# Patient Record
Sex: FEMALE | Race: WHITE | NOT HISPANIC OR LATINO | Employment: OTHER | URBAN - METROPOLITAN AREA
[De-identification: names, ages, dates, MRNs, and addresses within clinical notes are randomized per-mention and may not be internally consistent; named-entity substitution may affect disease eponyms.]

---

## 2017-02-21 ENCOUNTER — CONVERSION ENCOUNTER (OUTPATIENT)
Dept: FAMILY MEDICINE CLINIC | Facility: CLINIC | Age: 57
End: 2017-02-21

## 2017-02-21 LAB
ALBUMIN SERPL-MCNC: 4.5 G/DL (ref 3.6–5.1)
ALBUMIN/GLOB SERPL: ABNORMAL {RATIO} (ref 1–2.5)
ALP SERPL-CCNC: 73 UNITS/L (ref 33–130)
ALT SERPL-CCNC: 12 UNITS/L (ref 6–29)
ANA SER QL IA: POSITIVE
AST SERPL-CCNC: 17 UNITS/L (ref 10–35)
BILIRUB SERPL-MCNC: 0.6 MG/DL (ref 0.2–1.2)
BILIRUB UR QL STRIP: NEGATIVE
BUN SERPL-MCNC: 16 MG/DL (ref 7–25)
BUN/CREAT SERPL: ABNORMAL (ref 6–22)
C3 SERPL-MCNC: 114 MG/DL (ref 90–180)
C4 SERPL-MCNC: 19 MG/DL (ref 16–47)
CALCIUM SERPL-MCNC: 9.4 MG/DL (ref 8.6–10.4)
CHLORIDE SERPL-SCNC: 103 MMOL/L (ref 98–110)
CK SERPL-CCNC: 249 UNITS/L (ref 29–143)
CO2 CONTENT VENOUS: 31 MMOL/L (ref 20–31)
COLOR UR: YELLOW
CONV BACTERIA IN URINE MICRO: ABNORMAL /HPF
CONV HYALINE CASTS IN URINE MICRO: ABNORMAL
CONV PROTEIN IN URINE BY AUTOMATED TEST STRIP: NEGATIVE
CONV TOTAL PROTEIN: 7 G/DL (ref 6.1–8.1)
CREAT UR-MCNC: 0.78 MG/DL (ref 0.5–1.05)
CRP SERPL-MCNC: 0.38 MG/DL
CYCLIC CITRULLINATED PEPTIDE ANTIBODY: ABNORMAL
DSDNA AB SER-ACNC: <1 [IU]/ML
ERYTHROCYTE [DISTWIDTH] IN BLOOD BY AUTOMATED COUNT: 15.9 % (ref 11–15)
ERYTHROCYTE [SEDIMENTATION RATE] IN BLOOD BY WESTERGREN METHOD: 17 MM/HR
GLOBULIN UR ELPH-MCNC: ABNORMAL G/DL (ref 1.9–3.7)
GLUCOSE SERPL-MCNC: 83 MG/DL (ref 65–99)
GLUCOSE UR QL: NEGATIVE G/DL
HCT VFR BLD AUTO: 39.3 % (ref 35–45)
HGB BLD-MCNC: 13.2 G/DL (ref 11.7–15.5)
HGB UR QL STRIP: NEGATIVE
KETONES UR QL STRIP: NEGATIVE
LEUKOCYTE ESTERASE UR QL STRIP: ABNORMAL
MCH RBC QN AUTO: 33.9 PG (ref 27–33)
MCHC RBC AUTO-ENTMCNC: ABNORMAL % (ref 32–36)
MCV RBC AUTO: 101 FL (ref 80–100)
NITRITE UR QL STRIP: NEGATIVE
PH UR STRIP.AUTO: 6 [PH] (ref 5–8)
PLATELET # BLD AUTO: ABNORMAL 10*3/MM3 (ref 140–400)
PMV BLD AUTO: 11.1 FL (ref 7.5–12.5)
POTASSIUM SERPL-SCNC: 4 MMOL/L (ref 3.5–5.3)
RBC # BLD AUTO: ABNORMAL 10*6/MM3 (ref 3.8–5.1)
RBC #/AREA URNS HPF: ABNORMAL /[HPF]
SODIUM SERPL-SCNC: 141 MMOL/L (ref 135–146)
SP GR UR: 1.01 (ref 1–1.03)
SQUAMOUS #/AREA URNS HPF: ABNORMAL /HPF
WBC # BLD AUTO: ABNORMAL K/UL (ref 3.8–10.8)
WBC #/AREA URNS HPF: ABNORMAL CELLS/HPF

## 2017-06-02 LAB
ALBUMIN SERPL-MCNC: 4.2 G/DL (ref 3.6–5.1)
ALBUMIN/GLOB SERPL: ABNORMAL {RATIO} (ref 1–2.5)
ALDOLASE SERPL-CCNC: 6.2 UNITS/L
ALP SERPL-CCNC: 71 UNITS/L (ref 33–130)
ALT SERPL-CCNC: 15 UNITS/L (ref 6–29)
AST SERPL-CCNC: 18 UNITS/L (ref 10–35)
BILIRUB SERPL-MCNC: 0.4 MG/DL (ref 0.2–1.2)
BILIRUB UR QL STRIP: NEGATIVE
BUN SERPL-MCNC: 14 MG/DL (ref 7–25)
BUN/CREAT SERPL: ABNORMAL (ref 6–22)
CALCIUM SERPL-MCNC: 9.5 MG/DL (ref 8.6–10.4)
CHLORIDE SERPL-SCNC: 107 MMOL/L (ref 98–110)
CK SERPL-CCNC: 197 UNITS/L (ref 29–143)
CO2 CONTENT VENOUS: 28 MMOL/L (ref 20–31)
COLOR UR: ABNORMAL
CONV BACTERIA IN URINE MICRO: ABNORMAL /HPF
CONV HYALINE CASTS IN URINE MICRO: ABNORMAL
CONV PROTEIN IN URINE BY AUTOMATED TEST STRIP: NEGATIVE
CONV TOTAL PROTEIN: 6.6 G/DL (ref 6.1–8.1)
CREAT UR-MCNC: 0.72 MG/DL (ref 0.5–1.05)
ERYTHROCYTE [DISTWIDTH] IN BLOOD BY AUTOMATED COUNT: 13.8 % (ref 11–15)
ERYTHROCYTE [SEDIMENTATION RATE] IN BLOOD BY WESTERGREN METHOD: 19 MM/HR
GLOBULIN UR ELPH-MCNC: ABNORMAL G/DL (ref 1.9–3.7)
GLUCOSE SERPL-MCNC: 100 MG/DL (ref 65–99)
GLUCOSE UR QL: NEGATIVE G/DL
HCT VFR BLD AUTO: 38.2 % (ref 35–45)
HGB BLD-MCNC: 12.8 G/DL (ref 11.7–15.5)
HGB UR QL STRIP: NEGATIVE
KETONES UR QL STRIP: NEGATIVE
LEUKOCYTE ESTERASE UR QL STRIP: ABNORMAL
MCH RBC QN AUTO: 33.6 PG (ref 27–33)
MCHC RBC AUTO-ENTMCNC: ABNORMAL % (ref 32–36)
MCV RBC AUTO: 100.3 FL (ref 80–100)
NITRITE UR QL STRIP: NEGATIVE
PH UR STRIP.AUTO: 6.5 [PH] (ref 5–8)
PLATELET # BLD AUTO: ABNORMAL 10*3/MM3 (ref 140–400)
PMV BLD AUTO: 12.5 FL (ref 7.5–12.5)
POTASSIUM SERPL-SCNC: 4.6 MMOL/L (ref 3.5–5.3)
RBC # BLD AUTO: ABNORMAL 10*6/MM3 (ref 3.8–5.1)
RBC #/AREA URNS HPF: ABNORMAL /[HPF]
SODIUM SERPL-SCNC: 140 MMOL/L (ref 135–146)
SP GR UR: 1.02 (ref 1–1.03)
SQUAMOUS #/AREA URNS HPF: ABNORMAL /HPF
TSH SERPL-ACNC: 0.57 MICROINTL UNITS/ML
WBC # BLD AUTO: ABNORMAL K/UL (ref 3.8–10.8)
WBC #/AREA URNS HPF: ABNORMAL CELLS/HPF

## 2017-11-03 LAB
ALBUMIN SERPL-MCNC: 4.3 G/DL (ref 3.6–5.1)
ALBUMIN/GLOB SERPL: ABNORMAL {RATIO} (ref 1–2.5)
ALDOLASE SERPL-CCNC: 4.1 UNITS/L
ALP SERPL-CCNC: 77 UNITS/L (ref 33–130)
ALT SERPL-CCNC: 20 UNITS/L (ref 6–29)
AST SERPL-CCNC: 19 UNITS/L (ref 10–35)
BASOPHILS # BLD AUTO: ABNORMAL 10*3/MM3 (ref 0–200)
BASOPHILS NFR BLD AUTO: 0.9 %
BILIRUB SERPL-MCNC: 0.4 MG/DL (ref 0.2–1.2)
BILIRUB UR QL STRIP: NEGATIVE
BUN SERPL-MCNC: 10 MG/DL (ref 7–25)
BUN/CREAT SERPL: ABNORMAL (ref 6–22)
C3 SERPL-MCNC: 150 MG/DL (ref 90–180)
C4 SERPL-MCNC: 25 MG/DL (ref 16–47)
CALCIUM SERPL-MCNC: 9.6 MG/DL (ref 8.6–10.4)
CHLORIDE SERPL-SCNC: 105 MMOL/L (ref 98–110)
CK SERPL-CCNC: 203 UNITS/L (ref 29–143)
CO2 CONTENT VENOUS: 31 MMOL/L (ref 20–31)
COLOR UR: ABNORMAL
CONV BACTERIA IN URINE MICRO: ABNORMAL /HPF
CONV CALCIUM OXALATE CRYSTALS /HPF IN URINE SEDIMENT BY MICROSCOPY: ABNORMAL
CONV HYALINE CASTS IN URINE MICRO: ABNORMAL
CONV NEUTROPHILS/100 LEUKOCYTES IN BODY FLUID BY MANUAL COUNT: 60.5 %
CONV PROTEIN IN URINE BY AUTOMATED TEST STRIP: ABNORMAL
CONV TOTAL PROTEIN: 6.7 G/DL (ref 6.1–8.1)
CREAT UR-MCNC: 0.77 MG/DL (ref 0.5–1.05)
CRP SERPL-MCNC: 0.39 MG/DL
EOSINOPHIL # BLD AUTO: 2.8 %
EOSINOPHIL # BLD AUTO: ABNORMAL 10*3/MM3 (ref 15–500)
ERYTHROCYTE [DISTWIDTH] IN BLOOD BY AUTOMATED COUNT: 12.7 % (ref 11–15)
ERYTHROCYTE [SEDIMENTATION RATE] IN BLOOD BY WESTERGREN METHOD: 29 MM/HR
GLOBULIN UR ELPH-MCNC: ABNORMAL G/DL (ref 1.9–3.7)
GLUCOSE SERPL-MCNC: 94 MG/DL (ref 65–99)
GLUCOSE UR QL: NEGATIVE G/DL
HCT VFR BLD AUTO: 39.3 % (ref 35–45)
HGB BLD-MCNC: 13.4 G/DL (ref 11.7–15.5)
HGB UR QL STRIP: NEGATIVE
KETONES UR QL STRIP: ABNORMAL
LEUKOCYTE ESTERASE UR QL STRIP: ABNORMAL
LYMPHOCYTES # BLD AUTO: ABNORMAL 10*3/MM3 (ref 850–3900)
LYMPHOCYTES NFR BLD AUTO: 30.2 %
MCH RBC QN AUTO: 33.8 PG (ref 27–33)
MCHC RBC AUTO-ENTMCNC: ABNORMAL % (ref 32–36)
MCV RBC AUTO: 99.2 FL (ref 80–100)
MONOCYTES # BLD AUTO: ABNORMAL 10*3/MICROLITER (ref 200–950)
MONOCYTES NFR BLD AUTO: 5.6 %
NEUTROPHILS # BLD AUTO: ABNORMAL 10*3/MM3 (ref 1500–7800)
NITRITE UR QL STRIP: NEGATIVE
PH UR STRIP.AUTO: 6 [PH] (ref 5–8)
PLATELET # BLD AUTO: ABNORMAL 10*3/MM3 (ref 140–400)
PMV BLD AUTO: 12.1 FL (ref 7.5–12.5)
POTASSIUM SERPL-SCNC: 4.9 MMOL/L (ref 3.5–5.3)
RBC # BLD AUTO: ABNORMAL 10*6/MM3 (ref 3.8–5.1)
RBC #/AREA URNS HPF: ABNORMAL /[HPF]
SODIUM SERPL-SCNC: 141 MMOL/L (ref 135–146)
SP GR UR: 1.03 (ref 1–1.03)
SQUAMOUS #/AREA URNS HPF: ABNORMAL /HPF
SQUAMOUS SPT QL MICRO: ABNORMAL
WBC # BLD AUTO: ABNORMAL K/UL (ref 3.8–10.8)
WBC #/AREA URNS HPF: ABNORMAL CELLS/HPF

## 2018-03-16 ENCOUNTER — HOSPITAL ENCOUNTER (OUTPATIENT)
Dept: RHEUMATOLOGY | Facility: CLINIC | Age: 58
Discharge: HOME | End: 2018-03-16
Attending: INTERNAL MEDICINE | Admitting: INTERNAL MEDICINE

## 2018-05-07 LAB
ALBUMIN SERPL-MCNC: 4.3 G/DL (ref 3.6–5.1)
ALBUMIN/GLOB SERPL: ABNORMAL {RATIO} (ref 1–2.5)
ALP SERPL-CCNC: 84 UNITS/L (ref 33–130)
ALT SERPL-CCNC: 27 UNITS/L (ref 6–29)
AST SERPL-CCNC: 21 UNITS/L (ref 10–35)
BASOPHILS # BLD AUTO: ABNORMAL 10*3/MM3 (ref 0–200)
BASOPHILS NFR BLD AUTO: 0.9 %
BILIRUB SERPL-MCNC: 0.7 MG/DL (ref 0.2–1.2)
BUN SERPL-MCNC: 11 MG/DL (ref 7–25)
BUN/CREAT SERPL: ABNORMAL (ref 6–22)
CALCIUM SERPL-MCNC: 9.4 MG/DL (ref 8.6–10.4)
CHLORIDE SERPL-SCNC: 100 MMOL/L (ref 98–110)
CO2 CONTENT VENOUS: 32 MMOL/L (ref 20–31)
COLOR UR: ABNORMAL
CONV NEUTROPHILS/100 LEUKOCYTES IN BODY FLUID BY MANUAL COUNT: 56.6 %
CONV TOTAL PROTEIN: 6.8 G/DL (ref 6.1–8.1)
CREAT UR-MCNC: 0.74 MG/DL (ref 0.5–1.05)
CRP SERPL-MCNC: 0.47 MG/DL
EOSINOPHIL # BLD AUTO: 3.7 %
EOSINOPHIL # BLD AUTO: ABNORMAL 10*3/MM3 (ref 15–500)
ERYTHROCYTE [DISTWIDTH] IN BLOOD BY AUTOMATED COUNT: 13.6 % (ref 11–15)
ERYTHROCYTE [SEDIMENTATION RATE] IN BLOOD BY WESTERGREN METHOD: 14 MM/HR
GLOBULIN UR ELPH-MCNC: ABNORMAL G/DL (ref 1.9–3.7)
GLUCOSE SERPL-MCNC: 93 MG/DL (ref 65–99)
HCT VFR BLD AUTO: 40.2 % (ref 35–45)
HGB BLD-MCNC: 14.2 G/DL (ref 11.7–15.5)
LYMPHOCYTES # BLD AUTO: ABNORMAL 10*3/MM3 (ref 850–3900)
LYMPHOCYTES NFR BLD AUTO: 31.5 %
MCH RBC QN AUTO: 34.3 PG (ref 27–33)
MCHC RBC AUTO-ENTMCNC: ABNORMAL % (ref 32–36)
MCV RBC AUTO: 97.1 FL (ref 80–100)
MONOCYTES # BLD AUTO: ABNORMAL 10*3/MICROLITER (ref 200–950)
MONOCYTES NFR BLD AUTO: 7.3 %
NEUTROPHILS # BLD AUTO: ABNORMAL 10*3/MM3 (ref 1500–7800)
PLATELET # BLD AUTO: ABNORMAL 10*3/MM3 (ref 140–400)
PMV BLD AUTO: 12.2 FL (ref 7.5–12.5)
POTASSIUM SERPL-SCNC: 3.5 MMOL/L (ref 3.5–5.3)
RBC # BLD AUTO: ABNORMAL 10*6/MM3 (ref 3.8–5.1)
SODIUM SERPL-SCNC: 140 MMOL/L (ref 135–146)
WBC # BLD AUTO: ABNORMAL K/UL (ref 3.8–10.8)

## 2018-05-17 LAB — ALDOLASE SERPL-CCNC: 6.7 UNITS/L

## 2018-07-17 ENCOUNTER — HOSPITAL ENCOUNTER (OUTPATIENT)
Dept: LAB | Facility: HOSPITAL | Age: 58
Discharge: HOME | End: 2018-07-17
Attending: NURSE PRACTITIONER | Admitting: NURSE PRACTITIONER

## 2018-07-17 LAB
ALBUMIN SERPL-MCNC: 3.7 G/DL (ref 3.5–4.8)
ALBUMIN/GLOB SERPL: 1.4 {RATIO} (ref 1–1.7)
ALP SERPL-CCNC: 62 IU/L (ref 32–91)
ALT SERPL-CCNC: 24 IU/L (ref 14–54)
ANION GAP SERPL CALC-SCNC: 12.5 MMOL/L (ref 10–20)
AST SERPL-CCNC: 26 IU/L (ref 15–41)
BASOPHILS # BLD AUTO: 0.1 10*3/UL (ref 0–0.2)
BASOPHILS NFR BLD AUTO: 1 % (ref 0–2)
BILIRUB SERPL-MCNC: 0.2 MG/DL (ref 0.3–1.2)
BUN SERPL-MCNC: 9 MG/DL (ref 8–20)
BUN/CREAT SERPL: 12.9 (ref 5.4–26.2)
CALCIUM SERPL-MCNC: 9.3 MG/DL (ref 8.9–10.3)
CHLORIDE SERPL-SCNC: 102 MMOL/L (ref 101–111)
CONV CO2: 28 MMOL/L (ref 22–32)
CONV TOTAL PROTEIN: 6.4 G/DL (ref 6.1–7.9)
CREAT UR-MCNC: 0.7 MG/DL (ref 0.4–1)
CRP SERPL-MCNC: 0.38 MG/DL (ref 0–0.7)
DIFFERENTIAL METHOD BLD: (no result)
EOSINOPHIL # BLD AUTO: 0.3 10*3/UL (ref 0–0.3)
EOSINOPHIL # BLD AUTO: 4 % (ref 0–3)
ERYTHROCYTE [DISTWIDTH] IN BLOOD BY AUTOMATED COUNT: 14.2 % (ref 11.5–14.5)
ERYTHROCYTE [SEDIMENTATION RATE] IN BLOOD BY WESTERGREN METHOD: 31 MM/HR (ref 0–30)
GLOBULIN UR ELPH-MCNC: 2.7 G/DL (ref 2.5–3.8)
GLUCOSE SERPL-MCNC: 90 MG/DL (ref 65–99)
HCT VFR BLD AUTO: 39.2 % (ref 35–49)
HGB BLD-MCNC: 13.1 G/DL (ref 12–15)
LYMPHOCYTES # BLD AUTO: 2 10*3/UL (ref 0.8–4.8)
LYMPHOCYTES NFR BLD AUTO: 27 % (ref 18–42)
MCH RBC QN AUTO: 33.7 PG (ref 26–32)
MCHC RBC AUTO-ENTMCNC: 33.5 G/DL (ref 32–36)
MCV RBC AUTO: 100.6 FL (ref 80–94)
MONOCYTES # BLD AUTO: 0.5 10*3/UL (ref 0.1–1.3)
MONOCYTES NFR BLD AUTO: 7 % (ref 2–11)
NEUTROPHILS # BLD AUTO: 4.6 10*3/UL (ref 2.3–8.6)
NEUTROPHILS NFR BLD AUTO: 61 % (ref 50–75)
NRBC BLD AUTO-RTO: 0 /100{WBCS}
NRBC/RBC NFR BLD MANUAL: 0 10*3/UL
PLATELET # BLD AUTO: 166 10*3/UL (ref 150–450)
PMV BLD AUTO: 11.4 FL (ref 7.4–10.4)
POTASSIUM SERPL-SCNC: 3.5 MMOL/L (ref 3.6–5.1)
RBC # BLD AUTO: 3.89 10*6/UL (ref 4–5.4)
SODIUM SERPL-SCNC: 139 MMOL/L (ref 136–144)
WBC # BLD AUTO: 7.5 10*3/UL (ref 4.5–11.5)

## 2018-11-05 LAB
CALCIUM SERPL-MCNC: 9.9 MG/DL (ref 8.6–10.4)
INTACT PTH: 25 PG/ML (ref 14–64)

## 2019-02-12 LAB
ALBUMIN SERPL-MCNC: 4.1 G/DL (ref 3.6–5.1)
ALBUMIN/GLOB SERPL: ABNORMAL {RATIO} (ref 1–2.5)
ALP SERPL-CCNC: 57 UNITS/L (ref 33–130)
ALT SERPL-CCNC: 16 UNITS/L (ref 6–29)
AST SERPL-CCNC: 18 UNITS/L (ref 10–35)
BASOPHILS # BLD AUTO: ABNORMAL 10*3/MM3 (ref 0–200)
BASOPHILS NFR BLD AUTO: 1.1 %
BILIRUB SERPL-MCNC: 0.5 MG/DL (ref 0.2–1.2)
BILIRUB UR QL STRIP: NEGATIVE
BUN SERPL-MCNC: 9 MG/DL (ref 7–25)
BUN/CREAT SERPL: ABNORMAL (ref 6–22)
CALCIUM SERPL-MCNC: 9.3 MG/DL (ref 8.6–10.4)
CHLORIDE SERPL-SCNC: 110 MMOL/L (ref 98–110)
CO2 CONTENT VENOUS: 28 MMOL/L (ref 20–32)
COLOR UR: YELLOW
CONV BACTERIA IN URINE MICRO: ABNORMAL /HPF
CONV HYALINE CASTS IN URINE MICRO: ABNORMAL
CONV NEUTROPHILS/100 LEUKOCYTES IN BODY FLUID BY MANUAL COUNT: 65.6 %
CONV PROTEIN IN URINE BY AUTOMATED TEST STRIP: NEGATIVE
CONV TOTAL PROTEIN: 6.3 G/DL (ref 6.1–8.1)
CREAT UR-MCNC: 0.67 MG/DL (ref 0.5–1.05)
CRP SERPL-MCNC: 0.15 MG/DL
EOSINOPHIL # BLD AUTO: 1.8 %
EOSINOPHIL # BLD AUTO: ABNORMAL 10*3/MM3 (ref 15–500)
ERYTHROCYTE [DISTWIDTH] IN BLOOD BY AUTOMATED COUNT: 13 % (ref 11–15)
ERYTHROCYTE [SEDIMENTATION RATE] IN BLOOD BY WESTERGREN METHOD: 9 MM/HR
GLOBULIN UR ELPH-MCNC: ABNORMAL G/DL (ref 1.9–3.7)
GLUCOSE SERPL-MCNC: 89 MG/DL (ref 65–139)
GLUCOSE UR QL: NEGATIVE G/DL
HCT VFR BLD AUTO: 40 % (ref 35–45)
HGB BLD-MCNC: 13.5 G/DL (ref 11.7–15.5)
HGB UR QL STRIP: NEGATIVE
KETONES UR QL STRIP: NEGATIVE
LEUKOCYTE ESTERASE UR QL STRIP: ABNORMAL
LYMPHOCYTES # BLD AUTO: ABNORMAL 10*3/MM3 (ref 850–3900)
LYMPHOCYTES NFR BLD AUTO: 26.7 %
MCH RBC QN AUTO: 33.6 PG (ref 27–33)
MCHC RBC AUTO-ENTMCNC: ABNORMAL % (ref 32–36)
MCV RBC AUTO: 99.5 FL (ref 80–100)
MONOCYTES # BLD AUTO: ABNORMAL 10*3/MICROLITER (ref 200–950)
MONOCYTES NFR BLD AUTO: 4.8 %
NEUTROPHILS # BLD AUTO: ABNORMAL 10*3/MM3 (ref 1500–7800)
NITRITE UR QL STRIP: POSITIVE
PH UR STRIP.AUTO: 7 [PH] (ref 5–8)
PLATELET # BLD AUTO: ABNORMAL 10*3/MM3 (ref 140–400)
PMV BLD AUTO: 13.3 FL (ref 7.5–12.5)
POTASSIUM SERPL-SCNC: 4 MMOL/L (ref 3.5–5.3)
RBC # BLD AUTO: ABNORMAL 10*6/MM3 (ref 3.8–5.1)
RBC #/AREA URNS HPF: ABNORMAL /[HPF]
SODIUM SERPL-SCNC: 143 MMOL/L (ref 135–146)
SP GR UR: 1.02 (ref 1–1.03)
SQUAMOUS #/AREA URNS HPF: ABNORMAL /HPF
WBC # BLD AUTO: ABNORMAL K/UL (ref 3.8–10.8)
WBC #/AREA URNS HPF: ABNORMAL CELLS/HPF

## 2019-02-21 ENCOUNTER — HOSPITAL ENCOUNTER (OUTPATIENT)
Dept: LAB | Facility: HOSPITAL | Age: 59
Discharge: HOME | End: 2019-02-21
Attending: INTERNAL MEDICINE | Admitting: INTERNAL MEDICINE

## 2019-02-21 LAB
AMPICILLIN SUSC ISLT: ABNORMAL
AZTREONAM SUSC ISLT: ABNORMAL
BACTERIA ISLT: ABNORMAL
BACTERIA SPEC AEROBE CULT: ABNORMAL
BILIRUB UR QL STRIP: NEGATIVE MG/DL
CASTS URNS QL MICRO: ABNORMAL /[LPF]
CEFAZOLIN SUSC ISLT: ABNORMAL
CEFEPIME SUSC ISLT: ABNORMAL
CEFTRIAXONE SUSC ISLT: ABNORMAL
CIPROFLOXACIN SUSC ISLT: ABNORMAL
COLONY COUNT: ABNORMAL
COLOR UR: YELLOW
CONV BACTERIA IN URINE MICRO: ABNORMAL
CONV CLARITY OF URINE: ABNORMAL
CONV HYALINE CASTS IN URINE MICRO: 1 /[LPF] (ref 0–5)
CONV PROTEIN IN URINE BY AUTOMATED TEST STRIP: NEGATIVE MG/DL
CONV SMALL ROUND CELLS: ABNORMAL /[HPF]
CONV UROBILINOGEN IN URINE BY AUTOMATED TEST STRIP: 0.2 MG/DL
CULTURE INDICATED?: ABNORMAL
GLUCOSE UR QL: NEGATIVE MG/DL
HGB UR QL STRIP: NEGATIVE
KETONES UR QL STRIP: NEGATIVE MG/DL
LEUKOCYTE ESTERASE UR QL STRIP: NEGATIVE
LEVOFLOXACIN SUSC ISLT: ABNORMAL
Lab: ABNORMAL
MEROPENEM SUSC ISLT: ABNORMAL
MICRO REPORT STATUS: ABNORMAL
NITRITE UR QL STRIP: POSITIVE
NITROFURANTOIN SUSC ISLT: ABNORMAL
PH UR STRIP.AUTO: 6 [PH] (ref 4.5–8)
PIP+TAZO SUSC ISLT: ABNORMAL
RBC #/AREA URNS HPF: 1 /[HPF] (ref 0–3)
SP GR UR: 1.02 (ref 1–1.03)
SPECIMEN SOURCE: ABNORMAL
SPERM URNS QL MICRO: ABNORMAL /[HPF]
SQUAMOUS SPT QL MICRO: 2 /[HPF] (ref 0–5)
SUSC METH SPEC: ABNORMAL
TETRACYCLINE SUSC ISLT: ABNORMAL
TOBRAMYCIN SUSC ISLT: ABNORMAL
TRIMETHOPRIM/SULFA: ABNORMAL
UNIDENT CRYS URNS QL MICRO: ABNORMAL /[HPF]
WBC #/AREA URNS HPF: 1 /[HPF] (ref 0–5)
YEAST SPEC QL WET PREP: ABNORMAL /[HPF]

## 2019-03-12 ENCOUNTER — CONVERSION ENCOUNTER (OUTPATIENT)
Dept: FAMILY MEDICINE CLINIC | Facility: CLINIC | Age: 59
End: 2019-03-12

## 2019-04-26 ENCOUNTER — OFFICE (OUTPATIENT)
Dept: URBAN - METROPOLITAN AREA CLINIC 64 | Facility: CLINIC | Age: 59
End: 2019-04-26

## 2019-04-26 VITALS
HEIGHT: 61 IN | SYSTOLIC BLOOD PRESSURE: 111 MMHG | DIASTOLIC BLOOD PRESSURE: 70 MMHG | WEIGHT: 133 LBS | HEART RATE: 81 BPM

## 2019-04-26 DIAGNOSIS — R10.84 GENERALIZED ABDOMINAL PAIN: ICD-10-CM

## 2019-04-26 DIAGNOSIS — K59.00 CONSTIPATION, UNSPECIFIED: ICD-10-CM

## 2019-04-26 DIAGNOSIS — K30 FUNCTIONAL DYSPEPSIA: ICD-10-CM

## 2019-04-26 DIAGNOSIS — R63.4 ABNORMAL WEIGHT LOSS: ICD-10-CM

## 2019-04-26 PROCEDURE — 99204 OFFICE O/P NEW MOD 45 MIN: CPT | Performed by: INTERNAL MEDICINE

## 2019-04-26 RX ORDER — LINACLOTIDE 290 UG/1
290 CAPSULE, GELATIN COATED ORAL
Qty: 90 | Refills: 3 | Status: COMPLETED
Start: 2019-04-26 | End: 2019-05-23

## 2019-04-26 RX ORDER — OMEPRAZOLE 20 MG/1
20 CAPSULE, DELAYED RELEASE ORAL
Qty: 90 | Refills: 3 | Status: ACTIVE
Start: 2019-04-26

## 2019-05-10 ENCOUNTER — CONVERSION ENCOUNTER (OUTPATIENT)
Dept: FAMILY MEDICINE CLINIC | Facility: CLINIC | Age: 59
End: 2019-05-10

## 2019-05-10 LAB
ALBUMIN SERPL-MCNC: 4.7 G/DL (ref 3.6–5.1)
ALBUMIN/GLOB SERPL: ABNORMAL {RATIO} (ref 1–2.5)
ALP SERPL-CCNC: 75 UNITS/L (ref 33–130)
ALT SERPL-CCNC: 16 UNITS/L (ref 6–29)
AST SERPL-CCNC: 19 UNITS/L (ref 10–35)
BACTERIA UR CULT: ABNORMAL
BASOPHILS # BLD AUTO: ABNORMAL 10*3/MM3 (ref 0–200)
BASOPHILS NFR BLD AUTO: 1.1 %
BILIRUB SERPL-MCNC: 0.7 MG/DL (ref 0.2–1.2)
BILIRUB UR QL STRIP: NEGATIVE
BUN SERPL-MCNC: 13 MG/DL (ref 7–25)
BUN/CREAT SERPL: ABNORMAL (ref 6–22)
CALCIUM SERPL-MCNC: 10 MG/DL (ref 8.6–10.4)
CHLORIDE SERPL-SCNC: 98 MMOL/L (ref 98–110)
CO2 CONTENT VENOUS: 30 MMOL/L (ref 20–32)
COLOR UR: YELLOW
CONV BACTERIA IN URINE MICRO: ABNORMAL /HPF
CONV HYALINE CASTS IN URINE MICRO: ABNORMAL
CONV NEUTROPHILS/100 LEUKOCYTES IN BODY FLUID BY MANUAL COUNT: 59.7 %
CONV PROTEIN IN URINE BY AUTOMATED TEST STRIP: NEGATIVE
CONV TOTAL PROTEIN: 7.4 G/DL (ref 6.1–8.1)
CREAT UR-MCNC: 0.84 MG/DL (ref 0.5–1.05)
CRP SERPL-MCNC: 0.31 MG/DL
EOSINOPHIL # BLD AUTO: 2.1 %
EOSINOPHIL # BLD AUTO: ABNORMAL 10*3/MM3 (ref 15–500)
ERYTHROCYTE [DISTWIDTH] IN BLOOD BY AUTOMATED COUNT: 12.8 % (ref 11–15)
ERYTHROCYTE [SEDIMENTATION RATE] IN BLOOD BY WESTERGREN METHOD: 9 MM/HR
GLOBULIN UR ELPH-MCNC: ABNORMAL G/DL (ref 1.9–3.7)
GLUCOSE SERPL-MCNC: 92 MG/DL (ref 65–139)
GLUCOSE UR QL: NEGATIVE G/DL
HCT VFR BLD AUTO: 44 % (ref 35–45)
HGB BLD-MCNC: 15.4 G/DL (ref 11.7–15.5)
HGB UR QL STRIP: NEGATIVE
KETONES UR QL STRIP: NEGATIVE
LYMPHOCYTES # BLD AUTO: ABNORMAL 10*3/MM3 (ref 850–3900)
LYMPHOCYTES NFR BLD AUTO: 28.3 %
MCH RBC QN AUTO: 34.1 PG (ref 27–33)
MCHC RBC AUTO-ENTMCNC: ABNORMAL % (ref 32–36)
MCV RBC AUTO: 97.6 FL (ref 80–100)
MONOCYTES # BLD AUTO: ABNORMAL 10*3/MICROLITER (ref 200–950)
MONOCYTES NFR BLD AUTO: 8.8 %
NEUTROPHILS # BLD AUTO: ABNORMAL 10*3/MM3 (ref 1500–7800)
PH UR STRIP.AUTO: 6.5 [PH] (ref 5–8)
PLATELET # BLD AUTO: ABNORMAL 10*3/MM3 (ref 140–400)
PMV BLD AUTO: 12.4 FL (ref 7.5–12.5)
POTASSIUM SERPL-SCNC: 3.4 MMOL/L (ref 3.5–5.3)
RBC # BLD AUTO: ABNORMAL 10*6/MM3 (ref 3.8–5.1)
RBC #/AREA URNS HPF: ABNORMAL /[HPF]
SODIUM SERPL-SCNC: 139 MMOL/L (ref 135–146)
SP GR UR: 1.01 (ref 1–1.03)
SQUAMOUS #/AREA URNS HPF: ABNORMAL /HPF
WBC # BLD AUTO: ABNORMAL K/UL (ref 3.8–10.8)
WBC #/AREA URNS HPF: ABNORMAL CELLS/HPF

## 2019-05-23 ENCOUNTER — OFFICE (OUTPATIENT)
Dept: URBAN - METROPOLITAN AREA CLINIC 64 | Facility: CLINIC | Age: 59
End: 2019-05-23
Payer: COMMERCIAL

## 2019-05-23 VITALS — WEIGHT: 129 LBS | HEIGHT: 61 IN

## 2019-05-23 DIAGNOSIS — R63.4 ABNORMAL WEIGHT LOSS: ICD-10-CM

## 2019-05-23 DIAGNOSIS — K30 FUNCTIONAL DYSPEPSIA: ICD-10-CM

## 2019-05-23 DIAGNOSIS — R10.84 GENERALIZED ABDOMINAL PAIN: ICD-10-CM

## 2019-05-23 DIAGNOSIS — K59.00 CONSTIPATION, UNSPECIFIED: ICD-10-CM

## 2019-05-23 DIAGNOSIS — R13.10 DYSPHAGIA, UNSPECIFIED: ICD-10-CM

## 2019-05-23 PROCEDURE — 99213 OFFICE O/P EST LOW 20 MIN: CPT | Performed by: NURSE PRACTITIONER

## 2019-06-03 VITALS
SYSTOLIC BLOOD PRESSURE: 141 MMHG | HEART RATE: 96 BPM | BODY MASS INDEX: 24.51 KG/M2 | OXYGEN SATURATION: 96 % | DIASTOLIC BLOOD PRESSURE: 86 MMHG | WEIGHT: 134 LBS

## 2019-06-12 ENCOUNTER — HOSPITAL ENCOUNTER (OUTPATIENT)
Dept: CT IMAGING | Facility: HOSPITAL | Age: 59
Discharge: HOME | End: 2019-06-12
Attending: UROLOGY | Admitting: UROLOGY

## 2019-06-12 LAB — CREAT BLDA-MCNC: 1.1 MG/DL (ref 0.6–1.3)

## 2019-07-01 RX ORDER — ALPRAZOLAM 1 MG/1
TABLET ORAL
Qty: 100 TABLET | Refills: 1 | Status: SHIPPED | OUTPATIENT
Start: 2019-07-01 | End: 2019-09-03 | Stop reason: SINTOL

## 2019-07-01 RX ORDER — TRAMADOL HYDROCHLORIDE 50 MG/1
TABLET ORAL
Qty: 135 TABLET | Refills: 1 | Status: SHIPPED | OUTPATIENT
Start: 2019-07-01 | End: 2019-08-27 | Stop reason: SDUPTHER

## 2019-07-02 ENCOUNTER — TELEPHONE (OUTPATIENT)
Dept: FAMILY MEDICINE CLINIC | Facility: CLINIC | Age: 59
End: 2019-07-02

## 2019-07-02 RX ORDER — DOXYCYCLINE 100 MG/1
100 TABLET ORAL 2 TIMES DAILY
Qty: 20 TABLET | Refills: 0 | Status: SHIPPED | OUTPATIENT
Start: 2019-07-02 | End: 2019-07-12

## 2019-07-02 NOTE — TELEPHONE ENCOUNTER
Pt. Called said she thinks she has what Shortie had, chest congestion, COPD, temp. 101, coughing up green stuff.

## 2019-07-09 ENCOUNTER — ON CAMPUS - OUTPATIENT (OUTPATIENT)
Dept: URBAN - NONMETROPOLITAN AREA HOSPITAL 6 | Facility: HOSPITAL | Age: 59
End: 2019-07-09
Payer: COMMERCIAL

## 2019-07-09 DIAGNOSIS — T18.2XXA FOREIGN BODY IN STOMACH, INITIAL ENCOUNTER: ICD-10-CM

## 2019-07-09 DIAGNOSIS — R13.10 DYSPHAGIA, UNSPECIFIED: ICD-10-CM

## 2019-07-09 DIAGNOSIS — R11.0 NAUSEA: ICD-10-CM

## 2019-07-09 DIAGNOSIS — K51.40 INFLAMMATORY POLYPS OF COLON WITHOUT COMPLICATIONS: ICD-10-CM

## 2019-07-09 DIAGNOSIS — R63.4 ABNORMAL WEIGHT LOSS: ICD-10-CM

## 2019-07-09 DIAGNOSIS — K29.50 UNSPECIFIED CHRONIC GASTRITIS WITHOUT BLEEDING: ICD-10-CM

## 2019-07-09 DIAGNOSIS — Z12.11 ENCOUNTER FOR SCREENING FOR MALIGNANT NEOPLASM OF COLON: ICD-10-CM

## 2019-07-09 PROCEDURE — 43239 EGD BIOPSY SINGLE/MULTIPLE: CPT | Mod: 59 | Performed by: INTERNAL MEDICINE

## 2019-07-09 PROCEDURE — 45385 COLONOSCOPY W/LESION REMOVAL: CPT | Mod: PT | Performed by: INTERNAL MEDICINE

## 2019-07-16 ENCOUNTER — TELEPHONE (OUTPATIENT)
Dept: FAMILY MEDICINE CLINIC | Facility: CLINIC | Age: 59
End: 2019-07-16

## 2019-07-29 ENCOUNTER — TELEPHONE (OUTPATIENT)
Dept: RHEUMATOLOGY | Facility: CLINIC | Age: 59
End: 2019-07-29

## 2019-07-29 RX ORDER — CLONAZEPAM 1 MG/1
TABLET ORAL
Qty: 90 TABLET | Refills: 0 | Status: SHIPPED | OUTPATIENT
Start: 2019-07-29 | End: 2019-08-27 | Stop reason: SDUPTHER

## 2019-07-29 NOTE — TELEPHONE ENCOUNTER
Patient states that Dr Reyes told her that the Leflunomide is causing her UTI- She has discontinued this medication on 06/18/19. She is still having UTI and intestinal problems. Is there a drug to counteract this medication?

## 2019-07-30 NOTE — TELEPHONE ENCOUNTER
Please let the patient know that there is some medication that takes the medication removes the medication from her system but I do not think that this is indicated for her at this time unless she is having severe infections.  Discontinuing the medication should be enough for now.  Let me know if she has any further questions or if Dr. Reyes believes that medication is necessary to remove leflunomide from her system.  I spoke with Faby Wan about this issue and gave her the name of the medication, but this should be given only if there is a strong indication.

## 2019-08-01 ENCOUNTER — OFFICE (OUTPATIENT)
Dept: URBAN - METROPOLITAN AREA CLINIC 64 | Facility: CLINIC | Age: 59
End: 2019-08-01

## 2019-08-01 VITALS
HEIGHT: 61 IN | WEIGHT: 135 LBS | SYSTOLIC BLOOD PRESSURE: 108 MMHG | HEART RATE: 72 BPM | DIASTOLIC BLOOD PRESSURE: 59 MMHG

## 2019-08-01 DIAGNOSIS — R13.10 DYSPHAGIA, UNSPECIFIED: ICD-10-CM

## 2019-08-01 DIAGNOSIS — R11.0 NAUSEA: ICD-10-CM

## 2019-08-01 DIAGNOSIS — R10.13 EPIGASTRIC PAIN: ICD-10-CM

## 2019-08-01 DIAGNOSIS — R63.4 ABNORMAL WEIGHT LOSS: ICD-10-CM

## 2019-08-01 PROCEDURE — 99213 OFFICE O/P EST LOW 20 MIN: CPT | Performed by: INTERNAL MEDICINE

## 2019-08-20 DIAGNOSIS — N64.4 BREAST PAIN: Primary | ICD-10-CM

## 2019-08-27 RX ORDER — TRAMADOL HYDROCHLORIDE 50 MG/1
TABLET ORAL
Qty: 135 TABLET | Refills: 0 | Status: SHIPPED | OUTPATIENT
Start: 2019-08-27 | End: 2019-09-24 | Stop reason: SDUPTHER

## 2019-08-27 RX ORDER — CLONAZEPAM 1 MG/1
TABLET ORAL
Qty: 90 TABLET | Refills: 0 | Status: SHIPPED | OUTPATIENT
Start: 2019-08-27 | End: 2019-09-03

## 2019-09-03 ENCOUNTER — OFFICE VISIT (OUTPATIENT)
Dept: FAMILY MEDICINE CLINIC | Facility: CLINIC | Age: 59
End: 2019-09-03

## 2019-09-03 VITALS
HEIGHT: 62 IN | BODY MASS INDEX: 23.19 KG/M2 | OXYGEN SATURATION: 99 % | TEMPERATURE: 98.9 F | WEIGHT: 126 LBS | HEART RATE: 99 BPM | DIASTOLIC BLOOD PRESSURE: 79 MMHG | SYSTOLIC BLOOD PRESSURE: 107 MMHG

## 2019-09-03 DIAGNOSIS — F41.9 ANXIETY DISORDER, UNSPECIFIED TYPE: Primary | ICD-10-CM

## 2019-09-03 DIAGNOSIS — I10 ESSENTIAL (PRIMARY) HYPERTENSION: ICD-10-CM

## 2019-09-03 PROBLEM — R51.9 HEADACHE: Status: ACTIVE | Noted: 2019-02-21

## 2019-09-03 PROBLEM — M79.7 FIBROMYALGIA: Status: ACTIVE | Noted: 2017-10-27

## 2019-09-03 PROBLEM — Z79.899 OTHER LONG TERM (CURRENT) DRUG THERAPY: Status: ACTIVE | Noted: 2018-11-14

## 2019-09-03 PROBLEM — J45.909 ASTHMA: Status: ACTIVE | Noted: 2019-09-03

## 2019-09-03 PROBLEM — R53.83 FATIGUE: Status: ACTIVE | Noted: 2018-08-13

## 2019-09-03 PROBLEM — M35.00 SJOGREN'S SYNDROME: Status: ACTIVE | Noted: 2017-02-17

## 2019-09-03 PROBLEM — J44.9 CHRONIC OBSTRUCTIVE PULMONARY DISEASE: Status: ACTIVE | Noted: 2019-09-03

## 2019-09-03 PROBLEM — R00.2 PALPITATIONS: Status: ACTIVE | Noted: 2017-02-23

## 2019-09-03 PROBLEM — R76.8 ELEVATED ANTINUCLEAR ANTIBODY (ANA) LEVEL: Status: ACTIVE | Noted: 2017-02-17

## 2019-09-03 PROBLEM — I34.0 NONRHEUMATIC MITRAL VALVE INSUFFICIENCY: Status: ACTIVE | Noted: 2017-02-13

## 2019-09-03 PROBLEM — E78.5 HYPERLIPIDEMIA: Status: ACTIVE | Noted: 2019-09-03

## 2019-09-03 PROBLEM — G47.00 INSOMNIA: Status: ACTIVE | Noted: 2018-09-19

## 2019-09-03 PROBLEM — G56.03 CARPAL TUNNEL SYNDROME, BILATERAL: Status: ACTIVE | Noted: 2017-02-17

## 2019-09-03 PROBLEM — E55.9 VITAMIN D DEFICIENCY: Status: ACTIVE | Noted: 2017-03-22

## 2019-09-03 PROBLEM — G47.9 SLEEP DISORDER: Status: ACTIVE | Noted: 2017-03-22

## 2019-09-03 LAB
ALBUMIN SERPL-MCNC: 4.2 G/DL (ref 3.5–4.8)
ALBUMIN/GLOB SERPL: 1.5 G/DL (ref 1–1.7)
ALP SERPL-CCNC: 75 U/L (ref 32–91)
ALT SERPL W P-5'-P-CCNC: 21 U/L (ref 14–54)
ANION GAP SERPL CALCULATED.3IONS-SCNC: 21.5 MMOL/L (ref 5–15)
AST SERPL-CCNC: 26 U/L (ref 15–41)
BASOPHILS # BLD AUTO: 0.1 10*3/MM3 (ref 0–0.2)
BASOPHILS NFR BLD AUTO: 0.8 % (ref 0–1.5)
BILIRUB SERPL-MCNC: 0.8 MG/DL (ref 0.3–1.2)
BUN BLD-MCNC: 7 MG/DL (ref 8–20)
BUN/CREAT SERPL: 7 (ref 5.4–26.2)
CALCIUM SPEC-SCNC: 9.6 MG/DL (ref 8.9–10.3)
CHLORIDE SERPL-SCNC: 86 MMOL/L (ref 101–111)
CO2 SERPL-SCNC: 32 MMOL/L (ref 22–32)
CREAT BLD-MCNC: 1 MG/DL (ref 0.4–1)
DEPRECATED RDW RBC AUTO: 48.6 FL (ref 37–54)
EOSINOPHIL # BLD AUTO: 0.1 10*3/MM3 (ref 0–0.4)
EOSINOPHIL NFR BLD AUTO: 0.6 % (ref 0.3–6.2)
ERYTHROCYTE [DISTWIDTH] IN BLOOD BY AUTOMATED COUNT: 13.9 % (ref 12.3–15.4)
GFR SERPL CREATININE-BSD FRML MDRD: 57 ML/MIN/1.73
GLOBULIN UR ELPH-MCNC: 2.8 GM/DL (ref 2.5–3.8)
GLUCOSE BLD-MCNC: 102 MG/DL (ref 65–99)
HCT VFR BLD AUTO: 42.7 % (ref 34–46.6)
HGB BLD-MCNC: 14.7 G/DL (ref 12–15.9)
LYMPHOCYTES # BLD AUTO: 2.3 10*3/MM3 (ref 0.7–3.1)
LYMPHOCYTES NFR BLD AUTO: 25 % (ref 19.6–45.3)
MCH RBC QN AUTO: 34.4 PG (ref 26.6–33)
MCHC RBC AUTO-ENTMCNC: 34.3 G/DL (ref 31.5–35.7)
MCV RBC AUTO: 100.2 FL (ref 79–97)
MONOCYTES # BLD AUTO: 0.5 10*3/MM3 (ref 0.1–0.9)
MONOCYTES NFR BLD AUTO: 5.7 % (ref 5–12)
NEUTROPHILS # BLD AUTO: 6.2 10*3/MM3 (ref 1.7–7)
NEUTROPHILS NFR BLD AUTO: 67.9 % (ref 42.7–76)
NRBC BLD AUTO-RTO: 0.1 /100 WBC (ref 0–0.2)
PLATELET # BLD AUTO: 209 10*3/MM3 (ref 140–450)
PMV BLD AUTO: 10.6 FL (ref 6–12)
POTASSIUM BLD-SCNC: 2.5 MMOL/L (ref 3.6–5.1)
PROT SERPL-MCNC: 7 G/DL (ref 6.1–7.9)
RBC # BLD AUTO: 4.26 10*6/MM3 (ref 3.77–5.28)
SODIUM BLD-SCNC: 137 MMOL/L (ref 136–144)
WBC NRBC COR # BLD: 9.2 10*3/MM3 (ref 3.4–10.8)

## 2019-09-03 PROCEDURE — 99213 OFFICE O/P EST LOW 20 MIN: CPT | Performed by: FAMILY MEDICINE

## 2019-09-03 PROCEDURE — 85025 COMPLETE CBC W/AUTO DIFF WBC: CPT | Performed by: FAMILY MEDICINE

## 2019-09-03 PROCEDURE — 80053 COMPREHEN METABOLIC PANEL: CPT | Performed by: FAMILY MEDICINE

## 2019-09-03 PROCEDURE — 36415 COLL VENOUS BLD VENIPUNCTURE: CPT | Performed by: FAMILY MEDICINE

## 2019-09-03 RX ORDER — ALBUTEROL SULFATE 2.5 MG/3ML
SOLUTION RESPIRATORY (INHALATION)
COMMUNITY
Start: 2018-11-20 | End: 2019-09-03

## 2019-09-03 RX ORDER — LEFLUNOMIDE 20 MG/1
TABLET ORAL EVERY 24 HOURS
COMMUNITY
Start: 2018-01-30 | End: 2019-06-18

## 2019-09-03 RX ORDER — BUMETANIDE 2 MG/1
2 TABLET ORAL 2 TIMES DAILY
Qty: 180 TABLET | Refills: 3 | Status: SHIPPED | OUTPATIENT
Start: 2019-09-03 | End: 2019-10-10 | Stop reason: SDUPTHER

## 2019-09-03 RX ORDER — MULTIVIT-MIN/IRON/FOLIC ACID/K 18-600-40
2000 CAPSULE ORAL DAILY
COMMUNITY
Start: 2018-05-18

## 2019-09-03 RX ORDER — ROFLUMILAST 500 UG/1
TABLET ORAL
COMMUNITY
Start: 2019-06-04 | End: 2019-09-03 | Stop reason: SDUPTHER

## 2019-09-03 RX ORDER — DULOXETIN HYDROCHLORIDE 60 MG/1
CAPSULE, DELAYED RELEASE ORAL
COMMUNITY
Start: 2019-08-24 | End: 2019-09-03 | Stop reason: SDUPTHER

## 2019-09-03 RX ORDER — DICLOFENAC SODIUM 75 MG/1
75 TABLET, DELAYED RELEASE ORAL DAILY
Qty: 90 TABLET | Refills: 3 | Status: SHIPPED | OUTPATIENT
Start: 2019-09-03 | End: 2020-03-03 | Stop reason: SDUPTHER

## 2019-09-03 RX ORDER — BUMETANIDE 2 MG/1
TABLET ORAL
COMMUNITY
Start: 2019-08-14 | End: 2019-09-03 | Stop reason: SDUPTHER

## 2019-09-03 RX ORDER — GABAPENTIN 300 MG/1
CAPSULE ORAL
COMMUNITY
Start: 2019-08-24 | End: 2019-09-03 | Stop reason: SDUPTHER

## 2019-09-03 RX ORDER — BACLOFEN 20 MG/1
TABLET ORAL
COMMUNITY
Start: 2019-06-24 | End: 2019-09-12

## 2019-09-03 RX ORDER — GABAPENTIN 300 MG/1
300 CAPSULE ORAL 4 TIMES DAILY
Qty: 360 CAPSULE | Refills: 3 | Status: SHIPPED | OUTPATIENT
Start: 2019-09-03 | End: 2020-09-08 | Stop reason: SDUPTHER

## 2019-09-03 RX ORDER — MULTIVIT WITH MINERALS/LUTEIN
TABLET ORAL
COMMUNITY
Start: 2018-03-16 | End: 2021-06-16

## 2019-09-03 RX ORDER — HYDROXYCHLOROQUINE SULFATE 200 MG/1
TABLET, FILM COATED ORAL
COMMUNITY
Start: 2019-08-06 | End: 2019-09-24 | Stop reason: SDUPTHER

## 2019-09-03 RX ORDER — EPINEPHRINE 0.3 MG/.3ML
INJECTION SUBCUTANEOUS
COMMUNITY
Start: 2019-06-26 | End: 2019-12-03 | Stop reason: SDUPTHER

## 2019-09-03 RX ORDER — ALPRAZOLAM 0.5 MG/1
0.5 TABLET ORAL 3 TIMES DAILY PRN
Qty: 100 TABLET | Refills: 0 | Status: SHIPPED | OUTPATIENT
Start: 2019-09-03 | End: 2019-09-30 | Stop reason: SDUPTHER

## 2019-09-03 RX ORDER — SUCRALFATE 1 G/1
TABLET ORAL
COMMUNITY
Start: 2019-08-20 | End: 2020-07-06 | Stop reason: ALTCHOICE

## 2019-09-03 RX ORDER — NITROGLYCERIN 0.4 MG/1
TABLET SUBLINGUAL
COMMUNITY
Start: 2016-09-09 | End: 2019-09-06 | Stop reason: SDUPTHER

## 2019-09-03 RX ORDER — ALBUTEROL SULFATE 90 UG/1
2 AEROSOL, METERED RESPIRATORY (INHALATION) EVERY 4 HOURS PRN
Qty: 3 INHALER | Refills: 3 | Status: SHIPPED | OUTPATIENT
Start: 2019-09-03 | End: 2020-09-08 | Stop reason: SDUPTHER

## 2019-09-03 RX ORDER — DICLOFENAC SODIUM 75 MG/1
TABLET, DELAYED RELEASE ORAL
COMMUNITY
Start: 2019-06-07 | End: 2019-09-03 | Stop reason: SDUPTHER

## 2019-09-03 RX ORDER — ALBUTEROL SULFATE 2.5 MG/3ML
2.5 SOLUTION RESPIRATORY (INHALATION) EVERY 4 HOURS PRN
Qty: 360 VIAL | Refills: 3 | Status: SHIPPED | OUTPATIENT
Start: 2019-09-03 | End: 2020-09-08 | Stop reason: SDUPTHER

## 2019-09-03 RX ORDER — FOLIC ACID 1 MG/1
1 TABLET ORAL EVERY EVENING
COMMUNITY
Start: 2018-08-13

## 2019-09-03 RX ORDER — POLYETHYLENE GLYCOL 3350 17 G/17G
17 POWDER, FOR SOLUTION ORAL AS NEEDED
COMMUNITY
Start: 2019-05-21

## 2019-09-03 RX ORDER — DULOXETIN HYDROCHLORIDE 60 MG/1
60 CAPSULE, DELAYED RELEASE ORAL DAILY
Qty: 90 CAPSULE | Refills: 3 | Status: SHIPPED | OUTPATIENT
Start: 2019-09-03 | End: 2019-09-23

## 2019-09-03 RX ORDER — VERAPAMIL HYDROCHLORIDE 240 MG/1
TABLET, FILM COATED, EXTENDED RELEASE ORAL
COMMUNITY
Start: 2019-07-05 | End: 2019-09-21 | Stop reason: SDUPTHER

## 2019-09-03 RX ORDER — ROFLUMILAST 500 UG/1
500 TABLET ORAL DAILY
Qty: 90 TABLET | Refills: 3 | Status: SHIPPED | OUTPATIENT
Start: 2019-09-03 | End: 2020-03-03 | Stop reason: SDUPTHER

## 2019-09-03 RX ORDER — ALBUTEROL SULFATE 90 UG/1
AEROSOL, METERED RESPIRATORY (INHALATION)
COMMUNITY
Start: 2017-06-24 | End: 2019-09-03

## 2019-09-03 NOTE — PROGRESS NOTES
Subjective   Chief Complaint   Patient presents with   • Follow-up     3 mos f/u     Diya Arreola is a 59 y.o. female.     Increased stress recently with her bipolar unmedicated daughter moving back home, her  retired, etc.      Anxiety   Presents for follow-up visit. Symptoms include depressed mood, irritability, nervous/anxious behavior and restlessness. Patient reports no chest pain, compulsions, confusion, dizziness, feeling of choking, palpitations or shortness of breath. Primary symptoms comment: decreased appetite. Symptoms occur most days. The severity of symptoms is causing significant distress. The quality of sleep is fair.     Compliance with medications is %.   Hypertension   This is a chronic problem. The current episode started more than 1 year ago. The problem has been gradually improving since onset. The problem is controlled. Associated symptoms include anxiety. Pertinent negatives include no blurred vision, chest pain, palpitations, peripheral edema or shortness of breath. The current treatment provides moderate improvement. There are no compliance problems.       Past Medical History:   Diagnosis Date   • Allergic    • Anxiety    • Arthritis    • Asthma    • COPD (chronic obstructive pulmonary disease) (CMS/formerly [facility])    • Fibromyalgia, primary    • Gastritis    • Low back pain    • Lupus    • Osteopenia    • Pancreatitis    • Sjogren's syndrome (CMS/formerly [facility])    • Tachycardia    • TIA (transient ischemic attack) 2014     Past Surgical History:   Procedure Laterality Date   • CARDIAC CATHETERIZATION     • CARPAL TUNNEL RELEASE     •  SECTION     • CHOLECYSTECTOMY     • COLONOSCOPY  10/08/2013   • CYSTOCELE REPAIR     • HYSTERECTOMY     • SPINE SURGERY      neck , , ; lumbar 2012      Allergies   Allergen Reactions   • Adhesive Tape Rash   • Aspirin Other (See Comments)   • Cephalexin Rash   • Ciprofloxacin Rash   • Corticosteroids Unknown (See Comments)   • Latex  "Unknown (See Comments)   • Nitrofurantoin Unknown (See Comments)   • Other Unknown (See Comments)     Oline Abx and Setswana [place] soap   • Penicillin G Unknown (See Comments)   • Prednisone Unknown (See Comments)   • Rosuvastatin Calcium Unknown (See Comments)   • Sulfa Antibiotics Unknown (See Comments)   • Azithromycin Rash   • Gabapentin Rash     Social History     Socioeconomic History   • Marital status:      Spouse name: Jose \"Shorty\"   • Number of children: Not on file   • Years of education: Not on file   • Highest education level: Not on file   Occupational History   • Occupation: homemaker   Tobacco Use   • Smoking status: Current Every Day Smoker     Years: 39.00     Types: Cigarettes   • Smokeless tobacco: Never Used   Substance and Sexual Activity   • Alcohol use: No     Frequency: Never     Comment: caffeine 2c qd     Social History     Tobacco Use   Smoking Status Current Every Day Smoker   • Years: 39.00   • Types: Cigarettes   Smokeless Tobacco Never Used       family history includes Diabetes in her father; Heart disease in her brother; Hypertension in her brother and sister; Osteoporosis in her maternal grandmother; Stroke in her sister.  Current Outpatient Medications on File Prior to Visit   Medication Sig Dispense Refill   • CALCIUM PO CALCIUM CAPS     • Cholecalciferol (VITAMIN D) 2000 units capsule VITAMIN D 2000 UNIT CAPS     • folic acid (FOLVITE) 1 MG tablet Daily.     • hydroxychloroquine (PLAQUENIL) 200 MG tablet      • Multiple Vitamins-Minerals (CENTRUM SILVER) tablet CENTRUM SILVER TABS     • Polyethylene Glycol 3350 (MIRALAX PO) 2 (Two) Times a Day.     • sucralfate (CARAFATE) 1 g tablet      • verapamil SR (CALAN-SR) 240 MG CR tablet      • [DISCONTINUED] albuterol (PROVENTIL) (2.5 MG/3ML) 0.083% nebulizer solution ALBUTEROL SULFATE (2.5 MG/3ML) 0.083% NEBU     • [DISCONTINUED] albuterol sulfate HFA (PROAIR HFA) 108 (90 Base) MCG/ACT inhaler PROAIR  (90 Base) MCG/ACT " AERS     • [DISCONTINUED] bumetanide (BUMEX) 2 MG tablet      • [DISCONTINUED] DALIRESP 500 MCG tablet tablet      • [DISCONTINUED] diclofenac (VOLTAREN) 75 MG EC tablet      • [DISCONTINUED] DULoxetine (CYMBALTA) 60 MG capsule      • [DISCONTINUED] gabapentin (NEURONTIN) 300 MG capsule      • baclofen (LIORESAL) 20 MG tablet      • EPINEPHrine (EPIPEN) 0.3 MG/0.3ML solution auto-injector injection      • nitroglycerin (NITROSTAT) 0.4 MG SL tablet NITROSTAT 0.4 MG SUBL     • traMADol (ULTRAM) 50 MG tablet TAKE 1 AND 1/2 TABLET THREE TIMES DAILY 135 tablet 0   • [DISCONTINUED] ALPRAZolam (XANAX) 1 MG tablet TAKE ONE TABLET BY MOUTH THREE TO FOUR TIMES DAILY AS NEEDED. 100 tablet 1   • [DISCONTINUED] clonazePAM (KlonoPIN) 1 MG tablet TAKE ONE TABLET BY MOUTH 3 TIMES DAILY 90 tablet 0     No current facility-administered medications on file prior to visit.      Patient Active Problem List   Diagnosis   • Allergic rhinitis   • Anaclitic depression   • Anxiety disorder   • Asthma   • Carpal tunnel syndrome, bilateral   • Chronic obstructive pulmonary disease (CMS/HCC)   • Connective tissue disease, undifferentiated (CMS/HCC)   • Constipation   • Cystic fibrosis carrier   • DDD (degenerative disc disease), cervical   • Dyspepsia   • Edema   • Elevated antinuclear antibody (LILI) level   • Essential (primary) hypertension   • Fatigue   • Hyperlipidemia   • Insomnia   • Irritable bowel syndrome   • Fibromyalgia   • Headache   • Lupus   • Sjogren's syndrome (CMS/HCC)   • Nonrheumatic mitral valve insufficiency   • Other long term (current) drug therapy   • Other specified dorsopathies, site unspecified   • Palpitations   • Paroxysmal tachycardia (CMS/HCC)   • Sciatica   • Sleep disorder   • Spinal stenosis of lumbar region   • Temporary cerebral vascular dysfunction   • Vitamin D deficiency       The following portions of the patient's history were reviewed and updated as appropriate: allergies, current medications, past  "family history, past medical history, past social history, past surgical history and problem list.    Review of Systems   Constitutional: Positive for irritability. Negative for chills and fever.   HENT: Negative for sinus pressure and sore throat.    Eyes: Negative for blurred vision.   Respiratory: Negative for cough and shortness of breath.    Cardiovascular: Negative for chest pain and palpitations.   Gastrointestinal: Negative for abdominal pain.   Endocrine: Negative for polyuria.   Skin: Negative for rash.   Neurological: Negative for dizziness, headache and confusion.   Hematological: Negative for adenopathy.   Psychiatric/Behavioral: Positive for depressed mood. The patient is nervous/anxious.        Objective   /79 (BP Location: Left arm, Patient Position: Sitting, Cuff Size: Adult)   Pulse 99   Temp 98.9 °F (37.2 °C) (Oral)   Ht 156.2 cm (61.5\")   Wt 57.2 kg (126 lb)   SpO2 99%   BMI 23.42 kg/m²   Physical Exam   Constitutional: She is oriented to person, place, and time. She appears well-developed. No distress.   HENT:   Head: Normocephalic.   Eyes: Conjunctivae and lids are normal.   Neck: Trachea normal. No thyroid mass and no thyromegaly present.   Cardiovascular: Normal rate, regular rhythm and normal heart sounds.   Pulmonary/Chest: Effort normal and breath sounds normal.   Lymphadenopathy:     She has no cervical adenopathy.   Neurological: She is alert and oriented to person, place, and time.   Skin: Skin is warm and dry.   Psychiatric: She has a normal mood and affect. Her speech is normal and behavior is normal. She is attentive.       No visits with results within 1 Week(s) from this visit.   Latest known visit with results is:   Hospital Outpatient Visit on 06/12/2019   Component Date Value Ref Range Status   • Creatinine, Arterial 06/12/2019 1.1  0.6 - 1.3 mg/dL Final   • GFR MDRD Non  06/12/2019 51* >60 mL/min/1.73m2 Final   • eGFR African Am 06/12/2019 >60  >60 " mL/min/1.73m2 Final           Assessment/Plan   Problems Addressed this Visit        Cardiovascular and Mediastinum    Essential (primary) hypertension    Relevant Medications    EPINEPHrine (EPIPEN) 0.3 MG/0.3ML solution auto-injector injection    bumetanide (BUMEX) 2 MG tablet    Other Relevant Orders    Comprehensive Metabolic Panel (Completed)    CBC & Differential (Completed)    CBC Auto Differential (Completed)       Other    Anxiety disorder - Primary    Relevant Medications    ALPRAZolam (XANAX) 0.5 MG tablet          Diya was seen today for follow-up.    Diagnoses and all orders for this visit:    Anxiety disorder, unspecified type    Essential (primary) hypertension  -     Comprehensive Metabolic Panel  -     CBC & Differential  -     CBC Auto Differential    Other orders  -     ALPRAZolam (XANAX) 0.5 MG tablet; Take 1 tablet by mouth 3 (Three) Times a Day As Needed for Anxiety.  -     bumetanide (BUMEX) 2 MG tablet; Take 1 tablet by mouth 2 (Two) Times a Day.  -     diclofenac (VOLTAREN) 75 MG EC tablet; Take 1 tablet by mouth Daily.  -     gabapentin (NEURONTIN) 300 MG capsule; Take 1 capsule by mouth 4 (Four) Times a Day.  -     DALIRESP 500 MCG tablet tablet; Take 1 tablet by mouth Daily.  -     albuterol sulfate HFA (PROAIR HFA) 108 (90 Base) MCG/ACT inhaler; Inhale 2 puffs Every 4 (Four) Hours As Needed for Wheezing.  -     albuterol (PROVENTIL) (2.5 MG/3ML) 0.083% nebulizer solution; Take 2.5 mg by nebulization Every 4 (Four) Hours As Needed for Wheezing.  -     DULoxetine (CYMBALTA) 60 MG capsule; Take 1 capsule by mouth Daily.

## 2019-09-04 RX ORDER — POTASSIUM CHLORIDE 750 MG/1
10 TABLET, EXTENDED RELEASE ORAL 2 TIMES DAILY
Qty: 60 TABLET | Refills: 2 | Status: SHIPPED | OUTPATIENT
Start: 2019-09-04 | End: 2019-12-17 | Stop reason: SDUPTHER

## 2019-09-06 RX ORDER — NITROGLYCERIN 0.4 MG/1
TABLET SUBLINGUAL
Qty: 25 TABLET | Refills: 1 | Status: SHIPPED | OUTPATIENT
Start: 2019-09-06 | End: 2020-10-14 | Stop reason: SDUPTHER

## 2019-09-12 RX ORDER — METHOCARBAMOL 500 MG/1
TABLET, FILM COATED ORAL
Qty: 60 TABLET | Refills: 5 | Status: SHIPPED | OUTPATIENT
Start: 2019-09-12 | End: 2020-03-03 | Stop reason: SDUPTHER

## 2019-09-23 RX ORDER — DULOXETIN HYDROCHLORIDE 60 MG/1
CAPSULE, DELAYED RELEASE ORAL
Qty: 60 CAPSULE | Refills: 3 | Status: SHIPPED | OUTPATIENT
Start: 2019-09-23 | End: 2019-12-03 | Stop reason: SDUPTHER

## 2019-09-23 RX ORDER — VERAPAMIL HYDROCHLORIDE 240 MG/1
TABLET, FILM COATED, EXTENDED RELEASE ORAL
Qty: 90 TABLET | Refills: 3 | Status: SHIPPED | OUTPATIENT
Start: 2019-09-23 | End: 2020-03-03 | Stop reason: SDUPTHER

## 2019-09-24 ENCOUNTER — OFFICE VISIT (OUTPATIENT)
Dept: RHEUMATOLOGY | Facility: CLINIC | Age: 59
End: 2019-09-24

## 2019-09-24 VITALS
DIASTOLIC BLOOD PRESSURE: 69 MMHG | HEIGHT: 61 IN | HEART RATE: 101 BPM | SYSTOLIC BLOOD PRESSURE: 102 MMHG | BODY MASS INDEX: 23.81 KG/M2

## 2019-09-24 DIAGNOSIS — Z23 NEED FOR VACCINATION: ICD-10-CM

## 2019-09-24 DIAGNOSIS — Z79.899 LONG-TERM USE OF HIGH-RISK MEDICATION: ICD-10-CM

## 2019-09-24 DIAGNOSIS — M35.00 SECONDARY SJOGREN'S SYNDROME (HCC): ICD-10-CM

## 2019-09-24 DIAGNOSIS — R76.8 ANA POSITIVE: Primary | ICD-10-CM

## 2019-09-24 DIAGNOSIS — R63.4 WEIGHT LOSS: ICD-10-CM

## 2019-09-24 DIAGNOSIS — M35.9 CONNECTIVE TISSUE DISEASE (HCC): ICD-10-CM

## 2019-09-24 DIAGNOSIS — M85.80 OSTEOPENIA, UNSPECIFIED LOCATION: ICD-10-CM

## 2019-09-24 PROCEDURE — 90674 CCIIV4 VAC NO PRSV 0.5 ML IM: CPT | Performed by: INTERNAL MEDICINE

## 2019-09-24 PROCEDURE — G0008 ADMIN INFLUENZA VIRUS VAC: HCPCS | Performed by: INTERNAL MEDICINE

## 2019-09-24 PROCEDURE — 99214 OFFICE O/P EST MOD 30 MIN: CPT | Performed by: INTERNAL MEDICINE

## 2019-09-24 RX ORDER — HYDROXYCHLOROQUINE SULFATE 200 MG/1
TABLET, FILM COATED ORAL
Qty: 135 TABLET | Refills: 0 | Status: SHIPPED | OUTPATIENT
Start: 2019-09-24 | End: 2020-03-27 | Stop reason: SDUPTHER

## 2019-09-24 RX ORDER — TRAMADOL HYDROCHLORIDE 50 MG/1
TABLET ORAL
Qty: 135 TABLET | Refills: 0 | Status: SHIPPED | OUTPATIENT
Start: 2019-09-24 | End: 2019-10-21 | Stop reason: SDUPTHER

## 2019-09-24 NOTE — PROGRESS NOTES
She is a pleasant 59-year-old female with history of connective tissue disease (SLE/Sjogren's syndrome).  The patient was last seen in the office May 21, 2019.  The patient was taking leflunomide 20 mg p.o. daily and Plaquenil 300 mg p.o. daily on a regular basis.  The patient had recurrent infections and was complaining of weight loss for several months.  She completed her age-appropriate cancer screening and all of the studies were negative for malignancies.  In July, she stopped leflunomide after being seen by the urology service due to the recurrent urinary tract infections, which were thought to be a side effect of leflunomide.  After she stopped the medication, she did not have any more problems with UTIs.  As far as her weight loss, this has remained stable.  She did not experience weight loss when she started on leflunomide approximately a year and a half ago.    She reports pain that is generalized, rated 6 out of 10, she has 1-1/2 hours of morning stiffness, she has pain in her shoulders, her knees her ankles.  He has not noticed recent joint swelling. She has pain in her breast.    Review of systems is negative for fever or chills, she has dry eyes and dry mouth, she drinks water constantly, she uses Biotene with good results.  Denies oral nasal ulcers, no excessive hair loss, denies nausea vomiting, no diarrhea, no chest pain, she has on and off shortness of breath.      Other systems reviewed and they were negative.    Laboratories done for review today show creatinine of 0.91, liver function test normal, potassium 3.9.  UA negative WBC count 7.8, hemoglobin 13.4, platelet count normal.  ESR 14, CRP 2.5.    Social family history reviewed and unchanged.    Active Ambulatory Problems     Diagnosis Date Noted   • Allergic rhinitis 10/31/2014   • Anaclitic depression 01/03/2012   • Anxiety disorder 09/03/2019   • Asthma 09/03/2019   • Carpal tunnel syndrome, bilateral 02/17/2017   • Chronic obstructive  pulmonary disease (CMS/[facility]) 09/03/2019   • Connective tissue disease, undifferentiated (CMS/[facility]) 10/03/2014   • Constipation 07/10/2015   • Cystic fibrosis carrier 10/03/2011   • DDD (degenerative disc disease), cervical 10/03/2011   • Dyspepsia 04/29/2013   • Edema 06/20/2016   • Elevated antinuclear antibody (LILI) level 02/17/2017   • Essential (primary) hypertension 12/02/2011   • Fatigue 08/13/2018   • Hyperlipidemia 09/03/2019   • Insomnia 09/19/2018   • Irritable bowel syndrome 10/03/2011   • Fibromyalgia 10/27/2017   • Headache 02/21/2019   • Lupus 10/03/2011   • Sjogren's syndrome (CMS/[facility]) 02/17/2017   • Nonrheumatic mitral valve insufficiency 02/13/2017   • Other long term (current) drug therapy 11/14/2018   • Other specified dorsopathies, site unspecified 12/28/2011   • Palpitations 02/23/2017   • Paroxysmal tachycardia (CMS/[facility]) 10/03/2011   • Sciatica 12/28/2011   • Sleep disorder 03/22/2017   • Spinal stenosis of lumbar region 03/07/2012   • Temporary cerebral vascular dysfunction 09/16/2014   • Vitamin D deficiency 03/22/2017     Resolved Ambulatory Problems     Diagnosis Date Noted   • No Resolved Ambulatory Problems     Past Medical History:   Diagnosis Date   • Allergic    • Anxiety    • Arthritis    • Asthma    • COPD (chronic obstructive pulmonary disease) (CMS/[facility])    • Fibromyalgia, primary    • Gastritis    • Low back pain    • Lupus    • Osteopenia    • Pancreatitis    • Sjogren's syndrome (CMS/[facility])    • Tachycardia    • TIA (transient ischemic attack) 09/2014     Physical examination:  Vitals:    09/24/19 1102   BP: 102/69   Pulse: 101     GENERAL: Well-developed, well-nourished in no acute distress. Alert and oriented x3.  HEENT: Normocephalic, atraumatic. Pupils are equal, round, and reactive to light. Extraocular muscles are intact. Mucous membranes are pink and moist. Nostrils are clear.   NECK: Supple without lymphadenopathy.  LUNGS: Clear to auscultation bilaterally.  HEART: Regular  rate and rhythm without murmur, rub or gallop.  CHEST: Respirations easy and unlabored.  EXTREMITIES: No cyanosis, edema or clubbing.  SKIN: Warm, dry and intact.  MSK: Generalized tenderness.  Multiple myofascial tender points.  No signs of synovitis.    Assessment:  1.  Connective tissue disease.  The patient complains of generalized pain.  Physical exam is positive for multiple myofascial tender points.  I do not see signs of synovitis.  No skin rashes, no oral nasal ulcers.  No major signs for active disease clinically.  Her laboratories are for the most part unremarkable.  Plan to continue with Plaquenil as is.    2.  Long-term high-risk medications.  The patient was on leflunomide and that the recurrent urinary tract infections.  This medication has been stopped.    She was recommended to keep up-to-date with her regular eye exams due to Plaquenil therapy.  Cancer screening:  Colonoscopy  2019 Dr Merida      Mammogram: 8/2019  Bone health: calcium and vitamin D: YES Vit D  DXA Scan YES 6/10/14  Vaccines: Flu: 2017, needs to be updated  PNA13: 12/18/18    Zostervac: YES 2014  X-rays of the chest,3/16/2018 Hands, 3/16/18 Feet:  3/16/2018  Hepatitis panel, HIV, QTB/PPD:  NO  EYE Exam for Plaquenil; 10/2018     #3 weight loss.  Improved.  She no longer has nausea after stopping leflunomide.  Although leflunomide causes weight loss in some patients, she has been taking leflunomide for several months before she Had weight loss.    4.  Secondary Sjogren's syndrome.  Recommended to continue with Biotene to be used when needed.  Keep herself hydrated.    5.  Osteopenia.  She did not do the bone densitometry as recommended.  Recommend to follow-up with PCP which she is going to be seen in December.    Plan:  Continue with Plaquenil 300 mg p.o. daily  Keep up-to-date with eye exams  Conservative measures for management of Sjogren's syndrome  Recommend to proceed with DEXA and discuss with PCP.  Take calcium and vitamin  D.  RTC in 6 months or sooner if needed.

## 2019-09-25 ENCOUNTER — TELEPHONE (OUTPATIENT)
Dept: FAMILY MEDICINE CLINIC | Facility: CLINIC | Age: 59
End: 2019-09-25

## 2019-09-25 DIAGNOSIS — E87.6 HYPOKALEMIA: Primary | ICD-10-CM

## 2019-09-30 RX ORDER — ALPRAZOLAM 0.5 MG/1
0.5 TABLET ORAL 3 TIMES DAILY PRN
Qty: 100 TABLET | Refills: 0 | Status: SHIPPED | OUTPATIENT
Start: 2019-09-30 | End: 2019-10-28 | Stop reason: SDUPTHER

## 2019-10-11 RX ORDER — BUMETANIDE 2 MG/1
TABLET ORAL
Qty: 60 TABLET | Refills: 5 | Status: SHIPPED | OUTPATIENT
Start: 2019-10-11 | End: 2020-03-03 | Stop reason: SDUPTHER

## 2019-10-14 NOTE — TELEPHONE ENCOUNTER
Pt called back stating that she does not see Dr. Torres until March since labs were good other that K+. If you want her K+ checked sent order to [facility] and let her know. She will go get it done.

## 2019-10-16 NOTE — TELEPHONE ENCOUNTER
I put in an order for labs.  Please fax to [facility] jaja let her know to go get them done.

## 2019-10-21 RX ORDER — TRAMADOL HYDROCHLORIDE 50 MG/1
TABLET ORAL
Qty: 135 TABLET | Refills: 0 | Status: SHIPPED | OUTPATIENT
Start: 2019-10-21 | End: 2019-11-18 | Stop reason: SDUPTHER

## 2019-10-22 ENCOUNTER — OFFICE VISIT (OUTPATIENT)
Dept: FAMILY MEDICINE CLINIC | Facility: CLINIC | Age: 59
End: 2019-10-22

## 2019-10-22 ENCOUNTER — TELEPHONE (OUTPATIENT)
Dept: FAMILY MEDICINE CLINIC | Facility: CLINIC | Age: 59
End: 2019-10-22

## 2019-10-22 VITALS
TEMPERATURE: 98.5 F | HEIGHT: 61 IN | SYSTOLIC BLOOD PRESSURE: 150 MMHG | RESPIRATION RATE: 16 BRPM | HEART RATE: 101 BPM | BODY MASS INDEX: 26.06 KG/M2 | WEIGHT: 138 LBS | DIASTOLIC BLOOD PRESSURE: 85 MMHG | OXYGEN SATURATION: 95 %

## 2019-10-22 DIAGNOSIS — E87.6 HYPOKALEMIA: ICD-10-CM

## 2019-10-22 DIAGNOSIS — H66.90 ACUTE OTITIS MEDIA, UNSPECIFIED OTITIS MEDIA TYPE: Primary | ICD-10-CM

## 2019-10-22 DIAGNOSIS — J02.9 SORE THROAT: ICD-10-CM

## 2019-10-22 LAB
EXPIRATION DATE: NORMAL
INTERNAL CONTROL: NORMAL
Lab: NORMAL
S PYO AG THROAT QL: NEGATIVE

## 2019-10-22 PROCEDURE — 36415 COLL VENOUS BLD VENIPUNCTURE: CPT | Performed by: FAMILY MEDICINE

## 2019-10-22 PROCEDURE — 80048 BASIC METABOLIC PNL TOTAL CA: CPT | Performed by: FAMILY MEDICINE

## 2019-10-22 PROCEDURE — 99213 OFFICE O/P EST LOW 20 MIN: CPT | Performed by: FAMILY MEDICINE

## 2019-10-22 PROCEDURE — 87880 STREP A ASSAY W/OPTIC: CPT | Performed by: FAMILY MEDICINE

## 2019-10-22 RX ORDER — CEPHALEXIN 500 MG/1
500 CAPSULE ORAL 3 TIMES DAILY
Qty: 30 CAPSULE | Refills: 0 | Status: SHIPPED | OUTPATIENT
Start: 2019-10-22 | End: 2020-03-03

## 2019-10-22 NOTE — PROGRESS NOTES
Subjective   Chief Complaint   Patient presents with   • Otitis Media     bilateral   • Sore Throat   • Fever     Diya Arreola is a 59 y.o. female.     Otitis Media   This is a new problem. The current episode started in the past 7 days. The problem occurs constantly. The problem has been gradually worsening. Associated symptoms include coughing, fatigue, a fever, myalgias, a sore throat and swollen glands. Pertinent negatives include no abdominal pain, chest pain, chills or rash. Treatments tried: sweet oil, salt water, tea with lemon and ezekiel. The treatment provided no relief.   Sore Throat    Associated symptoms include coughing and swollen glands. Pertinent negatives include no abdominal pain or shortness of breath.   Fever    Associated symptoms include coughing and a sore throat. Pertinent negatives include no abdominal pain, chest pain or rash.      Past Medical History:   Diagnosis Date   • Allergic    • Anxiety    • Arthritis    • Asthma    • COPD (chronic obstructive pulmonary disease) (CMS/[facility])    • Fibromyalgia, primary    • Gastritis    • Low back pain    • Lupus (CMS/[facility])    • Osteopenia    • Pancreatitis    • Sjogren's syndrome (CMS/[facility]ly [facility])    • Tachycardia    • TIA (transient ischemic attack) 2014     Past Surgical History:   Procedure Laterality Date   • CARDIAC CATHETERIZATION     • CARPAL TUNNEL RELEASE     •  SECTION     • CHOLECYSTECTOMY     • COLONOSCOPY  10/08/2013   • CYSTOCELE REPAIR     • HYSTERECTOMY     • SPINE SURGERY      neck , , ; lumbar 2012      Allergies   Allergen Reactions   • Adhesive Tape Rash   • Aspirin Other (See Comments)   • Cephalexin Rash   • Ciprofloxacin Rash   • Corticosteroids Unknown (See Comments)   • Latex Unknown (See Comments)   • Nitrofurantoin Unknown (See Comments)   • Other Unknown (See Comments)     Oline Abx and [place] soap   • Penicillin G Unknown (See Comments)   • Prednisone Unknown (See Comments)   • Rosuvastatin  "Calcium Unknown (See Comments)   • Sulfa Antibiotics Unknown (See Comments)   • Azithromycin Rash   • Gabapentin Rash     Social History     Socioeconomic History   • Marital status:      Spouse name: Jose \"Shorty\"   • Number of children: 4   • Years of education: Not on file   • Highest education level: Not on file   Occupational History   • Occupation: homemaker   Tobacco Use   • Smoking status: Current Every Day Smoker     Years: 39.00     Types: Cigarettes   • Smokeless tobacco: Never Used   • Tobacco comment: 10 cigareetes a day   Substance and Sexual Activity   • Alcohol use: No     Frequency: Never     Comment: caffeine 2c qd     Social History     Tobacco Use   Smoking Status Current Every Day Smoker   • Years: 39.00   • Types: Cigarettes   Smokeless Tobacco Never Used   Tobacco Comment    10 cigareetes a day       family history includes Diabetes in her father; Heart disease in her brother; Hypertension in her brother and sister; Osteoporosis in her maternal grandmother; Stroke in her sister.  Current Outpatient Medications on File Prior to Visit   Medication Sig Dispense Refill   • albuterol (PROVENTIL) (2.5 MG/3ML) 0.083% nebulizer solution Take 2.5 mg by nebulization Every 4 (Four) Hours As Needed for Wheezing. 360 vial 3   • albuterol sulfate HFA (PROAIR HFA) 108 (90 Base) MCG/ACT inhaler Inhale 2 puffs Every 4 (Four) Hours As Needed for Wheezing. 3 inhaler 3   • ALPRAZolam (XANAX) 0.5 MG tablet TAKE 1 TABLET BY MOUTH 3 (THREE) TIMES A DAY AS NEEDED FOR ANXIETY. 100 tablet 0   • bumetanide (BUMEX) 2 MG tablet TAKE ONE (1) TABLET BY MOUTH TWICE A DAY 60 tablet 5   • CALCIUM PO CALCIUM CAPS     • Cholecalciferol (VITAMIN D) 2000 units capsule VITAMIN D 2000 UNIT CAPS     • DALIRESP 500 MCG tablet tablet Take 1 tablet by mouth Daily. 90 tablet 3   • diclofenac (VOLTAREN) 75 MG EC tablet Take 1 tablet by mouth Daily. 90 tablet 3   • DULoxetine (CYMBALTA) 60 MG capsule TAKE 1 TABLET BY MOUTH TWICE " DAILY 60 capsule 3   • EPINEPHrine (EPIPEN) 0.3 MG/0.3ML solution auto-injector injection      • folic acid (FOLVITE) 1 MG tablet Daily.     • gabapentin (NEURONTIN) 300 MG capsule Take 1 capsule by mouth 4 (Four) Times a Day. 360 capsule 3   • hydroxychloroquine (PLAQUENIL) 200 MG tablet Take 1 and a 1/2 tablet by mouth daily 135 tablet 0   • methocarbamol (ROBAXIN) 500 MG tablet TAKE ONE (1) TABLET BY MOUTH TWICE A DAY 60 tablet 5   • Multiple Vitamins-Minerals (CENTRUM SILVER) tablet CENTRUM SILVER TABS     • nitroglycerin (NITROSTAT) 0.4 MG SL tablet DISSOLVE ONE (1) TABLET UNDER THE TONGUE EVERY FIVE MINUTES AS NEEDED FOR CHEST PAIN. DO NOT EXCEED 3 TABLETS. 25 tablet 1   • Polyethylene Glycol 3350 (MIRALAX PO) 2 (Two) Times a Day.     • potassium chloride (K-DUR,KLOR-CON) 10 MEQ CR tablet Take 1 tablet by mouth 2 (Two) Times a Day. 60 tablet 2   • sucralfate (CARAFATE) 1 g tablet      • traMADol (ULTRAM) 50 MG tablet TAKE 1 AND 1/2 TABLET THREE TIMES DAILY 135 tablet 0   • verapamil SR (CALAN-SR) 240 MG CR tablet TAKE 1 TABLET BY MOUTH DAILY 90 tablet 3     No current facility-administered medications on file prior to visit.      Patient Active Problem List   Diagnosis   • Allergic rhinitis   • Anaclitic depression   • Anxiety disorder   • Asthma   • Carpal tunnel syndrome, bilateral   • Chronic obstructive pulmonary disease (CMS/HCC)   • Connective tissue disease, undifferentiated (CMS/HCC)   • Constipation   • Cystic fibrosis carrier   • DDD (degenerative disc disease), cervical   • Dyspepsia   • Edema   • Elevated antinuclear antibody (LIIL) level   • Essential (primary) hypertension   • Fatigue   • Hyperlipidemia   • Insomnia   • Irritable bowel syndrome   • Fibromyalgia   • Headache   • Lupus (CMS/HCC)   • Sjogren's syndrome (CMS/HCC)   • Nonrheumatic mitral valve insufficiency   • Other long term (current) drug therapy   • Other specified dorsopathies, site unspecified   • Palpitations   • Paroxysmal  "tachycardia (CMS/HCC)   • Sciatica   • Sleep disorder   • Spinal stenosis of lumbar region   • Temporary cerebral vascular dysfunction   • Vitamin D deficiency   • Hypokalemia   • Sore throat   • Acute otitis media       The following portions of the patient's history were reviewed and updated as appropriate: allergies, current medications, past family history, past medical history, past social history, past surgical history and problem list.    Review of Systems   Constitutional: Positive for fatigue and fever. Negative for chills.   HENT: Positive for sore throat and swollen glands. Negative for sinus pressure.    Eyes: Negative for blurred vision.   Respiratory: Positive for cough. Negative for shortness of breath.    Cardiovascular: Negative for chest pain and palpitations.   Gastrointestinal: Negative for abdominal pain.   Endocrine: Negative for polyuria.   Musculoskeletal: Positive for myalgias.   Skin: Negative for rash.   Neurological: Negative for dizziness and headache.   Hematological: Negative for adenopathy.   Psychiatric/Behavioral: Negative for depressed mood.       Objective   /85 (BP Location: Right arm, Patient Position: Sitting, Cuff Size: Adult)   Pulse 101   Temp 98.5 °F (36.9 °C) (Oral)   Resp 16   Ht 154.9 cm (61\")   Wt 62.6 kg (138 lb)   SpO2 95%   BMI 26.07 kg/m²   Physical Exam   Constitutional: She is oriented to person, place, and time. She appears well-developed. No distress.   HENT:   Head: Normocephalic.   Right Ear: Tympanic membrane is erythematous.   Left Ear: Tympanic membrane is erythematous.   Mouth/Throat: Mucous membranes are normal. Posterior oropharyngeal erythema present.   Eyes: Conjunctivae and lids are normal.   Neck: Trachea normal. No thyroid mass and no thyromegaly present.   Cardiovascular: Normal rate, regular rhythm and normal heart sounds.   Pulmonary/Chest: Effort normal and breath sounds normal.   Lymphadenopathy:     She has no cervical adenopathy. "   Neurological: She is alert and oriented to person, place, and time.   Skin: Skin is warm and dry.   Psychiatric: She has a normal mood and affect. Her speech is normal and behavior is normal. She is attentive.       Office Visit on 10/22/2019   Component Date Value Ref Range Status   • Glucose 10/22/2019 90  65 - 99 mg/dL Final   • BUN 10/22/2019 16  6 - 20 mg/dL Final   • Creatinine 10/22/2019 0.96  0.57 - 1.00 mg/dL Final   • Sodium 10/22/2019 145  136 - 145 mmol/L Final   • Potassium 10/22/2019 3.6  3.5 - 5.2 mmol/L Final   • Chloride 10/22/2019 98  98 - 107 mmol/L Final   • CO2 10/22/2019 34.7* 22.0 - 29.0 mmol/L Final   • Calcium 10/22/2019 9.5  8.6 - 10.5 mg/dL Final   • eGFR Non African Amer 10/22/2019 60* >60 mL/min/1.73 Final   • BUN/Creatinine Ratio 10/22/2019 16.7  7.0 - 25.0 Final   • Anion Gap 10/22/2019 12.3  5.0 - 15.0 mmol/L Final   • Rapid Strep A Screen 10/22/2019 Negative  Negative, VALID, INVALID, Not Performed Final   • Internal Control 10/22/2019 Passed  Passed Final   • Lot Number 10/22/2019 [id number]   Final   • Expiration Date 10/22/2019 10,[id number]   Final           Assessment/Plan   Problems Addressed this Visit        Respiratory    Sore throat    Relevant Orders    POCT rapid strep A (Completed)       Nervous and Auditory    Acute otitis media - Primary       Other    Hypokalemia    Relevant Orders    Basic Metabolic Panel (Completed)          Diya was seen today for otitis media, sore throat and fever.    Diagnoses and all orders for this visit:    Acute otitis media, unspecified otitis media type    Hypokalemia  -     Basic Metabolic Panel    Sore throat  -     POCT rapid strep A    Other orders  -     cephalexin (KEFLEX) 500 MG capsule; Take 1 capsule by mouth 3 (Three) Times a Day.  -     Discontinue: HYDROcodone-homatropine (HYCODAN) 5-1.5 MG/5ML syrup; Take 5 mL by mouth Every 6 (Six) Hours As Needed for Cough.

## 2019-10-23 LAB
ANION GAP SERPL CALCULATED.3IONS-SCNC: 12.3 MMOL/L (ref 5–15)
BUN BLD-MCNC: 16 MG/DL (ref 6–20)
BUN/CREAT SERPL: 16.7 (ref 7–25)
CALCIUM SPEC-SCNC: 9.5 MG/DL (ref 8.6–10.5)
CHLORIDE SERPL-SCNC: 98 MMOL/L (ref 98–107)
CO2 SERPL-SCNC: 34.7 MMOL/L (ref 22–29)
CREAT BLD-MCNC: 0.96 MG/DL (ref 0.57–1)
GFR SERPL CREATININE-BSD FRML MDRD: 60 ML/MIN/1.73
GLUCOSE BLD-MCNC: 90 MG/DL (ref 65–99)
POTASSIUM BLD-SCNC: 3.6 MMOL/L (ref 3.5–5.2)
SODIUM BLD-SCNC: 145 MMOL/L (ref 136–145)

## 2019-10-23 RX ORDER — GUAIFENESIN AND CODEINE PHOSPHATE 100; 10 MG/5ML; MG/5ML
5 SOLUTION ORAL 3 TIMES DAILY PRN
Qty: 120 ML | Refills: 0 | Status: SHIPPED | OUTPATIENT
Start: 2019-10-23 | End: 2020-03-03

## 2019-10-26 PROBLEM — E87.6 HYPOKALEMIA: Status: ACTIVE | Noted: 2019-10-26

## 2019-10-26 PROBLEM — J02.9 SORE THROAT: Status: ACTIVE | Noted: 2019-10-26

## 2019-10-26 PROBLEM — H66.90 ACUTE OTITIS MEDIA: Status: ACTIVE | Noted: 2019-10-26

## 2019-10-28 RX ORDER — ALPRAZOLAM 0.5 MG/1
0.5 TABLET ORAL 3 TIMES DAILY PRN
Qty: 100 TABLET | Refills: 0 | Status: SHIPPED | OUTPATIENT
Start: 2019-10-28 | End: 2019-11-25 | Stop reason: SDUPTHER

## 2019-11-18 RX ORDER — TRAMADOL HYDROCHLORIDE 50 MG/1
TABLET ORAL
Qty: 135 TABLET | Refills: 1 | Status: SHIPPED | OUTPATIENT
Start: 2019-11-18 | End: 2020-01-13

## 2019-11-25 RX ORDER — ALPRAZOLAM 0.5 MG/1
0.5 TABLET ORAL 3 TIMES DAILY PRN
Qty: 100 TABLET | Refills: 1 | Status: SHIPPED | OUTPATIENT
Start: 2019-11-25 | End: 2020-01-20

## 2019-12-03 ENCOUNTER — OFFICE VISIT (OUTPATIENT)
Dept: FAMILY MEDICINE CLINIC | Facility: CLINIC | Age: 59
End: 2019-12-03

## 2019-12-03 VITALS
BODY MASS INDEX: 26.26 KG/M2 | DIASTOLIC BLOOD PRESSURE: 73 MMHG | HEART RATE: 83 BPM | WEIGHT: 139 LBS | SYSTOLIC BLOOD PRESSURE: 118 MMHG | TEMPERATURE: 98.1 F

## 2019-12-03 DIAGNOSIS — I47.9 PAROXYSMAL TACHYCARDIA (HCC): ICD-10-CM

## 2019-12-03 DIAGNOSIS — M48.061 SPINAL STENOSIS OF LUMBAR REGION WITHOUT NEUROGENIC CLAUDICATION: Primary | ICD-10-CM

## 2019-12-03 DIAGNOSIS — J42 CHRONIC BRONCHITIS, UNSPECIFIED CHRONIC BRONCHITIS TYPE (HCC): ICD-10-CM

## 2019-12-03 PROCEDURE — 99214 OFFICE O/P EST MOD 30 MIN: CPT | Performed by: FAMILY MEDICINE

## 2019-12-03 RX ORDER — DULOXETIN HYDROCHLORIDE 60 MG/1
60 CAPSULE, DELAYED RELEASE ORAL 2 TIMES DAILY
Qty: 180 CAPSULE | Refills: 3 | Status: SHIPPED | OUTPATIENT
Start: 2019-12-03 | End: 2020-01-20

## 2019-12-03 RX ORDER — EPINEPHRINE 0.3 MG/.3ML
0.3 INJECTION SUBCUTANEOUS ONCE
Qty: 1 EACH | Refills: 1 | Status: SHIPPED | OUTPATIENT
Start: 2019-12-03 | End: 2019-12-03

## 2019-12-03 NOTE — ASSESSMENT & PLAN NOTE
COPD is unchanged.  Discussed monitoring symptoms and use of quick-relief medications and contacting us early in the course of exacerbations.  Counseled to avoid exposure to cigarette smoke.

## 2019-12-03 NOTE — PROGRESS NOTES
Subjective   Chief Complaint   Patient presents with   • Follow-up     on lupus and get labs     Diya Arreola is a 59 y.o. female.     Back Pain   This is a chronic problem. The current episode started more than 1 year ago. The problem occurs constantly. The problem has been gradually worsening since onset. The pain is present in the lumbar spine and thoracic spine. The quality of the pain is described as burning, shooting and aching. The pain radiates to the right foot and left foot. The pain is moderate. The symptoms are aggravated by bending and standing. Associated symptoms include numbness, paresthesias and tingling. Pertinent negatives include no abdominal pain, bladder incontinence, chest pain or fever. She has tried bed rest, analgesics and heat for the symptoms. The treatment provided mild relief.   COPD   There is no chest tightness, cough, difficulty breathing, shortness of breath, sputum production or wheezing. This is a chronic problem. The current episode started more than 1 year ago. The problem occurs intermittently. The problem has been unchanged. Pertinent negatives include no appetite change, chest pain, dyspnea on exertion, ear pain, fever, sore throat or trouble swallowing. Her symptoms are aggravated by change in weather. Her symptoms are not alleviated by beta-agonist.      Past Medical History:   Diagnosis Date   • Allergic    • Anxiety    • Arthritis    • Asthma    • COPD (chronic obstructive pulmonary disease) (CMS/HCC)    • Fibromyalgia, primary    • Gastritis    • Low back pain    • Lupus (CMS/HCC)    • Osteopenia    • Pancreatitis    • Sjogren's syndrome (CMS/HCC)    • Tachycardia    • TIA (transient ischemic attack) 2014     Past Surgical History:   Procedure Laterality Date   • CARDIAC CATHETERIZATION     • CARPAL TUNNEL RELEASE     •  SECTION     • CHOLECYSTECTOMY     • COLONOSCOPY  10/08/2013   • CYSTOCELE REPAIR     • HYSTERECTOMY     • SPINE SURGERY      neck ,  "2001, 2006; lumbar 4/2012      Allergies   Allergen Reactions   • Adhesive Tape Rash   • Aspirin Other (See Comments)   • Cephalexin Rash   • Ciprofloxacin Rash   • Corticosteroids Unknown (See Comments)   • Latex Unknown (See Comments)   • Nitrofurantoin Unknown (See Comments)   • Other Unknown (See Comments)     Oline Abx and [place]haric [place] soap   • Penicillin G Unknown (See Comments)   • Prednisone Unknown (See Comments)   • Rosuvastatin Calcium Unknown (See Comments)   • Sulfa Antibiotics Unknown (See Comments)   • Azithromycin Rash   • Gabapentin Rash     Social History     Socioeconomic History   • Marital status:      Spouse name: Jose \"Shorty\"   • Number of children: 4   • Years of education: Not on file   • Highest education level: Not on file   Occupational History   • Occupation: homemaker   Tobacco Use   • Smoking status: Current Every Day Smoker     Years: 39.00     Types: Cigarettes   • Smokeless tobacco: Never Used   • Tobacco comment: 10 cigareetes a day   Substance and Sexual Activity   • Alcohol use: No     Frequency: Never     Comment: caffeine 2c qd     Social History     Tobacco Use   Smoking Status Current Every Day Smoker   • Years: 39.00   • Types: Cigarettes   Smokeless Tobacco Never Used   Tobacco Comment    10 cigareetes a day       family history includes Diabetes in her father; Heart disease in her brother; Hypertension in her brother and sister; Osteoporosis in her maternal grandmother; Stroke in her sister.  Current Outpatient Medications on File Prior to Visit   Medication Sig Dispense Refill   • albuterol (PROVENTIL) (2.5 MG/3ML) 0.083% nebulizer solution Take 2.5 mg by nebulization Every 4 (Four) Hours As Needed for Wheezing. 360 vial 3   • albuterol sulfate HFA (PROAIR HFA) 108 (90 Base) MCG/ACT inhaler Inhale 2 puffs Every 4 (Four) Hours As Needed for Wheezing. 3 inhaler 3   • ALPRAZolam (XANAX) 0.5 MG tablet TAKE 1 TABLET BY MOUTH 3 (THREE) TIMES A DAY AS NEEDED FOR " ANXIETY. 100 tablet 1   • bumetanide (BUMEX) 2 MG tablet TAKE ONE (1) TABLET BY MOUTH TWICE A DAY 60 tablet 5   • CALCIUM PO CALCIUM CAPS     • cephalexin (KEFLEX) 500 MG capsule Take 1 capsule by mouth 3 (Three) Times a Day. 30 capsule 0   • Cholecalciferol (VITAMIN D) 2000 units capsule VITAMIN D 2000 UNIT CAPS     • DALIRESP 500 MCG tablet tablet Take 1 tablet by mouth Daily. 90 tablet 3   • diclofenac (VOLTAREN) 75 MG EC tablet Take 1 tablet by mouth Daily. 90 tablet 3   • folic acid (FOLVITE) 1 MG tablet Daily.     • gabapentin (NEURONTIN) 300 MG capsule Take 1 capsule by mouth 4 (Four) Times a Day. 360 capsule 3   • guaifenesin-codeine (GUAIFENESIN AC) 100-10 MG/5ML liquid Take 5 mL by mouth 3 (Three) Times a Day As Needed for Cough. 120 mL 0   • hydroxychloroquine (PLAQUENIL) 200 MG tablet Take 1 and a 1/2 tablet by mouth daily 135 tablet 0   • methocarbamol (ROBAXIN) 500 MG tablet TAKE ONE (1) TABLET BY MOUTH TWICE A DAY 60 tablet 5   • Multiple Vitamins-Minerals (CENTRUM SILVER) tablet CENTRUM SILVER TABS     • nitroglycerin (NITROSTAT) 0.4 MG SL tablet DISSOLVE ONE (1) TABLET UNDER THE TONGUE EVERY FIVE MINUTES AS NEEDED FOR CHEST PAIN. DO NOT EXCEED 3 TABLETS. 25 tablet 1   • Polyethylene Glycol 3350 (MIRALAX PO) 2 (Two) Times a Day.     • potassium chloride (K-DUR,KLOR-CON) 10 MEQ CR tablet Take 1 tablet by mouth 2 (Two) Times a Day. 60 tablet 2   • sucralfate (CARAFATE) 1 g tablet      • traMADol (ULTRAM) 50 MG tablet TAKE 1 AND 1/2 TABLET THREE TIMES DAILY 135 tablet 1   • verapamil SR (CALAN-SR) 240 MG CR tablet TAKE 1 TABLET BY MOUTH DAILY 90 tablet 3   • [DISCONTINUED] DULoxetine (CYMBALTA) 60 MG capsule TAKE 1 TABLET BY MOUTH TWICE DAILY 60 capsule 3   • [DISCONTINUED] EPINEPHrine (EPIPEN) 0.3 MG/0.3ML solution auto-injector injection        No current facility-administered medications on file prior to visit.      Patient Active Problem List   Diagnosis   • Allergic rhinitis   • Anaclitic  depression   • Anxiety disorder   • Asthma   • Carpal tunnel syndrome, bilateral   • Chronic obstructive pulmonary disease (CMS/HCC)   • Connective tissue disease, undifferentiated (CMS/HCC)   • Constipation   • Cystic fibrosis carrier   • DDD (degenerative disc disease), cervical   • Dyspepsia   • Edema   • Elevated antinuclear antibody (LILI) level   • Essential (primary) hypertension   • Fatigue   • Hyperlipidemia   • Insomnia   • Irritable bowel syndrome   • Fibromyalgia   • Headache   • Lupus (CMS/HCC)   • Sjogren's syndrome (CMS/HCC)   • Nonrheumatic mitral valve insufficiency   • Other long term (current) drug therapy   • Other specified dorsopathies, site unspecified   • Palpitations   • Paroxysmal tachycardia (CMS/HCC)   • Sciatica   • Sleep disorder   • Spinal stenosis of lumbar region   • Temporary cerebral vascular dysfunction   • Vitamin D deficiency   • Hypokalemia       The following portions of the patient's history were reviewed and updated as appropriate: allergies, current medications, past family history, past medical history, past social history, past surgical history and problem list.    Review of Systems   Constitutional: Negative for appetite change and fever.   HENT: Negative for ear pain, sore throat and trouble swallowing.    Respiratory: Negative for cough, sputum production, shortness of breath and wheezing.    Cardiovascular: Negative for chest pain and dyspnea on exertion.   Gastrointestinal: Negative for abdominal pain.   Genitourinary: Negative for urinary incontinence.   Musculoskeletal: Positive for back pain.   Neurological: Positive for tingling, numbness and paresthesias.       Objective   /73 (BP Location: Right arm, Patient Position: Sitting, Cuff Size: Adult)   Pulse 83   Temp 98.1 °F (36.7 °C) (Oral)   Wt 63 kg (139 lb)   BMI 26.26 kg/m²   Physical Exam   Constitutional: She is oriented to person, place, and time. She appears well-developed. No distress.   HENT:    Head: Normocephalic.   Eyes: Conjunctivae and lids are normal.   Neck: Trachea normal. No thyroid mass and no thyromegaly present.   Cardiovascular: Normal rate, regular rhythm and normal heart sounds.   Pulmonary/Chest: Effort normal and breath sounds normal.   Lymphadenopathy:     She has no cervical adenopathy.   Neurological: She is alert and oriented to person, place, and time.   Skin: Skin is warm and dry.   Psychiatric: She has a normal mood and affect. Her speech is normal and behavior is normal. She is attentive.       No visits with results within 1 Week(s) from this visit.   Latest known visit with results is:   Office Visit on 10/22/2019   Component Date Value Ref Range Status   • Glucose 10/22/2019 90  65 - 99 mg/dL Final   • BUN 10/22/2019 16  6 - 20 mg/dL Final   • Creatinine 10/22/2019 0.96  0.57 - 1.00 mg/dL Final   • Sodium 10/22/2019 145  136 - 145 mmol/L Final   • Potassium 10/22/2019 3.6  3.5 - 5.2 mmol/L Final   • Chloride 10/22/2019 98  98 - 107 mmol/L Final   • CO2 10/22/2019 34.7* 22.0 - 29.0 mmol/L Final   • Calcium 10/22/2019 9.5  8.6 - 10.5 mg/dL Final   • eGFR Non African Amer 10/22/2019 60* >60 mL/min/1.73 Final   • BUN/Creatinine Ratio 10/22/2019 16.7  7.0 - 25.0 Final   • Anion Gap 10/22/2019 12.3  5.0 - 15.0 mmol/L Final   • Rapid Strep A Screen 10/22/2019 Negative  Negative, VALID, INVALID, Not Performed Final   • Internal Control 10/22/2019 Passed  Passed Final   • Lot Number 10/22/2019 [id number]   Final   • Expiration Date 10/22/2019 [id number]   Final           Assessment/Plan   Problems Addressed this Visit        Cardiovascular and Mediastinum    Paroxysmal tachycardia (CMS/HCC)     No symptoms recently         Relevant Medications    EPINEPHrine (EPIPEN) 0.3 MG/0.3ML solution auto-injector injection       Respiratory    Chronic obstructive pulmonary disease (CMS/HCC)     COPD is unchanged.  Discussed monitoring symptoms and use of quick-relief medications and contacting us  early in the course of exacerbations.  Counseled to avoid exposure to cigarette smoke.                Other    Spinal stenosis of lumbar region - Primary    Relevant Orders    MRI Lumbar Spine With & Without Contrast          Diya was seen today for follow-up.    Diagnoses and all orders for this visit:    Spinal stenosis of lumbar region without neurogenic claudication  -     MRI Lumbar Spine With & Without Contrast; Future    Paroxysmal tachycardia (CMS/HCC)    Chronic bronchitis, unspecified chronic bronchitis type (CMS/HCC)    Other orders  -     DULoxetine (CYMBALTA) 60 MG capsule; Take 1 capsule by mouth 2 (Two) Times a Day.  -     EPINEPHrine (EPIPEN) 0.3 MG/0.3ML solution auto-injector injection; Inject 0.3 mL under the skin into the appropriate area as directed 1 (One) Time for 1 dose.

## 2019-12-06 ENCOUNTER — TELEPHONE (OUTPATIENT)
Dept: FAMILY MEDICINE CLINIC | Facility: CLINIC | Age: 59
End: 2019-12-06

## 2019-12-06 DIAGNOSIS — I10 ESSENTIAL (PRIMARY) HYPERTENSION: Primary | ICD-10-CM

## 2019-12-06 NOTE — TELEPHONE ENCOUNTER
Elda is having her MRI at [facility] and they need a current Creatinine and BUN  level They will not take the 10/22/2019

## 2019-12-17 RX ORDER — POTASSIUM CHLORIDE 750 MG/1
10 TABLET, EXTENDED RELEASE ORAL 3 TIMES DAILY
Qty: 90 TABLET | Refills: 2 | Status: SHIPPED | OUTPATIENT
Start: 2019-12-17 | End: 2020-10-14 | Stop reason: SDUPTHER

## 2019-12-17 NOTE — PROGRESS NOTES
Please let her know that her MRI shows moderate spinal stenosis.  I recommend that she follow up with a spine surgeon.  Does she have someone in mind or does she want me to refer her to someone?

## 2020-01-10 DIAGNOSIS — M50.30 DDD (DEGENERATIVE DISC DISEASE), CERVICAL: Primary | ICD-10-CM

## 2020-01-13 RX ORDER — TRAMADOL HYDROCHLORIDE 50 MG/1
TABLET ORAL
Qty: 135 TABLET | Refills: 0 | Status: SHIPPED | OUTPATIENT
Start: 2020-01-13 | End: 2020-02-10

## 2020-01-17 DIAGNOSIS — F41.1 GENERALIZED ANXIETY DISORDER: Primary | ICD-10-CM

## 2020-01-20 RX ORDER — ALPRAZOLAM 0.5 MG/1
0.5 TABLET ORAL 3 TIMES DAILY PRN
Qty: 100 TABLET | Refills: 1 | Status: SHIPPED | OUTPATIENT
Start: 2020-01-20 | End: 2020-03-03 | Stop reason: SDUPTHER

## 2020-01-20 RX ORDER — DULOXETIN HYDROCHLORIDE 60 MG/1
CAPSULE, DELAYED RELEASE ORAL
Qty: 60 CAPSULE | Refills: 11 | Status: SHIPPED | OUTPATIENT
Start: 2020-01-20 | End: 2020-09-08 | Stop reason: SDUPTHER

## 2020-02-07 DIAGNOSIS — M50.30 DDD (DEGENERATIVE DISC DISEASE), CERVICAL: ICD-10-CM

## 2020-02-10 RX ORDER — TRAMADOL HYDROCHLORIDE 50 MG/1
TABLET ORAL
Qty: 135 TABLET | Refills: 0 | Status: SHIPPED | OUTPATIENT
Start: 2020-02-10 | End: 2020-03-03 | Stop reason: SDUPTHER

## 2020-03-03 ENCOUNTER — OFFICE VISIT (OUTPATIENT)
Dept: FAMILY MEDICINE CLINIC | Facility: CLINIC | Age: 60
End: 2020-03-03

## 2020-03-03 VITALS
OXYGEN SATURATION: 95 % | TEMPERATURE: 97.2 F | BODY MASS INDEX: 27.96 KG/M2 | WEIGHT: 148 LBS | HEART RATE: 89 BPM | SYSTOLIC BLOOD PRESSURE: 131 MMHG | DIASTOLIC BLOOD PRESSURE: 77 MMHG

## 2020-03-03 DIAGNOSIS — M50.30 DDD (DEGENERATIVE DISC DISEASE), CERVICAL: ICD-10-CM

## 2020-03-03 DIAGNOSIS — F41.1 GENERALIZED ANXIETY DISORDER: ICD-10-CM

## 2020-03-03 DIAGNOSIS — M48.061 SPINAL STENOSIS OF LUMBAR REGION WITHOUT NEUROGENIC CLAUDICATION: Primary | ICD-10-CM

## 2020-03-03 DIAGNOSIS — E87.6 HYPOKALEMIA: ICD-10-CM

## 2020-03-03 LAB
ALBUMIN SERPL-MCNC: 4.7 G/DL (ref 3.5–5.2)
ALBUMIN/GLOB SERPL: 1.8 G/DL
ALP SERPL-CCNC: 98 U/L (ref 39–117)
ALT SERPL W P-5'-P-CCNC: 29 U/L (ref 1–33)
ANION GAP SERPL CALCULATED.3IONS-SCNC: 15.2 MMOL/L (ref 5–15)
AST SERPL-CCNC: 21 U/L (ref 1–32)
BILIRUB SERPL-MCNC: 0.3 MG/DL (ref 0.2–1.2)
BUN BLD-MCNC: 26 MG/DL (ref 6–20)
BUN/CREAT SERPL: 27.4 (ref 7–25)
CALCIUM SPEC-SCNC: 10.1 MG/DL (ref 8.6–10.5)
CHLORIDE SERPL-SCNC: 93 MMOL/L (ref 98–107)
CO2 SERPL-SCNC: 31.8 MMOL/L (ref 22–29)
CREAT BLD-MCNC: 0.95 MG/DL (ref 0.57–1)
GFR SERPL CREATININE-BSD FRML MDRD: 61 ML/MIN/1.73
GLOBULIN UR ELPH-MCNC: 2.6 GM/DL
GLUCOSE BLD-MCNC: 105 MG/DL (ref 65–99)
POTASSIUM BLD-SCNC: 3.5 MMOL/L (ref 3.5–5.2)
PROT SERPL-MCNC: 7.3 G/DL (ref 6–8.5)
SODIUM BLD-SCNC: 140 MMOL/L (ref 136–145)

## 2020-03-03 PROCEDURE — 80053 COMPREHEN METABOLIC PANEL: CPT | Performed by: FAMILY MEDICINE

## 2020-03-03 PROCEDURE — 99214 OFFICE O/P EST MOD 30 MIN: CPT | Performed by: FAMILY MEDICINE

## 2020-03-03 PROCEDURE — 36415 COLL VENOUS BLD VENIPUNCTURE: CPT | Performed by: FAMILY MEDICINE

## 2020-03-03 RX ORDER — LIDOCAINE 50 MG/G
1 PATCH TOPICAL EVERY 24 HOURS
Qty: 30 PATCH | Refills: 1 | Status: SHIPPED | OUTPATIENT
Start: 2020-03-03 | End: 2021-06-16

## 2020-03-03 RX ORDER — TRAMADOL HYDROCHLORIDE 50 MG/1
50 TABLET ORAL EVERY 6 HOURS PRN
Qty: 135 TABLET | Refills: 0 | Status: SHIPPED | OUTPATIENT
Start: 2020-03-03 | End: 2020-04-10 | Stop reason: SDUPTHER

## 2020-03-03 RX ORDER — METHOCARBAMOL 500 MG/1
TABLET, FILM COATED ORAL
Qty: 60 TABLET | Refills: 5 | OUTPATIENT
Start: 2020-03-03

## 2020-03-03 RX ORDER — VERAPAMIL HYDROCHLORIDE 240 MG/1
240 TABLET, FILM COATED, EXTENDED RELEASE ORAL DAILY
Qty: 90 TABLET | Refills: 3 | Status: SHIPPED | OUTPATIENT
Start: 2020-03-03 | End: 2020-09-08 | Stop reason: SDUPTHER

## 2020-03-03 RX ORDER — METHOCARBAMOL 750 MG/1
750 TABLET, FILM COATED ORAL 2 TIMES DAILY
Qty: 60 TABLET | Refills: 0 | Status: SHIPPED | OUTPATIENT
Start: 2020-03-03 | End: 2020-04-03

## 2020-03-03 RX ORDER — ALPRAZOLAM 1 MG/1
1 TABLET ORAL 3 TIMES DAILY PRN
Qty: 90 TABLET | Refills: 0 | Status: SHIPPED | OUTPATIENT
Start: 2020-03-03 | End: 2020-04-10

## 2020-03-03 RX ORDER — ROFLUMILAST 500 UG/1
500 TABLET ORAL DAILY
Qty: 90 TABLET | Refills: 3 | Status: SHIPPED | OUTPATIENT
Start: 2020-03-03 | End: 2021-03-10 | Stop reason: SDUPTHER

## 2020-03-03 RX ORDER — DICLOFENAC SODIUM 75 MG/1
75 TABLET, DELAYED RELEASE ORAL DAILY
Qty: 90 TABLET | Refills: 3 | Status: SHIPPED | OUTPATIENT
Start: 2020-03-03 | End: 2020-08-25 | Stop reason: SDUPTHER

## 2020-03-03 RX ORDER — BUMETANIDE 2 MG/1
2 TABLET ORAL 2 TIMES DAILY
Qty: 60 TABLET | Refills: 5 | Status: SHIPPED | OUTPATIENT
Start: 2020-03-03 | End: 2020-09-08 | Stop reason: SDUPTHER

## 2020-03-03 RX ORDER — EPINEPHRINE 0.3 MG/.3ML
INJECTION SUBCUTANEOUS
COMMUNITY
Start: 2019-12-03 | End: 2021-02-17 | Stop reason: SDUPTHER

## 2020-03-20 NOTE — PROGRESS NOTES
Subjective   Chief Complaint   Patient presents with   • Follow-up     3 mos   • Labs Only     Diya Arreola is a 59 y.o. female.     H/O hypokalemia. She is due to have her potassium level re-tested.    Back Pain   This is a chronic problem. The current episode started more than 1 year ago. The problem occurs constantly. The problem is unchanged. The pain is present in the lumbar spine. The quality of the pain is described as aching. The pain does not radiate. The pain is severe. The symptoms are aggravated by bending. Pertinent negatives include no abdominal pain, bladder incontinence, bowel incontinence, chest pain, dysuria, fever, headaches, numbness, pelvic pain, tingling, weakness or weight loss. Risk factors include sedentary lifestyle. She has tried analgesics for the symptoms. The treatment provided moderate relief.   Anxiety   Presents for follow-up visit. Symptoms include nervous/anxious behavior. Patient reports no chest pain, compulsions, confusion, decreased concentration, depressed mood, dizziness, excessive worry, insomnia, palpitations, restlessness or shortness of breath. Symptoms occur occasionally. The severity of symptoms is moderate. The quality of sleep is fair.     Compliance with medications is %.      Past Medical History:   Diagnosis Date   • Allergic    • Anxiety    • Arthritis    • Asthma    • COPD (chronic obstructive pulmonary disease) (CMS/HCC)    • Fibromyalgia, primary    • Gastritis    • Low back pain    • Lupus (CMS/HCC)    • Osteopenia    • Pancreatitis    • Sjogren's syndrome (CMS/HCC)    • Tachycardia    • TIA (transient ischemic attack) 2014     Past Surgical History:   Procedure Laterality Date   • CARDIAC CATHETERIZATION     • CARPAL TUNNEL RELEASE     •  SECTION     • CHOLECYSTECTOMY     • COLONOSCOPY  10/08/2013   • CYSTOCELE REPAIR     • HYSTERECTOMY     • SPINE SURGERY      neck , , ; lumbar 2012      Allergies   Allergen Reactions   •  "Adhesive Tape Rash   • Aspirin Other (See Comments)   • Cephalexin Rash   • Ciprofloxacin Rash   • Corticosteroids Unknown (See Comments)   • Latex Unknown (See Comments)   • Nitrofurantoin Unknown (See Comments)   • Other Unknown (See Comments)     Oline Abx and [place] soap   • Penicillin G Unknown (See Comments)   • Prednisone Unknown (See Comments)   • Rosuvastatin Calcium Unknown (See Comments)   • Sulfa Antibiotics Unknown (See Comments)   • Azithromycin Rash   • Gabapentin Rash     Social History     Socioeconomic History   • Marital status:      Spouse name: Jose \"Shorty\"   • Number of children: 4   • Years of education: Not on file   • Highest education level: Not on file   Occupational History   • Occupation: homemaker   Tobacco Use   • Smoking status: Current Every Day Smoker     Years: 39.00     Types: Cigarettes   • Smokeless tobacco: Never Used   • Tobacco comment: 10 cigareetes a day   Substance and Sexual Activity   • Alcohol use: No     Frequency: Never     Comment: caffeine 2c qd     Social History     Tobacco Use   Smoking Status Current Every Day Smoker   • Years: 39.00   • Types: Cigarettes   Smokeless Tobacco Never Used   Tobacco Comment    10 cigareetes a day       family history includes Diabetes in her father; Heart disease in her brother; Hypertension in her brother and sister; Osteoporosis in her maternal grandmother; Stroke in her sister.  Current Outpatient Medications on File Prior to Visit   Medication Sig Dispense Refill   • albuterol (PROVENTIL) (2.5 MG/3ML) 0.083% nebulizer solution Take 2.5 mg by nebulization Every 4 (Four) Hours As Needed for Wheezing. 360 vial 3   • albuterol sulfate HFA (PROAIR HFA) 108 (90 Base) MCG/ACT inhaler Inhale 2 puffs Every 4 (Four) Hours As Needed for Wheezing. 3 inhaler 3   • CALCIUM PO CALCIUM CAPS     • Cholecalciferol (VITAMIN D) 2000 units capsule VITAMIN D 2000 UNIT CAPS     • DULoxetine (CYMBALTA) 60 MG capsule TAKE ONE CAPSULE BY " MOUTH TWICE A DAY 60 capsule 11   • EPINEPHrine (EPIPEN) 0.3 MG/0.3ML solution auto-injector injection      • folic acid (FOLVITE) 1 MG tablet Daily.     • gabapentin (NEURONTIN) 300 MG capsule Take 1 capsule by mouth 4 (Four) Times a Day. 360 capsule 3   • hydroxychloroquine (PLAQUENIL) 200 MG tablet Take 1 and a 1/2 tablet by mouth daily 135 tablet 0   • Multiple Vitamins-Minerals (CENTRUM SILVER) tablet CENTRUM SILVER TABS     • nitroglycerin (NITROSTAT) 0.4 MG SL tablet DISSOLVE ONE (1) TABLET UNDER THE TONGUE EVERY FIVE MINUTES AS NEEDED FOR CHEST PAIN. DO NOT EXCEED 3 TABLETS. 25 tablet 1   • Polyethylene Glycol 3350 (MIRALAX PO) 2 (Two) Times a Day.     • potassium chloride (K-DUR,KLOR-CON) 10 MEQ CR tablet Take 1 tablet by mouth 3 (Three) Times a Day. 90 tablet 2   • sucralfate (CARAFATE) 1 g tablet        No current facility-administered medications on file prior to visit.      Patient Active Problem List   Diagnosis   • Allergic rhinitis   • Anaclitic depression   • Anxiety disorder   • Asthma   • Carpal tunnel syndrome, bilateral   • Chronic obstructive pulmonary disease (CMS/HCC)   • Connective tissue disease, undifferentiated (CMS/HCC)   • Constipation   • Cystic fibrosis carrier   • DDD (degenerative disc disease), cervical   • Dyspepsia   • Edema   • Elevated antinuclear antibody (LILI) level   • Essential (primary) hypertension   • Fatigue   • Hyperlipidemia   • Insomnia   • Irritable bowel syndrome   • Fibromyalgia   • Headache   • Lupus (CMS/HCC)   • Sjogren's syndrome (CMS/HCC)   • Nonrheumatic mitral valve insufficiency   • Other long term (current) drug therapy   • Other specified dorsopathies, site unspecified   • Palpitations   • Paroxysmal tachycardia (CMS/HCC)   • Sciatica   • Sleep disorder   • Spinal stenosis of lumbar region   • Temporary cerebral vascular dysfunction   • Vitamin D deficiency   • Hypokalemia       The following portions of the patient's history were reviewed and updated as  appropriate: allergies, current medications, past family history, past medical history, past social history, past surgical history and problem list.    Review of Systems   Constitutional: Negative for chills, fever and unexpected weight loss.   HENT: Negative for sinus pressure and sore throat.    Eyes: Negative for blurred vision.   Respiratory: Negative for cough and shortness of breath.    Cardiovascular: Negative for chest pain and palpitations.   Gastrointestinal: Negative for abdominal pain and bowel incontinence.   Endocrine: Negative for polyuria.   Genitourinary: Negative for dysuria, pelvic pain and urinary incontinence.   Musculoskeletal: Positive for arthralgias, back pain and myalgias.   Skin: Positive for rash.   Neurological: Negative for dizziness, tingling, weakness, numbness, headache and confusion.   Hematological: Negative for adenopathy.   Psychiatric/Behavioral: Negative for decreased concentration and depressed mood. The patient is nervous/anxious. The patient does not have insomnia.        Objective   /77 (BP Location: Left arm, Patient Position: Sitting, Cuff Size: Adult)   Pulse 89   Temp 97.2 °F (36.2 °C) (Tympanic)   Wt 67.1 kg (148 lb)   SpO2 95%   BMI 27.96 kg/m²   Physical Exam   Constitutional: She is oriented to person, place, and time. She appears well-developed. No distress.   HENT:   Head: Normocephalic.   Eyes: Conjunctivae and lids are normal.   Neck: Trachea normal. No thyroid mass and no thyromegaly present.   Cardiovascular: Normal rate, regular rhythm and normal heart sounds.   Pulmonary/Chest: Effort normal and breath sounds normal.   Musculoskeletal: She exhibits tenderness.        Lumbar back: She exhibits tenderness.   Lymphadenopathy:     She has no cervical adenopathy.   Neurological: She is alert and oriented to person, place, and time.   Skin: Skin is warm and dry.   Psychiatric: She has a normal mood and affect. Her speech is normal and behavior is  normal. She is attentive.       Office Visit on 03/03/2020   Component Date Value Ref Range Status   • Glucose 03/03/2020 105* 65 - 99 mg/dL Final   • BUN 03/03/2020 26* 6 - 20 mg/dL Final   • Creatinine 03/03/2020 0.95  0.57 - 1.00 mg/dL Final   • Sodium 03/03/2020 140  136 - 145 mmol/L Final   • Potassium 03/03/2020 3.5  3.5 - 5.2 mmol/L Final   • Chloride 03/03/2020 93* 98 - 107 mmol/L Final   • CO2 03/03/2020 31.8* 22.0 - 29.0 mmol/L Final   • Calcium 03/03/2020 10.1  8.6 - 10.5 mg/dL Final   • Total Protein 03/03/2020 7.3  6.0 - 8.5 g/dL Final   • Albumin 03/03/2020 4.70  3.50 - 5.20 g/dL Final   • ALT (SGPT) 03/03/2020 29  1 - 33 U/L Final   • AST (SGOT) 03/03/2020 21  1 - 32 U/L Final   • Alkaline Phosphatase 03/03/2020 98  39 - 117 U/L Final   • Total Bilirubin 03/03/2020 0.3  0.2 - 1.2 mg/dL Final   • eGFR Non African Amer 03/03/2020 61  >60 mL/min/1.73 Final   • Globulin 03/03/2020 2.6  gm/dL Final   • A/G Ratio 03/03/2020 1.8  g/dL Final   • BUN/Creatinine Ratio 03/03/2020 27.4* 7.0 - 25.0 Final   • Anion Gap 03/03/2020 15.2* 5.0 - 15.0 mmol/L Final           Assessment/Plan   Problems Addressed this Visit        Other    Anxiety disorder     Psychological condition is improving with treatment.  Continue current treatment regimen.  Psychological condition  will be reassessed at the next regular appointment.         Relevant Medications    ALPRAZolam (XANAX) 1 MG tablet    Spinal stenosis of lumbar region - Primary     She has an appt on March 11 with Dr. Strong, that has been rescheduled twice.           Hypokalemia    Relevant Orders    Comprehensive Metabolic Panel (Completed)

## 2020-03-27 DIAGNOSIS — R76.8 ANA POSITIVE: ICD-10-CM

## 2020-03-27 RX ORDER — HYDROXYCHLOROQUINE SULFATE 200 MG/1
TABLET, FILM COATED ORAL
Qty: 135 TABLET | Refills: 0 | Status: SHIPPED | OUTPATIENT
Start: 2020-03-27 | End: 2020-09-08 | Stop reason: SDUPTHER

## 2020-03-31 ENCOUNTER — TELEPHONE (OUTPATIENT)
Dept: RHEUMATOLOGY | Facility: CLINIC | Age: 60
End: 2020-03-31

## 2020-04-01 DIAGNOSIS — M50.30 DDD (DEGENERATIVE DISC DISEASE), CERVICAL: ICD-10-CM

## 2020-04-02 RX ORDER — AZITHROMYCIN 250 MG/1
TABLET, FILM COATED ORAL
Qty: 6 TABLET | OUTPATIENT
Start: 2020-04-02

## 2020-04-03 DIAGNOSIS — M50.30 DDD (DEGENERATIVE DISC DISEASE), CERVICAL: ICD-10-CM

## 2020-04-03 RX ORDER — TRAMADOL HYDROCHLORIDE 50 MG/1
50 TABLET ORAL EVERY 6 HOURS PRN
Qty: 135 TABLET | OUTPATIENT
Start: 2020-04-03

## 2020-04-03 RX ORDER — METHOCARBAMOL 750 MG/1
750 TABLET, FILM COATED ORAL 2 TIMES DAILY
Qty: 60 TABLET | Refills: 0 | Status: SHIPPED | OUTPATIENT
Start: 2020-04-03 | End: 2020-05-01 | Stop reason: SDUPTHER

## 2020-04-03 RX ORDER — CEPHALEXIN 500 MG/1
500 CAPSULE ORAL 3 TIMES DAILY
Qty: 30 CAPSULE | Refills: 0 | OUTPATIENT
Start: 2020-04-03

## 2020-04-03 RX ORDER — METHOCARBAMOL 500 MG/1
TABLET, FILM COATED ORAL
Qty: 60 TABLET | Refills: 0 | OUTPATIENT
Start: 2020-04-03

## 2020-04-08 DIAGNOSIS — F41.1 GENERALIZED ANXIETY DISORDER: ICD-10-CM

## 2020-04-10 DIAGNOSIS — M50.30 DDD (DEGENERATIVE DISC DISEASE), CERVICAL: ICD-10-CM

## 2020-04-10 RX ORDER — ALPRAZOLAM 1 MG/1
1 TABLET ORAL 3 TIMES DAILY PRN
Qty: 10 TABLET | Refills: 0 | Status: SHIPPED | OUTPATIENT
Start: 2020-04-10 | End: 2020-04-17

## 2020-04-10 RX ORDER — TRAMADOL HYDROCHLORIDE 50 MG/1
50 TABLET ORAL 2 TIMES DAILY PRN
Qty: 14 TABLET | Refills: 0 | Status: SHIPPED | OUTPATIENT
Start: 2020-04-10 | End: 2020-04-21

## 2020-04-17 DIAGNOSIS — M50.30 DDD (DEGENERATIVE DISC DISEASE), CERVICAL: ICD-10-CM

## 2020-04-17 DIAGNOSIS — F41.1 GENERALIZED ANXIETY DISORDER: ICD-10-CM

## 2020-04-17 RX ORDER — TRAMADOL HYDROCHLORIDE 50 MG/1
50 TABLET ORAL 2 TIMES DAILY PRN
Qty: 14 TABLET | OUTPATIENT
Start: 2020-04-17

## 2020-04-17 RX ORDER — ALPRAZOLAM 1 MG/1
1 TABLET ORAL 3 TIMES DAILY PRN
Qty: 10 TABLET | Refills: 0 | Status: SHIPPED | OUTPATIENT
Start: 2020-04-17 | End: 2020-04-21 | Stop reason: SDUPTHER

## 2020-04-21 DIAGNOSIS — F41.1 GENERALIZED ANXIETY DISORDER: ICD-10-CM

## 2020-04-21 DIAGNOSIS — M50.30 DDD (DEGENERATIVE DISC DISEASE), CERVICAL: ICD-10-CM

## 2020-04-21 RX ORDER — ALPRAZOLAM 1 MG/1
1 TABLET ORAL 3 TIMES DAILY PRN
Qty: 90 TABLET | Refills: 0 | Status: SHIPPED | OUTPATIENT
Start: 2020-04-21 | End: 2020-05-18

## 2020-04-21 RX ORDER — TRAMADOL HYDROCHLORIDE 50 MG/1
50 TABLET ORAL 2 TIMES DAILY PRN
Qty: 14 TABLET | Refills: 0 | OUTPATIENT
Start: 2020-04-21

## 2020-05-01 RX ORDER — METHOCARBAMOL 750 MG/1
750 TABLET, FILM COATED ORAL 2 TIMES DAILY
Qty: 60 TABLET | Refills: 0 | Status: SHIPPED | OUTPATIENT
Start: 2020-05-01 | End: 2020-05-29 | Stop reason: SDUPTHER

## 2020-05-01 RX ORDER — METHOCARBAMOL 750 MG/1
750 TABLET, FILM COATED ORAL 2 TIMES DAILY
Qty: 60 TABLET | Refills: 0 | OUTPATIENT
Start: 2020-05-01

## 2020-05-18 DIAGNOSIS — F41.1 GENERALIZED ANXIETY DISORDER: ICD-10-CM

## 2020-05-18 DIAGNOSIS — M50.30 DDD (DEGENERATIVE DISC DISEASE), CERVICAL: ICD-10-CM

## 2020-05-18 RX ORDER — TRAMADOL HYDROCHLORIDE 50 MG/1
50 TABLET ORAL EVERY 6 HOURS PRN
Qty: 120 TABLET | Refills: 0 | Status: SHIPPED | OUTPATIENT
Start: 2020-05-18 | End: 2020-06-03 | Stop reason: SDUPTHER

## 2020-05-18 RX ORDER — ALPRAZOLAM 1 MG/1
1 TABLET ORAL 3 TIMES DAILY PRN
Qty: 90 TABLET | Refills: 0 | Status: SHIPPED | OUTPATIENT
Start: 2020-05-18 | End: 2020-06-15

## 2020-05-29 RX ORDER — METHOCARBAMOL 750 MG/1
750 TABLET, FILM COATED ORAL 2 TIMES DAILY
Qty: 60 TABLET | Refills: 0 | OUTPATIENT
Start: 2020-05-29

## 2020-05-29 RX ORDER — METHOCARBAMOL 750 MG/1
750 TABLET, FILM COATED ORAL 2 TIMES DAILY
Qty: 60 TABLET | Refills: 0 | Status: SHIPPED | OUTPATIENT
Start: 2020-05-29 | End: 2020-06-03

## 2020-06-03 ENCOUNTER — OFFICE VISIT (OUTPATIENT)
Dept: FAMILY MEDICINE CLINIC | Facility: CLINIC | Age: 60
End: 2020-06-03

## 2020-06-03 ENCOUNTER — OFFICE VISIT (OUTPATIENT)
Dept: NEUROSURGERY | Facility: CLINIC | Age: 60
End: 2020-06-03

## 2020-06-03 VITALS
TEMPERATURE: 97.7 F | HEART RATE: 86 BPM | SYSTOLIC BLOOD PRESSURE: 112 MMHG | BODY MASS INDEX: 26.45 KG/M2 | DIASTOLIC BLOOD PRESSURE: 72 MMHG | OXYGEN SATURATION: 96 % | WEIGHT: 140 LBS

## 2020-06-03 VITALS
SYSTOLIC BLOOD PRESSURE: 105 MMHG | WEIGHT: 140 LBS | BODY MASS INDEX: 26.43 KG/M2 | DIASTOLIC BLOOD PRESSURE: 76 MMHG | HEART RATE: 96 BPM | HEIGHT: 61 IN | RESPIRATION RATE: 18 BRPM

## 2020-06-03 DIAGNOSIS — G89.29 CHRONIC LOW BACK PAIN, UNSPECIFIED BACK PAIN LATERALITY, UNSPECIFIED WHETHER SCIATICA PRESENT: Primary | ICD-10-CM

## 2020-06-03 DIAGNOSIS — F41.1 GENERALIZED ANXIETY DISORDER: ICD-10-CM

## 2020-06-03 DIAGNOSIS — F32.A DEPRESSION, UNSPECIFIED DEPRESSION TYPE: Primary | ICD-10-CM

## 2020-06-03 DIAGNOSIS — M51.36 LUMBAR ADJACENT SEGMENT DISEASE WITH SPONDYLOLISTHESIS: ICD-10-CM

## 2020-06-03 DIAGNOSIS — M43.16 LUMBAR ADJACENT SEGMENT DISEASE WITH SPONDYLOLISTHESIS: ICD-10-CM

## 2020-06-03 DIAGNOSIS — M47.892 OTHER SPONDYLOSIS, CERVICAL REGION: ICD-10-CM

## 2020-06-03 DIAGNOSIS — M54.50 CHRONIC LOW BACK PAIN, UNSPECIFIED BACK PAIN LATERALITY, UNSPECIFIED WHETHER SCIATICA PRESENT: Primary | ICD-10-CM

## 2020-06-03 DIAGNOSIS — M50.30 DDD (DEGENERATIVE DISC DISEASE), CERVICAL: ICD-10-CM

## 2020-06-03 PROCEDURE — 99203 OFFICE O/P NEW LOW 30 MIN: CPT | Performed by: NEUROLOGICAL SURGERY

## 2020-06-03 PROCEDURE — 99214 OFFICE O/P EST MOD 30 MIN: CPT | Performed by: FAMILY MEDICINE

## 2020-06-03 RX ORDER — MELOXICAM 15 MG/1
15 TABLET ORAL DAILY
Qty: 60 TABLET | Refills: 2 | Status: SHIPPED | OUTPATIENT
Start: 2020-06-03 | End: 2020-07-06 | Stop reason: ALTCHOICE

## 2020-06-03 RX ORDER — TRAMADOL HYDROCHLORIDE 50 MG/1
100 TABLET ORAL EVERY 8 HOURS PRN
Qty: 180 TABLET | Refills: 0 | Status: SHIPPED | OUTPATIENT
Start: 2020-06-03 | End: 2020-07-01

## 2020-06-03 RX ORDER — BUPROPION HYDROCHLORIDE 150 MG/1
150 TABLET ORAL DAILY
Qty: 90 TABLET | Refills: 1 | Status: SHIPPED | OUTPATIENT
Start: 2020-06-03 | End: 2020-09-08 | Stop reason: SDUPTHER

## 2020-06-03 RX ORDER — TIZANIDINE 2 MG/1
2 TABLET ORAL NIGHTLY PRN
Qty: 90 TABLET | Refills: 2 | Status: SHIPPED | OUTPATIENT
Start: 2020-06-03 | End: 2020-10-23 | Stop reason: SDUPTHER

## 2020-06-03 NOTE — PROGRESS NOTES
Subjective   No chief complaint on file.    Diya Arreola is a 60 y.o. female.     She has had increased stress recently with concerns about the pandemic, rioting in near cities, family issues, etc.     She reports that she recently saw a neurosurgeon.  He is scheduling an MRI of her cervical spine and has referred her for epidurals.    Anxiety   Presents for follow-up visit. Symptoms include depressed mood, excessive worry, malaise and nervous/anxious behavior. Patient reports no chest pain, confusion, dizziness, feeling of choking, palpitations, shortness of breath or suicidal ideas. Symptoms occur constantly. The severity of symptoms is moderate. The quality of sleep is fair.     Her past medical history is significant for depression. Compliance with medications is %.   Depression   Visit Type: follow-up  Patient presents with the following symptoms: anhedonia, depressed mood, excessive worry, malaise, nervousness/anxiety, psychomotor agitation and psychomotor retardation.  Patient is not experiencing: confusion, palpitations, shortness of breath, suicidal ideas and suicidal planning.  Frequency of symptoms: most days   Severity: moderate   Sleep quality: fair      Neck Pain    This is a chronic problem. The current episode started more than 1 year ago. The problem occurs constantly. The problem has been gradually worsening. The pain is present in the midline. The quality of the pain is described as burning and shooting. The pain is moderate. The symptoms are aggravated by position. Associated symptoms include headaches, numbness and tingling. Pertinent negatives include no chest pain or fever. She has tried oral narcotics, muscle relaxants and home exercises for the symptoms. The treatment provided moderate relief.      Past Medical History:   Diagnosis Date   • Allergic    • Anxiety    • Arthritis    • Asthma    • COPD (chronic obstructive pulmonary disease) (CMS/[facility])    • Fibromyalgia, primary    •  "Gastritis    • Low back pain    • Lupus (CMS/HCC)    • Osteopenia    • Pancreatitis    • Sjogren's syndrome (CMS/HCC)    • Tachycardia    • TIA (transient ischemic attack) 2014     Past Surgical History:   Procedure Laterality Date   • CARDIAC CATHETERIZATION     • CARPAL TUNNEL RELEASE     •  SECTION     • CHOLECYSTECTOMY     • COLONOSCOPY  10/08/2013   • CYSTOCELE REPAIR     • HYSTERECTOMY     • SPINE SURGERY      neck , , ; lumbar 2012      Allergies   Allergen Reactions   • Adhesive Tape Rash   • Cephalexin Rash   • Ciprofloxacin Rash   • Corticosteroids Unknown (See Comments)     Nerve burning    • Latex Unknown (See Comments)     Reddness, rash    • Other Unknown (See Comments)     Oline Abx and [place] soap   • Penicillin G Anaphylaxis   • Prednisone Unknown (See Comments)     Nerve burning    • Sulfa Antibiotics Unknown (See Comments)   • Azithromycin Rash     Social History     Socioeconomic History   • Marital status:      Spouse name: Jose \"Shorty\"   • Number of children: 4   • Years of education: Not on file   • Highest education level: Not on file   Occupational History   • Occupation: homemaker   Tobacco Use   • Smoking status: Current Every Day Smoker     Packs/day: 0.50     Years: 39.00     Pack years: 19.50     Types: Cigarettes   • Smokeless tobacco: Never Used   • Tobacco comment: 10 cigareetes a day   Substance and Sexual Activity   • Alcohol use: No     Frequency: Never     Comment: caffeine 2c qd   • Drug use: Never   • Sexual activity: Never     Social History     Tobacco Use   Smoking Status Current Every Day Smoker   • Packs/day: 0.50   • Years: 39.00   • Pack years: 19.50   • Types: Cigarettes   Smokeless Tobacco Never Used   Tobacco Comment    10 cigareetes a day       family history includes Diabetes in her father; Heart disease in her brother; Hypertension in her brother and sister; Osteoporosis in her maternal grandmother; Stroke in her " sister.  Current Outpatient Medications on File Prior to Visit   Medication Sig Dispense Refill   • albuterol (PROVENTIL) (2.5 MG/3ML) 0.083% nebulizer solution Take 2.5 mg by nebulization Every 4 (Four) Hours As Needed for Wheezing. 360 vial 3   • albuterol sulfate HFA (PROAIR HFA) 108 (90 Base) MCG/ACT inhaler Inhale 2 puffs Every 4 (Four) Hours As Needed for Wheezing. 3 inhaler 3   • ALPRAZolam (XANAX) 1 MG tablet TAKE 1 TABLET BY MOUTH 3 (THREE) TIMES A DAY AS NEEDED FOR ANXIETY. 90 tablet 0   • bumetanide (BUMEX) 2 MG tablet Take 1 tablet by mouth 2 (Two) Times a Day. 60 tablet 5   • CALCIUM PO CALCIUM CAPS     • Cholecalciferol (VITAMIN D) 2000 units capsule VITAMIN D 2000 UNIT CAPS     • diclofenac (VOLTAREN) 75 MG EC tablet Take 1 tablet by mouth Daily. 90 tablet 3   • diphenhydrAMINE HCl, Sleep, (SLEEP-AID PO) Take  by mouth Every Night.     • DULoxetine (CYMBALTA) 60 MG capsule TAKE ONE CAPSULE BY MOUTH TWICE A DAY 60 capsule 11   • EPINEPHrine (EPIPEN) 0.3 MG/0.3ML solution auto-injector injection      • folic acid (FOLVITE) 1 MG tablet Daily.     • gabapentin (NEURONTIN) 300 MG capsule Take 1 capsule by mouth 4 (Four) Times a Day. 360 capsule 3   • hydroxychloroquine (PLAQUENIL) 200 MG tablet Take 1 and a 1/2 tablet by mouth daily 135 tablet 0   • lidocaine (LIDODERM) 5 % Place 1 patch on the skin as directed by provider Daily. Remove & Discard patch within 12 hours or as directed by MD 30 patch 1   • meloxicam (MOBIC) 15 MG tablet Take 1 tablet by mouth Daily. 60 tablet 2   • Multiple Vitamins-Minerals (CENTRUM SILVER) tablet CENTRUM SILVER TABS     • nitroglycerin (NITROSTAT) 0.4 MG SL tablet DISSOLVE ONE (1) TABLET UNDER THE TONGUE EVERY FIVE MINUTES AS NEEDED FOR CHEST PAIN. DO NOT EXCEED 3 TABLETS. 25 tablet 1   • Polyethylene Glycol 3350 (MIRALAX PO) 2 (Two) Times a Day.     • potassium chloride (K-DUR,KLOR-CON) 10 MEQ CR tablet Take 1 tablet by mouth 3 (Three) Times a Day. 90 tablet 2   •  roflumilast (DALIRESP) 500 MCG tablet tablet Take 1 tablet by mouth Daily. 90 tablet 3   • sucralfate (CARAFATE) 1 g tablet      • tiZANidine (ZANAFLEX) 2 MG tablet Take 1 tablet by mouth At Night As Needed for Muscle Spasms. 90 tablet 2   • verapamil SR (CALAN-SR) 240 MG CR tablet Take 1 tablet by mouth Daily. 90 tablet 3   • [DISCONTINUED] methocarbamol (ROBAXIN) 750 MG tablet Take 1 tablet by mouth 2 (Two) Times a Day. 60 tablet 0   • [DISCONTINUED] traMADol (ULTRAM) 50 MG tablet TAKE 1 TABLET BY MOUTH EVERY 6 (SIX) HOURS AS NEEDED FOR MODERATE PAIN . 120 tablet 0     No current facility-administered medications on file prior to visit.      Patient Active Problem List   Diagnosis   • Allergic rhinitis   • Anxiety disorder   • Asthma   • Carpal tunnel syndrome, bilateral   • Chronic obstructive pulmonary disease (CMS/HCC)   • Connective tissue disease, undifferentiated (CMS/HCC)   • Constipation   • Cystic fibrosis carrier   • DDD (degenerative disc disease), cervical   • Dyspepsia   • Edema   • Elevated antinuclear antibody (LILI) level   • Essential (primary) hypertension   • Fatigue   • Hyperlipidemia   • Insomnia   • Irritable bowel syndrome   • Fibromyalgia   • Headache   • Lupus (CMS/HCC)   • Sjogren's syndrome (CMS/HCC)   • Nonrheumatic mitral valve insufficiency   • Other long term (current) drug therapy   • Other specified dorsopathies, site unspecified   • Palpitations   • Paroxysmal tachycardia (CMS/HCC)   • Sciatica   • Sleep disorder   • Spinal stenosis of lumbar region   • Temporary cerebral vascular dysfunction   • Vitamin D deficiency   • Hypokalemia   • Depression       The following portions of the patient's history were reviewed and updated as appropriate: allergies, current medications, past family history, past medical history, past social history, past surgical history and problem list.    Review of Systems   Constitutional: Negative for chills and fever.   HENT: Negative for sinus pressure  and sore throat.    Eyes: Negative for blurred vision.   Respiratory: Negative for cough and shortness of breath.    Cardiovascular: Negative for chest pain and palpitations.   Gastrointestinal: Negative for abdominal pain.   Endocrine: Negative for polyuria.   Musculoskeletal: Positive for neck pain.   Skin: Negative for rash.   Neurological: Positive for tingling and numbness. Negative for dizziness, headache and confusion.   Hematological: Negative for adenopathy.   Psychiatric/Behavioral: Positive for depressed mood. Negative for suicidal ideas. The patient is nervous/anxious.        Objective   /72 (BP Location: Left arm, Patient Position: Sitting, Cuff Size: Adult)   Pulse 86   Temp 97.7 °F (36.5 °C)   Wt 63.5 kg (140 lb)   SpO2 96%   BMI 26.45 kg/m²   Physical Exam   Constitutional: She is oriented to person, place, and time. She appears well-developed. No distress.   HENT:   Head: Normocephalic.   Eyes: Conjunctivae and lids are normal.   Neck: Trachea normal. Muscular tenderness present. No thyroid mass and no thyromegaly present.   Cardiovascular: Normal rate, regular rhythm and normal heart sounds.   Pulmonary/Chest: Effort normal and breath sounds normal.   Lymphadenopathy:     She has no cervical adenopathy.   Neurological: She is alert and oriented to person, place, and time. She displays tremor.   Skin: Skin is warm and dry.   Psychiatric: Her speech is normal and behavior is normal. She exhibits a depressed mood. She is attentive.       No visits with results within 1 Week(s) from this visit.   Latest known visit with results is:   Office Visit on 03/03/2020   Component Date Value Ref Range Status   • Glucose 03/03/2020 105* 65 - 99 mg/dL Final   • BUN 03/03/2020 26* 6 - 20 mg/dL Final   • Creatinine 03/03/2020 0.95  0.57 - 1.00 mg/dL Final   • Sodium 03/03/2020 140  136 - 145 mmol/L Final   • Potassium 03/03/2020 3.5  3.5 - 5.2 mmol/L Final   • Chloride 03/03/2020 93* 98 - 107 mmol/L  Final   • CO2 03/03/2020 31.8* 22.0 - 29.0 mmol/L Final   • Calcium 03/03/2020 10.1  8.6 - 10.5 mg/dL Final   • Total Protein 03/03/2020 7.3  6.0 - 8.5 g/dL Final   • Albumin 03/03/2020 4.70  3.50 - 5.20 g/dL Final   • ALT (SGPT) 03/03/2020 29  1 - 33 U/L Final   • AST (SGOT) 03/03/2020 21  1 - 32 U/L Final   • Alkaline Phosphatase 03/03/2020 98  39 - 117 U/L Final   • Total Bilirubin 03/03/2020 0.3  0.2 - 1.2 mg/dL Final   • eGFR Non African Amer 03/03/2020 61  >60 mL/min/1.73 Final   • Globulin 03/03/2020 2.6  gm/dL Final   • A/G Ratio 03/03/2020 1.8  g/dL Final   • BUN/Creatinine Ratio 03/03/2020 27.4* 7.0 - 25.0 Final   • Anion Gap 03/03/2020 15.2* 5.0 - 15.0 mmol/L Final           Assessment/Plan   Problems Addressed this Visit        Musculoskeletal and Integument    DDD (degenerative disc disease), cervical    Relevant Medications    traMADol (ULTRAM) 50 MG tablet       Other    Anxiety disorder    Relevant Medications    buPROPion XL (Wellbutrin XL) 150 MG 24 hr tablet    Depression - Primary    Relevant Medications    buPROPion XL (Wellbutrin XL) 150 MG 24 hr tablet

## 2020-06-03 NOTE — PROGRESS NOTES
"Subjective   Diya Arreola is a 60 y.o. female.     Chief Complaint   Patient presents with   • Back Pain     new pt. fell off ladder 3 years ago      Visit Vitals  /76 (BP Location: Left arm, Patient Position: Sitting, Cuff Size: Large Adult)   Pulse 96   Resp 18   Ht 154.9 cm (61\")   Wt 63.5 kg (140 lb)   BMI 26.45 kg/m²       History of Present Illness: Mrs. Arreola is a 60-year-old lady who presents today with a 2-year history of progressive back and lower extremity pain.  This started after a fall from a ladder.  Is been progressively worsening over the past 2 years which cannot stand for more than a few minutes without excruciating back pain pain down the legs which will also depend on how she carries.  If she uses a shopping cart she can handle most of her time.  She used to be an avid jogger.  In 2000 and well she did undergo an L3-4 posterior lumbar decompression arthrodesis which she states significantly helped all of her back and leg pain.  Before this she had suffered an injury at work and underwent an anterior cervical procedure which she states did not heal well and they had to revise the surgery and after that she states is been doing very well.  After the fall over the past year she has been noticing more numbness and tingling in her hands and feet.  It is more problematic at night.  She did have a history of carpal tunnel syndrome and bilateral carpal tunnel release.  She was diagnosed several years ago with lupus is currently on Curtice for this.  She denies any urinary urgency or incontinence or gait dysfunction.  He denies any fever, chills, history of any neoplastic process.  She has not undergone any interventional pain management or physical therapy.  She currently takes Neurontin 300 mg 4 times a day which she is not sure helps the numbness and tingling that much definite does not help the leg and back pain.  The back pain is approximately 60% to the leg pain.  Pain radiates down " "the legs.  It is alleviated when she sits down but she states the back pain will come very problematic if she sits for too long.  She takes Ultram and states it only has minimal effect.    The following portions of the patient's history were reviewed and updated as appropriate: allergies, current medications, past family history, past medical history, past social history, past surgical history and problem list.    Review of Systems   All other systems reviewed and are negative.           Past Surgical History:   Procedure Laterality Date   • CARDIAC CATHETERIZATION     • CARPAL TUNNEL RELEASE     •  SECTION     • CHOLECYSTECTOMY     • COLONOSCOPY  10/08/2013   • CYSTOCELE REPAIR     • HYSTERECTOMY     • SPINE SURGERY      neck , , ; lumbar 2012        Past Medical History:   Diagnosis Date   • Allergic    • Anxiety    • Arthritis    • Asthma    • COPD (chronic obstructive pulmonary disease) (CMS/[facility])    • Fibromyalgia, primary    • Gastritis    • Low back pain    • Lupus (CMS/HCC)    • Osteopenia    • Pancreatitis    • Sjogren's syndrome (CMS/HCC)    • Tachycardia    • TIA (transient ischemic attack) 2014     Social History     Socioeconomic History   • Marital status:      Spouse name: Jose \"Carlos\"   • Number of children: 4   • Years of education: Not on file   • Highest education level: Not on file   Occupational History   • Occupation: homemaker   Tobacco Use   • Smoking status: Current Every Day Smoker     Packs/day: 0.50     Years: 39.00     Pack years: 19.50     Types: Cigarettes   • Smokeless tobacco: Never Used   • Tobacco comment: 10 cigareetes a day   Substance and Sexual Activity   • Alcohol use: No     Frequency: Never     Comment: caffeine 2c qd   • Drug use: Never   • Sexual activity: Never      Family History   Problem Relation Age of Onset   • Diabetes Father    • Stroke Sister    • Hypertension Sister    • Hypertension Brother    • Heart disease Brother    • " Osteoporosis Maternal Grandmother           Objective   Physical Exam  Neurologic Exam  Ortho Exam  Well fed, well-developed, no apparent distress   Head tremor approximately 9 to 12 Hz   alert and oriented by 3  Speech is intact and coherent articulate with good content and production  Cranial nerves III through XII are grossly intact with pupils symmetric and reactive and no gaze paresis or nystagmus  Sensation is intact to soft touch and pinprick  in both upper and lower extremities  Motor strength is 5/5 in both upper and lower extremities with no focal motor deficits  Reflexes are 2+ in both upper and lower extremities with no upper motor neuron signs  Gait is nonantalgic  Heel toe walking is intact  No dysmetria or dysdiadochokinesia  Slight resting tremor bilateral extremities  Normal extremities with 2+ distal pulse and less than 2-second cap refill  Positive Tinel bilateral cubital tunnel and transverse carpal ligament with positive ulnar stretch test  Mild tender to palpation lumbar spine  Full range of the lumbar spine  Full range of cervical spine negative Spurling sign negative Lhermitte's sign    MRI of the lumbar spine demonstrates advanced spondylitic disease with bilateral neuroforaminal and lateral recess stenosis with associated arthropathy generating additional stenosis at the L2-3 level as well as advanced facet arthropathy stenosis seen at the L4-5 level.  There is loss of lordotic curvature.  The remaining disc appears relatively normal in height hydration.  There appears to be solid arthrodesis at the L3-4 level.    Lumbar flexion-extension films demonstrate a mobile listhesis at L4-5 approximate 6 mm and fishmouthing at the L2-3 level and what appears to be a solid arthrodesis L3-4.  There is loss lordosis of the pelvic veins approximately 70 degrees of lumbar curvature approximate 55 degrees.      Assessment and Plan: Mrs. Arreola is a 60-year-old lady with destruction adjacent segment  spondylolisthesis at the L4-5 level approximately 6 mm which reduces to 0 in extension.  She has advanced degenerative changes with Modic changes at the L2-3 level with fishmouthing on flexion-extension.  MRI does demonstrate central neuroforaminal stenosis and advanced facet arthropathy at the L2-3 and the L4-5 level there appears to be a solid arthrodesis at L3-4.  At this time I feel that most of her issues with her hand and feet are related to her neuropathy.  She does have lupus and she is been on Plaquenil for several years.  She has had multiple spine surgeries to the cervical spine and I am also concerned that after the fall she might be developing symptoms of progressive spinal cord compression.  At this time we will plan to refer her to physical therapy for deep tissue manipulation cervical spine and dry needling as well as lumbar spine.  I will start her on a course of tizanidine and meloxicam.  She currently takes Neurontin 400 mg 3 times a day and I do not want to adjust this dose at this time.  I will also refer her to interventional pain management for dedicated lumbar epidurals.  I did discuss with her that we will try conservative route I feel that most likely we will need to be discussing proceeding with surgical intervention of the lumbar spine in time.      Problems Addressed this Visit     None

## 2020-06-12 ENCOUNTER — TELEPHONE (OUTPATIENT)
Dept: NEUROSURGERY | Facility: CLINIC | Age: 60
End: 2020-06-12

## 2020-06-15 DIAGNOSIS — F41.1 GENERALIZED ANXIETY DISORDER: ICD-10-CM

## 2020-06-15 RX ORDER — ALPRAZOLAM 1 MG/1
1 TABLET ORAL 3 TIMES DAILY PRN
Qty: 90 TABLET | Refills: 1 | Status: SHIPPED | OUTPATIENT
Start: 2020-06-15 | End: 2020-08-07 | Stop reason: SDUPTHER

## 2020-06-18 ENCOUNTER — TELEPHONE (OUTPATIENT)
Dept: NEUROSURGERY | Facility: CLINIC | Age: 60
End: 2020-06-18

## 2020-06-18 NOTE — TELEPHONE ENCOUNTER
IVORY FROM PATIENT'S PCP, DR. BERRY'S OFFICE CALLED ASKING FOR MA. PATIENT HAS BEEN CONSTANTLY REQUESTING ALLERGY LIST FROM PCP DUE TO SUPPOSED INSTRUCTION FROM OFFICE BUT NO REC OF THIS INSTRUCTION IN CHART AT TIME OF CALL SO UNABLE TO ASSIST. OFFICE CONFUSED ON REQUEST SINCE THEY ARE WITHIN [facility].    PLEASE CONTACT GIRMA -[phone] FOR FURTHER ASSISTANCE

## 2020-06-22 ENCOUNTER — HOSPITAL ENCOUNTER (OUTPATIENT)
Dept: MRI IMAGING | Facility: HOSPITAL | Age: 60
Discharge: HOME | End: 2020-06-22
Admitting: NEUROLOGICAL SURGERY

## 2020-06-22 ENCOUNTER — TELEPHONE (OUTPATIENT)
Dept: NEUROSURGERY | Facility: CLINIC | Age: 60
End: 2020-06-22

## 2020-06-22 ENCOUNTER — OFFICE VISIT (OUTPATIENT)
Dept: PAIN MEDICINE | Facility: CLINIC | Age: 60
End: 2020-06-22

## 2020-06-22 VITALS
RESPIRATION RATE: 16 BRPM | OXYGEN SATURATION: 93 % | BODY MASS INDEX: 26.43 KG/M2 | DIASTOLIC BLOOD PRESSURE: 86 MMHG | HEART RATE: 98 BPM | SYSTOLIC BLOOD PRESSURE: 135 MMHG | WEIGHT: 140 LBS | HEIGHT: 61 IN

## 2020-06-22 DIAGNOSIS — M54.16 LUMBAR RADICULITIS: ICD-10-CM

## 2020-06-22 DIAGNOSIS — M96.1 POSTLAMINECTOMY SYNDROME OF CERVICAL REGION: ICD-10-CM

## 2020-06-22 DIAGNOSIS — M96.1 POSTLAMINECTOMY SYNDROME OF LUMBAR REGION: ICD-10-CM

## 2020-06-22 DIAGNOSIS — G89.4 CHRONIC PAIN SYNDROME: Primary | ICD-10-CM

## 2020-06-22 DIAGNOSIS — M47.892 OTHER SPONDYLOSIS, CERVICAL REGION: ICD-10-CM

## 2020-06-22 PROCEDURE — 72141 MRI NECK SPINE W/O DYE: CPT

## 2020-06-22 PROCEDURE — G0463 HOSPITAL OUTPT CLINIC VISIT: HCPCS | Performed by: ANESTHESIOLOGY

## 2020-06-22 PROCEDURE — 99204 OFFICE O/P NEW MOD 45 MIN: CPT | Performed by: ANESTHESIOLOGY

## 2020-06-22 NOTE — PROGRESS NOTES
Subjective    CC back, leg pain, neck pain  Diya Arreola is a 60 y.o. female with chronic neck pain status post ACDF, chronic back pain status post Fusion L3-5, here for initial evaluation.  Referred by neurosurgery/Dr. Strong.  Complains of continued and worsening back pain upper lumbar and lower lumbar radiating to bilateral hips bilateral lower extremity with burning tingling numbness and feeling of weakness in the legs worse with standing walking twisting or any activity.  Denies new injury, saddle anesthesia bladder bowel continence.  Continued neck pain, radiating to bilateral shoulder limited range of motion in the neck and significant paraspinal muscle spasm bilateral UE radicular pain.  Denies balance issues.    Chronic tremors, history of polyarthralgia/lupus sees rheumatology.  Pain interfering with ADL.  Tried physical therapy without relief.  Seen neurosurgery, referred to try injection.  C-spine MRI pending.  Utilizes tramadol with mild relief prescriber PCP.  Also on benzos.     L-spine MRI 2019 postsurgical changes L3-L4 with pedicle screw L3.  Retrolisthesis L2 on L3 moderate central lateral canal stenosis.  Grade 1 anterolisthesis L4-L5.  Degenerative changes throughout.  L-spine x-ray 2020: 4 mm anterolisthesis on extension, 6 mm anterolisthesis on flexion, at the L4-5 level. Stable spinal fusion changes at L3-4.. Severe degenerative disc and endplate changes at L2-3, similar to  2018 examination.    Pain Assessment   Location of Pain: Lower Back, R Hip, L Hip, L Leg, neck pain, joint  Description of Pain: Dull/Aching, Throbbing, Stabbing  Previous Pain Rating :9  Current Pain Ratin  Aggravating Factors: Activity  Alleviating Factors: Rest, Medication    PEG Assessment   What number best describes your pain on average in the past week?9  What number best describes how, during the past week, pain has interfered with your enjoyment of life?10  What number best describes how, during  the past week, pain has interfered with your general activity?10      The following portions of the patient's history were reviewed and updated as appropriate: allergies, current medications, past family history, past medical history, past social history, past surgical history and problem list.     has a past medical history of Allergic, Anxiety, Arthritis, Asthma, COPD (chronic obstructive pulmonary disease) (CMS/HCC), Fibromyalgia, primary, Gastritis, Low back pain, Lupus (CMS/HCC), Osteopenia, Pancreatitis, Sjogren's syndrome (CMS/[facility]), Tachycardia, and TIA (transient ischemic attack) (2014).   has a past surgical history that includes Cholecystectomy; Hysterectomy;  section; Cystocele repair; Carpal tunnel release; Cardiac catheterization; Spine surgery; and Colonoscopy (10/08/2013).  family history includes Diabetes in her father; Heart disease in her brother; Hypertension in her brother and sister; Osteoporosis in her maternal grandmother; Stroke in her sister.  Social History     Tobacco Use   • Smoking status: Current Every Day Smoker     Packs/day: 0.50     Years: 39.00     Pack years: 19.50     Types: Cigarettes   • Smokeless tobacco: Never Used   • Tobacco comment: 10 cigareetes a day   Substance Use Topics   • Alcohol use: No     Frequency: Never     Comment: caffeine 2c qd     Review of Systems   Constitutional: Negative for chills, fatigue and fever.   HENT: Negative for swollen glands and trouble swallowing.    Respiratory: Negative.  Negative for shortness of breath.    Cardiovascular: Negative for chest pain, palpitations and leg swelling.   Gastrointestinal: Negative for nausea and vomiting.   Musculoskeletal: Positive for arthralgias, back pain and neck pain. Negative for gait problem, joint swelling, myalgias and neck stiffness.   Skin: Negative for color change, dry skin, rash and skin lesions.   Neurological: Positive for tremors, numbness and headache. Negative for dizziness,  "weakness and light-headedness.   Hematological: Negative for adenopathy. Does not bruise/bleed easily.   Psychiatric/Behavioral: Negative for behavioral problems, suicidal ideas and depressed mood.   All other systems reviewed and are negative.    Objective   Physical Exam   Constitutional: She is oriented to person, place, and time. She appears well-developed and well-nourished. No distress.   Eyes: Pupils are equal, round, and reactive to light. Conjunctivae are normal.   Neck: Muscular tenderness present. Decreased range of motion present. Kernig's sign noted.   Cardiovascular: Normal heart sounds and intact distal pulses.   Pulmonary/Chest: Effort normal and breath sounds normal.   Musculoskeletal:        Cervical back: She exhibits decreased range of motion, tenderness and spasm.        Lumbar back: She exhibits decreased range of motion and tenderness.   Back: Paraspinal tenderness, positive leg raise on the left/right.  Pain with extension/side rotation. Lumbar loading positive, pain on extension of low back past 5 degrees.  TTP on the lumbar facets noted.     Vascular Status -  Her right foot exhibits no edema. Her left foot exhibits no edema.  Lymphadenopathy:     She has no cervical adenopathy.   Neurological: She is alert and oriented to person, place, and time. She has normal strength and normal reflexes. No sensory deficit. Gait abnormal. Coordination normal.   Skin: Skin is warm and dry. Capillary refill takes less than 2 seconds.   Psychiatric: She has a normal mood and affect. Her behavior is normal.   Vitals reviewed.    /86   Pulse 98   Resp 16   Ht 154.9 cm (61\")   Wt 63.5 kg (140 lb)   SpO2 93%   BMI 26.45 kg/m²      PHQ 9 on chart  Opioid risk tool low risk    Assessment/Plan   Diya was seen today for back pain, neck pain and initial evaluation.    Diagnoses and all orders for this visit:    Chronic pain syndrome    Postlaminectomy syndrome of lumbar region    Postlaminectomy " syndrome of cervical region    Lumbar radiculitis  -     Ambulatory Referral to Pain Management    Summary  Diya Arreola is a 60 y.o. female with chronic neck pain status post ACDF, chronic back pain status post Fusion L3-5, here for initial evaluation.  Referred by neurosurgery/Dr. Strong.  Worsening back pain from DDD spondylosis postlaminectomy syndrome with radicular pain.  L-spine x-ray flexion-extension with anterolisthesis mild instability L4-5.    Continued neck pain from postlaminectomy syndrome.  Worsening chronic tremors.  Impairing ADL.  Marginal relief with medication and PT.  Seen neurosurgery.  C-spine MRI pending.  Further try injection for lumbar pain.    Schedule for caudal ALEXIS.  We will also plan bilateral transforaminal procedure injection above the fusion at L2/3.    RTC for procedure

## 2020-07-01 DIAGNOSIS — M50.30 DDD (DEGENERATIVE DISC DISEASE), CERVICAL: ICD-10-CM

## 2020-07-01 RX ORDER — TRAMADOL HYDROCHLORIDE 50 MG/1
100 TABLET ORAL EVERY 8 HOURS PRN
Qty: 180 TABLET | Refills: 1 | Status: SHIPPED | OUTPATIENT
Start: 2020-07-01 | End: 2020-08-25

## 2020-07-06 ENCOUNTER — HOSPITAL ENCOUNTER (OUTPATIENT)
Dept: PAIN MEDICINE | Facility: HOSPITAL | Age: 60
Discharge: HOME | End: 2020-07-06

## 2020-07-06 ENCOUNTER — HOSPITAL ENCOUNTER (OUTPATIENT)
Dept: PAIN MEDICINE | Facility: HOSPITAL | Age: 60
Discharge: HOME | End: 2020-07-06
Admitting: ANESTHESIOLOGY

## 2020-07-06 VITALS
HEART RATE: 104 BPM | RESPIRATION RATE: 16 BRPM | DIASTOLIC BLOOD PRESSURE: 87 MMHG | OXYGEN SATURATION: 98 % | HEIGHT: 61 IN | TEMPERATURE: 97.5 F | WEIGHT: 134 LBS | SYSTOLIC BLOOD PRESSURE: 117 MMHG | BODY MASS INDEX: 25.3 KG/M2

## 2020-07-06 DIAGNOSIS — M54.16 LUMBAR RADICULITIS: Primary | ICD-10-CM

## 2020-07-06 DIAGNOSIS — R52 PAIN: ICD-10-CM

## 2020-07-06 PROCEDURE — 0 IOPAMIDOL 41 % SOLUTION: Performed by: ANESTHESIOLOGY

## 2020-07-06 PROCEDURE — 62323 NJX INTERLAMINAR LMBR/SAC: CPT | Performed by: ANESTHESIOLOGY

## 2020-07-06 PROCEDURE — 25010000002 METHYLPREDNISOLONE PER 40 MG

## 2020-07-06 PROCEDURE — 77003 FLUOROGUIDE FOR SPINE INJECT: CPT

## 2020-07-06 RX ORDER — BUPIVACAINE HYDROCHLORIDE 2.5 MG/ML
INJECTION, SOLUTION EPIDURAL; INFILTRATION; INTRACAUDAL
Status: DISCONTINUED
Start: 2020-07-06 | End: 2020-07-07 | Stop reason: HOSPADM

## 2020-07-06 RX ORDER — BUPIVACAINE HYDROCHLORIDE 2.5 MG/ML
10 INJECTION, SOLUTION INFILTRATION; PERINEURAL ONCE
Status: COMPLETED | OUTPATIENT
Start: 2020-07-06 | End: 2020-07-06

## 2020-07-06 RX ORDER — DICLOFENAC POTASSIUM 25 MG/1
CAPSULE, LIQUID FILLED ORAL
COMMUNITY
End: 2020-07-06 | Stop reason: ALTCHOICE

## 2020-07-06 RX ORDER — METHYLPREDNISOLONE ACETATE 40 MG/ML
40 INJECTION, SUSPENSION INTRA-ARTICULAR; INTRALESIONAL; INTRAMUSCULAR; SOFT TISSUE ONCE
Status: COMPLETED | OUTPATIENT
Start: 2020-07-06 | End: 2020-07-06

## 2020-07-06 RX ORDER — METHYLPREDNISOLONE ACETATE 40 MG/ML
INJECTION, SUSPENSION INTRA-ARTICULAR; INTRALESIONAL; INTRAMUSCULAR; SOFT TISSUE
Status: DISCONTINUED
Start: 2020-07-06 | End: 2020-07-07 | Stop reason: HOSPADM

## 2020-07-06 RX ADMIN — IOPAMIDOL 3 ML: 408 INJECTION, SOLUTION INTRATHECAL at 14:04

## 2020-07-06 RX ADMIN — BUPIVACAINE HYDROCHLORIDE 10 ML: 2.5 INJECTION, SOLUTION INFILTRATION; PERINEURAL at 14:05

## 2020-07-06 RX ADMIN — METHYLPREDNISOLONE ACETATE 40 MG: 40 INJECTION, SUSPENSION INTRA-ARTICULAR; INTRALESIONAL; INTRAMUSCULAR; SOFT TISSUE at 14:05

## 2020-07-06 NOTE — ADDENDUM NOTE
Encounter addended by: Lizeth Mcclellan MD on: 7/6/2020 2:23 PM   Actions taken: Problem List reviewed, Allergies reviewed, Medication List reviewed, Charge Capture section accepted

## 2020-07-06 NOTE — PROCEDURES
"Subjective    CC back pain, bilateral leg pain  Diya Arreola is a 60 y.o. female lumbar radiculitis/postlaminectomy syndrome here for caudal ALEXIS.     No anticoagulation    Pain Assessment   Location of Pain: Lower Back,legs  Description of Pain: Dull/Aching, Throbbing, Stabbing  Previous Pain Rating :6  Current Pain Ratin  Aggravating Factors: Activity  Alleviating Factors: Rest, Medication    The following portions of the patient's history were reviewed and updated as appropriate: allergies, current medications, past family history, past medical history, past social history, past surgical history and problem list.    Review of Systems  As in HPI  Objective   Physical Exam   Constitutional: She is oriented to person, place, and time. She appears well-developed. No distress.   Cardiovascular: Normal rate.   Pulmonary/Chest: Effort normal.   Musculoskeletal:        Lumbar back: She exhibits decreased range of motion and tenderness.   Neurological: She is alert and oriented to person, place, and time.   Psychiatric: She has a normal mood and affect.     /87 (BP Location: Left arm, Patient Position: Sitting)   Pulse 120   Temp 97.5 °F (36.4 °C) (Skin)   Resp 16   Ht 154.9 cm (61\")   Wt 60.8 kg (134 lb)   SpO2 95%   BMI 25.32 kg/m²     Assessment/Plan    underwent caudal ALEXIS.  Letter for bilateral L3 transforaminal epidural steroid injection /the above the fusion     RTC 4-6 weeks or as needed for repeat    DATE OF PROCEDURE: 2020    PREOPERATIVE DIAGNOSIS:  lumbosacral DDD and radiculitis  POSTOPERATIVE DIAGNOSIS: Same    PROCEDURE PERFORMED: Caudal Epidural Steroid Injection    The patient presents with a history of  lumbosacral degenerative disc disease with lumbosacral neuritis. The patient presents today for a caudal epidural steroid injection. The patient understands the risks and benefits of the procedure and wishes to proceed. The patient was seen in the preoperative area.  Patient's " consent was obtained and updated.  Vitals were taken.  Patient was then brought to the procedure suite and placed in a prone position. The appropriate anatomic area was widely prepped with  Chloraprep and draped in a sterile fashion. Noninvasive monitoring per routine anesthesia protocol was placed.  Under fluoroscopic guidance using a lateral view a 22 guage curved spinal needle was passed through skin anesthesized with 1% Lidocaine without epinephrine.  The needle was advanced through the sacral hiatus and into the sacral epidural space using fluoroscopic guidance. Needle tip placement in the epidural space was confirmed by loss of resistance and injection of  1.5  mL of  preservative free contrast. Following this 10 mL of a solution containing  1 mL of 40 mg Depo-Medrol,  1 mL of  0.25% bupivacaine and 8 mL of preservative-free saline was carefully administered in the epidural space.   A sterile dressing was placed over the puncture site.    The patient tolerated the procedure with  no complications. They were then brought to the post procedure area where they recovered nicely.     Discharge:  The patient will be discharged home in stable condition.   Patient understands to contact the Center with any post procedure questions or concerns.  Discharge instructions given by nursing staff.

## 2020-07-06 NOTE — ADDENDUM NOTE
Encounter addended by: Johanna Lim RN on: 7/6/2020 2:32 PM   Actions taken: Visit Navigator Flowsheet section accepted

## 2020-07-24 ENCOUNTER — TELEPHONE (OUTPATIENT)
Dept: NEUROSURGERY | Facility: CLINIC | Age: 60
End: 2020-07-24

## 2020-07-24 NOTE — TELEPHONE ENCOUNTER
Pt called and would like Mian to give her a call about her epidural injections. She said she had the 1st one completed 7/6/20 and she said they called her yesterday and stated ins will not cover the 2nd injection for lumbar 2-3  until 8/17/20 d/t injections being to close together.    Pt contact # [phone]   Best time to call: anytime

## 2020-07-29 NOTE — TELEPHONE ENCOUNTER
PATIENT CALLED REQUESTING FOR MRI REPORT & LAST OFFICE NOTE TO BE FAXED TO DR. BERRY [phone] ATTN GIRMA  PH:[phone]    PATIENT CAN BE CONTACTED [phone] WITH AN UPDATE

## 2020-07-31 ENCOUNTER — TELEPHONE (OUTPATIENT)
Dept: NEUROSURGERY | Facility: CLINIC | Age: 60
End: 2020-07-31

## 2020-07-31 NOTE — TELEPHONE ENCOUNTER
PATIENT/CAREGIVER RETURNED CALL FOR ALLY BUT DUE TO HUB PROTOCOL, UNABLE TO ASSIST AT TIME OF CALL SINCE NO REC OF A PREV CALL MADE IN CHART WITH INSTRUCTIONS AS TO WHAT & WHY PATIENT WAS CONTACTED.     [phone]

## 2020-08-03 ENCOUNTER — APPOINTMENT (OUTPATIENT)
Dept: PAIN MEDICINE | Facility: HOSPITAL | Age: 60
End: 2020-08-03

## 2020-08-04 ENCOUNTER — PRIOR AUTHORIZATION (OUTPATIENT)
Dept: PAIN MEDICINE | Facility: HOSPITAL | Age: 60
End: 2020-08-04

## 2020-08-04 NOTE — TELEPHONE ENCOUNTER
CALL FROM  [facility] ABOUT [facility] REQUESTED IT  EXCEEDS LIMITATION THEY WILL ONLY  APPROVE 2 LEVEL PER VISIT, BILATERAL IS CONSIDER 4 LEVELS PLEASE ADVISE.

## 2020-08-05 NOTE — TELEPHONE ENCOUNTER
One level (L3) bilateral is considered 4 levels?  when just doing left L3 and Right L3 ( 2 sites) ?   If it's the case then schedule one side at a time.

## 2020-08-07 DIAGNOSIS — F41.1 GENERALIZED ANXIETY DISORDER: ICD-10-CM

## 2020-08-10 RX ORDER — ALPRAZOLAM 1 MG/1
1 TABLET ORAL 3 TIMES DAILY PRN
Qty: 90 TABLET | Refills: 1 | Status: SHIPPED | OUTPATIENT
Start: 2020-08-10 | End: 2020-09-22

## 2020-08-17 ENCOUNTER — HOSPITAL ENCOUNTER (OUTPATIENT)
Dept: PAIN MEDICINE | Facility: HOSPITAL | Age: 60
Discharge: HOME | End: 2020-08-17
Admitting: ANESTHESIOLOGY

## 2020-08-17 ENCOUNTER — HOSPITAL ENCOUNTER (OUTPATIENT)
Dept: PAIN MEDICINE | Facility: HOSPITAL | Age: 60
Discharge: HOME | End: 2020-08-17

## 2020-08-17 VITALS
HEIGHT: 61 IN | BODY MASS INDEX: 26.43 KG/M2 | SYSTOLIC BLOOD PRESSURE: 106 MMHG | TEMPERATURE: 98.1 F | HEART RATE: 72 BPM | RESPIRATION RATE: 16 BRPM | WEIGHT: 140 LBS | DIASTOLIC BLOOD PRESSURE: 70 MMHG | OXYGEN SATURATION: 98 %

## 2020-08-17 DIAGNOSIS — M54.16 LUMBAR RADICULITIS: Primary | ICD-10-CM

## 2020-08-17 DIAGNOSIS — M54.50 LOWER BACK PAIN: ICD-10-CM

## 2020-08-17 PROCEDURE — 77003 FLUOROGUIDE FOR SPINE INJECT: CPT

## 2020-08-17 PROCEDURE — 64483 NJX AA&/STRD TFRM EPI L/S 1: CPT | Performed by: ANESTHESIOLOGY

## 2020-08-17 PROCEDURE — 0 IOPAMIDOL 41 % SOLUTION: Performed by: ANESTHESIOLOGY

## 2020-08-17 PROCEDURE — 25010000002 DEXAMETHASONE SODIUM PHOSPHATE 10 MG/ML SOLUTION

## 2020-08-17 RX ORDER — DEXAMETHASONE SODIUM PHOSPHATE 10 MG/ML
INJECTION, SOLUTION INTRAMUSCULAR; INTRAVENOUS
Status: DISCONTINUED
Start: 2020-08-17 | End: 2020-08-18 | Stop reason: HOSPADM

## 2020-08-17 RX ORDER — BUPIVACAINE HYDROCHLORIDE 2.5 MG/ML
10 INJECTION, SOLUTION INFILTRATION; PERINEURAL ONCE
Status: COMPLETED | OUTPATIENT
Start: 2020-08-17 | End: 2020-08-17

## 2020-08-17 RX ORDER — BUPIVACAINE HYDROCHLORIDE 2.5 MG/ML
INJECTION, SOLUTION EPIDURAL; INFILTRATION; INTRACAUDAL
Status: DISCONTINUED
Start: 2020-08-17 | End: 2020-08-18 | Stop reason: HOSPADM

## 2020-08-17 RX ORDER — DEXAMETHASONE SODIUM PHOSPHATE 10 MG/ML
10 INJECTION, SOLUTION INTRAMUSCULAR; INTRAVENOUS ONCE
Status: COMPLETED | OUTPATIENT
Start: 2020-08-17 | End: 2020-08-17

## 2020-08-17 RX ADMIN — BUPIVACAINE HYDROCHLORIDE 10 ML: 2.5 INJECTION, SOLUTION INFILTRATION; PERINEURAL at 13:27

## 2020-08-17 RX ADMIN — DEXAMETHASONE SODIUM PHOSPHATE 10 MG: 10 INJECTION, SOLUTION INTRAMUSCULAR; INTRAVENOUS at 13:27

## 2020-08-17 RX ADMIN — IOPAMIDOL 3 ML: 408 INJECTION, SOLUTION INTRATHECAL at 13:27

## 2020-08-19 NOTE — PROCEDURES
"Subjective    CC back pain, bilateral leg pain  Diya Arreola is a 60 y.o. female lumbar radiculitis/postlaminectomy syndrome here for bilateral L3 transforaminal ALEXIS    No anticoagulation    Pain Assessment   Location of Pain: Lower Back,legs  Description of Pain: Dull/Aching, Throbbing, Stabbing  Previous Pain Rating :6  Current Pain Ratin  Aggravating Factors: Activity  Alleviating Factors: Rest, Medication    The following portions of the patient's history were reviewed and updated as appropriate: allergies, current medications, past family history, past medical history, past social history, past surgical history and problem list.    Review of Systems  As in HPI  Objective   Physical Exam   Constitutional: She is oriented to person, place, and time. She appears well-developed. No distress.   Cardiovascular: Normal rate.   Pulmonary/Chest: Effort normal.   Musculoskeletal:        Lumbar back: She exhibits decreased range of motion and tenderness.   Neurological: She is alert and oriented to person, place, and time.   Psychiatric: She has a normal mood and affect.     /70 (BP Location: Left arm, Patient Position: Sitting)   Pulse 72   Temp 98.1 °F (36.7 °C) (Skin)   Resp 16   Ht 154.9 cm (61\")   Wt 63.5 kg (140 lb)   SpO2 98%   BMI 26.45 kg/m²     Assessment/Plan    underwent bilateral L3 transforaminal epidural steroid injection /the above the fusion     RTC 4-6 weeks or as needed for repeat    DATE OF PROCEDURE:  2020    PREOPERATIVE DIAGNOSIS:   Lumbar radiculitis    POSTOPERATIVE DIAGNOSIS: Same    PROCEDURE PERFORMED: Bilateral L3 Transforaminal Epidural     The patient presents with a history of  lumbar degenerative disc disease with lumbosacral neuritis at level L3 bilaterally.  The patient presents today for a transforaminal epidural. The patient understands the risks and benefits of the procedure and wishes to proceed. The patient was seen in the preoperative area.  Patient's " consent was obtained and updated.  Vitals were taken.  Patient was then brought to the procedure suite and placed in a prone position. Noninvasive monitoring per routine anesthesia protocol was placed.  The appropriate anatomic area was widely prepped with Chloroprep and draped in a sterile fashion.  Under fluoroscopic guidance using an AP and lateral view, a 22 guage curved tip spinal needle  was passed through skin anesthetized with 1% Lidocaine without epinephrine. The needle tip was advanced to the inferior medial aspect of the transverse process and carefully walked into the neuroforamin using an AP lateral view.  At no time were parathesias elicited.  At this point 2 mL of a solution containing  1 mL of 0.25% bupivacaine and 1 mL of 10 mg Decadron were injected on each side.  Clear epidural spread using 0.25 mL of  preservative free contrast was obtained.  A sterile dressing was placed over the puncture site.    The patient tolerated the procedure with no complications . They were then brought to the post procedure area where they recovered nicely.    Discharge:  The patient will be discharged home in stable condition.   Patient understands to contact the Center with any post procedure questions or concerns.  Discharge instructions given by nursing staff.

## 2020-08-24 DIAGNOSIS — M50.30 DDD (DEGENERATIVE DISC DISEASE), CERVICAL: ICD-10-CM

## 2020-08-25 RX ORDER — TRAMADOL HYDROCHLORIDE 50 MG/1
100 TABLET ORAL EVERY 8 HOURS PRN
Qty: 180 TABLET | Refills: 1 | Status: SHIPPED | OUTPATIENT
Start: 2020-08-25 | End: 2020-10-22 | Stop reason: SDUPTHER

## 2020-08-26 RX ORDER — DICLOFENAC SODIUM 75 MG/1
75 TABLET, DELAYED RELEASE ORAL DAILY
Qty: 90 TABLET | Refills: 1 | Status: SHIPPED | OUTPATIENT
Start: 2020-08-26 | End: 2021-02-17

## 2020-08-31 ENCOUNTER — TELEPHONE (OUTPATIENT)
Dept: FAMILY MEDICINE CLINIC | Facility: CLINIC | Age: 60
End: 2020-08-31

## 2020-08-31 RX ORDER — DOXYCYCLINE 100 MG/1
100 CAPSULE ORAL 2 TIMES DAILY
Qty: 20 CAPSULE | Refills: 0 | Status: SHIPPED | OUTPATIENT
Start: 2020-08-31 | End: 2021-03-10

## 2020-09-02 ENCOUNTER — TELEPHONE (OUTPATIENT)
Dept: NEUROSURGERY | Facility: CLINIC | Age: 60
End: 2020-09-02

## 2020-09-02 NOTE — TELEPHONE ENCOUNTER
PT IS SCHEDULED TO SEE DR PETERS ON 10/21. (THAT IS HIS FIRST AVAILABLE)       PT HAS FINISHED HER EPIDURAL SHOTS. SHE WOULD LIKE TO KNOW IF SHE NEEDS TO GET ANYMORE SHOTS BEFORE HER APPT.   THE SHOTS ARE NOT LASTING.  PT SAYS SHE COULD NOT FINISH PT BECAUSE OF THE PAIN.    LASHAUN [phone]   ASHLEY [phone]

## 2020-09-08 ENCOUNTER — OFFICE VISIT (OUTPATIENT)
Dept: FAMILY MEDICINE CLINIC | Facility: CLINIC | Age: 60
End: 2020-09-08

## 2020-09-08 VITALS
DIASTOLIC BLOOD PRESSURE: 71 MMHG | OXYGEN SATURATION: 92 % | TEMPERATURE: 97.1 F | WEIGHT: 147 LBS | BODY MASS INDEX: 27.78 KG/M2 | HEART RATE: 88 BPM | SYSTOLIC BLOOD PRESSURE: 132 MMHG

## 2020-09-08 DIAGNOSIS — Z23 NEED FOR IMMUNIZATION AGAINST INFLUENZA: ICD-10-CM

## 2020-09-08 DIAGNOSIS — F32.A DEPRESSION, UNSPECIFIED DEPRESSION TYPE: ICD-10-CM

## 2020-09-08 DIAGNOSIS — M50.30 DDD (DEGENERATIVE DISC DISEASE), CERVICAL: ICD-10-CM

## 2020-09-08 DIAGNOSIS — M48.061 SPINAL STENOSIS OF LUMBAR REGION WITHOUT NEUROGENIC CLAUDICATION: Primary | ICD-10-CM

## 2020-09-08 DIAGNOSIS — R76.8 ANA POSITIVE: ICD-10-CM

## 2020-09-08 PROCEDURE — 90686 IIV4 VACC NO PRSV 0.5 ML IM: CPT | Performed by: FAMILY MEDICINE

## 2020-09-08 PROCEDURE — 99214 OFFICE O/P EST MOD 30 MIN: CPT | Performed by: FAMILY MEDICINE

## 2020-09-08 PROCEDURE — G0008 ADMIN INFLUENZA VIRUS VAC: HCPCS | Performed by: FAMILY MEDICINE

## 2020-09-08 RX ORDER — GABAPENTIN 300 MG/1
300 CAPSULE ORAL 4 TIMES DAILY
Qty: 360 CAPSULE | Refills: 3 | Status: SHIPPED | OUTPATIENT
Start: 2020-09-08 | End: 2021-03-10

## 2020-09-08 RX ORDER — BUMETANIDE 2 MG/1
2 TABLET ORAL 2 TIMES DAILY
Qty: 180 TABLET | Refills: 3 | Status: SHIPPED | OUTPATIENT
Start: 2020-09-08 | End: 2020-09-18

## 2020-09-08 RX ORDER — ALBUTEROL SULFATE 90 UG/1
2 AEROSOL, METERED RESPIRATORY (INHALATION) EVERY 4 HOURS PRN
Qty: 18 G | Refills: 3 | Status: SHIPPED | OUTPATIENT
Start: 2020-09-08 | End: 2021-03-10 | Stop reason: SDUPTHER

## 2020-09-08 RX ORDER — VERAPAMIL HYDROCHLORIDE 240 MG/1
240 TABLET, FILM COATED, EXTENDED RELEASE ORAL DAILY
Qty: 90 TABLET | Refills: 3 | Status: SHIPPED | OUTPATIENT
Start: 2020-09-08 | End: 2021-09-07

## 2020-09-08 RX ORDER — BUPROPION HYDROCHLORIDE 150 MG/1
150 TABLET ORAL DAILY
Qty: 90 TABLET | Refills: 3 | Status: SHIPPED | OUTPATIENT
Start: 2020-09-08 | End: 2020-09-18

## 2020-09-08 RX ORDER — HYDROXYCHLOROQUINE SULFATE 200 MG/1
400 TABLET, FILM COATED ORAL DAILY
Qty: 180 TABLET | Refills: 1 | Status: SHIPPED | OUTPATIENT
Start: 2020-09-08 | End: 2021-03-07

## 2020-09-08 RX ORDER — DULOXETIN HYDROCHLORIDE 60 MG/1
60 CAPSULE, DELAYED RELEASE ORAL 2 TIMES DAILY
Qty: 180 CAPSULE | Refills: 3 | Status: SHIPPED | OUTPATIENT
Start: 2020-09-08 | End: 2021-05-25

## 2020-09-08 RX ORDER — ALBUTEROL SULFATE 2.5 MG/3ML
2.5 SOLUTION RESPIRATORY (INHALATION) EVERY 4 HOURS PRN
Qty: 360 VIAL | Refills: 3 | Status: SHIPPED | OUTPATIENT
Start: 2020-09-08 | End: 2022-07-20 | Stop reason: SDUPTHER

## 2020-09-08 NOTE — PROGRESS NOTES
Subjective   Chief Complaint   Patient presents with   • Back Pain     f/u   • Depression     Diya Arreola is a 60 y.o. female.     Her lower back is more painful than her neck at this time.  She has fallen a few times due to her legs giving out.  She has had both area evaluated by a neurosurgeon but says that he seems to be more concerned about her cervical spine.    Back Pain   This is a chronic problem. The current episode started more than 1 year ago. The problem occurs constantly. The problem is unchanged. The pain is present in the lumbar spine. The quality of the pain is described as burning and aching. The pain is moderate. The symptoms are aggravated by twisting and position. Associated symptoms include leg pain, numbness, tingling and weakness. Pertinent negatives include no abdominal pain, chest pain, dysuria or fever. Risk factors include lack of exercise and sedentary lifestyle.   Depression   Visit Type: follow-up  Patient presents with the following symptoms: anhedonia and fatigue.  Patient is not experiencing: depressed mood, feelings of hopelessness, feelings of worthlessness, irritability, palpitations, panic, psychomotor agitation, restlessness, shortness of breath, suicidal ideas, suicidal planning and thoughts of death.  Frequency of symptoms: most days   Severity: moderate   Sleep quality: fair  Compliance with medications:  %           Past Medical History:   Diagnosis Date   • Allergic    • Anxiety    • Arthritis    • Asthma    • COPD (chronic obstructive pulmonary disease) (CMS/[facility])    • Fibromyalgia, primary    • Gastritis    • Low back pain    • Lupus (CMS/[facility])    • Osteopenia    • Pancreatitis    • Sjogren's syndrome (CMS/[facility])    • Tachycardia    • TIA (transient ischemic attack) 2014     Past Surgical History:   Procedure Laterality Date   • CARDIAC CATHETERIZATION     • CARPAL TUNNEL RELEASE     •  SECTION     • CHOLECYSTECTOMY     • COLONOSCOPY  10/08/2013   •  "CYSTOCELE REPAIR     • HYSTERECTOMY     • SPINE SURGERY      neck 2000, 2001, 2006; lumbar 4/2012      Allergies   Allergen Reactions   • Adhesive Tape Rash   • Cephalexin Rash   • Ciprofloxacin Rash   • Corticosteroids Unknown (See Comments)     Nerve burning    • Latex Unknown (See Comments)     Reddness, rash    • Other Unknown (See Comments)     Oline Abx and [place] soap   • Penicillin G Anaphylaxis   • Prednisone Unknown (See Comments)     Nerve burning    • Sulfa Antibiotics Unknown (See Comments)   • Azithromycin Rash     Social History     Socioeconomic History   • Marital status:      Spouse name: Jose \"Shorty\"   • Number of children: 4   • Years of education: Not on file   • Highest education level: Not on file   Occupational History   • Occupation: homemaker   Tobacco Use   • Smoking status: Current Every Day Smoker     Packs/day: 0.25     Years: 39.00     Pack years: 9.75     Types: Cigarettes   • Smokeless tobacco: Never Used   • Tobacco comment: 10 cigareetes a day   Substance and Sexual Activity   • Alcohol use: No     Frequency: Never     Comment: caffeine 2c qd   • Drug use: Never   • Sexual activity: Never     Social History     Tobacco Use   Smoking Status Current Every Day Smoker   • Packs/day: 0.25   • Years: 39.00   • Pack years: 9.75   • Types: Cigarettes   Smokeless Tobacco Never Used   Tobacco Comment    10 cigareetes a day       family history includes Diabetes in her father; Heart disease in her brother; Hypertension in her brother and sister; Osteoporosis in her maternal grandmother; Stroke in her sister.  Current Outpatient Medications on File Prior to Visit   Medication Sig Dispense Refill   • ALPRAZolam (XANAX) 1 MG tablet Take 1 tablet by mouth 3 (Three) Times a Day As Needed for Anxiety. 90 tablet 1   • CALCIUM PO CALCIUM CAPS     • Cholecalciferol (VITAMIN D) 2000 units capsule VITAMIN D 2000 UNIT CAPS     • diclofenac (VOLTAREN) 75 MG EC tablet Take 1 tablet by mouth " Daily. 90 tablet 1   • diphenhydrAMINE HCl, Sleep, (SLEEP-AID PO) Take  by mouth Every Night.     • doxycycline (MONODOX) 100 MG capsule Take 1 capsule by mouth 2 (Two) Times a Day. 20 capsule 0   • EPINEPHrine (EPIPEN) 0.3 MG/0.3ML solution auto-injector injection      • folic acid (FOLVITE) 1 MG tablet Daily.     • lidocaine (LIDODERM) 5 % Place 1 patch on the skin as directed by provider Daily. Remove & Discard patch within 12 hours or as directed by MD 30 patch 1   • Multiple Vitamins-Minerals (CENTRUM SILVER) tablet CENTRUM SILVER TABS     • nitroglycerin (NITROSTAT) 0.4 MG SL tablet DISSOLVE ONE (1) TABLET UNDER THE TONGUE EVERY FIVE MINUTES AS NEEDED FOR CHEST PAIN. DO NOT EXCEED 3 TABLETS. 25 tablet 1   • Polyethylene Glycol 3350 (MIRALAX PO) 2 (Two) Times a Day.     • potassium chloride (K-DUR,KLOR-CON) 10 MEQ CR tablet Take 1 tablet by mouth 3 (Three) Times a Day. 90 tablet 2   • roflumilast (DALIRESP) 500 MCG tablet tablet Take 1 tablet by mouth Daily. 90 tablet 3   • tiZANidine (ZANAFLEX) 2 MG tablet Take 1 tablet by mouth At Night As Needed for Muscle Spasms. 90 tablet 2   • traMADol (ULTRAM) 50 MG tablet TAKE 2 TABLETS BY MOUTH EVERY 8 (EIGHT) HOURS AS NEEDED FOR MODERATE PAIN . 180 tablet 1     No current facility-administered medications on file prior to visit.      Patient Active Problem List   Diagnosis   • Allergic rhinitis   • Anxiety disorder   • Asthma   • Carpal tunnel syndrome, bilateral   • Chronic obstructive pulmonary disease (CMS/HCC)   • Connective tissue disease, undifferentiated (CMS/HCC)   • Constipation   • Cystic fibrosis carrier   • DDD (degenerative disc disease), cervical   • Dyspepsia   • Edema   • Elevated antinuclear antibody (LILI) level   • Essential (primary) hypertension   • Fatigue   • Hyperlipidemia   • Insomnia   • Irritable bowel syndrome   • Fibromyalgia   • Headache   • Lupus (CMS/HCC)   • Sjogren's syndrome (CMS/HCC)   • Nonrheumatic mitral valve insufficiency   •  Other long term (current) drug therapy   • Other specified dorsopathies, site unspecified   • Palpitations   • Paroxysmal tachycardia (CMS/HCC)   • Sciatica   • Sleep disorder   • Spinal stenosis of lumbar region   • Temporary cerebral vascular dysfunction   • Vitamin D deficiency   • Hypokalemia   • Depression   • LILI positive   • Need for immunization against influenza       The following portions of the patient's history were reviewed and updated as appropriate: allergies, current medications, past family history, past medical history, past social history, past surgical history and problem list.    Review of Systems   Constitutional: Negative for chills, fever and irritability.   HENT: Negative for sinus pressure and sore throat.    Eyes: Negative for blurred vision.   Respiratory: Negative for cough and shortness of breath.    Cardiovascular: Negative for chest pain and palpitations.   Gastrointestinal: Negative for abdominal pain.   Endocrine: Negative for polyuria.   Genitourinary: Negative for dysuria.   Musculoskeletal: Positive for back pain.   Skin: Negative for rash.   Neurological: Positive for tingling, weakness and numbness. Negative for dizziness and headache.   Hematological: Negative for adenopathy.   Psychiatric/Behavioral: Negative for suicidal ideas and depressed mood.       Objective   /71 (BP Location: Left arm, Patient Position: Sitting, Cuff Size: Adult)   Pulse 88   Temp 97.1 °F (36.2 °C)   Wt 66.7 kg (147 lb)   SpO2 92%   BMI 27.78 kg/m²   Physical Exam   Constitutional: She is oriented to person, place, and time. She appears well-developed. No distress.   HENT:   Head: Normocephalic.   Eyes: Conjunctivae and lids are normal.   Neck: Trachea normal. No thyroid mass and no thyromegaly present.   Cardiovascular: Normal rate, regular rhythm and normal heart sounds.   Pulmonary/Chest: Effort normal and breath sounds normal.   Musculoskeletal:        Lumbar back: She exhibits  decreased range of motion, tenderness and pain.   Lymphadenopathy:     She has no cervical adenopathy.   Neurological: She is alert and oriented to person, place, and time. She displays tremor.   Skin: Skin is warm and dry.   Psychiatric: She has a normal mood and affect. Her speech is normal and behavior is normal. She is attentive.       No visits with results within 1 Week(s) from this visit.   Latest known visit with results is:   Office Visit on 03/03/2020   Component Date Value Ref Range Status   • Glucose 03/03/2020 105* 65 - 99 mg/dL Final   • BUN 03/03/2020 26* 6 - 20 mg/dL Final   • Creatinine 03/03/2020 0.95  0.57 - 1.00 mg/dL Final   • Sodium 03/03/2020 140  136 - 145 mmol/L Final   • Potassium 03/03/2020 3.5  3.5 - 5.2 mmol/L Final   • Chloride 03/03/2020 93* 98 - 107 mmol/L Final   • CO2 03/03/2020 31.8* 22.0 - 29.0 mmol/L Final   • Calcium 03/03/2020 10.1  8.6 - 10.5 mg/dL Final   • Total Protein 03/03/2020 7.3  6.0 - 8.5 g/dL Final   • Albumin 03/03/2020 4.70  3.50 - 5.20 g/dL Final   • ALT (SGPT) 03/03/2020 29  1 - 33 U/L Final   • AST (SGOT) 03/03/2020 21  1 - 32 U/L Final   • Alkaline Phosphatase 03/03/2020 98  39 - 117 U/L Final   • Total Bilirubin 03/03/2020 0.3  0.2 - 1.2 mg/dL Final   • eGFR Non African Amer 03/03/2020 61  >60 mL/min/1.73 Final   • Globulin 03/03/2020 2.6  gm/dL Final   • A/G Ratio 03/03/2020 1.8  g/dL Final   • BUN/Creatinine Ratio 03/03/2020 27.4* 7.0 - 25.0 Final   • Anion Gap 03/03/2020 15.2* 5.0 - 15.0 mmol/L Final           Assessment/Plan   Diya was seen today for back pain and depression.    Diagnoses and all orders for this visit:    Spinal stenosis of lumbar region without neurogenic claudication  Comments:  Follow up with Dr. Strong and Dr. Mcclellan as scheduled    Depression, unspecified depression type  Comments:  Feeling some better on current medicines.  Continue same.   Orders:  -     buPROPion XL (Wellbutrin XL) 150 MG 24 hr tablet; Take 1 tablet by  mouth Daily.    LILI positive  -     hydroxychloroquine (PLAQUENIL) 200 MG tablet; Take 2 tablets by mouth Daily for 180 doses. Indications: Rheumatoid Arthritis    DDD (degenerative disc disease), cervical    Need for immunization against influenza  -     FluLaval Quad >6 Months (3300-8280)    Other orders  -     albuterol (PROVENTIL) (2.5 MG/3ML) 0.083% nebulizer solution; Take 2.5 mg by nebulization Every 4 (Four) Hours As Needed for Wheezing.  -     albuterol sulfate HFA (ProAir HFA) 108 (90 Base) MCG/ACT inhaler; Inhale 2 puffs Every 4 (Four) Hours As Needed for Wheezing.  -     bumetanide (BUMEX) 2 MG tablet; Take 1 tablet by mouth 2 (Two) Times a Day.  -     DULoxetine (CYMBALTA) 60 MG capsule; Take 1 capsule by mouth 2 (Two) Times a Day.  -     gabapentin (NEURONTIN) 300 MG capsule; Take 1 capsule by mouth 4 (Four) Times a Day.  -     verapamil SR (CALAN-SR) 240 MG CR tablet; Take 1 tablet by mouth Daily.

## 2020-09-09 PROBLEM — Z23 NEED FOR IMMUNIZATION AGAINST INFLUENZA: Status: ACTIVE | Noted: 2020-09-09

## 2020-09-09 PROBLEM — R76.8 ANA POSITIVE: Status: ACTIVE | Noted: 2020-09-09

## 2020-09-14 ENCOUNTER — OFFICE VISIT (OUTPATIENT)
Dept: PAIN MEDICINE | Facility: CLINIC | Age: 60
End: 2020-09-14

## 2020-09-14 VITALS — RESPIRATION RATE: 16 BRPM | WEIGHT: 137 LBS | HEIGHT: 61 IN | BODY MASS INDEX: 25.86 KG/M2

## 2020-09-14 DIAGNOSIS — G89.4 CHRONIC PAIN SYNDROME: Primary | ICD-10-CM

## 2020-09-14 DIAGNOSIS — M96.1 POSTLAMINECTOMY SYNDROME OF LUMBAR REGION: ICD-10-CM

## 2020-09-14 DIAGNOSIS — M54.16 LUMBAR RADICULITIS: ICD-10-CM

## 2020-09-14 DIAGNOSIS — M96.1 POSTLAMINECTOMY SYNDROME OF CERVICAL REGION: ICD-10-CM

## 2020-09-14 PROCEDURE — 99443 PR PHYS/QHP TELEPHONE EVALUATION 21-30 MIN: CPT | Performed by: ANESTHESIOLOGY

## 2020-09-14 RX ORDER — GABAPENTIN 300 MG/1
300 CAPSULE ORAL
COMMUNITY
End: 2021-09-16

## 2020-09-15 NOTE — PROGRESS NOTES
Subjective    CC back, leg pain, neck pain  Diya Arreola is a 60 y.o. female with chronic neck pain status post ACDF, chronic back pain status post Fusion L3-5, here for f/u by tel.  Good relief with caudal and hazel L3 TFESI/above fusion. However did not last long.   Chronic back pain upper lumbar and lower lumbar radiating to bilateral hips bilateral lower extremity with burning tingling numbness and feeling of weakness in the legs worse with standing walking twisting or any activity.  Denies new injury, saddle anesthesia bladder bowel continence.  Continued neck pain, radiating to bilateral shoulder limited range of motion in the neck and significant paraspinal muscle spasm bilateral UE radicular pain.  Denies balance issues.    Chronic tremors, history of polyarthralgia/lupus sees rheumatology.  Pain interfering with ADL.  Tried physical therapy without relief.  Seen neurosurgery, referred to try injection.   Utilizes tramadol with mild relief prescriber PCP.  Also on benzos.     C-spine MRI  probably a plate on the anterior aspect of the spine at the C4-5 level. mild subluxation with degenerative disc disease at C3-4, causing a mild central canal stenosis. Left-sided disc extrusion at C6-7 with left foraminal impingement.   L-spine MRI 2019 postsurgical changes L3-L4 with pedicle screw L3.  Retrolisthesis L2 on L3 moderate central lateral canal stenosis.  Grade 1 anterolisthesis L4-L5.  Degenerative changes throughout.  L-spine x-ray : 4 mm anterolisthesis on extension, 6 mm anterolisthesis on flexion, at the L4-5 level. Stable spinal fusion changes at L3-4.. Severe degenerative disc and endplate changes at L2-3, similar to  2018 examination.    Pain Assessment   Location of Pain: Lower Back, R Hip, L Hip, L Leg, neck pain, joint  Description of Pain: Dull/Aching, Throbbing, Stabbing  Previous Pain Rating :9  Current Pain Ratin  Aggravating Factors: Activity  Alleviating Factors: Rest,  Medication    PEG Assessment   What number best describes your pain on average in the past week?7  What number best describes how, during the past week, pain has interfered with your enjoyment of life?10  What number best describes how, during the past week, pain has interfered with your general activity?7      The following portions of the patient's history were reviewed and updated as appropriate: allergies, current medications, past family history, past medical history, past social history, past surgical history and problem list.     has a past medical history of Allergic, Anxiety, Arthritis, Asthma, COPD (chronic obstructive pulmonary disease) (CMS/[facility]), Fibromyalgia, primary, Gastritis, Low back pain, Lupus (CMS/[facility]), Osteopenia, Pancreatitis, Sjogren's syndrome (CMS/[facility]), Tachycardia, and TIA (transient ischemic attack) (2014).   has a past surgical history that includes Cholecystectomy; Hysterectomy;  section; Cystocele repair; Carpal tunnel release; Cardiac catheterization; Spine surgery; and Colonoscopy (10/08/2013).  family history includes Diabetes in her father; Heart disease in her brother; Hypertension in her brother and sister; Osteoporosis in her maternal grandmother; Stroke in her sister.  Social History     Tobacco Use   • Smoking status: Current Every Day Smoker     Packs/day: 0.25     Years: 39.00     Pack years: 9.75     Types: Cigarettes   • Smokeless tobacco: Never Used   Substance Use Topics   • Alcohol use: No     Frequency: Never     Comment: caffeine 2c qd     Review of Systems   Constitutional: Negative for chills, fatigue and fever.   HENT: Negative for swollen glands and trouble swallowing.    Respiratory: Negative.  Negative for shortness of breath.    Cardiovascular: Negative for chest pain, palpitations and leg swelling.   Gastrointestinal: Negative for nausea and vomiting.   Musculoskeletal: Positive for arthralgias, back pain and neck pain. Negative for gait problem,  "joint swelling, myalgias and neck stiffness.   Skin: Negative for color change, dry skin, rash and skin lesions.   Neurological: Positive for tremors, numbness and headache. Negative for dizziness, weakness and light-headedness.   Hematological: Negative for adenopathy. Does not bruise/bleed easily.   Psychiatric/Behavioral: Negative for behavioral problems, suicidal ideas and depressed mood.   All other systems reviewed and are negative.    Objective   Physical Exam   Constitutional: No distress.     Resp 16   Ht 154.9 cm (61\")   Wt 62.1 kg (137 lb)   BMI 25.89 kg/m²      PHQ 9 on chart  Opioid risk tool low risk    Assessment/Plan   Diya was seen today for back pain and follow-up.    Diagnoses and all orders for this visit:    Chronic pain syndrome    Postlaminectomy syndrome of lumbar region    Postlaminectomy syndrome of cervical region    Lumbar radiculitis    Summary  Diya Arreola is a 60 y.o. female with chronic neck pain status post ACDF, chronic back pain status post Fusion L3-5, here for f/u.  Chronic back pain from DDD spondylosis postlaminectomy syndrome with radicular pain.  L-spine x-ray flexion-extension with anterolisthesis mild instability L4-5.    Continued neck pain from postlaminectomy syndrome.  Worsening chronic tremors.  Impairing ADL.  Marginal relief with medication and PT.    Good relief with caudal ALEXIS and  bilateral transforaminal procedure injection above the fusion at L2/3.  However did not last long.   She would like to follow up with neurosurgery before considering repeat injection.     RTC  As needed    "

## 2020-09-18 DIAGNOSIS — F32.A DEPRESSION, UNSPECIFIED DEPRESSION TYPE: ICD-10-CM

## 2020-09-18 RX ORDER — BUPROPION HYDROCHLORIDE 150 MG/1
150 TABLET ORAL DAILY
Qty: 90 TABLET | Refills: 1 | Status: SHIPPED | OUTPATIENT
Start: 2020-09-18 | End: 2020-10-22 | Stop reason: SDUPTHER

## 2020-09-18 RX ORDER — BUMETANIDE 2 MG/1
2 TABLET ORAL 2 TIMES DAILY
Qty: 180 TABLET | Refills: 1 | Status: SHIPPED | OUTPATIENT
Start: 2020-09-18 | End: 2021-09-17 | Stop reason: SDUPTHER

## 2020-09-22 DIAGNOSIS — F41.1 GENERALIZED ANXIETY DISORDER: ICD-10-CM

## 2020-09-22 RX ORDER — ALPRAZOLAM 1 MG/1
1 TABLET ORAL 3 TIMES DAILY PRN
Qty: 90 TABLET | Refills: 1 | Status: SHIPPED | OUTPATIENT
Start: 2020-09-22 | End: 2020-11-27

## 2020-10-14 RX ORDER — NITROGLYCERIN 0.4 MG/1
0.4 TABLET SUBLINGUAL
Qty: 25 TABLET | Refills: 1 | Status: SHIPPED | OUTPATIENT
Start: 2020-10-14 | End: 2022-01-12

## 2020-10-14 RX ORDER — POTASSIUM CHLORIDE 750 MG/1
10 TABLET, EXTENDED RELEASE ORAL 3 TIMES DAILY
Qty: 90 TABLET | Refills: 2 | Status: SHIPPED | OUTPATIENT
Start: 2020-10-14 | End: 2020-11-06

## 2020-10-21 ENCOUNTER — OFFICE VISIT (OUTPATIENT)
Dept: NEUROSURGERY | Facility: CLINIC | Age: 60
End: 2020-10-21

## 2020-10-21 VITALS
WEIGHT: 148 LBS | RESPIRATION RATE: 18 BRPM | HEIGHT: 61 IN | DIASTOLIC BLOOD PRESSURE: 85 MMHG | SYSTOLIC BLOOD PRESSURE: 122 MMHG | BODY MASS INDEX: 27.94 KG/M2 | HEART RATE: 116 BPM

## 2020-10-21 DIAGNOSIS — M48.061 SPINAL STENOSIS OF LUMBAR REGION WITHOUT NEUROGENIC CLAUDICATION: Primary | ICD-10-CM

## 2020-10-21 PROCEDURE — 99211 OFF/OP EST MAY X REQ PHY/QHP: CPT | Performed by: NEUROLOGICAL SURGERY

## 2020-10-21 NOTE — PROGRESS NOTES
"Rubi Arreola is a 60 y.o. female.     Chief Complaint   Patient presents with   • Follow-up     low back pain     Visit Vitals  /85 (BP Location: Left arm, Patient Position: Sitting, Cuff Size: Large Adult)   Pulse 116   Resp 18   Ht 154.9 cm (61\")   Wt 67.1 kg (148 lb)   BMI 27.96 kg/m²       History of Present Illness: Patient states her back and leg pain is actually getting worse.  She does have a mild listhesis below the fusion at L3-4.  I did inform the patient that I will be leaving the [facility] system and she wished to be transferred back over to Dr. Cao.  She states she was not aware that his practice had restarted.  I told her I would oblige her and we will place a referral today.    The following portions of the patient's history were reviewed and updated as appropriate: allergies, current medications, past family history, past medical history, past social history, past surgical history and problem list.    Review of Systems         Past Surgical History:   Procedure Laterality Date   • CARDIAC CATHETERIZATION     • CARPAL TUNNEL RELEASE     •  SECTION     • CHOLECYSTECTOMY     • COLONOSCOPY  10/08/2013   • CYSTOCELE REPAIR     • EPIDURAL BLOCK     • HYSTERECTOMY     • SPINE SURGERY      neck , , ; lumbar 2012        Past Medical History:   Diagnosis Date   • Allergic    • Anxiety    • Arthritis    • Asthma    • COPD (chronic obstructive pulmonary disease) (CMS/HCC)    • Fibromyalgia, primary    • Gastritis    • Low back pain    • Lupus (CMS/HCC)    • Osteopenia    • Pancreatitis    • Sjogren's syndrome (CMS/HCC)    • Tachycardia    • TIA (transient ischemic attack) 2014     Social History     Socioeconomic History   • Marital status:      Spouse name: Jose \"Shorty\"   • Number of children: 4   • Years of education: Not on file   • Highest education level: Not on file   Occupational History   • Occupation: homemaker   Tobacco Use   • Smoking status: " Current Every Day Smoker     Packs/day: 0.25     Years: 39.00     Pack years: 9.75     Types: Cigarettes   • Smokeless tobacco: Never Used   Substance and Sexual Activity   • Alcohol use: No     Frequency: Never     Comment: caffeine 2c qd   • Drug use: Never   • Sexual activity: Never      Family History   Problem Relation Age of Onset   • Diabetes Father    • Stroke Sister    • Hypertension Sister    • Hypertension Brother    • Heart disease Brother    • Osteoporosis Maternal Grandmother           Objective   Physical Exam  Neurologic Exam  Ortho Exam        Assessment and Plan: See above    I spent 5 minutes with the patient in direct face-to-face time with greater than 50% time counseling educated patient.      Problems Addressed this Visit     None      Diagnoses    None.

## 2020-10-22 ENCOUNTER — TELEPHONE (OUTPATIENT)
Dept: NEUROSURGERY | Facility: CLINIC | Age: 60
End: 2020-10-22

## 2020-10-22 DIAGNOSIS — F32.A DEPRESSION, UNSPECIFIED DEPRESSION TYPE: ICD-10-CM

## 2020-10-22 DIAGNOSIS — M50.30 DDD (DEGENERATIVE DISC DISEASE), CERVICAL: ICD-10-CM

## 2020-10-22 NOTE — TELEPHONE ENCOUNTER
THE PATIENT CALLED BECAUSE SHE IS HAVING TROUBLE REFILLING HER TIZANIDINE PRESCRIPTION. SHE SAID HER PHARMACY ([facility], [phone]) TOLD HER THEY DIDN'T HAVE THE ORDER FOR HER TIZANIDINE. SHE WANTED TO KNOW IF ANOTHER ORDER COULD BE SENT.     THE PATIENT ALSO WANTED TO BE CONTACTED ABOUT WHAT NEUROSURGEON SHE CAN SEE AFTER DR. PETERS LEAVES. SHE SAID SHE WAS TOLD TO SEE DR. BARILLAS BUT HE IS OUT OF THE NETWORK AND SHE CAN'T AFFORD TO SEE HIM.     PLEASE ADVISE. THE PATIENT CAN BE REACHED AT: [phone]    THANK YOU!

## 2020-10-23 ENCOUNTER — TELEPHONE (OUTPATIENT)
Dept: PAIN MEDICINE | Facility: HOSPITAL | Age: 60
End: 2020-10-23

## 2020-10-23 DIAGNOSIS — M50.30 DDD (DEGENERATIVE DISC DISEASE), CERVICAL: ICD-10-CM

## 2020-10-23 RX ORDER — TRAMADOL HYDROCHLORIDE 50 MG/1
100 TABLET ORAL EVERY 8 HOURS PRN
Qty: 180 TABLET | Refills: 1 | Status: SHIPPED | OUTPATIENT
Start: 2020-10-23 | End: 2020-12-04 | Stop reason: SDUPTHER

## 2020-10-23 RX ORDER — TIZANIDINE 2 MG/1
2 TABLET ORAL NIGHTLY PRN
Qty: 90 TABLET | Refills: 2 | Status: SHIPPED | OUTPATIENT
Start: 2020-10-23 | End: 2020-10-23 | Stop reason: SDUPTHER

## 2020-10-23 RX ORDER — BUPROPION HYDROCHLORIDE 150 MG/1
150 TABLET ORAL DAILY
Qty: 90 TABLET | Refills: 1 | Status: SHIPPED | OUTPATIENT
Start: 2020-10-23 | End: 2021-09-17 | Stop reason: SDUPTHER

## 2020-10-23 RX ORDER — TIZANIDINE 2 MG/1
2 TABLET ORAL NIGHTLY PRN
Qty: 90 TABLET | Refills: 2 | Status: SHIPPED | OUTPATIENT
Start: 2020-10-23 | End: 2021-06-16 | Stop reason: SDUPTHER

## 2020-10-23 RX ORDER — TRAMADOL HYDROCHLORIDE 50 MG/1
100 TABLET ORAL EVERY 8 HOURS PRN
Qty: 180 TABLET | Refills: 1 | OUTPATIENT
Start: 2020-10-23

## 2020-10-23 NOTE — TELEPHONE ENCOUNTER
Patient called and wanted it charted that her feet went out from under her she had a major fall hit her head and  believes that she fractured her tailbone and poss. Hip. Walking with a cane now. Spent three days in [facility].  Getting an MRI from Dr. Rea but would like to come in to discuss pain medication. Scheduled a follow up visit.

## 2020-10-26 ENCOUNTER — OFFICE VISIT (OUTPATIENT)
Dept: FAMILY MEDICINE CLINIC | Facility: CLINIC | Age: 60
End: 2020-10-26

## 2020-10-26 VITALS
BODY MASS INDEX: 27.59 KG/M2 | SYSTOLIC BLOOD PRESSURE: 139 MMHG | DIASTOLIC BLOOD PRESSURE: 82 MMHG | WEIGHT: 146 LBS | HEART RATE: 116 BPM | OXYGEN SATURATION: 97 % | TEMPERATURE: 97.8 F

## 2020-10-26 DIAGNOSIS — M48.061 SPINAL STENOSIS OF LUMBAR REGION WITHOUT NEUROGENIC CLAUDICATION: Primary | ICD-10-CM

## 2020-10-26 PROCEDURE — 99213 OFFICE O/P EST LOW 20 MIN: CPT | Performed by: FAMILY MEDICINE

## 2020-10-26 RX ORDER — CALCIUM CARBONATE 160(400)MG
1 TABLET,CHEWABLE ORAL DAILY
Qty: 1 EACH | Refills: 0 | Status: ON HOLD | OUTPATIENT
Start: 2020-10-26 | End: 2021-11-17

## 2020-10-28 ENCOUNTER — OFFICE VISIT (OUTPATIENT)
Dept: PAIN MEDICINE | Facility: CLINIC | Age: 60
End: 2020-10-28

## 2020-10-28 VITALS
HEART RATE: 108 BPM | WEIGHT: 146 LBS | DIASTOLIC BLOOD PRESSURE: 84 MMHG | HEIGHT: 61 IN | SYSTOLIC BLOOD PRESSURE: 118 MMHG | OXYGEN SATURATION: 98 % | RESPIRATION RATE: 16 BRPM | TEMPERATURE: 97.3 F | BODY MASS INDEX: 27.56 KG/M2

## 2020-10-28 DIAGNOSIS — M96.1 POSTLAMINECTOMY SYNDROME OF CERVICAL REGION: ICD-10-CM

## 2020-10-28 DIAGNOSIS — M96.1 POSTLAMINECTOMY SYNDROME OF LUMBAR REGION: ICD-10-CM

## 2020-10-28 DIAGNOSIS — G89.4 CHRONIC PAIN SYNDROME: Primary | ICD-10-CM

## 2020-10-28 DIAGNOSIS — M54.16 LUMBAR RADICULITIS: ICD-10-CM

## 2020-10-28 PROCEDURE — 99214 OFFICE O/P EST MOD 30 MIN: CPT | Performed by: ANESTHESIOLOGY

## 2020-10-28 PROCEDURE — G0463 HOSPITAL OUTPT CLINIC VISIT: HCPCS | Performed by: ANESTHESIOLOGY

## 2020-10-28 NOTE — PROGRESS NOTES
Subjective    CC back, leg pain, neck pain  Diya Arreola is a 60 y.o. female with chronic neck pain status post ACDF, chronic back pain status post Fusion L3-5, here for follow-up.    Had caudal ALEXIS, transforaminal ALEXIS above the fusion at L3 Mera mild relief.  Had a fall 2 weeks ago complains of significantly worsening back pain, coccyx pain, bilateral lower extremity pain.  Was seen at [facility] had pelvic x-ray showing no acute abnormalities and no hardware abnormality.   His last visit with me had follow-up with neurosurgery, surgery was discussed however Dr. Strong is leaving [place] she was refer back to Dr. Cao per patient preference.  She is concerned he is not in her insurance network.   Chronic back pain upper lumbar and lower lumbar radiating to bilateral hips bilateral lower extremity with burning tingling numbness and feeling of weakness in the legs worse with standing walking twisting or any activity.  Denies new injury, saddle anesthesia bladder bowel continence.  Chronic neck pain, radiating to bilateral shoulder limited range of motion in the neck and significant paraspinal muscle spasm bilateral UE radicular pain.  Denies balance issues.    Chronic tremors, history of polyarthralgia/lupus sees rheumatology.  Pain interfering with ADL.  Tried physical therapy without relief.  Seen neurosurgery, referred to try injection.    Utilizes tramadol with mild relief prescriber PCP.  Also on benzos.     C-spine MRI 2020 probably a plate on the anterior aspect of the spine at the C4-5 level. mild subluxation with degenerative disc disease at C3-4, causing a mild central canal stenosis. Left-sided disc extrusion at C6-7 with left foraminal impingement.   L-spine MRI 2019 postsurgical changes L3-L4 with pedicle screw L3.  Retrolisthesis L2 on L3 moderate central lateral canal stenosis.  Grade 1 anterolisthesis L4-L5.  Degenerative changes throughout.  L-spine x-ray 2020: 4 mm anterolisthesis on extension,  6 mm anterolisthesis on flexion, at the L4-5 level. Stable spinal fusion changes at L3-4.. Severe degenerative disc and endplate changes at L2-3, similar to  2018 examination.    Pain Assessment   Location of Pain: Lower Back, R Hip, L Hip, L Leg, neck pain, joint  Description of Pain: Dull/Aching, Throbbing, Stabbing  Previous Pain Rating :9  Current Pain Ratin  Aggravating Factors: Activity  Alleviating Factors: Rest, Medication    PEG Assessment   What number best describes your pain on average in the past week?9  What number best describes how, during the past week, pain has interfered with your enjoyment of life?10  What number best describes how, during the past week, pain has interfered with your general activity?10      The following portions of the patient's history were reviewed and updated as appropriate: allergies, current medications, past family history, past medical history, past social history, past surgical history and problem list.     has a past medical history of Allergic, Anxiety, Arthritis, Asthma, COPD (chronic obstructive pulmonary disease) (CMS/HCC), Fall, Fibromyalgia, primary, Gastritis, Low back pain, Lupus (CMS/HCC), Neuropathy, Osteopenia, Pancreatitis, Sjogren's syndrome (CMS/HCC), Tachycardia, and TIA (transient ischemic attack) (2014).   has a past surgical history that includes Cholecystectomy; Hysterectomy;  section; Cystocele repair; Carpal tunnel release; Cardiac catheterization; Spine surgery; Colonoscopy (10/08/2013); and Epidural block injection.  family history includes Diabetes in her father; Heart disease in her brother; Hypertension in her brother and sister; Osteoporosis in her maternal grandmother; Stroke in her sister.  Social History     Tobacco Use   • Smoking status: Current Every Day Smoker     Packs/day: 0.25     Years: 39.00     Pack years: 9.75     Types: Cigarettes   • Smokeless tobacco: Never Used   Substance Use Topics   • Alcohol use: No      Frequency: Never     Comment: caffeine 2c qd     Review of Systems   Constitutional: Negative for chills, fatigue and fever.   HENT: Negative for swollen glands and trouble swallowing.    Respiratory: Negative.  Negative for shortness of breath.    Cardiovascular: Negative for chest pain, palpitations and leg swelling.   Gastrointestinal: Negative for nausea and vomiting.   Musculoskeletal: Positive for arthralgias, back pain and neck pain. Negative for gait problem, joint swelling, myalgias and neck stiffness.   Skin: Negative for color change, dry skin, rash and skin lesions.   Neurological: Positive for tremors, numbness and headache. Negative for dizziness, weakness and light-headedness.   Hematological: Negative for adenopathy. Does not bruise/bleed easily.   Psychiatric/Behavioral: Negative for behavioral problems, suicidal ideas and depressed mood.   All other systems reviewed and are negative.    Objective   Physical Exam   Constitutional: She is oriented to person, place, and time. She appears well-developed and well-nourished. No distress.   Eyes: Pupils are equal, round, and reactive to light. Conjunctivae are normal.   Neck: Muscular tenderness present. Decreased range of motion present. Kernig's sign noted.   Cardiovascular: Normal heart sounds.   Pulmonary/Chest: Effort normal and breath sounds normal.   Musculoskeletal:      Cervical back: She exhibits decreased range of motion, tenderness and spasm.      Lumbar back: She exhibits decreased range of motion and tenderness.      Comments: Back: Paraspinal tenderness, positive leg raise on the left/right.  Pain with extension/side rotation. Lumbar loading positive, pain on extension of low back past 5 degrees.  TTP on the lumbar facets noted.     Vascular Status -  Her right foot exhibits no edema. Her left foot exhibits no edema.  Lymphadenopathy:     She has no cervical adenopathy.   Neurological: She is alert and oriented to person, place, and  "time. She has normal reflexes. No sensory deficit. Gait abnormal. Coordination normal.   Skin: Skin is warm and dry. Capillary refill takes less than 2 seconds.   Psychiatric: Her behavior is normal.   Vitals reviewed.    /84   Pulse 108   Temp 97.3 °F (36.3 °C)   Resp 16   Ht 154.9 cm (61\")   Wt 66.2 kg (146 lb)   SpO2 98%   BMI 27.59 kg/m²      PHQ 9 on chart  Opioid risk tool low risk    Assessment/Plan   Diagnoses and all orders for this visit:    1. Chronic pain syndrome (Primary)    2. Postlaminectomy syndrome of lumbar region  -     Ambulatory Referral to Neurosurgery    3. Postlaminectomy syndrome of cervical region  -     Ambulatory Referral to Neurosurgery    4. Lumbar radiculitis  -     Ambulatory Referral to Pain Management  -     Ambulatory Referral to Neurosurgery    Summary  Diya Arreola is a 60 y.o. female with chronic neck pain status post ACDF, chronic back pain status post Fusion L3-5, here for follow-up.  Worsening back pain from DDD spondylosis postlaminectomy syndrome with radicular pain.  L-spine x-ray flexion-extension with anterolisthesis mild instability L4-5.    Continued neck pain from postlaminectomy syndrome.  Worsening chronic tremors.  Impairing ADL.  Marginal relief with medication and PT.    Had caudal ALEXIS, transforaminal ALEXIS above the fusion at L3 Mera mild relief.  Had a fall 2 weeks ago complains of significantly worsening back pain, coccyx pain, bilateral lower extremity pain.  Was seen at [facility] had pelvic x-ray showing no acute abnormalities and no hardware abnormality.   Scheduled for caudal ALEXIS.    Since her last visit with me had follow-up with neurosurgery, surgery was discussed however Dr. Strong is leaving [facility] she was refer back to Dr. Cao per patient preference.  She is concerned he is not in her insurance network.   Will provide new referral to [facility] Prince at [facility]    He inquired about opiate therapy.  Currently on tramadol from PCP.  "   She is currently on benzo/alprazolam 3 times daily.  Discussed increased risk of combining opioid and benzo use and will not prescribe stronger opioids while she is on benzos.  Will discuss weaning with PCP.    RTC for procedure

## 2020-11-06 RX ORDER — POTASSIUM CHLORIDE 750 MG/1
TABLET, EXTENDED RELEASE ORAL
Qty: 90 TABLET | Refills: 2 | Status: SHIPPED | OUTPATIENT
Start: 2020-11-06 | End: 2021-03-29

## 2020-11-25 DIAGNOSIS — F41.1 GENERALIZED ANXIETY DISORDER: ICD-10-CM

## 2020-11-27 RX ORDER — ALPRAZOLAM 1 MG/1
1 TABLET ORAL 3 TIMES DAILY PRN
Qty: 90 TABLET | Refills: 1 | Status: SHIPPED | OUTPATIENT
Start: 2020-11-27 | End: 2021-01-26 | Stop reason: SDUPTHER

## 2020-12-04 ENCOUNTER — TELEMEDICINE (OUTPATIENT)
Dept: FAMILY MEDICINE CLINIC | Facility: CLINIC | Age: 60
End: 2020-12-04

## 2020-12-04 ENCOUNTER — TELEPHONE (OUTPATIENT)
Dept: NEUROSURGERY | Facility: CLINIC | Age: 60
End: 2020-12-04

## 2020-12-04 DIAGNOSIS — E87.6 HYPOKALEMIA: ICD-10-CM

## 2020-12-04 DIAGNOSIS — J06.9 VIRAL UPPER RESPIRATORY TRACT INFECTION: Primary | ICD-10-CM

## 2020-12-04 DIAGNOSIS — M50.30 DDD (DEGENERATIVE DISC DISEASE), CERVICAL: ICD-10-CM

## 2020-12-04 DIAGNOSIS — E78.5 HYPERLIPIDEMIA, UNSPECIFIED HYPERLIPIDEMIA TYPE: ICD-10-CM

## 2020-12-04 PROCEDURE — 99214 OFFICE O/P EST MOD 30 MIN: CPT | Performed by: FAMILY MEDICINE

## 2020-12-04 RX ORDER — TRAMADOL HYDROCHLORIDE 50 MG/1
100 TABLET ORAL EVERY 8 HOURS PRN
Qty: 180 TABLET | Refills: 0 | Status: SHIPPED | OUTPATIENT
Start: 2020-12-04 | End: 2021-01-15

## 2020-12-04 NOTE — TELEPHONE ENCOUNTER
THE PATIENT HAS PREVIOUSLY BEEN SEEN BY DR. PETERS AND WANTED TO CONFIRM IF SHE WILL BE ABLE TO BE SEEN BY DR. DOW. SHE HAS NUMBNESS AND SHAKING IN HER HANDS FROM DISC DEGENERATION IN HER CERVICAL SPINE. SHE IS ALSO HAVING TREMORS IN THE LEFT SIDE OF HER NECK.    PLEASE ADVISE. THE PATIENT CAN BE REACHED [phone]     THANK YOU!

## 2020-12-04 NOTE — PROGRESS NOTES
Subjective   Chief Complaint   Patient presents with   • URI   • Back Pain     Diya Arreola is a 60 y.o. female.   You have chosen to receive care through a telehealth visit.  Do you consent to use a video/audio connection for your medical care today? Yes    She and her  are doing video visits today because of possible exposure to COVID19 last week.  They had tests today and will let me know the results next week.     URI   This is a new problem. The current episode started in the past 7 days. The problem has been gradually improving. There has been no fever. Associated symptoms include congestion, ear pain, joint pain, rhinorrhea and a sore throat. Pertinent negatives include no abdominal pain, chest pain, coughing, nausea, rash, sinus pain, sneezing, swollen glands or wheezing. She has tried steam, increased fluids and decongestant for the symptoms. The treatment provided mild relief.   Back Pain  This is a chronic problem. The current episode started more than 1 year ago. The problem occurs constantly. The problem has been gradually worsening since onset. The pain is present in the lumbar spine. The quality of the pain is described as aching. The pain is severe. The symptoms are aggravated by twisting and bending. Pertinent negatives include no abdominal pain, chest pain or fever. Risk factors include lack of exercise, sedentary lifestyle and menopause. She has tried ice, heat and analgesics for the symptoms. The treatment provided mild relief.   Hyperlipidemia  This is a chronic problem. The current episode started more than 1 year ago. There are no known factors aggravating her hyperlipidemia. Pertinent negatives include no chest pain or shortness of breath. Current antihyperlipidemic treatment includes diet change. The current treatment provides mild improvement of lipids. There are no compliance problems.       Past Medical History:   Diagnosis Date   • Allergic    • Anxiety    • Arthritis    •  "Asthma    • COPD (chronic obstructive pulmonary disease) (CMS/[facility])    • Fall     10-8-20-- was in [facility]   • Fibromyalgia, primary    • Gastritis    • Low back pain    • Lupus (CMS/[facility])    • Neuropathy     feet   • Osteopenia    • Pancreatitis    • Sjogren's syndrome (CMS/[facility])    • Tachycardia    • TIA (transient ischemic attack) 2014     Past Surgical History:   Procedure Laterality Date   • CARDIAC CATHETERIZATION     • CARPAL TUNNEL RELEASE     •  SECTION     • CHOLECYSTECTOMY     • COLONOSCOPY  10/08/2013   • CYSTOCELE REPAIR     • EPIDURAL BLOCK     • HYSTERECTOMY     • SPINE SURGERY      neck , , ; lumbar 2012      Allergies   Allergen Reactions   • Adhesive Tape Rash   • Cephalexin Rash   • Ciprofloxacin Rash   • Corticosteroids Unknown (See Comments)     Nerve burning    • Latex Unknown (See Comments)     Reddness, rash    • Other Unknown (See Comments)     Oline Abx and Vietnamese [place] soap   • Penicillin G Anaphylaxis   • Prednisone Unknown (See Comments)     Nerve burning    • Sulfa Antibiotics Unknown (See Comments)   • Azithromycin Rash     Social History     Socioeconomic History   • Marital status:      Spouse name: Jose \"Shorty\"   • Number of children: 4   • Years of education: Not on file   • Highest education level: Not on file   Occupational History   • Occupation: homemaker   Tobacco Use   • Smoking status: Current Every Day Smoker     Packs/day: 0.25     Years: 39.00     Pack years: 9.75     Types: Cigarettes   • Smokeless tobacco: Never Used   Substance and Sexual Activity   • Alcohol use: No     Frequency: Never     Comment: caffeine 2c qd   • Drug use: Never   • Sexual activity: Never     Social History     Tobacco Use   Smoking Status Current Every Day Smoker   • Packs/day: 0.25   • Years: 39.00   • Pack years: 9.75   • Types: Cigarettes   Smokeless Tobacco Never Used       family history includes Diabetes in her father; Heart disease in her " brother; Hypertension in her brother and sister; Osteoporosis in her maternal grandmother; Stroke in her sister.  Current Outpatient Medications on File Prior to Visit   Medication Sig Dispense Refill   • albuterol (PROVENTIL) (2.5 MG/3ML) 0.083% nebulizer solution Take 2.5 mg by nebulization Every 4 (Four) Hours As Needed for Wheezing. 360 vial 3   • albuterol sulfate HFA (ProAir HFA) 108 (90 Base) MCG/ACT inhaler Inhale 2 puffs Every 4 (Four) Hours As Needed for Wheezing. 18 g 3   • ALPRAZolam (XANAX) 1 MG tablet TAKE 1 TABLET BY MOUTH 3 (THREE) TIMES A DAY AS NEEDED FOR ANXIETY. 90 tablet 1   • bumetanide (BUMEX) 2 MG tablet TAKE 1 TABLET BY MOUTH 2 (TWO) TIMES A DAY. 180 tablet 1   • buPROPion XL (WELLBUTRIN XL) 150 MG 24 hr tablet Take 1 tablet by mouth Daily. 90 tablet 1   • CALCIUM PO CALCIUM CAPS     • Cholecalciferol (VITAMIN D) 2000 units capsule VITAMIN D 2000 UNIT CAPS     • diclofenac (VOLTAREN) 75 MG EC tablet Take 1 tablet by mouth Daily. 90 tablet 1   • diphenhydrAMINE HCl, Sleep, (SLEEP-AID PO) Take  by mouth Every Night.     • doxycycline (MONODOX) 100 MG capsule Take 1 capsule by mouth 2 (Two) Times a Day. 20 capsule 0   • DULoxetine (CYMBALTA) 60 MG capsule Take 1 capsule by mouth 2 (Two) Times a Day. 180 capsule 3   • EPINEPHrine (EPIPEN) 0.3 MG/0.3ML solution auto-injector injection      • folic acid (FOLVITE) 1 MG tablet Daily.     • gabapentin (NEURONTIN) 300 MG capsule Take 1 capsule by mouth 4 (Four) Times a Day. 360 capsule 3   • gabapentin (NEURONTIN) 300 MG capsule qid     • hydroxychloroquine (PLAQUENIL) 200 MG tablet Take 2 tablets by mouth Daily for 180 doses. Indications: Rheumatoid Arthritis 180 tablet 1   • KLOR-CON 10 MEQ CR tablet TAKE 1 TABLET BY MOUTH THREE TIMES A DAY 90 tablet 2   • lidocaine (LIDODERM) 5 % Place 1 patch on the skin as directed by provider Daily. Remove & Discard patch within 12 hours or as directed by MD 30 patch 1   • Misc. Devices (Rollator Ultra-Light)  misc 1 Device Daily. 1 each 0   • Multiple Vitamins-Minerals (CENTRUM SILVER) tablet CENTRUM SILVER TABS     • nitroglycerin (NITROSTAT) 0.4 MG SL tablet Place 1 tablet under the tongue Every 5 (Five) Minutes As Needed for Chest Pain. Take no more than 3 doses in 15 minutes. 25 tablet 1   • Polyethylene Glycol 3350 (MIRALAX PO) 2 (Two) Times a Day.     • roflumilast (DALIRESP) 500 MCG tablet tablet Take 1 tablet by mouth Daily. 90 tablet 3   • tiZANidine (ZANAFLEX) 2 MG tablet Take 1 tablet by mouth At Night As Needed for Muscle Spasms. 90 tablet 2   • verapamil SR (CALAN-SR) 240 MG CR tablet Take 1 tablet by mouth Daily. 90 tablet 3   • [DISCONTINUED] traMADol (ULTRAM) 50 MG tablet Take 2 tablets by mouth Every 8 (Eight) Hours As Needed for Moderate Pain . 180 tablet 1     No current facility-administered medications on file prior to visit.      Patient Active Problem List   Diagnosis   • Allergic rhinitis   • Anxiety disorder   • Asthma   • Carpal tunnel syndrome, bilateral   • Chronic obstructive pulmonary disease (CMS/HCC)   • Connective tissue disease, undifferentiated (CMS/HCC)   • Constipation   • Cystic fibrosis carrier   • DDD (degenerative disc disease), cervical   • Dyspepsia   • Edema   • Elevated antinuclear antibody (LILI) level   • Essential (primary) hypertension   • Fatigue   • Hyperlipidemia   • Insomnia   • Irritable bowel syndrome   • Fibromyalgia   • Headache   • Lupus (CMS/HCC)   • Sjogren's syndrome (CMS/HCC)   • Nonrheumatic mitral valve insufficiency   • Other long term (current) drug therapy   • Other specified dorsopathies, site unspecified   • Palpitations   • Paroxysmal tachycardia (CMS/HCC)   • Sciatica   • Sleep disorder   • Spinal stenosis of lumbar region   • Temporary cerebral vascular dysfunction   • Vitamin D deficiency   • Hypokalemia   • Depression   • LILI positive   • Need for immunization against influenza       The following portions of the patient's history were reviewed and  updated as appropriate: allergies, current medications, past family history, past medical history, past social history, past surgical history and problem list.    Review of Systems   Constitutional: Negative for chills and fever.   HENT: Positive for congestion, ear pain, rhinorrhea and sore throat. Negative for sinus pressure, sneezing and swollen glands.    Eyes: Negative for blurred vision.   Respiratory: Negative for cough, shortness of breath and wheezing.    Cardiovascular: Negative for chest pain and palpitations.   Gastrointestinal: Negative for abdominal pain and nausea.   Endocrine: Negative for polyuria.   Genitourinary: Negative for difficulty urinating.   Musculoskeletal: Positive for back pain and joint pain.   Skin: Negative for rash.   Neurological: Negative for dizziness, seizures and headache.   Hematological: Negative for adenopathy.   Psychiatric/Behavioral: Negative for depressed mood.       Objective   There were no vitals taken for this visit.  Physical Exam  Constitutional:       Appearance: Normal appearance.   HENT:      Head: Normocephalic and atraumatic.   Pulmonary:      Effort: Pulmonary effort is normal.   Neurological:      Mental Status: She is alert. Mental status is at baseline.   Psychiatric:         Mood and Affect: Mood normal.         No visits with results within 1 Week(s) from this visit.   Latest known visit with results is:   Office Visit on 03/03/2020   Component Date Value Ref Range Status   • Glucose 03/03/2020 105* 65 - 99 mg/dL Final   • BUN 03/03/2020 26* 6 - 20 mg/dL Final   • Creatinine 03/03/2020 0.95  0.57 - 1.00 mg/dL Final   • Sodium 03/03/2020 140  136 - 145 mmol/L Final   • Potassium 03/03/2020 3.5  3.5 - 5.2 mmol/L Final   • Chloride 03/03/2020 93* 98 - 107 mmol/L Final   • CO2 03/03/2020 31.8* 22.0 - 29.0 mmol/L Final   • Calcium 03/03/2020 10.1  8.6 - 10.5 mg/dL Final   • Total Protein 03/03/2020 7.3  6.0 - 8.5 g/dL Final   • Albumin 03/03/2020 4.70  3.50 -  5.20 g/dL Final   • ALT (SGPT) 03/03/2020 29  1 - 33 U/L Final   • AST (SGOT) 03/03/2020 21  1 - 32 U/L Final   • Alkaline Phosphatase 03/03/2020 98  39 - 117 U/L Final   • Total Bilirubin 03/03/2020 0.3  0.2 - 1.2 mg/dL Final   • eGFR Non African Amer 03/03/2020 61  >60 mL/min/1.73 Final   • Globulin 03/03/2020 2.6  gm/dL Final   • A/G Ratio 03/03/2020 1.8  g/dL Final   • BUN/Creatinine Ratio 03/03/2020 27.4* 7.0 - 25.0 Final   • Anion Gap 03/03/2020 15.2* 5.0 - 15.0 mmol/L Final           Assessment/Plan   Diagnoses and all orders for this visit:    1. Viral upper respiratory tract infection (Primary)  Comments:  Waiting on COVID19 test results.     2. DDD (degenerative disc disease), cervical  Comments:  Refer to a new neurosurgeon since her prior one has moved away.  Orders:  -     traMADol (ULTRAM) 50 MG tablet; Take 2 tablets by mouth Every 8 (Eight) Hours As Needed for Moderate Pain .  Dispense: 180 tablet; Refill: 0  -     Ambulatory Referral to Neurosurgery    3. Hypokalemia  Comments:  Labs ordered.  Orders:  -     Comprehensive Metabolic Panel    4. Hyperlipidemia, unspecified hyperlipidemia type  -     Lipid Panel      Total time spent on evaluation of patient:  25 min

## 2020-12-07 ENCOUNTER — TELEPHONE (OUTPATIENT)
Dept: NEUROSURGERY | Facility: CLINIC | Age: 60
End: 2020-12-07

## 2020-12-07 NOTE — TELEPHONE ENCOUNTER
Pt is calling to schedule her appointment on 12/14 with Dr Jenkins.  Pt says she can make that appointment      Please call pt at [phone]

## 2020-12-14 ENCOUNTER — OFFICE VISIT (OUTPATIENT)
Dept: NEUROSURGERY | Facility: CLINIC | Age: 60
End: 2020-12-14

## 2020-12-14 VITALS
HEART RATE: 125 BPM | HEIGHT: 61 IN | SYSTOLIC BLOOD PRESSURE: 168 MMHG | DIASTOLIC BLOOD PRESSURE: 98 MMHG | BODY MASS INDEX: 27.94 KG/M2 | WEIGHT: 148 LBS

## 2020-12-14 DIAGNOSIS — M54.50 CHRONIC LOW BACK PAIN WITHOUT SCIATICA, UNSPECIFIED BACK PAIN LATERALITY: Primary | ICD-10-CM

## 2020-12-14 DIAGNOSIS — G89.29 CHRONIC LOW BACK PAIN WITHOUT SCIATICA, UNSPECIFIED BACK PAIN LATERALITY: Primary | ICD-10-CM

## 2020-12-14 PROCEDURE — 99214 OFFICE O/P EST MOD 30 MIN: CPT | Performed by: NEUROLOGICAL SURGERY

## 2020-12-14 NOTE — PROGRESS NOTES
"Subjective   History of Present Illness: Diya Arreola is a 60 y.o. female  Back pain.    History of Present Illness   Summary  Diya Arreola is a 60 y.o. female with chronic neck pain status post ACDF, chronic back pain status post Fusion L3-5 by Dr Cao.  Worsening back pain from DDD spondylosis postlaminectomy syndrome with radicular pain.  L-spine x-ray flexion-extension with anterolisthesis mild instability L4-5.    Marginal relief with medication and PT.  Patient is sought referral follow-up with Dr. Cao, Dr. Strong, and Dr. Prince at [facility].  She inquired about opiate therapy.  Currently on tramadol from PCP.    She is currently on benzo/alprazolam 3 times daily. Will discuss weaning with PCP.    The following portions of the patient's history were reviewed and updated as appropriate: allergies, current medications, past family history, past medical history, past social history, past surgical history and problem list.    Review of Systems   Constitutional: Negative.    HENT: Negative.    Eyes: Negative.    Cardiovascular: Negative.    Gastrointestinal: Negative.        Objective     ./98 (BP Location: Left arm, Patient Position: Sitting, Cuff Size: Adult)   Pulse (!) 125   Ht 154.9 cm (61\")   Wt 67.1 kg (148 lb)   BMI 27.96 kg/m²    Body mass index is 27.96 kg/m².      Physical Exam  HENT:      Head: Normocephalic.   Eyes:      Pupils: Pupils are equal, round, and reactive to light.   Neck:      Musculoskeletal: Neck supple.   Pulmonary:      Effort: Pulmonary effort is normal.      Breath sounds: Normal breath sounds.   Abdominal:      Palpations: Abdomen is soft.   Neurological:      General: No focal deficit present.      Mental Status: She is alert.       Neurologic Exam     Cranial Nerves     CN III, IV, VI   Pupils are equal, round, and reactive to light.    Motor Exam 5/5 strength in legs     Sensory Exam   Pinprick intact in lower extremities         Assessment/Plan "   Independent Review of Radiographic Studies:      There are outside films not available for review     Medical Decision Making:      Probable postlaminectomy syndrome, with persistence of pain.  Will obtain several imaging studies to complete her evaluation including x-rays lumbar spine with flexion and extension and CT of the lumbar spine as well as MRI of lumbosacral spine.  Following completion of the diagnostic diagnostic imaging additional recommendations will follow.  Probable  Diagnoses and all orders for this visit:    1. Chronic low back pain without sciatica, unspecified back pain laterality (Primary)  -     CT Lumbar Spine Without Contrast; Future  -     MRI Lumbar Spine Without Contrast; Future  -     XR Spine Lumbar Complete With Flex & Ext; Future      Return in about 3 weeks (around 1/4/2021).    Ottoniel Jenkins Jr., MD FAANS, FACS, FICS         Ottoniel Jenkins MD  12/14/20  13:55 EST

## 2020-12-21 ENCOUNTER — TELEPHONE (OUTPATIENT)
Dept: FAMILY MEDICINE CLINIC | Facility: CLINIC | Age: 60
End: 2020-12-21

## 2021-01-06 ENCOUNTER — HOSPITAL ENCOUNTER (OUTPATIENT)
Dept: GENERAL RADIOLOGY | Facility: HOSPITAL | Age: 61
Discharge: HOME | End: 2021-01-06

## 2021-01-06 ENCOUNTER — HOSPITAL ENCOUNTER (OUTPATIENT)
Dept: CT IMAGING | Facility: HOSPITAL | Age: 61
Discharge: HOME | End: 2021-01-06

## 2021-01-06 ENCOUNTER — HOSPITAL ENCOUNTER (OUTPATIENT)
Dept: MRI IMAGING | Facility: HOSPITAL | Age: 61
Discharge: HOME | End: 2021-01-06

## 2021-01-06 DIAGNOSIS — M54.50 CHRONIC LOW BACK PAIN WITHOUT SCIATICA, UNSPECIFIED BACK PAIN LATERALITY: ICD-10-CM

## 2021-01-06 DIAGNOSIS — G89.29 CHRONIC LOW BACK PAIN WITHOUT SCIATICA, UNSPECIFIED BACK PAIN LATERALITY: ICD-10-CM

## 2021-01-06 PROCEDURE — 72131 CT LUMBAR SPINE W/O DYE: CPT

## 2021-01-06 PROCEDURE — 72148 MRI LUMBAR SPINE W/O DYE: CPT

## 2021-01-06 PROCEDURE — 72114 X-RAY EXAM L-S SPINE BENDING: CPT

## 2021-01-15 ENCOUNTER — TELEPHONE (OUTPATIENT)
Dept: FAMILY MEDICINE CLINIC | Facility: CLINIC | Age: 61
End: 2021-01-15

## 2021-01-15 DIAGNOSIS — M50.30 DDD (DEGENERATIVE DISC DISEASE), CERVICAL: ICD-10-CM

## 2021-01-15 RX ORDER — TRAZODONE HYDROCHLORIDE 100 MG/1
100 TABLET ORAL NIGHTLY
Qty: 30 TABLET | Refills: 1 | Status: SHIPPED | OUTPATIENT
Start: 2021-01-15 | End: 2021-02-08

## 2021-01-15 RX ORDER — TRAMADOL HYDROCHLORIDE 50 MG/1
100 TABLET ORAL EVERY 8 HOURS PRN
Qty: 180 TABLET | Refills: 0 | Status: SHIPPED | OUTPATIENT
Start: 2021-01-15 | End: 2021-02-17

## 2021-01-15 NOTE — TELEPHONE ENCOUNTER
Patient called to request a refill of Trazodone 100mg for insomnia. Was prescribed before like 2 years ago.    Please advise    [phone]  Or   [phone]

## 2021-01-21 ENCOUNTER — TELEPHONE (OUTPATIENT)
Dept: FAMILY MEDICINE CLINIC | Facility: CLINIC | Age: 61
End: 2021-01-21

## 2021-01-21 NOTE — TELEPHONE ENCOUNTER
Caller: ASHLEY BUTLER    Relationship: Emergency Contact    Best call back number: [phone]    What test was performed: LABS    When was the test performed: ABOUT 2 WEEKS AGO     Where was the test performed: [facility]     Additional notes: PATIENT WOULD LIKE A CALL BACK WITH TEST RESULTS.

## 2021-01-22 NOTE — TELEPHONE ENCOUNTER
I did not order any labs on her recently.  It looks like they were ordered by CLARY Steele.  She needs to contact Lindsey for test results.

## 2021-01-22 NOTE — TELEPHONE ENCOUNTER
HUD OK TO SHARE...    TEST WERE ORDERED BY ESTRELLITA LUX. PT NEEDS TO CALL HER OFFICE FOR RESULTS.    I CALLED PT BUT NO ANSWER.

## 2021-01-25 DIAGNOSIS — F41.1 GENERALIZED ANXIETY DISORDER: ICD-10-CM

## 2021-01-26 ENCOUNTER — TELEPHONE (OUTPATIENT)
Dept: FAMILY MEDICINE CLINIC | Facility: CLINIC | Age: 61
End: 2021-01-26

## 2021-01-26 DIAGNOSIS — F41.1 GENERALIZED ANXIETY DISORDER: ICD-10-CM

## 2021-01-26 RX ORDER — ALPRAZOLAM 1 MG/1
1 TABLET ORAL 3 TIMES DAILY PRN
Qty: 90 TABLET | Refills: 1 | Status: SHIPPED | OUTPATIENT
Start: 2021-01-26 | End: 2021-03-10 | Stop reason: SDUPTHER

## 2021-01-26 NOTE — TELEPHONE ENCOUNTER
Caller: ASHLEY BUTLER    Relationship: Emergency Contact    Best call back number: [phone]    Medication needed:   Requested Prescriptions     Pending Prescriptions Disp Refills   • ALPRAZolam (XANAX) 1 MG tablet 90 tablet 1     Sig: Take 1 tablet by mouth 3 (Three) Times a Day As Needed for Anxiety.       When do you need the refill by: ASAP    What details did the patient provide when requesting the medication: OUT OF MEDICATION    Does the patient have less than a 3 day supply:  [x] Yes  [] No    What is the patient's preferred pharmacy: [facility]/PHARMACY #[id number] - [facility] [place] [address] AT [place] [address] - [phone] [facility] [phone]

## 2021-01-27 RX ORDER — ALPRAZOLAM 1 MG/1
1 TABLET ORAL 3 TIMES DAILY PRN
Qty: 90 TABLET | Refills: 1 | OUTPATIENT
Start: 2021-01-27

## 2021-02-01 ENCOUNTER — OFFICE VISIT (OUTPATIENT)
Dept: NEUROSURGERY | Facility: CLINIC | Age: 61
End: 2021-02-01

## 2021-02-01 DIAGNOSIS — M96.1 POSTLAMINECTOMY SYNDROME, LUMBAR REGION: Primary | ICD-10-CM

## 2021-02-01 PROCEDURE — 99214 OFFICE O/P EST MOD 30 MIN: CPT | Performed by: NEUROLOGICAL SURGERY

## 2021-02-01 NOTE — PROGRESS NOTES
Subjective   History of Present Illness: Diya Arreola is a 60 y.o. female seen in follow-up in our neurosurgery clinic on 2/1/2021.  We evaluated her before for chronic back pain, status post L3-L4 pedicle screw fusion by Dr. Cao.  She presented to our clinic in December with probable postlaminectomy syndrome, with some relief with medication and physical therapy.  She has been seen in follow-up with Dr. Vigil, Dr. Strong and also Dr. Prince.  Currently she remains on tramadol.  Since we last evaluated her her symptomatic complaints are little improved.  She is more active, still complains of back pain after walking several feet.  She has been treated with physical therapy before and she states when she performs exercises her back feels better.  Denies any numbness or weakness of her legs.  MRI of the lumbosacral spine, CT scan of the lumbosacral spine and lumbar spine x-rays with flexion and extension films have been obtained.    History of Present Illness    The following portions of the patient's history were reviewed and updated as appropriate: allergies, current medications, past family history, past medical history, past social history, past surgical history and problem list.    Review of Systems   Constitutional: Negative.    HENT: Negative.    Eyes: Negative.    Respiratory: Negative.    Cardiovascular: Negative.    Musculoskeletal: Positive for back pain.   Neurological: Negative.        Objective     .There were no vitals taken for this visit.   There is no height or weight on file to calculate BMI.      Physical Exam  Constitutional:       Appearance: Normal appearance.   HENT:      Head: Normocephalic and atraumatic.   Eyes:      Extraocular Movements: Extraocular movements intact.      Pupils: Pupils are equal, round, and reactive to light.   Neck:      Musculoskeletal: Normal range of motion.   Abdominal:      Palpations: Abdomen is soft.   Musculoskeletal: Normal range of motion.   Neurological:       General: No focal deficit present.      Mental Status: She is alert and oriented to person, place, and time.   Psychiatric:         Speech: Speech normal.       Neurologic Exam     Mental Status   Oriented to person, place, and time.   Attention: normal. Concentration: normal.   Speech: speech is normal   Level of consciousness: alert  Knowledge: good.     Cranial Nerves     CN III, IV, VI   Pupils are equal, round, and reactive to light.    Motor Exam   Muscle bulk: normal5/5 strength throughout with negative pronator drift     Sensory Exam   Primary and cortical sensory modalities intact             Assessment/Plan   Independent Review of Radiographic Studies:      I personally reviewed the images from the following studies.    DATE OF EXAM:  1/6/2021 10:02 AM     PROCEDURE:  XR SPINE LUMBAR COMPLETE W FLEX EXT-     INDICATIONS:  Chronic low back pain, fall one month ago, previous spinal fusion.     COMPARISON:  3/16/2018.     TECHNIQUE:   A minimum of six radiologic views including bending views of the  lumbosacral spine were obtained.     FINDINGS:  There are bilateral transpedicular screws and posterior fixation rods at  L3-L4 with an interbody fusion procedure. There is no hardware loosening  or failure. There is disc space narrowing with the endplate sclerosis  and cortical irregularity at L3-L4 which is likely due to degenerative  disc disease and spondylosis. There is grade 1 anterior  spondylolisthesis of L4 on L5 on the flexion view. There does not appear  to be subluxation on the extension or neutral lateral view. There are  facet arthritic changes at L4-L5 and L5-S1. There is no acute fracture.  There are atherosclerotic vascular calcifications at the aortoiliac  bifurcation.      IMPRESSION:     1. Postsurgical changes at the L3-L4 level with no hardware loosening or  failure.  2. Degenerative changes as described above.  3. There is grade 1 anterior spondylolisthesis of L4 on L5 only seen on  the  flexion view.  Study Result    DATE OF EXAM:  1/6/2021 9:10 AM     PROCEDURE:  MRI LUMBAR SPINE WO CONTRAST-, CT LUMBAR SPINE WO CONTRAST-     INDICATIONS:  back pain; M54.5-Low back pain; G89.29-Other chronic pain     COMPARISON:  CT abdomen pelvis from 06/12/2019     TECHNIQUE:   Routine magnetic resonance imaging of the lumbar spine was performed  without the administration of contrast. CT scan of the lumbar spine  without IV contrast was also performed.     FINDINGS:  No acute fracture is identified. There is grade 1 anterolisthesis of L4  on L5. The vertebral body heights appear normal. There are degenerative  endplate changes at L2-3 and L3-4. There are marked discogenic changes  at L2-3 with adjacent sclerosis of the vertebral bodies. There are  changes from decompressive laminectomy with posterior fusion involving  vertical rods with paired pedicle screws placed at L3 and L4, with an  associated intervertebral spacer. The hardware appears intact. There is  at most minimal osseous fusion across L3-4. The conus terminates at the  T12-L1 level. Within the partially visualized abdomen is a 3 cm left  adrenal mass most compatible with an adenoma.     L1-2: Mild disc extrusion traversing 3 mm superiorly. Mild bilateral  facet arthropathy. Mild spinal canal stenosis. Mild bilateral neural  foraminal stenosis.     L2-3: Moderate disc bulge. Moderate bilateral facet arthropathy with  ligamentum flavum infolding. Moderate spinal canal stenosis. Moderate  bilateral neural foraminal stenosis.     L3-4: Laminectomy. Tiny left paracentral disc protrusion. Mild right  facet arthropathy. No spinal canal stenosis. Mild right neural foraminal  stenosis.     L4-5: Moderate disc bulge. Moderate bilateral facet arthropathy with  ligamentum flavum infolding. Mild to moderate spinal canal stenosis.  Mild to moderate right and mild left neural foraminal stenosis.     L5-S1: Moderate right and mild left facet arthropathy. No spinal  canal  stenosis. No neural foraminal stenosis.     IMPRESSION:  1.Changes from decompressive laminectomy with posterior fusion at L3-4.  Hardware appears intact.  2.Multilevel degenerative changes of lumbar spine as described above,  most prominent at L2-3 where there is moderate spinal canal and moderate  bilateral neural foraminal stenosis.     Review of the flexion and extension films show a small degree of anterior subluxation 4 5 about 4 mm in flexion only.  MRI of the lumbosacral spine is noteworthy for mild canal and foraminal stenosis.    Medical Decision Making:      Agree with diagnosis of postlaminectomy syndrome, yet overall the patient is continuing to slowly improve.  Review of the flexion and extension films show a small degree of anterior subluxation 4 5 about 4 mm in flexion only.  MRI of the lumbosacral spine is noteworthy for mild canal and foraminal stenosis.  Recommend continued conservative therapy and encourage continue PT with electrical stimulation ultrasound massage exercising especially core strengthening.  No heavy lifting bending or stooping is recommended.  Patient is agreement we will continue to follow-up with us as needed.    Procedures      There are no diagnoses linked to this encounter.  No follow-ups on file.    This patient was examined wearing appropriate personal protective equipment.     Patient is a current tobacco user. Discussed cessation/avoidance of tobacco products.    Smoking increases the risk of heart disease, lung disease, vascular disease, stroke, and cancer. If you smoke, STOP!    For more information:  Quit Now [place]  1-800-QUIT-NOW  https://[email or web address]      I did discuss a low-salt diet and exercise to improve BP in addition to taking presribed medications.    Patient's BMI is above goal.  I discussed healthy, low-fat diet and exercise to improve overall health and wellbeing.             Ottoniel Jenkins MD  02/01/21  14:35 EST

## 2021-02-08 RX ORDER — TRAZODONE HYDROCHLORIDE 100 MG/1
TABLET ORAL
Qty: 30 TABLET | Refills: 1 | Status: SHIPPED | OUTPATIENT
Start: 2021-02-08 | End: 2021-03-09

## 2021-02-17 DIAGNOSIS — M50.30 DDD (DEGENERATIVE DISC DISEASE), CERVICAL: ICD-10-CM

## 2021-02-17 RX ORDER — EPINEPHRINE 0.3 MG/.3ML
0.3 INJECTION SUBCUTANEOUS ONCE
Qty: 1 EACH | Refills: 1 | Status: SHIPPED | OUTPATIENT
Start: 2021-02-17 | End: 2021-02-17

## 2021-02-17 RX ORDER — TRAMADOL HYDROCHLORIDE 50 MG/1
100 TABLET ORAL EVERY 8 HOURS PRN
Qty: 180 TABLET | Refills: 0 | Status: SHIPPED | OUTPATIENT
Start: 2021-02-17 | End: 2021-03-10 | Stop reason: SDUPTHER

## 2021-02-17 RX ORDER — DICLOFENAC SODIUM 75 MG/1
TABLET, DELAYED RELEASE ORAL
Qty: 90 TABLET | Refills: 1 | Status: SHIPPED | OUTPATIENT
Start: 2021-02-17 | End: 2021-08-06

## 2021-03-09 RX ORDER — TRAZODONE HYDROCHLORIDE 100 MG/1
TABLET ORAL
Qty: 30 TABLET | Refills: 1 | Status: SHIPPED | OUTPATIENT
Start: 2021-03-09 | End: 2021-03-10 | Stop reason: SDUPTHER

## 2021-03-10 ENCOUNTER — OFFICE VISIT (OUTPATIENT)
Dept: FAMILY MEDICINE CLINIC | Facility: CLINIC | Age: 61
End: 2021-03-10

## 2021-03-10 VITALS
HEART RATE: 101 BPM | TEMPERATURE: 98.6 F | OXYGEN SATURATION: 90 % | SYSTOLIC BLOOD PRESSURE: 119 MMHG | BODY MASS INDEX: 30.42 KG/M2 | DIASTOLIC BLOOD PRESSURE: 81 MMHG | WEIGHT: 161 LBS

## 2021-03-10 DIAGNOSIS — M50.30 DDD (DEGENERATIVE DISC DISEASE), CERVICAL: ICD-10-CM

## 2021-03-10 DIAGNOSIS — M48.061 SPINAL STENOSIS OF LUMBAR REGION WITHOUT NEUROGENIC CLAUDICATION: ICD-10-CM

## 2021-03-10 DIAGNOSIS — F32.A DEPRESSION, UNSPECIFIED DEPRESSION TYPE: ICD-10-CM

## 2021-03-10 DIAGNOSIS — F41.1 GENERALIZED ANXIETY DISORDER: Primary | ICD-10-CM

## 2021-03-10 PROCEDURE — 99214 OFFICE O/P EST MOD 30 MIN: CPT | Performed by: FAMILY MEDICINE

## 2021-03-10 RX ORDER — ALPRAZOLAM 1 MG/1
1 TABLET ORAL 3 TIMES DAILY PRN
Qty: 90 TABLET | Refills: 1 | Status: SHIPPED | OUTPATIENT
Start: 2021-03-10 | End: 2021-05-17

## 2021-03-10 RX ORDER — ROFLUMILAST 500 UG/1
500 TABLET ORAL DAILY
Qty: 90 TABLET | Refills: 3 | Status: SHIPPED | OUTPATIENT
Start: 2021-03-10 | End: 2021-11-21

## 2021-03-10 RX ORDER — EPINEPHRINE 0.3 MG/.3ML
INJECTION SUBCUTANEOUS
COMMUNITY
Start: 2021-02-17 | End: 2021-12-17 | Stop reason: SDUPTHER

## 2021-03-10 RX ORDER — CYCLOBENZAPRINE HCL 10 MG
10 TABLET ORAL 3 TIMES DAILY PRN
Qty: 30 TABLET | Refills: 1 | Status: SHIPPED | OUTPATIENT
Start: 2021-03-10 | End: 2021-03-23 | Stop reason: SDUPTHER

## 2021-03-10 RX ORDER — DOXYCYCLINE 100 MG/1
100 CAPSULE ORAL 2 TIMES DAILY
Qty: 20 CAPSULE | Refills: 0 | Status: SHIPPED | OUTPATIENT
Start: 2021-03-10 | End: 2021-05-14 | Stop reason: SDUPTHER

## 2021-03-10 RX ORDER — ALBUTEROL SULFATE 90 UG/1
2 AEROSOL, METERED RESPIRATORY (INHALATION) EVERY 4 HOURS PRN
Qty: 18 G | Refills: 3 | Status: SHIPPED | OUTPATIENT
Start: 2021-03-10 | End: 2021-06-25

## 2021-03-10 RX ORDER — TRAMADOL HYDROCHLORIDE 50 MG/1
100 TABLET ORAL EVERY 8 HOURS PRN
Qty: 180 TABLET | Refills: 0 | Status: SHIPPED | OUTPATIENT
Start: 2021-03-10 | End: 2021-04-07

## 2021-03-10 RX ORDER — BREXPIPRAZOLE 0.5 MG/1
1 TABLET ORAL DAILY
Qty: 90 TABLET | Refills: 1 | Status: SHIPPED | OUTPATIENT
Start: 2021-03-10 | End: 2021-09-11

## 2021-03-10 RX ORDER — TRAZODONE HYDROCHLORIDE 100 MG/1
100 TABLET ORAL
Qty: 90 TABLET | Refills: 3 | Status: SHIPPED | OUTPATIENT
Start: 2021-03-10 | End: 2021-04-02

## 2021-03-26 RX ORDER — CYCLOBENZAPRINE HCL 10 MG
10 TABLET ORAL 3 TIMES DAILY PRN
Qty: 30 TABLET | Refills: 1 | Status: SHIPPED | OUTPATIENT
Start: 2021-03-26 | End: 2021-05-25

## 2021-03-29 RX ORDER — POTASSIUM CHLORIDE 750 MG/1
TABLET, EXTENDED RELEASE ORAL
Qty: 270 TABLET | Refills: 1 | Status: SHIPPED | OUTPATIENT
Start: 2021-03-29 | End: 2021-11-01

## 2021-03-29 NOTE — PROGRESS NOTES
Chief Complaint  Depression (discuss med)    Subjective          Diya Arreola presents to [facility]  Depression  Visit Type: follow-up  Patient presents with the following symptoms: anhedonia, depressed mood, excessive worry, fatigue, nervousness/anxiety and weight gain.  Patient is not experiencing: chest pain, confusion, feelings of worthlessness, restlessness, shortness of breath, suicidal planning and thoughts of death.  Frequency of symptoms: most days   Severity: moderate   Sleep quality: fair      Back Pain  This is a chronic problem. The current episode started more than 1 year ago. The problem occurs constantly. The problem is unchanged. The pain is present in the lumbar spine and thoracic spine. The quality of the pain is described as aching and burning. The pain is severe. The symptoms are aggravated by stress, twisting and position. Associated symptoms include headaches. Pertinent negatives include no chest pain, fever, numbness or tingling. Risk factors include menopause, sedentary lifestyle and lack of exercise.       Objective   Vital Signs:   /81 (BP Location: Left arm, Patient Position: Sitting, Cuff Size: Adult)   Pulse 101   Temp 98.6 °F (37 °C)   Wt 73 kg (161 lb)   SpO2 90%   BMI 30.42 kg/m²     Physical Exam  Constitutional:       General: She is not in acute distress.     Appearance: She is well-developed.   HENT:      Head: Normocephalic.   Eyes:      General: Lids are normal.      Conjunctiva/sclera: Conjunctivae normal.   Neck:      Thyroid: No thyroid mass or thyromegaly.      Trachea: Trachea normal.   Cardiovascular:      Rate and Rhythm: Normal rate and regular rhythm.      Heart sounds: Normal heart sounds.   Pulmonary:      Effort: Pulmonary effort is normal.      Breath sounds: Normal breath sounds.   Abdominal:      Palpations: Abdomen is soft.   Musculoskeletal:      Cervical back: Tenderness present. Decreased range of motion.       Lumbar back: Tenderness present. Decreased range of motion.   Lymphadenopathy:      Cervical: No cervical adenopathy.   Skin:     General: Skin is warm and dry.   Neurological:      Mental Status: She is alert and oriented to person, place, and time.   Psychiatric:         Attention and Perception: She is attentive.         Mood and Affect: Mood is depressed.         Speech: Speech normal.         Behavior: Behavior normal.        Result Review :   The following data was reviewed by: Mitzy Rea MD on 03/10/2021:                            Assessment and Plan    Diagnoses and all orders for this visit:    1. Generalized anxiety disorder (Primary)  -     ALPRAZolam (XANAX) 1 MG tablet; Take 1 tablet by mouth 3 (Three) Times a Day As Needed for Anxiety.  Dispense: 90 tablet; Refill: 1    2. DDD (degenerative disc disease), cervical  Comments:  Refer to a new neurosurgeon since her prior one has moved away.  Orders:  -     traMADol (ULTRAM) 50 MG tablet; Take 2 tablets by mouth Every 8 (Eight) Hours As Needed for Moderate Pain .  Dispense: 180 tablet; Refill: 0  -     Ambulatory Referral to Physical Therapy    3. Depression, unspecified depression type    4. Spinal stenosis of lumbar region without neurogenic claudication  -     Ambulatory Referral to Physical Therapy    Other orders  -     traZODone (DESYREL) 100 MG tablet; Take 1 tablet by mouth every night at bedtime.  Dispense: 90 tablet; Refill: 3  -     roflumilast (Daliresp) 500 MCG tablet tablet; Take 1 tablet by mouth Daily.  Dispense: 90 tablet; Refill: 3  -     doxycycline (MONODOX) 100 MG capsule; Take 1 capsule by mouth 2 (Two) Times a Day.  Dispense: 20 capsule; Refill: 0  -     Discontinue: cyclobenzaprine (FLEXERIL) 10 MG tablet; Take 1 tablet by mouth 3 (Three) Times a Day As Needed for Muscle Spasms.  Dispense: 30 tablet; Refill: 1  -     albuterol sulfate HFA (ProAir HFA) 108 (90 Base) MCG/ACT inhaler; Inhale 2 puffs Every 4 (Four) Hours As  Needed for Wheezing.  Dispense: 18 g; Refill: 3  -     Brexpiprazole (Rexulti) 0.5 MG tablet; Take 0.5 mg by mouth Daily.  Dispense: 90 tablet; Refill: 1        Follow Up   No follow-ups on file.  Patient was given instructions and counseling regarding her condition or for health maintenance advice. Please see specific information pulled into the AVS if appropriate.

## 2021-04-02 RX ORDER — TRAZODONE HYDROCHLORIDE 100 MG/1
TABLET ORAL
Qty: 30 TABLET | Refills: 1 | Status: SHIPPED | OUTPATIENT
Start: 2021-04-02 | End: 2021-08-30

## 2021-04-06 DIAGNOSIS — M50.30 DDD (DEGENERATIVE DISC DISEASE), CERVICAL: ICD-10-CM

## 2021-04-07 RX ORDER — TRAMADOL HYDROCHLORIDE 50 MG/1
100 TABLET ORAL EVERY 8 HOURS PRN
Qty: 180 TABLET | Refills: 0 | Status: SHIPPED | OUTPATIENT
Start: 2021-04-07 | End: 2021-05-07

## 2021-05-05 DIAGNOSIS — M50.30 DDD (DEGENERATIVE DISC DISEASE), CERVICAL: ICD-10-CM

## 2021-05-07 RX ORDER — TRAMADOL HYDROCHLORIDE 50 MG/1
100 TABLET ORAL EVERY 8 HOURS PRN
Qty: 180 TABLET | Refills: 0 | Status: SHIPPED | OUTPATIENT
Start: 2021-05-07 | End: 2021-06-08

## 2021-05-13 ENCOUNTER — TELEPHONE (OUTPATIENT)
Dept: FAMILY MEDICINE CLINIC | Facility: CLINIC | Age: 61
End: 2021-05-13

## 2021-05-13 NOTE — TELEPHONE ENCOUNTER
Caller: Diya Arreola    Relationship: Self    Best call back number: [phone]    What medication are you requesting: ANTIBIOTICS  What are your current symptoms: COUGH AND GREEN PHLEGM. MUCINEX NOT HELPING  How long have you been experiencing symptoms: 2 WEEKS   Have you had these symptoms before:    [x] Yes  [] No    Have you been treated for these symptoms before:   [x] Yes  [] No    If a prescription is needed, what is your preferred pharmacy and phone number: [facility]/PHARMACY #[id number] - [address] AT [place] [address] - [phone]  - [phone]

## 2021-05-14 RX ORDER — DOXYCYCLINE 100 MG/1
100 CAPSULE ORAL 2 TIMES DAILY
Qty: 20 CAPSULE | Refills: 0 | Status: SHIPPED | OUTPATIENT
Start: 2021-05-14 | End: 2021-05-25

## 2021-05-17 DIAGNOSIS — F41.1 GENERALIZED ANXIETY DISORDER: ICD-10-CM

## 2021-05-17 RX ORDER — ALPRAZOLAM 1 MG/1
1 TABLET ORAL 3 TIMES DAILY PRN
Qty: 90 TABLET | Refills: 1 | Status: SHIPPED | OUTPATIENT
Start: 2021-05-17 | End: 2021-06-16

## 2021-05-24 ENCOUNTER — TELEPHONE (OUTPATIENT)
Dept: PAIN MEDICINE | Facility: CLINIC | Age: 61
End: 2021-05-24

## 2021-05-24 NOTE — TELEPHONE ENCOUNTER
Caller: ABDOULAYE BUTLER     Relationship: SELF     Best call back number: [phone] ASHLEY BUTLER /CAREGIVER     What is the best time to reach you: ANYTIME     Who are you requesting to speak with (clinical staff, provider,  specific staff member):      What was the call regarding: NEW PROBLEM- WITH THE PATIENT HIP AND GLUTE AREA/ PAIN NOT BEING ABLE TO BEAR WEIGHT FOR LONG PERIODS OF TIME CANNOT  MORE THAN 10 POUNDS, PAIN IN VETEBRA AND LOWER BACK WHERE INFUSION WAS DONE WITH  A WHILE AGO , PT STATES THAT SHE HAS HAD TWO NEW FALLS SINCE THE LAST TIME SHE SAW  IN 2020  AND IS CURRENTLY TRYING COME OFF OF XANAX- OFFICE PLEASE ADVISE     Do you require a callback: YES

## 2021-05-24 NOTE — TELEPHONE ENCOUNTER
5/24/21-- Chastity--please schedule office  visit---per Dr GUADALUPE note-- if that is what pt is requesting

## 2021-05-24 NOTE — TELEPHONE ENCOUNTER
I am not sure of what she is asking. Does she want to be seen? If so, Please schedule for IN OFFICE evaluation

## 2021-05-25 ENCOUNTER — OFFICE VISIT (OUTPATIENT)
Dept: FAMILY MEDICINE CLINIC | Facility: CLINIC | Age: 61
End: 2021-05-25

## 2021-05-25 VITALS
TEMPERATURE: 97.5 F | OXYGEN SATURATION: 96 % | BODY MASS INDEX: 27.78 KG/M2 | SYSTOLIC BLOOD PRESSURE: 120 MMHG | HEART RATE: 100 BPM | WEIGHT: 147 LBS | DIASTOLIC BLOOD PRESSURE: 76 MMHG

## 2021-05-25 DIAGNOSIS — F41.1 GENERALIZED ANXIETY DISORDER: ICD-10-CM

## 2021-05-25 DIAGNOSIS — M48.061 SPINAL STENOSIS OF LUMBAR REGION WITHOUT NEUROGENIC CLAUDICATION: ICD-10-CM

## 2021-05-25 DIAGNOSIS — N30.00 ACUTE CYSTITIS WITHOUT HEMATURIA: Primary | ICD-10-CM

## 2021-05-25 LAB
BILIRUB BLD-MCNC: NEGATIVE MG/DL
CLARITY, POC: CLEAR
COLOR UR: YELLOW
GLUCOSE UR STRIP-MCNC: NEGATIVE MG/DL
KETONES UR QL: NEGATIVE
LEUKOCYTE EST, POC: NEGATIVE
NITRITE UR-MCNC: NEGATIVE MG/ML
PH UR: 7 [PH] (ref 5–8)
PROT UR STRIP-MCNC: NEGATIVE MG/DL
RBC # UR STRIP: NEGATIVE /UL
SP GR UR: 1.02 (ref 1–1.03)
UROBILINOGEN UR QL: NORMAL

## 2021-05-25 PROCEDURE — 99214 OFFICE O/P EST MOD 30 MIN: CPT | Performed by: FAMILY MEDICINE

## 2021-05-25 PROCEDURE — 81003 URINALYSIS AUTO W/O SCOPE: CPT | Performed by: FAMILY MEDICINE

## 2021-05-25 RX ORDER — HYDROXYCHLOROQUINE SULFATE 200 MG/1
200 TABLET, FILM COATED ORAL 2 TIMES DAILY
COMMUNITY
Start: 2021-04-26 | End: 2022-01-26 | Stop reason: SDUPTHER

## 2021-05-25 RX ORDER — NITROFURANTOIN 25; 75 MG/1; MG/1
100 CAPSULE ORAL 2 TIMES DAILY
Qty: 14 CAPSULE | Refills: 0 | Status: SHIPPED | OUTPATIENT
Start: 2021-05-25 | End: 2021-06-16

## 2021-05-25 NOTE — PROGRESS NOTES
Chief Complaint  Difficulty Urinating (burning sensation)    Subjective          Diya Arreola presents to [facility]  Difficulty Urinating  This is a new problem. The current episode started in the past 7 days. The problem occurs constantly. The problem has been unchanged. Associated symptoms include urinary symptoms. Pertinent negatives include no chills, congestion, coughing or fever. Nothing aggravates the symptoms. She has tried drinking for the symptoms. The treatment provided mild relief.       Objective   Vital Signs:   /76 (BP Location: Left arm, Patient Position: Sitting, Cuff Size: Adult)   Pulse 100   Temp 97.5 °F (36.4 °C) (Infrared)   Wt 66.7 kg (147 lb)   SpO2 96%   BMI 27.78 kg/m²     Physical Exam  Vitals reviewed.   Constitutional:       General: She is not in acute distress.     Appearance: She is well-developed.   HENT:      Head: Normocephalic.   Eyes:      General: Lids are normal.      Conjunctiva/sclera: Conjunctivae normal.   Neck:      Thyroid: No thyroid mass or thyromegaly.      Trachea: Trachea normal.   Cardiovascular:      Rate and Rhythm: Normal rate and regular rhythm.      Heart sounds: Normal heart sounds.   Pulmonary:      Effort: Pulmonary effort is normal.      Breath sounds: Normal breath sounds.   Abdominal:      Palpations: Abdomen is soft.      Tenderness: There is no right CVA tenderness or left CVA tenderness.   Musculoskeletal:      Cervical back: Normal range of motion.   Lymphadenopathy:      Cervical: No cervical adenopathy.   Skin:     General: Skin is warm and dry.   Neurological:      Mental Status: She is alert and oriented to person, place, and time.   Psychiatric:         Attention and Perception: She is attentive.         Mood and Affect: Mood normal.         Speech: Speech normal.         Behavior: Behavior normal.        Result Review :                 Assessment and Plan    Diagnoses and all orders for this  visit:    1. Acute cystitis without hematuria (Primary)  -     POCT urinalysis dipstick, automated  -     nitrofurantoin, macrocrystal-monohydrate, (Macrobid) 100 MG capsule; Take 1 capsule by mouth 2 (Two) Times a Day.  Dispense: 14 capsule; Refill: 0    2. Generalized anxiety disorder  Assessment & Plan:  She is planning to taper from 1mg Xanax to 0.5 mg Xanax with the next refill      3. Spinal stenosis of lumbar region without neurogenic claudication  Assessment & Plan:  She is scheduled to go to Pain Management but they want her to wean off of the Xanax.  Tramadol is no longer helping.         Follow Up   No follow-ups on file.  Patient was given instructions and counseling regarding her condition or for health maintenance advice. Please see specific information pulled into the AVS if appropriate.

## 2021-05-25 NOTE — ASSESSMENT & PLAN NOTE
She is scheduled to go to Pain Management but they want her to wean off of the Xanax.  Tramadol is no longer helping.

## 2021-06-04 ENCOUNTER — TELEPHONE (OUTPATIENT)
Dept: FAMILY MEDICINE CLINIC | Facility: CLINIC | Age: 61
End: 2021-06-04

## 2021-06-08 DIAGNOSIS — M50.30 DDD (DEGENERATIVE DISC DISEASE), CERVICAL: ICD-10-CM

## 2021-06-08 RX ORDER — TRAMADOL HYDROCHLORIDE 50 MG/1
100 TABLET ORAL EVERY 8 HOURS PRN
Qty: 180 TABLET | Refills: 0 | Status: SHIPPED | OUTPATIENT
Start: 2021-06-08 | End: 2021-08-03

## 2021-06-16 ENCOUNTER — TELEPHONE (OUTPATIENT)
Dept: PAIN MEDICINE | Facility: CLINIC | Age: 61
End: 2021-06-16

## 2021-06-16 ENCOUNTER — OFFICE VISIT (OUTPATIENT)
Dept: FAMILY MEDICINE CLINIC | Facility: CLINIC | Age: 61
End: 2021-06-16

## 2021-06-16 VITALS
HEART RATE: 91 BPM | WEIGHT: 157 LBS | SYSTOLIC BLOOD PRESSURE: 143 MMHG | OXYGEN SATURATION: 95 % | TEMPERATURE: 97.3 F | DIASTOLIC BLOOD PRESSURE: 83 MMHG | BODY MASS INDEX: 29.66 KG/M2

## 2021-06-16 DIAGNOSIS — F41.1 GENERALIZED ANXIETY DISORDER: Primary | ICD-10-CM

## 2021-06-16 DIAGNOSIS — M79.7 FIBROMYALGIA: ICD-10-CM

## 2021-06-16 PROCEDURE — 99213 OFFICE O/P EST LOW 20 MIN: CPT | Performed by: FAMILY MEDICINE

## 2021-06-16 NOTE — PROGRESS NOTES
Chief Complaint  Follow-up    Subjective          Diya Arreola presents to [facility]  She is trying to wean off of Xanax.  She plans to see pain management and has been told that they would not prescribe her any narcotics unless she weans off of benzos.  She has been cutting her pills in half and trying to take less and is still feeling ok except when her daughter irritates her.     Anxiety  Presents for follow-up visit. Symptoms include nervous/anxious behavior. Patient reports no chest pain, compulsions, decreased concentration, dizziness, palpitations, panic or suicidal ideas. Symptoms occur most days. The severity of symptoms is moderate. The quality of sleep is fair.       Fibromyalgia  This is a chronic problem. The current episode started more than 1 year ago. The problem occurs constantly. The problem has been unchanged. Associated symptoms include fatigue, headaches, myalgias and neck pain. Pertinent negatives include no chest pain, chills, congestion, fever, numbness, rash, swollen glands, vertigo, visual change, vomiting or weakness. The symptoms are aggravated by exertion.       Objective   Vital Signs:   /83 (BP Location: Left arm, Patient Position: Sitting, Cuff Size: Adult)   Pulse 91   Temp 97.3 °F (36.3 °C) (Infrared)   Wt 71.2 kg (157 lb)   SpO2 95%   BMI 29.66 kg/m²     Physical Exam  Vitals and nursing note reviewed.   Constitutional:       General: She is not in acute distress.     Appearance: She is well-developed.   HENT:      Head: Normocephalic.   Eyes:      General: Lids are normal.      Conjunctiva/sclera: Conjunctivae normal.   Neck:      Thyroid: No thyroid mass or thyromegaly.      Trachea: Trachea normal.   Cardiovascular:      Rate and Rhythm: Normal rate and regular rhythm.      Heart sounds: Normal heart sounds.   Pulmonary:      Effort: Pulmonary effort is normal.      Breath sounds: Normal breath sounds.   Musculoskeletal:       Cervical back: Normal range of motion. Tenderness present.   Lymphadenopathy:      Cervical: No cervical adenopathy.   Skin:     General: Skin is warm and dry.   Neurological:      Mental Status: She is alert and oriented to person, place, and time. Mental status is at baseline.      Motor: Tremor present.   Psychiatric:         Attention and Perception: She is attentive.         Mood and Affect: Mood normal.         Speech: Speech normal.         Behavior: Behavior normal.        Result Review :   The following data was reviewed by: Mitzy Rea MD on 06/16/2021:                            Assessment and Plan    Diagnoses and all orders for this visit:    1. Generalized anxiety disorder (Primary)  Assessment & Plan:  Weaning down on the dose of Xanax gradually.    Orders:  -     ALPRAZolam (Xanax) 0.5 MG tablet; Take 1 tablet by mouth 3 (Three) Times a Day As Needed for Anxiety.  Dispense: 90 tablet; Refill: 0    2. Fibromyalgia  -     tiZANidine (ZANAFLEX) 2 MG tablet; Take 1 tablet by mouth 2 (Two) Times a Day As Needed for Muscle Spasms.  Dispense: 180 tablet; Refill: 0        Follow Up   No follow-ups on file.  Patient was given instructions and counseling regarding her condition or for health maintenance advice. Please see specific information pulled into the AVS if appropriate.

## 2021-06-16 NOTE — TELEPHONE ENCOUNTER
Do you know anything about this? Im not sure who called her or what to tell her when we return her call.

## 2021-06-16 NOTE — TELEPHONE ENCOUNTER
Provider: DR MURRAY   Caller: MRS CARRIE   Relationship to Patient: PATIENT   Phone Number: [phone]  Reason for Call: PATIENT CALLED IN THIS MORNING STATING THAT SHE HAD RECEIVED A CALL YESTERDAY FROM OUR OFFICE DIDN'T SEE ANY NOTE STATING WHO CALLED OR WHAT IT WAS REGARDING BUT PATIENT SAID THAT DR MURRAY IS WANTING TO DO A TELEPHONE CONSULTATION WITH HER BEFORE HER VISIT ON 6/21/21 AND PATIENT ALSO STATES THAT DR BERRY(PCP)  IS ALSO WANTING TO SPEAK WITH DR MURRAY AS WELL. DR MURRAY CAN REACH HER @  [phone] ADVISED PATIENT THAT OFFICE IS NOT OPEN YET BUT WOULD SEND A MESSAGE OVER TO HAVE SOMEONE GIVE HER A CALL BACK TO DISCUSS WHAT THEY ARE NEEDING.   When was the patient last seen: 11/20/20

## 2021-06-17 ENCOUNTER — TELEPHONE (OUTPATIENT)
Dept: FAMILY MEDICINE CLINIC | Facility: CLINIC | Age: 61
End: 2021-06-17

## 2021-06-17 RX ORDER — TIZANIDINE 2 MG/1
2 TABLET ORAL 2 TIMES DAILY PRN
Qty: 180 TABLET | Refills: 0 | Status: SHIPPED | OUTPATIENT
Start: 2021-06-17 | End: 2021-09-08

## 2021-06-17 RX ORDER — ALPRAZOLAM 0.5 MG/1
0.5 TABLET ORAL 3 TIMES DAILY PRN
Qty: 90 TABLET | Refills: 0 | Status: SHIPPED | OUTPATIENT
Start: 2021-06-17 | End: 2021-08-03

## 2021-06-17 NOTE — TELEPHONE ENCOUNTER
UNABLE TO WARM TRANSFER    Caller: Diya Arreola    Relationship to patient: Self    Best call back number: [phone]    Patient is needing:     PATIENT IS OUT OF XANAX AND WAS SUPPOSE TO GET A LOWER DOSE SENT TO PHARMACY YESTERDAY AFTER HER APPOINTMENT WITH DR. BERRY. SHE WAS SUPPOSE TO GET MORE OF HER TIZANIDINE ALSO. PLEASE CALL.    CONFIRMED PHARMACY:  [facility]/pharmacy #[id number] - [address] AT [address] [phone] [place] [phone] [phone]

## 2021-06-21 ENCOUNTER — OFFICE VISIT (OUTPATIENT)
Dept: PAIN MEDICINE | Facility: CLINIC | Age: 61
End: 2021-06-21

## 2021-06-21 ENCOUNTER — PRIOR AUTHORIZATION (OUTPATIENT)
Dept: PAIN MEDICINE | Facility: CLINIC | Age: 61
End: 2021-06-21

## 2021-06-21 VITALS
HEIGHT: 61 IN | BODY MASS INDEX: 29.45 KG/M2 | RESPIRATION RATE: 16 BRPM | HEART RATE: 104 BPM | OXYGEN SATURATION: 95 % | WEIGHT: 156 LBS | DIASTOLIC BLOOD PRESSURE: 83 MMHG | SYSTOLIC BLOOD PRESSURE: 118 MMHG

## 2021-06-21 DIAGNOSIS — G89.4 CHRONIC PAIN SYNDROME: Primary | ICD-10-CM

## 2021-06-21 DIAGNOSIS — M54.16 LUMBAR RADICULITIS: ICD-10-CM

## 2021-06-21 DIAGNOSIS — M96.1 POSTLAMINECTOMY SYNDROME OF CERVICAL REGION: ICD-10-CM

## 2021-06-21 DIAGNOSIS — M96.1 POSTLAMINECTOMY SYNDROME OF LUMBAR REGION: ICD-10-CM

## 2021-06-21 PROCEDURE — 99214 OFFICE O/P EST MOD 30 MIN: CPT | Performed by: ANESTHESIOLOGY

## 2021-06-21 NOTE — PROGRESS NOTES
Subjective    CC back, leg pain, neck pain  Diya Arreola is a 61 y.o. female with chronic neck pain status post ACDF, chronic back pain status post Fusion L3-5, here for follow-up.    Last seen several months ago.  Since then has been evaluated by neurosurgery/Dr. Jenkins.  No intervention recommended at this time she was referred to PT and to continue conservative management.  Present today complaining of worsening pain, no relief with tramadol she could not see take prescriber PCP.  She is weaning off benzos to start chronic opioid therapy.    Chronic back pain upper lumbar and lower lumbar radiating to bilateral hips bilateral lower extremity with burning tingling numbness and feeling of weakness in the legs worse with standing walking twisting or any activity.  Denies new injury, saddle anesthesia bladder bowel continence.  Chronic neck pain, radiating to bilateral shoulder limited range of motion in the neck and significant paraspinal muscle spasm bilateral UE radicular pain.  Denies balance issues.    Chronic tremors, history of polyarthralgia/lupus sees rheumatology.  Pain interfering with ADL.  Tried physical therapy without relief.  Seen neurosurgery, referred to try injection.    Utilizes tramadol with mild relief prescriber PCP.  Also on benzos.     C-spine MRI 2020 probably a plate on the anterior aspect of the spine at the C4-5 level. mild subluxation with degenerative disc disease at C3-4, causing a mild central canal stenosis. Left-sided disc extrusion at C6-7 with left foraminal impingement.   L-spine MRI 2019 postsurgical changes L3-L4 with pedicle screw L3.  Retrolisthesis L2 on L3 moderate central lateral canal stenosis.  Grade 1 anterolisthesis L4-L5.  Degenerative changes throughout.  L-spine x-ray 2020: 4 mm anterolisthesis on extension, 6 mm anterolisthesis on flexion, at the L4-5 level. Stable spinal fusion changes at L3-4.. Severe degenerative disc and endplate changes at L2-3, similar  to  2018 examination.    Pain Assessment   Location of Pain: Lower Back, R Hip, L Hip, L Leg, neck pain, joint  Description of Pain: Dull/Aching, Throbbing, Stabbing  Previous Pain Rating :9  Current Pain Ratin  Aggravating Factors: Activity  Alleviating Factors: Rest, Medication    PEG Assessment   What number best describes your pain on average in the past week?9  What number best describes how, during the past week, pain has interfered with your enjoyment of life?9  What number best describes how, during the past week, pain has interfered with your general activity?10      The following portions of the patient's history were reviewed and updated as appropriate: allergies, current medications, past family history, past medical history, past social history, past surgical history and problem list.     has a past medical history of Allergic, Anxiety, Arthritis, Asthma, COPD (chronic obstructive pulmonary disease) (CMS/HCC), Fall, Fibromyalgia, primary, Gastritis, Low back pain, Lupus (CMS/[facility]), Neuropathy, Osteopenia, Pancreatitis, Sjogren's syndrome (CMS/[facility]), Tachycardia, and TIA (transient ischemic attack) (2014).   has a past surgical history that includes Cholecystectomy; Hysterectomy;  section; Cystocele repair; Carpal tunnel release; Cardiac catheterization; Spine surgery; Colonoscopy (10/08/2013); and Epidural block injection.  family history includes Diabetes in her father; Heart disease in her brother; Hypertension in her brother and sister; Osteoporosis in her maternal grandmother; Stroke in her sister.  Social History     Tobacco Use   • Smoking status: Current Every Day Smoker     Packs/day: 0.50     Years: 39.00     Pack years: 19.50     Types: Cigarettes   • Smokeless tobacco: Never Used   • Tobacco comment: 3/4 ppd   Substance Use Topics   • Alcohol use: No     Comment: caffeine 2c qd     Review of Systems   Musculoskeletal: Positive for arthralgias, back pain and neck pain.  "  Neurological: Positive for tremors, numbness and headache.   All other systems reviewed and are negative.    Objective   Physical Exam   Constitutional: No distress.   Neck: Kernig's sign noted.   Pulmonary/Chest: Effort normal.   Musculoskeletal:      Cervical back: She exhibits decreased range of motion and tenderness.      Lumbar back: She exhibits decreased range of motion and tenderness.   Psychiatric: Her behavior is normal.     /83 (BP Location: Left arm, Patient Position: Sitting)   Pulse 104   Resp 16   Ht 154.9 cm (61\")   Wt 70.8 kg (156 lb)   SpO2 95%   BMI 29.48 kg/m²      PHQ 9 on chart  Opioid risk tool low risk    Assessment/Plan   Diagnoses and all orders for this visit:    1. Chronic pain syndrome (Primary)    2. Postlaminectomy syndrome of lumbar region    3. Postlaminectomy syndrome of cervical region    4. Lumbar radiculitis  -     Caudal or Epidural, Single Injection    Summary  Diya Arreola is a 61 y.o. female with chronic neck pain status post ACDF, chronic back pain status post Fusion L3-5, here for follow-up.    Chronic back pain from DDD spondylosis postlaminectomy syndrome with radicular pain.   Chronic neck pain from postlaminectomy syndrome.  Worsening chronic tremors.  Impairing ADL.  Marginal relief with medication and PT.  Had caudal ALEXIS, transforaminal ALEXIS above the fusion at L3 L4 mild relief.      Last seen several months ago.  Since then has been evaluated by neurosurgery/Dr. Jenkins.  No intervention recommended at this time she was referred to PT and to continue conservative management.  Present today complaining of worsening pain, no relief with tramadol she could not see take prescriber PCP.  She is weaning off benzos to start chronic opioid therapy.    Schedule for repeat caudal ALEXIS.  Risk and benefit discussed.    Follow-up in 4 to 5 weeks once she is done with benzos for UDS.  If cleared we will start hydrocodone.  Discussed risk of tolerance, dependence, " respiratory depression, coma and death associated with use of oral opioids for treatment of chronic nonmalignant pain.     RTC for procedure

## 2021-06-25 RX ORDER — ALBUTEROL SULFATE 90 UG/1
AEROSOL, METERED RESPIRATORY (INHALATION)
Qty: 18 G | Refills: 3 | Status: SHIPPED | OUTPATIENT
Start: 2021-06-25 | End: 2022-07-20 | Stop reason: SDUPTHER

## 2021-06-28 ENCOUNTER — TELEPHONE (OUTPATIENT)
Dept: FAMILY MEDICINE CLINIC | Facility: CLINIC | Age: 61
End: 2021-06-28

## 2021-06-28 ENCOUNTER — TELEPHONE (OUTPATIENT)
Dept: PAIN MEDICINE | Facility: CLINIC | Age: 61
End: 2021-06-28

## 2021-06-28 ENCOUNTER — APPOINTMENT (OUTPATIENT)
Dept: PAIN MEDICINE | Facility: HOSPITAL | Age: 61
End: 2021-06-28

## 2021-06-28 RX ORDER — DOXYCYCLINE 100 MG/1
100 CAPSULE ORAL 2 TIMES DAILY
Qty: 20 CAPSULE | Refills: 0 | Status: SHIPPED | OUTPATIENT
Start: 2021-06-28 | End: 2021-09-17

## 2021-06-28 NOTE — TELEPHONE ENCOUNTER
Caller: ABDOULAYE BUTLER    Relationship to patient: SELF     Best call back number: [phone]    Chief complaint: PATIENT NEEDS TO CANCEL EPIDURAL SCHEDULED FOR 9:30 AM ON 06/28/2021    Type of visit: CAUDAL EPIDURAL    Requested date: ???     If rescheduling, when is the original appointment:     Additional notes: PATIENT NOT FEELING WELL- HAS HAD A FEVER FOR 2 DAYS

## 2021-06-28 NOTE — TELEPHONE ENCOUNTER
Caller: Diya Arreola    Relationship: Self    Best call back number: [phone]    What medication are you requesting: DR. CRUM     What are your current symptoms: FEVER, SORE THROAT, EAR PAIN     How long have you been experiencing symptoms: 3-4 DAYS     Have you had these symptoms before:    [x] Yes  [] No    Have you been treated for these symptoms before:   [x] Yes  [] No    If a prescription is needed, what is your preferred pharmacy and phone number: [id number] - [place] [address] - [phone]  - [phone]

## 2021-08-03 ENCOUNTER — OFFICE VISIT (OUTPATIENT)
Dept: PAIN MEDICINE | Facility: CLINIC | Age: 61
End: 2021-08-03

## 2021-08-03 VITALS
SYSTOLIC BLOOD PRESSURE: 155 MMHG | RESPIRATION RATE: 16 BRPM | HEIGHT: 61 IN | HEART RATE: 109 BPM | OXYGEN SATURATION: 97 % | DIASTOLIC BLOOD PRESSURE: 84 MMHG | WEIGHT: 156 LBS | BODY MASS INDEX: 29.45 KG/M2

## 2021-08-03 DIAGNOSIS — M96.1 POSTLAMINECTOMY SYNDROME OF LUMBAR REGION: ICD-10-CM

## 2021-08-03 DIAGNOSIS — Z79.899 HIGH RISK MEDICATION USE: Primary | ICD-10-CM

## 2021-08-03 DIAGNOSIS — G89.4 CHRONIC PAIN SYNDROME: Primary | ICD-10-CM

## 2021-08-03 DIAGNOSIS — M54.16 LUMBAR RADICULITIS: ICD-10-CM

## 2021-08-03 DIAGNOSIS — Z79.899 HIGH RISK MEDICATION USE: ICD-10-CM

## 2021-08-03 DIAGNOSIS — M96.1 POSTLAMINECTOMY SYNDROME OF CERVICAL REGION: ICD-10-CM

## 2021-08-03 PROCEDURE — 99214 OFFICE O/P EST MOD 30 MIN: CPT | Performed by: ANESTHESIOLOGY

## 2021-08-03 RX ORDER — HYDROCODONE BITARTRATE AND ACETAMINOPHEN 7.5; 325 MG/1; MG/1
1 TABLET ORAL 3 TIMES DAILY PRN
Qty: 21 TABLET | Refills: 0 | Status: SHIPPED | OUTPATIENT
Start: 2021-08-03 | End: 2021-09-02 | Stop reason: SDUPTHER

## 2021-08-03 RX ORDER — HYDROCODONE BITARTRATE AND ACETAMINOPHEN 7.5; 325 MG/1; MG/1
1 TABLET ORAL 3 TIMES DAILY PRN
Qty: 90 TABLET | Refills: 0 | Status: SHIPPED | OUTPATIENT
Start: 2021-08-10 | End: 2021-09-02 | Stop reason: SDUPTHER

## 2021-08-03 NOTE — PROGRESS NOTES
Subjective    CC back, leg pain, neck pain  Diya Arreola is a 61 y.o. female with chronic neck pain status post ACDF, chronic back pain status post Fusion L3-5, here for follow-up.    Has weaned off alprazolam.  Continue worsening back pain from postlaminectomy syndrome.    Chronic back pain upper lumbar and lower lumbar radiating to bilateral hips bilateral lower extremity with burning tingling numbness and feeling of weakness in the legs worse with standing walking twisting or any activity.  Denies new injury, saddle anesthesia bladder bowel continence.  Chronic neck pain, radiating to bilateral shoulder limited range of motion in the neck and significant paraspinal muscle spasm bilateral UE radicular pain.  Denies balance issues.    Chronic tremors, history of polyarthralgia/lupus sees rheumatology.  Pain interfering with ADL.  Tried physical therapy without relief.  Seen neurosurgery, referred to try injection.        C-spine MRI  probably a plate on the anterior aspect of the spine at the C4-5 level. mild subluxation with degenerative disc disease at C3-4, causing a mild central canal stenosis. Left-sided disc extrusion at C6-7 with left foraminal impingement.   L-spine MRI 2019 postsurgical changes L3-L4 with pedicle screw L3.  Retrolisthesis L2 on L3 moderate central lateral canal stenosis.  Grade 1 anterolisthesis L4-L5.  Degenerative changes throughout.  L-spine x-ray : 4 mm anterolisthesis on extension, 6 mm anterolisthesis on flexion, at the L4-5 level. Stable spinal fusion changes at L3-4.. Severe degenerative disc and endplate changes at L2-3, similar to  2018 examination.    Pain Assessment   Location of Pain: Lower Back, R Hip, L Hip, L Leg, neck pain, joint  Description of Pain: Dull/Aching, Throbbing, Stabbing  Previous Pain Rating :9  Current Pain Ratin  Aggravating Factors: Activity  Alleviating Factors: Rest, Medication    PEG Assessment   What number best describes your  pain on average in the past week?9  What number best describes how, during the past week, pain has interfered with your enjoyment of life?4  What number best describes how, during the past week, pain has interfered with your general activity?10      The following portions of the patient's history were reviewed and updated as appropriate: allergies, current medications, past family history, past medical history, past social history, past surgical history and problem list.     has a past medical history of Allergic, Anxiety, Arthritis, Asthma, COPD (chronic obstructive pulmonary disease) (CMS/[facility]), Fall, Fibromyalgia, primary, Gastritis, Low back pain, Lupus (CMS/[facility]), Neuropathy, Osteopenia, Pancreatitis, Rheumatoid arthritis (CMS/[facility]), Sjogren's syndrome (CMS/[facility]), Tachycardia, and TIA (transient ischemic attack) (2014).   has a past surgical history that includes Cholecystectomy; Hysterectomy;  section; Cystocele repair; Carpal tunnel release; Cardiac catheterization; Spine surgery; Colonoscopy (10/08/2013); Epidural block injection; and Back surgery.  family history includes Diabetes in her father; Heart disease in her brother; Hypertension in her brother and sister; Osteoporosis in her maternal grandmother; Stroke in her sister.  Social History     Tobacco Use   • Smoking status: Current Every Day Smoker     Packs/day: 0.50     Years: 39.00     Pack years: 19.50     Types: Cigarettes   • Smokeless tobacco: Never Used   • Tobacco comment: 3/4 ppd   Substance Use Topics   • Alcohol use: No     Comment: caffeine 2c qd     Review of Systems   Musculoskeletal: Positive for arthralgias, back pain and neck pain.   Neurological: Positive for tremors, numbness and headache.   All other systems reviewed and are negative.    Objective   Physical Exam   Constitutional: No distress.   Neck: Kernig's sign noted.   Pulmonary/Chest: Effort normal.   Musculoskeletal:      Cervical back: She exhibits decreased range of  "motion and tenderness.      Lumbar back: She exhibits decreased range of motion and tenderness.   Psychiatric: Her behavior is normal.     /84   Pulse 109   Resp 16   Ht 154.9 cm (61\")   Wt 70.8 kg (156 lb)   SpO2 97%   BMI 29.48 kg/m²      PHQ 9 on chart  Opioid risk tool low risk    Assessment/Plan   Diagnoses and all orders for this visit:    1. Chronic pain syndrome (Primary)  -     HYDROcodone-acetaminophen (NORCO) 7.5-325 MG per tablet; Take 1 tablet by mouth 3 (Three) Times a Day As Needed for Severe Pain .  Dispense: 21 tablet; Refill: 0  -     HYDROcodone-acetaminophen (NORCO) 7.5-325 MG per tablet; Take 1 tablet by mouth 3 (Three) Times a Day As Needed for Severe Pain . DNF before 8/10/2021  Dispense: 90 tablet; Refill: 0    2. Postlaminectomy syndrome of lumbar region    3. Postlaminectomy syndrome of cervical region    4. Lumbar radiculitis    5. High risk medication use    Summary  Diya Arreola is a 61 y.o. female with chronic neck pain status post ACDF, chronic back pain status post Fusion L3-5, here for follow-up.    Chronic back pain from DDD spondylosis postlaminectomy syndrome with radicular pain.   Chronic neck pain from postlaminectomy syndrome.  Worsening chronic tremors.  Impairing ADL.  Marginal relief with medication and PT.  Had caudal ALEXIS, transforaminal ALEXIS above the fusion at L3 L4 mild relief.      Has weaned off alprazolam  in order to continue chronic opioid therapy..  Continue worsening back pain from postlaminectomy syndrome.  Cannot afford injection at this time.    Start hydrocodone 7.5/325 3 times daily as needed for severe pain.  UDS sent and inspect reviewed  Discussed risk of tolerance, dependence, respiratory depression, coma and death associated with use of oral opioids for treatment of chronic nonmalignant pain.     RTC 1 month  "

## 2021-08-06 RX ORDER — DICLOFENAC SODIUM 75 MG/1
TABLET, DELAYED RELEASE ORAL
Qty: 90 TABLET | Refills: 1 | Status: SHIPPED | OUTPATIENT
Start: 2021-08-06 | End: 2021-09-17

## 2021-08-17 ENCOUNTER — TELEPHONE (OUTPATIENT)
Dept: NEUROSURGERY | Facility: CLINIC | Age: 61
End: 2021-08-17

## 2021-08-17 NOTE — TELEPHONE ENCOUNTER
Caller: Diya Arreola    Relationship to patient: Self    Best call back number: [phone]  Chief complaint: PREVIOUS DR DOW  Type of visit: FOLLOW UP EXT    Requested date: ASAP    If rescheduling, when is the original appointment: NA    Additional notes:PATIENT WAS LAST SEEN ON 02/01/21 WITH DR. DOW.  PATIENT IS CALLING TODAY TO SEE ABOUT SCHEDULING AN APPOINMENT DUE TO INCREASED PAIN AND TINGLING IN THE BOTTOM OF HER FEET AND LOWER RIGHT LEG.  PATIENT STATES THAT SHE CANNOT STAND LONGER THAT 15 MINUTES AT A TIME., LAST IMAGING XR LUMBAR - 01/06/21, MRI LUMBAR 01/06/21 IN CHART.  DOES PATIENT NEED NEW IMAGING BEFORE SCHEDULING?>  PLEASE ADVISE ON SCHEDULING.      THANK YOU

## 2021-08-30 RX ORDER — TRAZODONE HYDROCHLORIDE 100 MG/1
TABLET ORAL
Qty: 90 TABLET | Refills: 3 | Status: SHIPPED | OUTPATIENT
Start: 2021-08-30 | End: 2021-09-17 | Stop reason: SDUPTHER

## 2021-09-02 ENCOUNTER — OFFICE VISIT (OUTPATIENT)
Dept: PAIN MEDICINE | Facility: CLINIC | Age: 61
End: 2021-09-02

## 2021-09-02 VITALS
HEART RATE: 105 BPM | HEIGHT: 61 IN | WEIGHT: 156 LBS | OXYGEN SATURATION: 96 % | SYSTOLIC BLOOD PRESSURE: 135 MMHG | RESPIRATION RATE: 16 BRPM | DIASTOLIC BLOOD PRESSURE: 87 MMHG | BODY MASS INDEX: 29.45 KG/M2

## 2021-09-02 DIAGNOSIS — Z79.899 HIGH RISK MEDICATION USE: ICD-10-CM

## 2021-09-02 DIAGNOSIS — G89.4 CHRONIC PAIN SYNDROME: Primary | ICD-10-CM

## 2021-09-02 DIAGNOSIS — M96.1 POSTLAMINECTOMY SYNDROME OF LUMBAR REGION: ICD-10-CM

## 2021-09-02 DIAGNOSIS — M96.1 POSTLAMINECTOMY SYNDROME OF CERVICAL REGION: ICD-10-CM

## 2021-09-02 PROCEDURE — 99214 OFFICE O/P EST MOD 30 MIN: CPT | Performed by: ANESTHESIOLOGY

## 2021-09-02 RX ORDER — MELOXICAM 15 MG/1
15 TABLET ORAL DAILY
COMMUNITY
Start: 2021-08-19 | End: 2021-09-17 | Stop reason: SDUPTHER

## 2021-09-02 RX ORDER — HYDROCODONE BITARTRATE AND ACETAMINOPHEN 7.5; 325 MG/1; MG/1
1 TABLET ORAL 4 TIMES DAILY PRN
Qty: 120 TABLET | Refills: 0 | Status: SHIPPED | OUTPATIENT
Start: 2021-09-08 | End: 2021-09-17

## 2021-09-02 RX ORDER — HYDROCODONE BITARTRATE AND ACETAMINOPHEN 7.5; 325 MG/1; MG/1
1 TABLET ORAL 4 TIMES DAILY PRN
Qty: 120 TABLET | Refills: 0 | Status: SHIPPED | OUTPATIENT
Start: 2021-10-08 | End: 2021-11-01 | Stop reason: SDUPTHER

## 2021-09-02 NOTE — PROGRESS NOTES
Subjective    CC back, leg pain, neck pain  Diya Arreola is a 61 y.o. female with chronic neck pain status post ACDF, chronic back pain status post Fusion L3-5, here for follow-up.    Good relief with hydrocodone however not lasting longer needing a nighttime dose.  Chronic back pain upper lumbar and lower lumbar radiating to bilateral hips bilateral lower extremity with burning tingling numbness and feeling of weakness in the legs worse with standing walking twisting or any activity.  Denies new injury, saddle anesthesia bladder bowel continence.  Chronic neck pain, radiating to bilateral shoulder limited range of motion in the neck and significant paraspinal muscle spasm bilateral UE radicular pain.  Denies balance issues.    Chronic tremors, history of polyarthralgia/lupus sees rheumatology.  Pain interfering with ADL.  Tried physical therapy without relief.  Seen neurosurgery, referred to try injection.        C-spine MRI  probably a plate on the anterior aspect of the spine at the C4-5 level. mild subluxation with degenerative disc disease at C3-4, causing a mild central canal stenosis. Left-sided disc extrusion at C6-7 with left foraminal impingement.   L-spine MRI 2019 postsurgical changes L3-L4 with pedicle screw L3.  Retrolisthesis L2 on L3 moderate central lateral canal stenosis.  Grade 1 anterolisthesis L4-L5.  Degenerative changes throughout.  L-spine x-ray : 4 mm anterolisthesis on extension, 6 mm anterolisthesis on flexion, at the L4-5 level. Stable spinal fusion changes at L3-4.. Severe degenerative disc and endplate changes at L2-3, similar to  2018 examination.    Pain Assessment   Location of Pain: Lower Back, R Hip, L Hip, L Leg, neck pain, joint  Description of Pain: Dull/Aching, Throbbing, Stabbing  Previous Pain Rating :9  Current Pain Ratin  Aggravating Factors: Activity  Alleviating Factors: Rest, Medication    PEG Assessment   What number best describes your pain on  average in the past week?9  What number best describes how, during the past week, pain has interfered with your enjoyment of life?4  What number best describes how, during the past week, pain has interfered with your general activity?9      The following portions of the patient's history were reviewed and updated as appropriate: allergies, current medications, past family history, past medical history, past social history, past surgical history and problem list.     has a past medical history of Allergic, Anxiety, Arthritis, Asthma, COPD (chronic obstructive pulmonary disease) (CMS/[facility]), Fall, Fibromyalgia, primary, Gastritis, Low back pain, Lupus (CMS/[facility]), Neuropathy, Osteopenia, Pancreatitis, Rheumatoid arthritis (CMS/[facility]), Sjogren's syndrome (CMS/[facility]), Tachycardia, and TIA (transient ischemic attack) (2014).   has a past surgical history that includes Cholecystectomy; Hysterectomy;  section; Cystocele repair; Carpal tunnel release; Cardiac catheterization; Spine surgery; Colonoscopy (10/08/2013); Epidural block injection; and Back surgery.  family history includes Diabetes in her father; Heart disease in her brother; Hypertension in her brother and sister; Osteoporosis in her maternal grandmother; Stroke in her sister.  Social History     Tobacco Use   • Smoking status: Current Every Day Smoker     Packs/day: 0.50     Years: 39.00     Pack years: 19.50     Types: Cigarettes   • Smokeless tobacco: Never Used   • Tobacco comment: 3/4 ppd   Substance Use Topics   • Alcohol use: No     Comment: caffeine 2c qd     Review of Systems   Musculoskeletal: Positive for arthralgias, back pain and neck pain.   Neurological: Positive for tremors, numbness and headache.   All other systems reviewed and are negative.    Objective   Physical Exam   Constitutional: No distress.   Neck: Kernig's sign noted.   Pulmonary/Chest: Effort normal.   Musculoskeletal:      Cervical back: She exhibits decreased range of motion  "and tenderness.      Lumbar back: She exhibits decreased range of motion and tenderness.   Vitals reviewed.    /87   Pulse 105   Resp 16   Ht 154.9 cm (61\")   Wt 70.8 kg (156 lb)   SpO2 96%   BMI 29.48 kg/m²      PHQ 9 on chart  Opioid risk tool low risk    Assessment/Plan   Diagnoses and all orders for this visit:    1. Chronic pain syndrome (Primary)  -     HYDROcodone-acetaminophen (NORCO) 7.5-325 MG per tablet; Take 1 tablet by mouth 4 (Four) Times a Day As Needed for Severe Pain . DNF before 9/8/2021  Dispense: 120 tablet; Refill: 0  -     HYDROcodone-acetaminophen (NORCO) 7.5-325 MG per tablet; Take 1 tablet by mouth 4 (Four) Times a Day As Needed for Severe Pain . DNF before 10/8/2021  Dispense: 120 tablet; Refill: 0    2. Postlaminectomy syndrome of lumbar region    3. Postlaminectomy syndrome of cervical region    4. High risk medication use    Summary  Diya Arreola is a 61 y.o. female with chronic neck pain status post ACDF, chronic back pain status post Fusion L3-5, here for follow-up.    Chronic back pain from DDD spondylosis postlaminectomy syndrome with radicular pain.   Chronic neck pain from postlaminectomy syndrome.  Worsening chronic tremors.  Had caudal ALEXIS, transforaminal ALEXIS above the fusion at L3 L4 mild relief.      Good relief with hydrocodone however not lasting longer needing a nighttime dose.  Increase to 4 times daily.    Cont hydrocodone 7.5/325 4 times daily as needed for severe pain.  UDS sent and inspect reviewed  Discussed risk of tolerance, dependence, respiratory depression, coma and death associated with use of oral opioids for treatment of chronic nonmalignant pain.     RTC 2 month  "

## 2021-09-07 RX ORDER — VERAPAMIL HYDROCHLORIDE 240 MG/1
TABLET, FILM COATED, EXTENDED RELEASE ORAL
Qty: 90 TABLET | Refills: 3 | Status: SHIPPED | OUTPATIENT
Start: 2021-09-07 | End: 2021-09-17 | Stop reason: SDUPTHER

## 2021-09-08 DIAGNOSIS — M79.7 FIBROMYALGIA: ICD-10-CM

## 2021-09-08 RX ORDER — TIZANIDINE 2 MG/1
2 TABLET ORAL 2 TIMES DAILY PRN
Qty: 180 TABLET | Refills: 0 | Status: SHIPPED | OUTPATIENT
Start: 2021-09-08 | End: 2021-09-17

## 2021-09-11 RX ORDER — BREXPIPRAZOLE 0.5 MG/1
1 TABLET ORAL DAILY
Qty: 30 TABLET | Refills: 5 | Status: SHIPPED | OUTPATIENT
Start: 2021-09-11 | End: 2022-03-30

## 2021-09-16 RX ORDER — GABAPENTIN 300 MG/1
CAPSULE ORAL
Qty: 360 CAPSULE | Refills: 3 | Status: SHIPPED | OUTPATIENT
Start: 2021-09-16 | End: 2021-09-17 | Stop reason: SDUPTHER

## 2021-09-17 ENCOUNTER — OFFICE VISIT (OUTPATIENT)
Dept: FAMILY MEDICINE CLINIC | Facility: CLINIC | Age: 61
End: 2021-09-17

## 2021-09-17 VITALS
OXYGEN SATURATION: 96 % | TEMPERATURE: 98 F | HEART RATE: 83 BPM | SYSTOLIC BLOOD PRESSURE: 135 MMHG | WEIGHT: 157 LBS | BODY MASS INDEX: 29.66 KG/M2 | DIASTOLIC BLOOD PRESSURE: 79 MMHG

## 2021-09-17 DIAGNOSIS — M79.7 FIBROMYALGIA: ICD-10-CM

## 2021-09-17 DIAGNOSIS — M50.30 DDD (DEGENERATIVE DISC DISEASE), CERVICAL: ICD-10-CM

## 2021-09-17 DIAGNOSIS — J44.9 CHRONIC OBSTRUCTIVE PULMONARY DISEASE, UNSPECIFIED COPD TYPE (HCC): ICD-10-CM

## 2021-09-17 DIAGNOSIS — F51.01 PRIMARY INSOMNIA: ICD-10-CM

## 2021-09-17 DIAGNOSIS — F32.A DEPRESSION, UNSPECIFIED DEPRESSION TYPE: ICD-10-CM

## 2021-09-17 DIAGNOSIS — M48.061 SPINAL STENOSIS OF LUMBAR REGION WITHOUT NEUROGENIC CLAUDICATION: Primary | ICD-10-CM

## 2021-09-17 DIAGNOSIS — E78.5 HYPERLIPIDEMIA, UNSPECIFIED HYPERLIPIDEMIA TYPE: ICD-10-CM

## 2021-09-17 PROCEDURE — 99214 OFFICE O/P EST MOD 30 MIN: CPT | Performed by: FAMILY MEDICINE

## 2021-09-17 RX ORDER — MELOXICAM 15 MG/1
15 TABLET ORAL DAILY
Qty: 90 TABLET | Refills: 3 | Status: SHIPPED | OUTPATIENT
Start: 2021-09-17 | End: 2021-11-18 | Stop reason: HOSPADM

## 2021-09-17 RX ORDER — CYCLOBENZAPRINE HCL 10 MG
10 TABLET ORAL 3 TIMES DAILY PRN
Qty: 270 TABLET | Refills: 0 | Status: ON HOLD | OUTPATIENT
Start: 2021-09-17 | End: 2021-11-16

## 2021-09-17 RX ORDER — BUMETANIDE 2 MG/1
2 TABLET ORAL 2 TIMES DAILY
Qty: 180 TABLET | Refills: 3 | Status: SHIPPED | OUTPATIENT
Start: 2021-09-17 | End: 2022-07-12 | Stop reason: SDUPTHER

## 2021-09-17 RX ORDER — VERAPAMIL HYDROCHLORIDE 240 MG/1
240 TABLET, FILM COATED, EXTENDED RELEASE ORAL DAILY
Qty: 90 TABLET | Refills: 3 | Status: SHIPPED | OUTPATIENT
Start: 2021-09-17 | End: 2022-07-12 | Stop reason: SDUPTHER

## 2021-09-17 RX ORDER — TRAZODONE HYDROCHLORIDE 100 MG/1
100 TABLET ORAL
Qty: 90 TABLET | Refills: 3 | Status: SHIPPED | OUTPATIENT
Start: 2021-09-17 | End: 2021-11-01

## 2021-09-17 RX ORDER — GABAPENTIN 300 MG/1
300 CAPSULE ORAL 3 TIMES DAILY
Qty: 270 CAPSULE | Refills: 3 | Status: SHIPPED | OUTPATIENT
Start: 2021-09-17 | End: 2022-08-16 | Stop reason: SDUPTHER

## 2021-09-17 RX ORDER — BUPROPION HYDROCHLORIDE 150 MG/1
150 TABLET ORAL DAILY
Qty: 90 TABLET | Refills: 3 | Status: SHIPPED | OUTPATIENT
Start: 2021-09-17 | End: 2021-11-29

## 2021-10-01 NOTE — PROGRESS NOTES
"Subjective   History of Present Illness: Diya Arreoal is a 61 y.o. female is here today for follow-up. She has back pain, bilateral leg pain with numbness. She had a fall October, 2020.  She is status post L3-4 TLIF performed years ago.  Her major complaints are worsening back and leg pain that is worse when she is standing or walking and improves when she sits down.  Patient describes pain radiating into her thighs bilaterally as well as her lateral thighs and posterior thighs and into her legs and shins.  This is associated with numbness and tingling.  Patient says she has had minimal improvement with epidural steroid injections in the past.  She is undergone physical therapy in the past most recently last spring, however she was not able to tolerate the pain associated with doing therapy.  No bowel or bladder issues.    Previous Treatment: 1 Physical therapy session in spring, NSAID'S, Flexeril, Norco, multiple epidural steroid injection    The following portions of the patient's history were reviewed and updated as appropriate: allergies, current medications, past family history, past medical history, past social history, past surgical history and problem list.    Review of Systems   Constitutional: Positive for activity change.   Respiratory: Negative for chest tightness and shortness of breath.    Cardiovascular: Negative for chest pain.   Musculoskeletal: Positive for arthralgias, back pain, gait problem and myalgias.        Bilateral leg pain   Neurological: Positive for numbness.        Positive for tingling   Hematological: Negative.        Objective     /81   Temp 98.5 °F (36.9 °C)   Ht 154.9 cm (61\")   Wt 71.2 kg (157 lb)   BMI 29.66 kg/m²    Body mass index is 29.66 kg/m².      Neurologic Exam    Assessment/Plan   Independent Review of Radiographic Studies:      I personally reviewed and interpreted the images from the following studies.    MRI lumbar spine: Previous fusion L3-4 with " adjacent segment disease at L2-3 and L4-5.  At L2-3 there is severe central stenosis as well as foraminal stenosis and retrolisthesis.  At L 4 5 there is grade 1 spondylolisthesis and foraminal central stenosis.  There are also degenerative changes above L2-3 but no significant central or foraminal stenosis.    Lumbar flexion-extension x-ray: Retrolisthesis of L2-3.  There is spondylolisthesis at L4-5 with dynamic instability    CT lumbar spine: There is fusion across the disc space at L3-4.    Medical Decision Making:      Diya Arreola is a 61 y.o. 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  Patient has failed all conservative measures for her adjacent segment disease.  At this time surgical intervention is prudent.  We will plan to perform L2-5 decompression and fusion.  I will perform TLIF at L4-5.  L2-3 appears to degenerated to except an interbody graft.  Given this we will perform decompression at this level and posterior lateral fusion.  I will revise previous hardware incorporated to the L2-5 fusion.  Patient understands our significant risks of surgery and that her symptoms may not improve or may worsen.      There are no diagnoses linked to this encounter.  No follow-ups on file.    This patient was examined wearing appropriate personal protective equipment.                      Dr. Jerrell Gorman IV    10/04/21  13:01 EDT

## 2021-10-04 ENCOUNTER — PREP FOR SURGERY (OUTPATIENT)
Dept: OTHER | Facility: HOSPITAL | Age: 61
End: 2021-10-04

## 2021-10-04 ENCOUNTER — OFFICE VISIT (OUTPATIENT)
Dept: NEUROSURGERY | Facility: CLINIC | Age: 61
End: 2021-10-04

## 2021-10-04 VITALS
WEIGHT: 157 LBS | TEMPERATURE: 98.5 F | BODY MASS INDEX: 29.64 KG/M2 | HEIGHT: 61 IN | SYSTOLIC BLOOD PRESSURE: 137 MMHG | DIASTOLIC BLOOD PRESSURE: 81 MMHG

## 2021-10-04 DIAGNOSIS — M51.36 LUMBAR DEGENERATIVE DISC DISEASE: ICD-10-CM

## 2021-10-04 DIAGNOSIS — M43.16 SPONDYLOLISTHESIS OF LUMBAR REGION: Primary | ICD-10-CM

## 2021-10-04 DIAGNOSIS — E16.1 OTHER HYPOGLYCEMIA: ICD-10-CM

## 2021-10-04 DIAGNOSIS — M54.16 LUMBAR RADICULOPATHY: ICD-10-CM

## 2021-10-04 DIAGNOSIS — G96.9 DISORDER OF CENTRAL NERVOUS SYSTEM, UNSPECIFIED: ICD-10-CM

## 2021-10-04 DIAGNOSIS — Z01.818 PRE-OP EXAMINATION: ICD-10-CM

## 2021-10-04 DIAGNOSIS — G96.89 OTHER SPECIFIED DISORDERS OF CENTRAL NERVOUS SYSTEM: ICD-10-CM

## 2021-10-04 DIAGNOSIS — M48.062 LUMBAR STENOSIS WITH NEUROGENIC CLAUDICATION: ICD-10-CM

## 2021-10-04 PROCEDURE — 99214 OFFICE O/P EST MOD 30 MIN: CPT | Performed by: NEUROLOGICAL SURGERY

## 2021-10-06 PROBLEM — M43.16 SPONDYLOLISTHESIS OF LUMBAR REGION: Status: ACTIVE | Noted: 2021-10-06

## 2021-10-11 ENCOUNTER — TELEPHONE (OUTPATIENT)
Dept: NEUROSURGERY | Facility: CLINIC | Age: 61
End: 2021-10-11

## 2021-10-11 NOTE — TELEPHONE ENCOUNTER
Spoke to Kushal and Dr. Gorman is scheduled for a Peer to Peer on 10/13/21 @ 9:30am, Kushal will contact him.

## 2021-11-01 ENCOUNTER — OFFICE VISIT (OUTPATIENT)
Dept: PAIN MEDICINE | Facility: CLINIC | Age: 61
End: 2021-11-01

## 2021-11-01 VITALS
RESPIRATION RATE: 16 BRPM | HEART RATE: 107 BPM | SYSTOLIC BLOOD PRESSURE: 125 MMHG | OXYGEN SATURATION: 95 % | HEIGHT: 61 IN | BODY MASS INDEX: 29.64 KG/M2 | DIASTOLIC BLOOD PRESSURE: 71 MMHG | WEIGHT: 157 LBS

## 2021-11-01 DIAGNOSIS — M96.1 POSTLAMINECTOMY SYNDROME OF LUMBAR REGION: ICD-10-CM

## 2021-11-01 DIAGNOSIS — Z79.899 HIGH RISK MEDICATION USE: ICD-10-CM

## 2021-11-01 DIAGNOSIS — G89.4 CHRONIC PAIN SYNDROME: Primary | ICD-10-CM

## 2021-11-01 DIAGNOSIS — Z79.899 HIGH RISK MEDICATION USE: Primary | ICD-10-CM

## 2021-11-01 DIAGNOSIS — M96.1 POSTLAMINECTOMY SYNDROME OF CERVICAL REGION: ICD-10-CM

## 2021-11-01 PROCEDURE — 99214 OFFICE O/P EST MOD 30 MIN: CPT | Performed by: ANESTHESIOLOGY

## 2021-11-01 RX ORDER — HYDROCODONE BITARTRATE AND ACETAMINOPHEN 7.5; 325 MG/1; MG/1
1 TABLET ORAL 4 TIMES DAILY PRN
Qty: 120 TABLET | Refills: 0 | Status: SHIPPED | OUTPATIENT
Start: 2021-12-06 | End: 2021-11-18 | Stop reason: HOSPADM

## 2021-11-01 RX ORDER — HYDROCODONE BITARTRATE AND ACETAMINOPHEN 7.5; 325 MG/1; MG/1
1 TABLET ORAL 4 TIMES DAILY PRN
Qty: 120 TABLET | Refills: 0 | Status: ON HOLD | OUTPATIENT
Start: 2021-11-07 | End: 2021-11-16 | Stop reason: SDUPTHER

## 2021-11-01 NOTE — PROGRESS NOTES
Subjective    CC back, leg pain, neck pain  Diya Arreola is a 61 y.o. female with chronic neck pain status post ACDF, chronic back pain status post Fusion L3-5, here for follow-up.    Since last visit there see neurosurgery/Dr. Gorman.  Planning fusion decompression L2-L5/revision on .  Continued chronic back pain upper lumbar and lower lumbar radiating to bilateral hips bilateral lower extremity with burning tingling numbness and feeling of weakness in the legs worse with standing walking twisting or any activity.  Denies new injury, saddle anesthesia bladder bowel continence.  Chronic neck pain, radiating to bilateral shoulder limited range of motion in the neck and significant paraspinal muscle spasm bilateral UE radicular pain.  Denies balance issues.    Chronic tremors, history of polyarthralgia/lupus sees rheumatology.  Pain interfering with ADL.  Tried physical therapy without relief.  Seen neurosurgery, referred to try injection.        C-spine MRI  probably a plate on the anterior aspect of the spine at the C4-5 level. mild subluxation with degenerative disc disease at C3-4, causing a mild central canal stenosis. Left-sided disc extrusion at C6-7 with left foraminal impingement.   L-spine MRI  postsurgical changes L3-L4 with pedicle screw L3.  Retrolisthesis L2 on L3 moderate central lateral canal stenosis.  Grade 1 anterolisthesis L4-L5.  Degenerative changes throughout.  L-spine x-ray : 4 mm anterolisthesis on extension, 6 mm anterolisthesis on flexion, at the L4-5 level. Stable spinal fusion changes at L3-4.. Severe degenerative disc and endplate changes at L2-3, similar to  2018 examination.    Pain Assessment   Location of Pain: Lower Back, R Hip, L Hip, L Leg, neck pain, joint  Description of Pain: Dull/Aching, Throbbing, Stabbing  Previous Pain Rating :6  Current Pain Ratin  Aggravating Factors: Activity  Alleviating Factors: Rest, Medication    PEG Assessment    What number best describes your pain on average in the past week?9  What number best describes how, during the past week, pain has interfered with your enjoyment of life?8  What number best describes how, during the past week, pain has interfered with your general activity?10      The following portions of the patient's history were reviewed and updated as appropriate: allergies, current medications, past family history, past medical history, past social history, past surgical history and problem list.     has a past medical history of Allergic, Anxiety, Arthritis, Asthma, COPD (chronic obstructive pulmonary disease) ([facility]), Fall, Fibromyalgia, primary, Gastritis, Low back pain, Lupus ([facility]), Neuropathy, Osteopenia, Pancreatitis, Rheumatoid arthritis ([facility]), Sjogren's syndrome ([facility]), Tachycardia, and TIA (transient ischemic attack) (2014).   has a past surgical history that includes Cholecystectomy; Hysterectomy;  section; Cystocele repair; Carpal tunnel release; Cardiac catheterization; Spine surgery; Colonoscopy (10/08/2013); Epidural block injection; and Back surgery.  family history includes Diabetes in her father; Heart disease in her brother; Hypertension in her brother and sister; Osteoporosis in her maternal grandmother; Stroke in her sister.  Social History     Tobacco Use   • Smoking status: Former Smoker     Packs/day: 0.50     Years: 39.00     Pack years: 19.50     Types: Cigarettes     Quit date: 2021   • Smokeless tobacco: Never Used   • Tobacco comment: 3/4 ppd   Substance Use Topics   • Alcohol use: No     Comment: caffeine 4c qd     Review of Systems   Musculoskeletal: Positive for arthralgias, back pain and neck pain.   Neurological: Positive for tremors, numbness and headache.   All other systems reviewed and are negative.    Objective   Physical Exam   Constitutional: No distress.   Neck: Kernig's sign noted.   Pulmonary/Chest: Effort normal.   Musculoskeletal:      Cervical back:  "She exhibits decreased range of motion and tenderness.      Lumbar back: She exhibits decreased range of motion and tenderness.   Vitals reviewed.    /71   Pulse 107   Resp 16   Ht 154.9 cm (61\")   Wt 71.2 kg (157 lb)   SpO2 95%   BMI 29.66 kg/m²      PHQ 9 on chart  Opioid risk tool low risk    Assessment/Plan   Diagnoses and all orders for this visit:    1. Chronic pain syndrome (Primary)  -     HYDROcodone-acetaminophen (NORCO) 7.5-325 MG per tablet; Take 1 tablet by mouth 4 (Four) Times a Day As Needed for Severe Pain .  Dispense: 120 tablet; Refill: 0  -     HYDROcodone-acetaminophen (NORCO) 7.5-325 MG per tablet; Take 1 tablet by mouth 4 (Four) Times a Day As Needed for Severe Pain . DNF before 12/6/2021  Dispense: 120 tablet; Refill: 0    2. Postlaminectomy syndrome of lumbar region    3. Postlaminectomy syndrome of cervical region    4. High risk medication use    Summary  Diya Arreola is a 61 y.o. female with chronic neck pain status post ACDF, chronic back pain status post Fusion L3-5, here for follow-up.    Chronic back pain from DDD spondylosis postlaminectomy syndrome with radicular pain.   Chronic neck pain from postlaminectomy syndrome.  Worsening chronic tremors.  Had caudal ALEXIS, transforaminal ALEXIS above the fusion at L3 L4 mild relief.      Since last visit there see neurosurgery/Dr. Gorman.  Planning fusion decompression L2-L5/revision on November 18.  Okay for surgeon to prescribe opioid postop if indicated.    Better relief with hydrocodone 4 times daily.  Cont hydrocodone 7.5/325 4 times daily as needed for severe pain.  UDS sent and inspect reviewed  Discussed risk of tolerance, dependence, respiratory depression, coma and death associated with use of oral opioids for treatment of chronic nonmalignant pain.     RTC 2 month  "

## 2021-11-05 ENCOUNTER — TELEPHONE (OUTPATIENT)
Dept: FAMILY MEDICINE CLINIC | Facility: CLINIC | Age: 61
End: 2021-11-05

## 2021-11-05 ENCOUNTER — TELEPHONE (OUTPATIENT)
Dept: NEUROSURGERY | Facility: CLINIC | Age: 61
End: 2021-11-05

## 2021-11-05 NOTE — TELEPHONE ENCOUNTER
Is having surgery on 11/16 and wanted to call and request that she not be given Oxycodone.      She also wanted to know if it was okay to get her Moderna booster on 11/12.

## 2021-11-05 NOTE — TELEPHONE ENCOUNTER
Caller: ASHLEY BUTLER    Relationship to patient: Emergency Contact    Best call back number: [phone] (H)    Patient is needing: PATIENT  CALLING TO LET DR BERRY KNOW THAT PATIENT WILL BE HAVING BACK SURGERY ON October 16/21/21     ALSO INQUIRING IF PATIENT IS OK TO HAVE COVID VACCINE BOOSTER ON October 12/21 4 DAYS BEFORE PATIENTS SURGERY

## 2021-11-08 RX ORDER — WHEAT DEXTRIN 3 G/3.8 G
POWDER (GRAM) ORAL NIGHTLY PRN
COMMUNITY

## 2021-11-09 ENCOUNTER — HOSPITAL ENCOUNTER (OUTPATIENT)
Dept: CARDIOLOGY | Facility: HOSPITAL | Age: 61
Discharge: HOME | End: 2021-11-09

## 2021-11-09 ENCOUNTER — HOSPITAL ENCOUNTER (OUTPATIENT)
Dept: CT IMAGING | Facility: HOSPITAL | Age: 61
Discharge: HOME | End: 2021-11-09

## 2021-11-09 ENCOUNTER — LAB (OUTPATIENT)
Dept: LAB | Facility: HOSPITAL | Age: 61
End: 2021-11-09

## 2021-11-09 ENCOUNTER — HOSPITAL ENCOUNTER (OUTPATIENT)
Dept: GENERAL RADIOLOGY | Facility: HOSPITAL | Age: 61
Discharge: HOME | End: 2021-11-09

## 2021-11-09 DIAGNOSIS — Z01.818 PRE-OP EXAMINATION: ICD-10-CM

## 2021-11-09 DIAGNOSIS — M43.16 SPONDYLOLISTHESIS OF LUMBAR REGION: ICD-10-CM

## 2021-11-09 DIAGNOSIS — E16.1 OTHER HYPOGLYCEMIA: ICD-10-CM

## 2021-11-09 DIAGNOSIS — G96.89 OTHER SPECIFIED DISORDERS OF CENTRAL NERVOUS SYSTEM: ICD-10-CM

## 2021-11-09 DIAGNOSIS — G96.9 DISORDER OF CENTRAL NERVOUS SYSTEM, UNSPECIFIED: ICD-10-CM

## 2021-11-09 LAB
ABO GROUP BLD: NORMAL
ANION GAP SERPL CALCULATED.3IONS-SCNC: 7.9 MMOL/L (ref 5–15)
BILIRUB UR QL STRIP: NEGATIVE
BLD GP AB SCN SERPL QL: NEGATIVE
BUN SERPL-MCNC: 12 MG/DL (ref 6–20)
BUN/CREAT SERPL: 15.8 (ref 7–25)
CALCIUM SPEC-SCNC: 9.6 MG/DL (ref 8.6–10.5)
CHLORIDE SERPL-SCNC: 107 MMOL/L (ref 98–107)
CLARITY UR: CLEAR
CO2 SERPL-SCNC: 26.1 MMOL/L (ref 22–29)
COLOR UR: YELLOW
CREAT SERPL-MCNC: 0.76 MG/DL (ref 0.57–1)
DEPRECATED RDW RBC AUTO: 45.9 FL (ref 37–54)
ERYTHROCYTE [DISTWIDTH] IN BLOOD BY AUTOMATED COUNT: 12.4 % (ref 12.3–15.4)
GFR SERPL CREATININE-BSD FRML MDRD: 78 ML/MIN/1.73
GLUCOSE SERPL-MCNC: 92 MG/DL (ref 65–99)
GLUCOSE UR STRIP-MCNC: NEGATIVE MG/DL
HBA1C MFR BLD: 5.4 % (ref 3.5–5.6)
HCT VFR BLD AUTO: 38.8 % (ref 34–46.6)
HGB BLD-MCNC: 13 G/DL (ref 12–15.9)
HGB UR QL STRIP.AUTO: NEGATIVE
INR PPP: 0.96 (ref 0.93–1.1)
KETONES UR QL STRIP: NEGATIVE
LEUKOCYTE ESTERASE UR QL STRIP.AUTO: ABNORMAL
MCH RBC QN AUTO: 33.8 PG (ref 26.6–33)
MCHC RBC AUTO-ENTMCNC: 33.5 G/DL (ref 31.5–35.7)
MCV RBC AUTO: 100.8 FL (ref 79–97)
MRSA DNA SPEC QL NAA+PROBE: NORMAL
NITRITE UR QL STRIP: NEGATIVE
PH UR STRIP.AUTO: 7 [PH] (ref 5–8)
PLATELET # BLD AUTO: 206 10*3/MM3 (ref 140–450)
PMV BLD AUTO: 12.8 FL (ref 6–12)
POTASSIUM SERPL-SCNC: 5.6 MMOL/L (ref 3.5–5.2)
PROT UR QL STRIP: NEGATIVE
PROTHROMBIN TIME: 10.7 SECONDS (ref 9.6–11.7)
RBC # BLD AUTO: 3.85 10*6/MM3 (ref 3.77–5.28)
RH BLD: POSITIVE
SODIUM SERPL-SCNC: 141 MMOL/L (ref 136–145)
SP GR UR STRIP: 1.02 (ref 1–1.03)
T&S EXPIRATION DATE: NORMAL
UROBILINOGEN UR QL STRIP: ABNORMAL
WBC # BLD AUTO: 6.81 10*3/MM3 (ref 3.4–10.8)

## 2021-11-09 PROCEDURE — 83036 HEMOGLOBIN GLYCOSYLATED A1C: CPT

## 2021-11-09 PROCEDURE — 86850 RBC ANTIBODY SCREEN: CPT

## 2021-11-09 PROCEDURE — 86901 BLOOD TYPING SEROLOGIC RH(D): CPT

## 2021-11-09 PROCEDURE — 87641 MR-STAPH DNA AMP PROBE: CPT

## 2021-11-09 PROCEDURE — 85027 COMPLETE CBC AUTOMATED: CPT

## 2021-11-09 PROCEDURE — 86900 BLOOD TYPING SEROLOGIC ABO: CPT

## 2021-11-09 PROCEDURE — 71046 X-RAY EXAM CHEST 2 VIEWS: CPT

## 2021-11-09 PROCEDURE — 85610 PROTHROMBIN TIME: CPT

## 2021-11-09 PROCEDURE — 81003 URINALYSIS AUTO W/O SCOPE: CPT

## 2021-11-09 PROCEDURE — 80048 BASIC METABOLIC PNL TOTAL CA: CPT

## 2021-11-09 PROCEDURE — 93005 ELECTROCARDIOGRAM TRACING: CPT | Performed by: NEUROLOGICAL SURGERY

## 2021-11-09 PROCEDURE — 86923 COMPATIBILITY TEST ELECTRIC: CPT

## 2021-11-09 PROCEDURE — 93010 ELECTROCARDIOGRAM REPORT: CPT | Performed by: INTERNAL MEDICINE

## 2021-11-09 PROCEDURE — 36415 COLL VENOUS BLD VENIPUNCTURE: CPT

## 2021-11-09 PROCEDURE — 72131 CT LUMBAR SPINE W/O DYE: CPT

## 2021-11-10 LAB — QT INTERVAL: 382 MS

## 2021-11-13 ENCOUNTER — LAB (OUTPATIENT)
Dept: LAB | Facility: HOSPITAL | Age: 61
End: 2021-11-13

## 2021-11-13 LAB — POTASSIUM SERPL-SCNC: 4.9 MMOL/L (ref 3.5–5.2)

## 2021-11-13 PROCEDURE — U0004 COV-19 TEST NON-CDC HGH THRU: HCPCS

## 2021-11-13 PROCEDURE — 36415 COLL VENOUS BLD VENIPUNCTURE: CPT

## 2021-11-13 PROCEDURE — C9803 HOPD COVID-19 SPEC COLLECT: HCPCS

## 2021-11-13 PROCEDURE — 84132 ASSAY OF SERUM POTASSIUM: CPT

## 2021-11-14 LAB — SARS-COV-2 ORF1AB RESP QL NAA+PROBE: NOT DETECTED

## 2021-11-15 NOTE — TELEPHONE ENCOUNTER
Peer to Peer is scheduled for 10/13/21 @ 9:30am, they will contact you for the Peer to Peer    Doxycycline Counseling:  Patient counseled regarding possible photosensitivity and increased risk for sunburn.  Patient instructed to avoid sunlight, if possible.  When exposed to sunlight, patients should wear protective clothing, sunglasses, and sunscreen.  The patient was instructed to call the office immediately if the following severe adverse effects occur:  hearing changes, easy bruising/bleeding, severe headache, or vision changes.  The patient verbalized understanding of the proper use and possible adverse effects of doxycycline.  All of the patient's questions and concerns were addressed.

## 2021-11-16 ENCOUNTER — ANESTHESIA EVENT (OUTPATIENT)
Dept: PERIOP | Facility: HOSPITAL | Age: 61
End: 2021-11-16

## 2021-11-16 ENCOUNTER — APPOINTMENT (OUTPATIENT)
Dept: GENERAL RADIOLOGY | Facility: HOSPITAL | Age: 61
End: 2021-11-16

## 2021-11-16 ENCOUNTER — ANESTHESIA (OUTPATIENT)
Dept: PERIOP | Facility: HOSPITAL | Age: 61
End: 2021-11-16

## 2021-11-16 ENCOUNTER — HOSPITAL ENCOUNTER (INPATIENT)
Facility: HOSPITAL | Age: 61
LOS: 2 days | Discharge: HOME HEALTH | End: 2021-11-18
Attending: NEUROLOGICAL SURGERY | Admitting: NEUROLOGICAL SURGERY

## 2021-11-16 DIAGNOSIS — Z98.1 S/P LUMBAR FUSION: ICD-10-CM

## 2021-11-16 DIAGNOSIS — M43.16 SPONDYLOLISTHESIS OF LUMBAR REGION: Primary | ICD-10-CM

## 2021-11-16 LAB
HCT VFR BLD AUTO: 29.1 % (ref 34–46.6)
HGB BLD-MCNC: 9.8 G/DL (ref 12–15.9)

## 2021-11-16 PROCEDURE — C1713 ANCHOR/SCREW BN/BN,TIS/BN: HCPCS | Performed by: NEUROLOGICAL SURGERY

## 2021-11-16 PROCEDURE — 99233 SBSQ HOSP IP/OBS HIGH 50: CPT | Performed by: INTERNAL MEDICINE

## 2021-11-16 PROCEDURE — 25010000002 MAGNESIUM SULFATE PER 500 MG OF MAGNESIUM: Performed by: NURSE ANESTHETIST, CERTIFIED REGISTERED

## 2021-11-16 PROCEDURE — 25010000002 HYDROMORPHONE PER 4 MG: Performed by: NEUROLOGICAL SURGERY

## 2021-11-16 PROCEDURE — 25010000002 MIDAZOLAM PER 1 MG: Performed by: NURSE ANESTHETIST, CERTIFIED REGISTERED

## 2021-11-16 PROCEDURE — 25010000002 PHENYLEPHRINE 10 MG/ML SOLUTION 5 ML VIAL: Performed by: NURSE ANESTHETIST, CERTIFIED REGISTERED

## 2021-11-16 PROCEDURE — 0ST20ZZ RESECTION OF LUMBAR VERTEBRAL DISC, OPEN APPROACH: ICD-10-PCS | Performed by: NEUROLOGICAL SURGERY

## 2021-11-16 PROCEDURE — 22633 ARTHRD CMBN 1NTRSPC LUMBAR: CPT | Performed by: NEUROLOGICAL SURGERY

## 2021-11-16 PROCEDURE — 22842 INSERT SPINE FIXATION DEVICE: CPT | Performed by: NEUROLOGICAL SURGERY

## 2021-11-16 PROCEDURE — 25010000002 FENTANYL CITRATE (PF) 50 MCG/ML SOLUTION: Performed by: NURSE ANESTHETIST, CERTIFIED REGISTERED

## 2021-11-16 PROCEDURE — 25010000002 PHENYLEPHRINE 10 MG/ML SOLUTION: Performed by: ANESTHESIOLOGY

## 2021-11-16 PROCEDURE — 85014 HEMATOCRIT: CPT | Performed by: NURSE ANESTHETIST, CERTIFIED REGISTERED

## 2021-11-16 PROCEDURE — 0SG00AJ FUSION OF LUMBAR VERTEBRAL JOINT WITH INTERBODY FUSION DEVICE, POSTERIOR APPROACH, ANTERIOR COLUMN, OPEN APPROACH: ICD-10-PCS | Performed by: NEUROLOGICAL SURGERY

## 2021-11-16 PROCEDURE — 72100 X-RAY EXAM L-S SPINE 2/3 VWS: CPT

## 2021-11-16 PROCEDURE — C1889 IMPLANT/INSERT DEVICE, NOC: HCPCS | Performed by: NEUROLOGICAL SURGERY

## 2021-11-16 PROCEDURE — 63047 LAM FACETEC & FORAMOT LUMBAR: CPT | Performed by: NEUROLOGICAL SURGERY

## 2021-11-16 PROCEDURE — 85018 HEMOGLOBIN: CPT | Performed by: NURSE ANESTHETIST, CERTIFIED REGISTERED

## 2021-11-16 PROCEDURE — 0SG1071 FUSION OF 2 OR MORE LUMBAR VERTEBRAL JOINTS WITH AUTOLOGOUS TISSUE SUBSTITUTE, POSTERIOR APPROACH, POSTERIOR COLUMN, OPEN APPROACH: ICD-10-PCS | Performed by: NEUROLOGICAL SURGERY

## 2021-11-16 PROCEDURE — 76000 FLUOROSCOPY <1 HR PHYS/QHP: CPT

## 2021-11-16 PROCEDURE — 25010000002 METHYLPREDNISOLONE PER 80 MG: Performed by: NEUROLOGICAL SURGERY

## 2021-11-16 PROCEDURE — 25010000002 PROPOFOL 10 MG/ML EMULSION: Performed by: NURSE ANESTHETIST, CERTIFIED REGISTERED

## 2021-11-16 PROCEDURE — 22614 ARTHRD PST TQ 1NTRSPC EA ADD: CPT | Performed by: NEUROLOGICAL SURGERY

## 2021-11-16 PROCEDURE — G0378 HOSPITAL OBSERVATION PER HR: HCPCS

## 2021-11-16 PROCEDURE — 0SP00JZ REMOVAL OF SYNTHETIC SUBSTITUTE FROM LUMBAR VERTEBRAL JOINT, OPEN APPROACH: ICD-10-PCS | Performed by: NEUROLOGICAL SURGERY

## 2021-11-16 PROCEDURE — 01NB0ZZ RELEASE LUMBAR NERVE, OPEN APPROACH: ICD-10-PCS | Performed by: NEUROLOGICAL SURGERY

## 2021-11-16 PROCEDURE — 22853 INSJ BIOMECHANICAL DEVICE: CPT | Performed by: NEUROLOGICAL SURGERY

## 2021-11-16 PROCEDURE — 25010000002 HYDROMORPHONE PER 4 MG: Performed by: NURSE ANESTHETIST, CERTIFIED REGISTERED

## 2021-11-16 DEVICE — CREO MIS MODULAR POLYAXIAL TULIP, 30MM REDUCTION
Type: IMPLANTABLE DEVICE | Site: SPINE LUMBAR | Status: FUNCTIONAL
Brand: CREO

## 2021-11-16 DEVICE — 6.5 X 45MM CANNULATED SCREW, MODULAR, CREO AMP
Type: IMPLANTABLE DEVICE | Site: SPINE LUMBAR | Status: FUNCTIONAL
Brand: CREO

## 2021-11-16 DEVICE — ALLOGRFT FIBR OSTEOAMP SELECT 5CC: Type: IMPLANTABLE DEVICE | Site: SPINE LUMBAR | Status: FUNCTIONAL

## 2021-11-16 DEVICE — FLOSEAL HEMOSTATIC MATRIX, 10ML
Type: IMPLANTABLE DEVICE | Site: SPINE LUMBAR | Status: FUNCTIONAL
Brand: FLOSEAL HEMOSTATIC MATRIX

## 2021-11-16 DEVICE — ALLOGRFT FIBR OSTEOAMP SELECT 10CC: Type: IMPLANTABLE DEVICE | Site: SPINE LUMBAR | Status: FUNCTIONAL

## 2021-11-16 DEVICE — 6.5 X 40MM CANNULATED SCREW, MODULAR, CREO AMP
Type: IMPLANTABLE DEVICE | Site: SPINE LUMBAR | Status: FUNCTIONAL
Brand: CREO

## 2021-11-16 DEVICE — DEV WND/CLS CONTRL TISS STRATAFIX SYMM PDS PLS CTX 60CM VIL: Type: IMPLANTABLE DEVICE | Site: SPINE LUMBAR | Status: FUNCTIONAL

## 2021-11-16 DEVICE — CREO MIS LOCKING CAP
Type: IMPLANTABLE DEVICE | Site: SPINE LUMBAR | Status: FUNCTIONAL
Brand: CREO

## 2021-11-16 DEVICE — RISE SPACER 10X26MM, 8-15MM, 10&DEG;
Type: IMPLANTABLE DEVICE | Site: SPINE LUMBAR | Status: FUNCTIONAL
Brand: RISE

## 2021-11-16 DEVICE — I-FACTOR™ PUTTY, 5.0 CC SYRINGE
Type: IMPLANTABLE DEVICE | Site: SPINE LUMBAR | Status: FUNCTIONAL
Brand: I-FACTOR™ PEPTIDE ENHANCED BONE GRAFT

## 2021-11-16 DEVICE — CREO MIS 5.5MM CURVED ROD, TITANIUM ALLOY, 110MM LENGTH
Type: IMPLANTABLE DEVICE | Site: SPINE LUMBAR | Status: FUNCTIONAL
Brand: CREO

## 2021-11-16 DEVICE — I-FACTOR PUTTY, 2.5CC SYRINGE
Type: IMPLANTABLE DEVICE | Site: SPINE LUMBAR | Status: FUNCTIONAL
Brand: I-FACTOR PEPTIDE ENHANCED BONE GRAFT

## 2021-11-16 DEVICE — ALLOGRAFT BONE OSTEOAMP SELECT FLO 5CC: Type: IMPLANTABLE DEVICE | Site: SPINE LUMBAR | Status: FUNCTIONAL

## 2021-11-16 RX ORDER — HYDROXYCHLOROQUINE SULFATE 200 MG/1
200 TABLET, FILM COATED ORAL 2 TIMES DAILY WITH MEALS
Status: DISCONTINUED | OUTPATIENT
Start: 2021-11-16 | End: 2021-11-18 | Stop reason: HOSPADM

## 2021-11-16 RX ORDER — SODIUM CHLORIDE, SODIUM LACTATE, POTASSIUM CHLORIDE, CALCIUM CHLORIDE 600; 310; 30; 20 MG/100ML; MG/100ML; MG/100ML; MG/100ML
20 INJECTION, SOLUTION INTRAVENOUS CONTINUOUS
Status: DISCONTINUED | OUTPATIENT
Start: 2021-11-16 | End: 2021-11-18 | Stop reason: HOSPADM

## 2021-11-16 RX ORDER — ONDANSETRON 2 MG/ML
4 INJECTION INTRAMUSCULAR; INTRAVENOUS ONCE AS NEEDED
Status: DISCONTINUED | OUTPATIENT
Start: 2021-11-16 | End: 2021-11-16 | Stop reason: HOSPADM

## 2021-11-16 RX ORDER — GABAPENTIN 300 MG/1
300 CAPSULE ORAL 3 TIMES DAILY
Status: DISCONTINUED | OUTPATIENT
Start: 2021-11-16 | End: 2021-11-18 | Stop reason: HOSPADM

## 2021-11-16 RX ORDER — ALBUTEROL SULFATE 90 UG/1
2 AEROSOL, METERED RESPIRATORY (INHALATION) EVERY 4 HOURS PRN
Status: DISCONTINUED | OUTPATIENT
Start: 2021-11-16 | End: 2021-11-18 | Stop reason: HOSPADM

## 2021-11-16 RX ORDER — CLINDAMYCIN PHOSPHATE 900 MG/50ML
INJECTION, SOLUTION INTRAVENOUS AS NEEDED
Status: DISCONTINUED | OUTPATIENT
Start: 2021-11-16 | End: 2021-11-16 | Stop reason: SURG

## 2021-11-16 RX ORDER — DROPERIDOL 2.5 MG/ML
1.25 INJECTION, SOLUTION INTRAMUSCULAR; INTRAVENOUS ONCE AS NEEDED
Status: DISCONTINUED | OUTPATIENT
Start: 2021-11-16 | End: 2021-11-16 | Stop reason: HOSPADM

## 2021-11-16 RX ORDER — BUMETANIDE 1 MG/1
2 TABLET ORAL 2 TIMES DAILY
Status: DISCONTINUED | OUTPATIENT
Start: 2021-11-17 | End: 2021-11-16

## 2021-11-16 RX ORDER — LIDOCAINE HYDROCHLORIDE 20 MG/ML
INJECTION, SOLUTION EPIDURAL; INFILTRATION; INTRACAUDAL; PERINEURAL AS NEEDED
Status: DISCONTINUED | OUTPATIENT
Start: 2021-11-16 | End: 2021-11-16 | Stop reason: SURG

## 2021-11-16 RX ORDER — MIDAZOLAM HYDROCHLORIDE 1 MG/ML
INJECTION INTRAMUSCULAR; INTRAVENOUS AS NEEDED
Status: DISCONTINUED | OUTPATIENT
Start: 2021-11-16 | End: 2021-11-16 | Stop reason: SURG

## 2021-11-16 RX ORDER — SCOLOPAMINE TRANSDERMAL SYSTEM 1 MG/1
PATCH, EXTENDED RELEASE TRANSDERMAL AS NEEDED
Status: DISCONTINUED | OUTPATIENT
Start: 2021-11-16 | End: 2021-11-16 | Stop reason: SURG

## 2021-11-16 RX ORDER — BUMETANIDE 1 MG/1
2 TABLET ORAL 2 TIMES DAILY
Status: DISCONTINUED | OUTPATIENT
Start: 2021-11-16 | End: 2021-11-16

## 2021-11-16 RX ORDER — LIDOCAINE HYDROCHLORIDE 10 MG/ML
INJECTION, SOLUTION EPIDURAL; INFILTRATION; INTRACAUDAL; PERINEURAL AS NEEDED
Status: DISCONTINUED | OUTPATIENT
Start: 2021-11-16 | End: 2021-11-16 | Stop reason: SURG

## 2021-11-16 RX ORDER — KETAMINE HCL IN NACL, ISO-OSM 100MG/10ML
SYRINGE (ML) INJECTION AS NEEDED
Status: DISCONTINUED | OUTPATIENT
Start: 2021-11-16 | End: 2021-11-16 | Stop reason: SURG

## 2021-11-16 RX ORDER — MAGNESIUM SULFATE HEPTAHYDRATE 500 MG/ML
INJECTION, SOLUTION INTRAMUSCULAR; INTRAVENOUS AS NEEDED
Status: DISCONTINUED | OUTPATIENT
Start: 2021-11-16 | End: 2021-11-16 | Stop reason: SURG

## 2021-11-16 RX ORDER — ROCURONIUM BROMIDE 10 MG/ML
INJECTION, SOLUTION INTRAVENOUS AS NEEDED
Status: DISCONTINUED | OUTPATIENT
Start: 2021-11-16 | End: 2021-11-16 | Stop reason: SURG

## 2021-11-16 RX ORDER — LABETALOL HYDROCHLORIDE 5 MG/ML
5 INJECTION, SOLUTION INTRAVENOUS
Status: DISCONTINUED | OUTPATIENT
Start: 2021-11-16 | End: 2021-11-16 | Stop reason: HOSPADM

## 2021-11-16 RX ORDER — LIDOCAINE HYDROCHLORIDE 10 MG/ML
0.5 INJECTION, SOLUTION INFILTRATION; PERINEURAL ONCE AS NEEDED
Status: DISCONTINUED | OUTPATIENT
Start: 2021-11-16 | End: 2021-11-16 | Stop reason: HOSPADM

## 2021-11-16 RX ORDER — HYDROMORPHONE HCL 110MG/55ML
0.5 PATIENT CONTROLLED ANALGESIA SYRINGE INTRAVENOUS
Status: DISCONTINUED | OUTPATIENT
Start: 2021-11-16 | End: 2021-11-16 | Stop reason: HOSPADM

## 2021-11-16 RX ORDER — LIDOCAINE HYDROCHLORIDE AND EPINEPHRINE 10; 10 MG/ML; UG/ML
INJECTION, SOLUTION INFILTRATION; PERINEURAL AS NEEDED
Status: DISCONTINUED | OUTPATIENT
Start: 2021-11-16 | End: 2021-11-16 | Stop reason: HOSPADM

## 2021-11-16 RX ORDER — BUPROPION HYDROCHLORIDE 150 MG/1
150 TABLET ORAL DAILY
Status: DISCONTINUED | OUTPATIENT
Start: 2021-11-16 | End: 2021-11-18 | Stop reason: HOSPADM

## 2021-11-16 RX ORDER — ONDANSETRON 2 MG/ML
4 INJECTION INTRAMUSCULAR; INTRAVENOUS EVERY 6 HOURS PRN
Status: DISCONTINUED | OUTPATIENT
Start: 2021-11-16 | End: 2021-11-18 | Stop reason: HOSPADM

## 2021-11-16 RX ORDER — SODIUM CHLORIDE 0.9 % (FLUSH) 0.9 %
10 SYRINGE (ML) INJECTION AS NEEDED
Status: DISCONTINUED | OUTPATIENT
Start: 2021-11-16 | End: 2021-11-18 | Stop reason: HOSPADM

## 2021-11-16 RX ORDER — ARIPIPRAZOLE 2 MG/1
2 TABLET ORAL DAILY
Status: DISCONTINUED | OUTPATIENT
Start: 2021-11-16 | End: 2021-11-18 | Stop reason: HOSPADM

## 2021-11-16 RX ORDER — ALBUTEROL SULFATE 2.5 MG/3ML
2.5 SOLUTION RESPIRATORY (INHALATION) EVERY 4 HOURS PRN
Status: DISCONTINUED | OUTPATIENT
Start: 2021-11-16 | End: 2021-11-18 | Stop reason: HOSPADM

## 2021-11-16 RX ORDER — HYDROMORPHONE HCL 110MG/55ML
0.5 PATIENT CONTROLLED ANALGESIA SYRINGE INTRAVENOUS
Status: DISCONTINUED | OUTPATIENT
Start: 2021-11-16 | End: 2021-11-18 | Stop reason: HOSPADM

## 2021-11-16 RX ORDER — SODIUM CHLORIDE 0.9 % (FLUSH) 0.9 %
10 SYRINGE (ML) INJECTION AS NEEDED
Status: DISCONTINUED | OUTPATIENT
Start: 2021-11-16 | End: 2021-11-16 | Stop reason: HOSPADM

## 2021-11-16 RX ORDER — FENTANYL CITRATE 50 UG/ML
50 INJECTION, SOLUTION INTRAMUSCULAR; INTRAVENOUS
Status: DISCONTINUED | OUTPATIENT
Start: 2021-11-16 | End: 2021-11-16 | Stop reason: HOSPADM

## 2021-11-16 RX ORDER — HYDROXYCHLOROQUINE SULFATE 200 MG/1
200 TABLET, FILM COATED ORAL 2 TIMES DAILY
Status: DISCONTINUED | OUTPATIENT
Start: 2021-11-16 | End: 2021-11-16

## 2021-11-16 RX ORDER — IBUPROFEN 400 MG/1
600 TABLET ORAL ONCE AS NEEDED
Status: DISCONTINUED | OUTPATIENT
Start: 2021-11-16 | End: 2021-11-16 | Stop reason: HOSPADM

## 2021-11-16 RX ORDER — DIPHENHYDRAMINE HYDROCHLORIDE 50 MG/ML
12.5 INJECTION INTRAMUSCULAR; INTRAVENOUS
Status: DISCONTINUED | OUTPATIENT
Start: 2021-11-16 | End: 2021-11-16 | Stop reason: HOSPADM

## 2021-11-16 RX ORDER — ACETAMINOPHEN 325 MG/1
650 TABLET ORAL EVERY 6 HOURS PRN
Status: DISCONTINUED | OUTPATIENT
Start: 2021-11-16 | End: 2021-11-18 | Stop reason: HOSPADM

## 2021-11-16 RX ORDER — SODIUM CHLORIDE 0.9 % (FLUSH) 0.9 %
10 SYRINGE (ML) INJECTION EVERY 12 HOURS SCHEDULED
Status: DISCONTINUED | OUTPATIENT
Start: 2021-11-16 | End: 2021-11-18 | Stop reason: HOSPADM

## 2021-11-16 RX ORDER — ACETAMINOPHEN 500 MG
1000 TABLET ORAL ONCE
Status: COMPLETED | OUTPATIENT
Start: 2021-11-16 | End: 2021-11-16

## 2021-11-16 RX ORDER — HYDROCODONE BITARTRATE AND ACETAMINOPHEN 7.5; 325 MG/1; MG/1
1 TABLET ORAL EVERY 4 HOURS PRN
Status: DISCONTINUED | OUTPATIENT
Start: 2021-11-16 | End: 2021-11-17

## 2021-11-16 RX ORDER — EPHEDRINE SULFATE 5 MG/ML
5 INJECTION INTRAVENOUS ONCE AS NEEDED
Status: DISCONTINUED | OUTPATIENT
Start: 2021-11-16 | End: 2021-11-16 | Stop reason: HOSPADM

## 2021-11-16 RX ORDER — BISACODYL 10 MG
10 SUPPOSITORY, RECTAL RECTAL DAILY PRN
Status: DISCONTINUED | OUTPATIENT
Start: 2021-11-16 | End: 2021-11-18 | Stop reason: HOSPADM

## 2021-11-16 RX ORDER — DIPHENHYDRAMINE HCL 25 MG
25 CAPSULE ORAL NIGHTLY PRN
Status: DISCONTINUED | OUTPATIENT
Start: 2021-11-16 | End: 2021-11-18 | Stop reason: HOSPADM

## 2021-11-16 RX ORDER — DOCUSATE SODIUM 100 MG/1
100 CAPSULE, LIQUID FILLED ORAL 2 TIMES DAILY PRN
Status: DISCONTINUED | OUTPATIENT
Start: 2021-11-16 | End: 2021-11-18 | Stop reason: HOSPADM

## 2021-11-16 RX ORDER — VERAPAMIL HYDROCHLORIDE 240 MG/1
240 TABLET, FILM COATED, EXTENDED RELEASE ORAL
Status: DISCONTINUED | OUTPATIENT
Start: 2021-11-17 | End: 2021-11-16

## 2021-11-16 RX ORDER — NALOXONE HCL 0.4 MG/ML
0.4 VIAL (ML) INJECTION
Status: DISCONTINUED | OUTPATIENT
Start: 2021-11-16 | End: 2021-11-18 | Stop reason: HOSPADM

## 2021-11-16 RX ORDER — IPRATROPIUM BROMIDE AND ALBUTEROL SULFATE 2.5; .5 MG/3ML; MG/3ML
3 SOLUTION RESPIRATORY (INHALATION) ONCE AS NEEDED
Status: DISCONTINUED | OUTPATIENT
Start: 2021-11-16 | End: 2021-11-16 | Stop reason: HOSPADM

## 2021-11-16 RX ORDER — ROFLUMILAST 500 UG/1
500 TABLET ORAL
Status: DISCONTINUED | OUTPATIENT
Start: 2021-11-17 | End: 2021-11-18 | Stop reason: HOSPADM

## 2021-11-16 RX ORDER — PROPOFOL 10 MG/ML
VIAL (ML) INTRAVENOUS AS NEEDED
Status: DISCONTINUED | OUTPATIENT
Start: 2021-11-16 | End: 2021-11-16 | Stop reason: SURG

## 2021-11-16 RX ORDER — SODIUM CHLORIDE, SODIUM LACTATE, POTASSIUM CHLORIDE, CALCIUM CHLORIDE 600; 310; 30; 20 MG/100ML; MG/100ML; MG/100ML; MG/100ML
100 INJECTION, SOLUTION INTRAVENOUS CONTINUOUS
Status: DISCONTINUED | OUTPATIENT
Start: 2021-11-16 | End: 2021-11-18 | Stop reason: HOSPADM

## 2021-11-16 RX ORDER — ACETAMINOPHEN 325 MG/1
650 TABLET ORAL EVERY 8 HOURS
Status: DISPENSED | OUTPATIENT
Start: 2021-11-16 | End: 2021-11-18

## 2021-11-16 RX ORDER — MIDAZOLAM HYDROCHLORIDE 1 MG/ML
1 INJECTION INTRAMUSCULAR; INTRAVENOUS
Status: DISCONTINUED | OUTPATIENT
Start: 2021-11-16 | End: 2021-11-16 | Stop reason: HOSPADM

## 2021-11-16 RX ORDER — CYCLOBENZAPRINE HCL 10 MG
10 TABLET ORAL 3 TIMES DAILY PRN
Status: DISCONTINUED | OUTPATIENT
Start: 2021-11-16 | End: 2021-11-18 | Stop reason: HOSPADM

## 2021-11-16 RX ORDER — NALOXONE HCL 0.4 MG/ML
0.4 VIAL (ML) INJECTION AS NEEDED
Status: DISCONTINUED | OUTPATIENT
Start: 2021-11-16 | End: 2021-11-16 | Stop reason: HOSPADM

## 2021-11-16 RX ORDER — ONDANSETRON 4 MG/1
4 TABLET, FILM COATED ORAL EVERY 6 HOURS PRN
Status: DISCONTINUED | OUTPATIENT
Start: 2021-11-16 | End: 2021-11-18 | Stop reason: HOSPADM

## 2021-11-16 RX ORDER — MULTIPLE VITAMINS W/ MINERALS TAB 9MG-400MCG
1 TAB ORAL DAILY
COMMUNITY

## 2021-11-16 RX ORDER — PHENYLEPHRINE HCL IN 0.9% NACL 1 MG/10 ML
SYRINGE (ML) INTRAVENOUS AS NEEDED
Status: DISCONTINUED | OUTPATIENT
Start: 2021-11-16 | End: 2021-11-16 | Stop reason: SURG

## 2021-11-16 RX ORDER — FLUMAZENIL 0.1 MG/ML
0.1 INJECTION INTRAVENOUS AS NEEDED
Status: DISCONTINUED | OUTPATIENT
Start: 2021-11-16 | End: 2021-11-16 | Stop reason: HOSPADM

## 2021-11-16 RX ORDER — EPHEDRINE SULFATE 5 MG/ML
INJECTION INTRAVENOUS AS NEEDED
Status: DISCONTINUED | OUTPATIENT
Start: 2021-11-16 | End: 2021-11-16 | Stop reason: SURG

## 2021-11-16 RX ORDER — PHENYLEPHRINE HYDROCHLORIDE 10 MG/ML
100 INJECTION INTRAVENOUS AS NEEDED
Status: DISCONTINUED | OUTPATIENT
Start: 2021-11-16 | End: 2021-11-16 | Stop reason: HOSPADM

## 2021-11-16 RX ORDER — METHYLPREDNISOLONE ACETATE 80 MG/ML
INJECTION, SUSPENSION INTRA-ARTICULAR; INTRALESIONAL; INTRAMUSCULAR; SOFT TISSUE AS NEEDED
Status: DISCONTINUED | OUTPATIENT
Start: 2021-11-16 | End: 2021-11-16 | Stop reason: HOSPADM

## 2021-11-16 RX ORDER — FENTANYL CITRATE 50 UG/ML
INJECTION, SOLUTION INTRAMUSCULAR; INTRAVENOUS AS NEEDED
Status: DISCONTINUED | OUTPATIENT
Start: 2021-11-16 | End: 2021-11-16 | Stop reason: SURG

## 2021-11-16 RX ORDER — LORAZEPAM 2 MG/ML
1 INJECTION INTRAMUSCULAR
Status: DISCONTINUED | OUTPATIENT
Start: 2021-11-16 | End: 2021-11-16 | Stop reason: HOSPADM

## 2021-11-16 RX ORDER — HYDRALAZINE HYDROCHLORIDE 20 MG/ML
5 INJECTION INTRAMUSCULAR; INTRAVENOUS
Status: DISCONTINUED | OUTPATIENT
Start: 2021-11-16 | End: 2021-11-16 | Stop reason: HOSPADM

## 2021-11-16 RX ORDER — GABAPENTIN 300 MG/1
300 CAPSULE ORAL 3 TIMES DAILY
Status: DISCONTINUED | OUTPATIENT
Start: 2021-11-16 | End: 2021-11-16

## 2021-11-16 RX ORDER — GABAPENTIN 300 MG/1
600 CAPSULE ORAL ONCE
Status: COMPLETED | OUTPATIENT
Start: 2021-11-16 | End: 2021-11-16

## 2021-11-16 RX ADMIN — LIDOCAINE HYDROCHLORIDE 50 MG: 10 INJECTION, SOLUTION EPIDURAL; INFILTRATION; INTRACAUDAL; PERINEURAL at 07:56

## 2021-11-16 RX ADMIN — CLINDAMYCIN PHOSPHATE 900 MG: 900 INJECTION, SOLUTION INTRAVENOUS at 08:18

## 2021-11-16 RX ADMIN — MIDAZOLAM 2 MG: 1 INJECTION INTRAMUSCULAR; INTRAVENOUS at 07:54

## 2021-11-16 RX ADMIN — EPHEDRINE SULFATE 5 MG: 5 INJECTION INTRAVENOUS at 09:45

## 2021-11-16 RX ADMIN — SODIUM CHLORIDE, PRESERVATIVE FREE 10 ML: 5 INJECTION INTRAVENOUS at 20:15

## 2021-11-16 RX ADMIN — EPHEDRINE SULFATE 5 MG: 5 INJECTION INTRAVENOUS at 09:12

## 2021-11-16 RX ADMIN — SODIUM CHLORIDE, POTASSIUM CHLORIDE, SODIUM LACTATE AND CALCIUM CHLORIDE: 600; 310; 30; 20 INJECTION, SOLUTION INTRAVENOUS at 10:13

## 2021-11-16 RX ADMIN — SODIUM CHLORIDE, POTASSIUM CHLORIDE, SODIUM LACTATE AND CALCIUM CHLORIDE 100 ML/HR: 600; 310; 30; 20 INJECTION, SOLUTION INTRAVENOUS at 18:19

## 2021-11-16 RX ADMIN — Medication 100 MCG: at 09:43

## 2021-11-16 RX ADMIN — EPHEDRINE SULFATE 5 MG: 5 INJECTION INTRAVENOUS at 09:57

## 2021-11-16 RX ADMIN — FENTANYL CITRATE 50 MCG: 50 INJECTION, SOLUTION INTRAMUSCULAR; INTRAVENOUS at 11:44

## 2021-11-16 RX ADMIN — FENTANYL CITRATE 50 MCG: 50 INJECTION INTRAMUSCULAR; INTRAVENOUS at 12:41

## 2021-11-16 RX ADMIN — Medication 50 MCG: at 09:06

## 2021-11-16 RX ADMIN — FENTANYL CITRATE 50 MCG: 50 INJECTION, SOLUTION INTRAMUSCULAR; INTRAVENOUS at 07:54

## 2021-11-16 RX ADMIN — PHENYLEPHRINE HYDROCHLORIDE 100 MCG: 10 INJECTION INTRAVENOUS at 12:36

## 2021-11-16 RX ADMIN — Medication 100 MCG: at 09:27

## 2021-11-16 RX ADMIN — EPHEDRINE SULFATE 10 MG: 5 INJECTION INTRAVENOUS at 08:59

## 2021-11-16 RX ADMIN — Medication 50 MCG: at 09:08

## 2021-11-16 RX ADMIN — Medication 100 MCG: at 09:34

## 2021-11-16 RX ADMIN — EPHEDRINE SULFATE 10 MG: 5 INJECTION INTRAVENOUS at 09:04

## 2021-11-16 RX ADMIN — EPHEDRINE SULFATE 5 MG: 5 INJECTION INTRAVENOUS at 08:49

## 2021-11-16 RX ADMIN — Medication 100 MCG: at 09:16

## 2021-11-16 RX ADMIN — EPHEDRINE SULFATE 10 MG: 5 INJECTION INTRAVENOUS at 09:06

## 2021-11-16 RX ADMIN — ACETAMINOPHEN 1000 MG: 500 TABLET, FILM COATED ORAL at 06:54

## 2021-11-16 RX ADMIN — EPHEDRINE SULFATE 5 MG: 5 INJECTION INTRAVENOUS at 08:54

## 2021-11-16 RX ADMIN — ROCURONIUM BROMIDE 50 MG: 10 INJECTION INTRAVENOUS at 07:56

## 2021-11-16 RX ADMIN — ACETAMINOPHEN 650 MG: 325 TABLET, FILM COATED ORAL at 20:12

## 2021-11-16 RX ADMIN — GABAPENTIN 300 MG: 300 CAPSULE ORAL at 20:13

## 2021-11-16 RX ADMIN — Medication 50 MCG: at 09:55

## 2021-11-16 RX ADMIN — Medication 50 MCG: at 10:00

## 2021-11-16 RX ADMIN — HYDROCODONE BITARTRATE AND ACETAMINOPHEN 1 TABLET: 7.5; 325 TABLET ORAL at 21:38

## 2021-11-16 RX ADMIN — HYDROMORPHONE HYDROCHLORIDE 0.5 MG: 2 INJECTION, SOLUTION INTRAMUSCULAR; INTRAVENOUS; SUBCUTANEOUS at 15:25

## 2021-11-16 RX ADMIN — Medication 50 MCG: at 09:21

## 2021-11-16 RX ADMIN — EPHEDRINE SULFATE 5 MG: 5 INJECTION INTRAVENOUS at 09:41

## 2021-11-16 RX ADMIN — SCOPALAMINE 1 PATCH: 1 PATCH, EXTENDED RELEASE TRANSDERMAL at 06:40

## 2021-11-16 RX ADMIN — SUGAMMADEX 200 MG: 100 INJECTION, SOLUTION INTRAVENOUS at 08:25

## 2021-11-16 RX ADMIN — PHENYLEPHRINE HYDROCHLORIDE 0.5 MCG/KG/MIN: 10 INJECTION INTRAVENOUS at 10:12

## 2021-11-16 RX ADMIN — MAGNESIUM SULFATE HEPTAHYDRATE 2 G: 500 INJECTION, SOLUTION INTRAMUSCULAR; INTRAVENOUS at 08:00

## 2021-11-16 RX ADMIN — Medication 100 MCG: at 09:50

## 2021-11-16 RX ADMIN — SODIUM CHLORIDE, POTASSIUM CHLORIDE, SODIUM LACTATE AND CALCIUM CHLORIDE: 600; 310; 30; 20 INJECTION, SOLUTION INTRAVENOUS at 08:40

## 2021-11-16 RX ADMIN — Medication 60 MG: at 08:00

## 2021-11-16 RX ADMIN — FENTANYL CITRATE 50 MCG: 50 INJECTION INTRAMUSCULAR; INTRAVENOUS at 13:25

## 2021-11-16 RX ADMIN — EPHEDRINE SULFATE 5 MG: 5 INJECTION INTRAVENOUS at 08:47

## 2021-11-16 RX ADMIN — EPHEDRINE SULFATE 10 MG: 5 INJECTION INTRAVENOUS at 10:05

## 2021-11-16 RX ADMIN — EPHEDRINE SULFATE 5 MG: 5 INJECTION INTRAVENOUS at 08:51

## 2021-11-16 RX ADMIN — EPHEDRINE SULFATE 5 MG: 5 INJECTION INTRAVENOUS at 09:49

## 2021-11-16 RX ADMIN — PROPOFOL 200 MG: 10 INJECTION, EMULSION INTRAVENOUS at 07:56

## 2021-11-16 RX ADMIN — LIDOCAINE HYDROCHLORIDE 400 MG: 20 INJECTION, SOLUTION EPIDURAL; INFILTRATION; INTRACAUDAL; PERINEURAL at 08:00

## 2021-11-16 RX ADMIN — Medication 50 MCG: at 10:05

## 2021-11-16 RX ADMIN — EPHEDRINE SULFATE 10 MG: 5 INJECTION INTRAVENOUS at 08:57

## 2021-11-16 RX ADMIN — EPHEDRINE SULFATE 5 MG: 5 INJECTION INTRAVENOUS at 09:19

## 2021-11-16 RX ADMIN — GABAPENTIN 600 MG: 300 CAPSULE ORAL at 06:55

## 2021-11-16 RX ADMIN — HYDROMORPHONE HYDROCHLORIDE 0.5 MG: 2 INJECTION, SOLUTION INTRAMUSCULAR; INTRAVENOUS; SUBCUTANEOUS at 17:00

## 2021-11-16 RX ADMIN — SODIUM CHLORIDE, POTASSIUM CHLORIDE, SODIUM LACTATE AND CALCIUM CHLORIDE 20 ML/HR: 600; 310; 30; 20 INJECTION, SOLUTION INTRAVENOUS at 07:31

## 2021-11-16 RX ADMIN — PROPOFOL 125 MCG/KG/MIN: 10 INJECTION, EMULSION INTRAVENOUS at 08:00

## 2021-11-16 RX ADMIN — HYDROCODONE BITARTRATE AND ACETAMINOPHEN 1 TABLET: 7.5; 325 TABLET ORAL at 14:30

## 2021-11-16 RX ADMIN — MIDAZOLAM 2 MG: 1 INJECTION INTRAMUSCULAR; INTRAVENOUS at 08:47

## 2021-11-16 RX ADMIN — ACETAMINOPHEN 650 MG: 325 TABLET, FILM COATED ORAL at 16:37

## 2021-11-16 RX ADMIN — HYDROXYCHLOROQUINE SULFATE 200 MG: 200 TABLET ORAL at 20:12

## 2021-11-16 RX ADMIN — Medication 50 MCG: at 09:11

## 2021-11-16 RX ADMIN — DEXMEDETOMIDINE HYDROCHLORIDE 0.8 MCG/KG/HR: 100 INJECTION, SOLUTION INTRAVENOUS at 08:00

## 2021-11-16 RX ADMIN — HYDROMORPHONE HYDROCHLORIDE 0.5 MG: 2 INJECTION, SOLUTION INTRAMUSCULAR; INTRAVENOUS; SUBCUTANEOUS at 19:39

## 2021-11-16 RX ADMIN — HYDROMORPHONE HYDROCHLORIDE 0.5 MG: 2 INJECTION, SOLUTION INTRAMUSCULAR; INTRAVENOUS; SUBCUTANEOUS at 14:21

## 2021-11-16 RX ADMIN — Medication 10 MG: at 10:11

## 2021-11-16 RX ADMIN — EPHEDRINE SULFATE 5 MG: 5 INJECTION INTRAVENOUS at 09:24

## 2021-11-16 RX ADMIN — HYDROMORPHONE HYDROCHLORIDE 0.5 MG: 2 INJECTION, SOLUTION INTRAMUSCULAR; INTRAVENOUS; SUBCUTANEOUS at 22:53

## 2021-11-16 RX ADMIN — DOXYLAMINE SUCCINATE 12.5 MG: 25 TABLET ORAL at 20:12

## 2021-11-16 RX ADMIN — Medication 10 MG: at 07:56

## 2021-11-16 RX ADMIN — EPHEDRINE SULFATE 5 MG: 5 INJECTION INTRAVENOUS at 09:34

## 2021-11-16 NOTE — ANESTHESIA POSTPROCEDURE EVALUATION
Patient: Diya Arreola    Procedure Summary     Date: 11/16/21 Room / Location: [facility] OR  / [facility] MAIN OR    Anesthesia Start: 0752 Anesthesia Stop: 1218    Procedure: L4-5 LUMBAR LAMINECTOMY TRANSFORAMINAL LUMBAR INTERBODY FUSION; L2-3 laminectomy and facetectomy; removal of hardware L3-4; L2-5 fusion, ROBOTIC (N/A Spine Lumbar) Diagnosis:       Spondylolisthesis of lumbar region      (Spondylolisthesis of lumbar region [M43.16])    Surgeons: Jerrell Gorman IV, MD Provider: Lily Paul MD    Anesthesia Type: general ASA Status: 3          Anesthesia Type: general    Vitals  Vitals Value Taken Time   BP 91/51 11/16/21 1557   Temp 98.4 °F (36.9 °C) 11/16/21 1215   Pulse 81 11/16/21 1600   Resp 18 11/16/21 1515   SpO2 91 % 11/16/21 1600   Vitals shown include unvalidated device data.        Post Anesthesia Care and Evaluation    Patient location during evaluation: PACU  Patient participation: complete - patient participated  Level of consciousness: awake  Pain management: adequate  Airway patency: patent  Anesthetic complications: No anesthetic complications  PONV Status: controlled  Cardiovascular status: acceptable  Respiratory status: acceptable  Hydration status: acceptable

## 2021-11-16 NOTE — ANESTHESIA PREPROCEDURE EVALUATION
Anesthesia Evaluation     Patient summary reviewed and Nursing notes reviewed   history of anesthetic complications: PONV  NPO Solid Status: > 8 hours  NPO Liquid Status: > 8 hours           Airway   Mallampati: II  TM distance: >3 FB  Neck ROM: full  No difficulty expected  Dental    (+) edentulous    Pulmonary     breath sounds clear to auscultation  (+) COPD moderate,   Cardiovascular     ECG reviewed  Rhythm: regular  Rate: normal    (+) hypertension, hyperlipidemia,       Neuro/Psych  (+) psychiatric history Depression,     GI/Hepatic/Renal/Endo - negative ROS     Musculoskeletal     (+) myalgias,   Abdominal     Abdomen: soft.   Substance History - negative use     OB/GYN negative ob/gyn ROS         Other - negative ROS                       Anesthesia Plan    ASA 3     general   total IV anesthesia(Preop gabapentin and tylenol PO)  intravenous induction     Anesthetic plan, all risks, benefits, and alternatives have been provided, discussed and informed consent has been obtained with: patient and spouse/significant other.    Plan discussed with CAA.

## 2021-11-16 NOTE — ANESTHESIA PROCEDURE NOTES
Airway  Urgency: elective    Date/Time: 11/16/2021 7:58 AM  Airway not difficult    General Information and Staff    Patient location during procedure: OR  Anesthesiologist: Lily Paul MD  CRNA: Keri Myrick CRNA    Indications and Patient Condition  Indications for airway management: airway protection    Preoxygenated: yes  MILS maintained throughout  Mask difficulty assessment: 2 - vent by mask + OA or adjuvant +/- NMBA    Final Airway Details  Final airway type: endotracheal airway      Successful airway: ETT  Cuffed: yes   Successful intubation technique: direct laryngoscopy  Facilitating devices/methods: intubating stylet  Endotracheal tube insertion site: oral  Blade: Leno  Blade size: 3  ETT size (mm): 7.0  Cormack-Lehane Classification: grade I - full view of glottis  Placement verified by: chest auscultation and capnometry   Measured from: lips  ETT/EBT  to lips (cm): 22  Number of attempts at approach: 1  Assessment: lips, teeth, and gum same as pre-op and atraumatic intubation

## 2021-11-17 ENCOUNTER — APPOINTMENT (OUTPATIENT)
Dept: GENERAL RADIOLOGY | Facility: HOSPITAL | Age: 61
End: 2021-11-17

## 2021-11-17 LAB
ANION GAP SERPL CALCULATED.3IONS-SCNC: 12 MMOL/L (ref 5–15)
BASOPHILS # BLD AUTO: 0.1 10*3/MM3 (ref 0–0.2)
BASOPHILS NFR BLD AUTO: 0.7 % (ref 0–1.5)
BUN SERPL-MCNC: 11 MG/DL (ref 6–20)
BUN/CREAT SERPL: 17.2 (ref 7–25)
CALCIUM SPEC-SCNC: 8.3 MG/DL (ref 8.6–10.5)
CHLORIDE SERPL-SCNC: 103 MMOL/L (ref 98–107)
CO2 SERPL-SCNC: 22 MMOL/L (ref 22–29)
CREAT SERPL-MCNC: 0.64 MG/DL (ref 0.57–1)
DEPRECATED RDW RBC AUTO: 48.6 FL (ref 37–54)
EOSINOPHIL # BLD AUTO: 0.1 10*3/MM3 (ref 0–0.4)
EOSINOPHIL NFR BLD AUTO: 0.9 % (ref 0.3–6.2)
ERYTHROCYTE [DISTWIDTH] IN BLOOD BY AUTOMATED COUNT: 13.5 % (ref 12.3–15.4)
GFR SERPL CREATININE-BSD FRML MDRD: 95 ML/MIN/1.73
GLUCOSE SERPL-MCNC: 113 MG/DL (ref 65–99)
HCT VFR BLD AUTO: 24.2 % (ref 34–46.6)
HGB BLD-MCNC: 8.3 G/DL (ref 12–15.9)
LYMPHOCYTES # BLD AUTO: 1.7 10*3/MM3 (ref 0.7–3.1)
LYMPHOCYTES NFR BLD AUTO: 23.9 % (ref 19.6–45.3)
MCH RBC QN AUTO: 34.6 PG (ref 26.6–33)
MCHC RBC AUTO-ENTMCNC: 34.2 G/DL (ref 31.5–35.7)
MCV RBC AUTO: 101.2 FL (ref 79–97)
MONOCYTES # BLD AUTO: 0.7 10*3/MM3 (ref 0.1–0.9)
MONOCYTES NFR BLD AUTO: 9.2 % (ref 5–12)
NEUTROPHILS NFR BLD AUTO: 4.7 10*3/MM3 (ref 1.7–7)
NEUTROPHILS NFR BLD AUTO: 65.3 % (ref 42.7–76)
NRBC BLD AUTO-RTO: 0.1 /100 WBC (ref 0–0.2)
PLATELET # BLD AUTO: 151 10*3/MM3 (ref 140–450)
PMV BLD AUTO: 10.1 FL (ref 6–12)
POTASSIUM SERPL-SCNC: 4.6 MMOL/L (ref 3.5–5.2)
RBC # BLD AUTO: 2.39 10*6/MM3 (ref 3.77–5.28)
SODIUM SERPL-SCNC: 137 MMOL/L (ref 136–145)
WBC # BLD AUTO: 7.1 10*3/MM3 (ref 3.4–10.8)

## 2021-11-17 PROCEDURE — 80048 BASIC METABOLIC PNL TOTAL CA: CPT | Performed by: NEUROLOGICAL SURGERY

## 2021-11-17 PROCEDURE — 83918 ORGANIC ACIDS TOTAL QUANT: CPT | Performed by: INTERNAL MEDICINE

## 2021-11-17 PROCEDURE — 72100 X-RAY EXAM L-S SPINE 2/3 VWS: CPT

## 2021-11-17 PROCEDURE — 97162 PT EVAL MOD COMPLEX 30 MIN: CPT

## 2021-11-17 PROCEDURE — 82136 AMINO ACIDS QUANT 2-5: CPT | Performed by: INTERNAL MEDICINE

## 2021-11-17 PROCEDURE — 63710000001 ONDANSETRON PER 8 MG: Performed by: NEUROLOGICAL SURGERY

## 2021-11-17 PROCEDURE — 25010000002 ENOXAPARIN PER 10 MG: Performed by: NEUROLOGICAL SURGERY

## 2021-11-17 PROCEDURE — 85025 COMPLETE CBC W/AUTO DIFF WBC: CPT | Performed by: NEUROLOGICAL SURGERY

## 2021-11-17 PROCEDURE — 99232 SBSQ HOSP IP/OBS MODERATE 35: CPT | Performed by: INTERNAL MEDICINE

## 2021-11-17 PROCEDURE — 25010000002 HYDROMORPHONE PER 4 MG: Performed by: NEUROLOGICAL SURGERY

## 2021-11-17 RX ORDER — HYDROCODONE BITARTRATE AND ACETAMINOPHEN 7.5; 325 MG/1; MG/1
2 TABLET ORAL EVERY 4 HOURS PRN
Status: DISCONTINUED | OUTPATIENT
Start: 2021-11-17 | End: 2021-11-18 | Stop reason: HOSPADM

## 2021-11-17 RX ORDER — VERAPAMIL HYDROCHLORIDE 80 MG/1
80 TABLET ORAL EVERY 8 HOURS SCHEDULED
Status: DISCONTINUED | OUTPATIENT
Start: 2021-11-17 | End: 2021-11-18 | Stop reason: HOSPADM

## 2021-11-17 RX ADMIN — ENOXAPARIN SODIUM 40 MG: 40 INJECTION SUBCUTANEOUS at 08:35

## 2021-11-17 RX ADMIN — VERAPAMIL HYDROCHLORIDE 80 MG: 80 TABLET ORAL at 14:39

## 2021-11-17 RX ADMIN — ROFLUMILAST 500 MCG: 500 TABLET ORAL at 06:09

## 2021-11-17 RX ADMIN — GABAPENTIN 300 MG: 300 CAPSULE ORAL at 08:35

## 2021-11-17 RX ADMIN — SODIUM CHLORIDE, POTASSIUM CHLORIDE, SODIUM LACTATE AND CALCIUM CHLORIDE 100 ML/HR: 600; 310; 30; 20 INJECTION, SOLUTION INTRAVENOUS at 04:03

## 2021-11-17 RX ADMIN — SODIUM CHLORIDE, PRESERVATIVE FREE 10 ML: 5 INJECTION INTRAVENOUS at 10:24

## 2021-11-17 RX ADMIN — ACETAMINOPHEN 650 MG: 325 TABLET, FILM COATED ORAL at 04:03

## 2021-11-17 RX ADMIN — HYDROXYCHLOROQUINE SULFATE 200 MG: 200 TABLET ORAL at 17:12

## 2021-11-17 RX ADMIN — HYDROMORPHONE HYDROCHLORIDE 0.5 MG: 2 INJECTION, SOLUTION INTRAMUSCULAR; INTRAVENOUS; SUBCUTANEOUS at 04:02

## 2021-11-17 RX ADMIN — CYCLOBENZAPRINE 10 MG: 10 TABLET, FILM COATED ORAL at 17:12

## 2021-11-17 RX ADMIN — BUPROPION HYDROCHLORIDE 150 MG: 150 TABLET, EXTENDED RELEASE ORAL at 08:35

## 2021-11-17 RX ADMIN — ARIPIPRAZOLE 2 MG: 2 TABLET ORAL at 08:37

## 2021-11-17 RX ADMIN — CYCLOBENZAPRINE 10 MG: 10 TABLET, FILM COATED ORAL at 08:35

## 2021-11-17 RX ADMIN — GABAPENTIN 300 MG: 300 CAPSULE ORAL at 20:49

## 2021-11-17 RX ADMIN — HYDROCODONE BITARTRATE AND ACETAMINOPHEN 1 TABLET: 7.5; 325 TABLET ORAL at 07:29

## 2021-11-17 RX ADMIN — HYDROCODONE BITARTRATE AND ACETAMINOPHEN 2 TABLET: 7.5; 325 TABLET ORAL at 17:12

## 2021-11-17 RX ADMIN — HYDROXYCHLOROQUINE SULFATE 200 MG: 200 TABLET ORAL at 07:29

## 2021-11-17 RX ADMIN — ONDANSETRON HYDROCHLORIDE 4 MG: 4 TABLET, FILM COATED ORAL at 17:12

## 2021-11-17 RX ADMIN — HYDROCODONE BITARTRATE AND ACETAMINOPHEN 2 TABLET: 7.5; 325 TABLET ORAL at 11:37

## 2021-11-17 RX ADMIN — GABAPENTIN 300 MG: 300 CAPSULE ORAL at 14:38

## 2021-11-17 RX ADMIN — DOXYLAMINE SUCCINATE 12.5 MG: 25 TABLET ORAL at 20:49

## 2021-11-17 RX ADMIN — HYDROMORPHONE HYDROCHLORIDE 0.5 MG: 2 INJECTION, SOLUTION INTRAMUSCULAR; INTRAVENOUS; SUBCUTANEOUS at 01:02

## 2021-11-17 RX ADMIN — HYDROMORPHONE HYDROCHLORIDE 0.5 MG: 2 INJECTION, SOLUTION INTRAMUSCULAR; INTRAVENOUS; SUBCUTANEOUS at 10:23

## 2021-11-17 RX ADMIN — SODIUM CHLORIDE, PRESERVATIVE FREE 10 ML: 5 INJECTION INTRAVENOUS at 20:50

## 2021-11-17 RX ADMIN — HYDROCODONE BITARTRATE AND ACETAMINOPHEN 2 TABLET: 7.5; 325 TABLET ORAL at 20:49

## 2021-11-17 RX ADMIN — HYDROMORPHONE HYDROCHLORIDE 0.5 MG: 2 INJECTION, SOLUTION INTRAMUSCULAR; INTRAVENOUS; SUBCUTANEOUS at 14:39

## 2021-11-18 ENCOUNTER — READMISSION MANAGEMENT (OUTPATIENT)
Dept: CALL CENTER | Facility: HOSPITAL | Age: 61
End: 2021-11-18

## 2021-11-18 VITALS
RESPIRATION RATE: 16 BRPM | DIASTOLIC BLOOD PRESSURE: 74 MMHG | HEART RATE: 92 BPM | HEIGHT: 61 IN | OXYGEN SATURATION: 92 % | WEIGHT: 143.52 LBS | SYSTOLIC BLOOD PRESSURE: 112 MMHG | BODY MASS INDEX: 27.1 KG/M2 | TEMPERATURE: 97.8 F

## 2021-11-18 LAB
ANION GAP SERPL CALCULATED.3IONS-SCNC: 11 MMOL/L (ref 5–15)
BASOPHILS # BLD AUTO: 0 10*3/MM3 (ref 0–0.2)
BASOPHILS NFR BLD AUTO: 0.5 % (ref 0–1.5)
BUN SERPL-MCNC: 6 MG/DL (ref 6–20)
BUN/CREAT SERPL: 10 (ref 7–25)
CALCIUM SPEC-SCNC: 8.2 MG/DL (ref 8.6–10.5)
CHLORIDE SERPL-SCNC: 104 MMOL/L (ref 98–107)
CO2 SERPL-SCNC: 24 MMOL/L (ref 22–29)
CREAT SERPL-MCNC: 0.6 MG/DL (ref 0.57–1)
DEPRECATED RDW RBC AUTO: 48.6 FL (ref 37–54)
EOSINOPHIL # BLD AUTO: 0.1 10*3/MM3 (ref 0–0.4)
EOSINOPHIL NFR BLD AUTO: 0.9 % (ref 0.3–6.2)
ERYTHROCYTE [DISTWIDTH] IN BLOOD BY AUTOMATED COUNT: 13.7 % (ref 12.3–15.4)
GFR SERPL CREATININE-BSD FRML MDRD: 102 ML/MIN/1.73
GLUCOSE SERPL-MCNC: 120 MG/DL (ref 65–99)
HCT VFR BLD AUTO: 21.7 % (ref 34–46.6)
HCT VFR BLD AUTO: 26.2 % (ref 34–46.6)
HGB BLD-MCNC: 7.4 G/DL (ref 12–15.9)
HGB BLD-MCNC: 9.2 G/DL (ref 12–15.9)
LYMPHOCYTES # BLD AUTO: 1.4 10*3/MM3 (ref 0.7–3.1)
LYMPHOCYTES NFR BLD AUTO: 16.8 % (ref 19.6–45.3)
MCH RBC QN AUTO: 34.1 PG (ref 26.6–33)
MCHC RBC AUTO-ENTMCNC: 33.9 G/DL (ref 31.5–35.7)
MCV RBC AUTO: 100.4 FL (ref 79–97)
MONOCYTES # BLD AUTO: 0.8 10*3/MM3 (ref 0.1–0.9)
MONOCYTES NFR BLD AUTO: 9.2 % (ref 5–12)
NEUTROPHILS NFR BLD AUTO: 6 10*3/MM3 (ref 1.7–7)
NEUTROPHILS NFR BLD AUTO: 72.6 % (ref 42.7–76)
NRBC BLD AUTO-RTO: 0.1 /100 WBC (ref 0–0.2)
PLATELET # BLD AUTO: 130 10*3/MM3 (ref 140–450)
PMV BLD AUTO: 8.8 FL (ref 6–12)
POTASSIUM SERPL-SCNC: 4 MMOL/L (ref 3.5–5.2)
RBC # BLD AUTO: 2.16 10*6/MM3 (ref 3.77–5.28)
SODIUM SERPL-SCNC: 139 MMOL/L (ref 136–145)
WBC NRBC COR # BLD: 8.3 10*3/MM3 (ref 3.4–10.8)

## 2021-11-18 PROCEDURE — 25010000002 ENOXAPARIN PER 10 MG: Performed by: NEUROLOGICAL SURGERY

## 2021-11-18 PROCEDURE — 97116 GAIT TRAINING THERAPY: CPT

## 2021-11-18 PROCEDURE — 99232 SBSQ HOSP IP/OBS MODERATE 35: CPT | Performed by: INTERNAL MEDICINE

## 2021-11-18 PROCEDURE — 86900 BLOOD TYPING SEROLOGIC ABO: CPT

## 2021-11-18 PROCEDURE — 36430 TRANSFUSION BLD/BLD COMPNT: CPT

## 2021-11-18 PROCEDURE — 80048 BASIC METABOLIC PNL TOTAL CA: CPT | Performed by: INTERNAL MEDICINE

## 2021-11-18 PROCEDURE — 99238 HOSP IP/OBS DSCHRG MGMT 30/<: CPT | Performed by: NURSE PRACTITIONER

## 2021-11-18 PROCEDURE — 85018 HEMOGLOBIN: CPT | Performed by: INTERNAL MEDICINE

## 2021-11-18 PROCEDURE — 85025 COMPLETE CBC W/AUTO DIFF WBC: CPT | Performed by: INTERNAL MEDICINE

## 2021-11-18 PROCEDURE — 85014 HEMATOCRIT: CPT | Performed by: INTERNAL MEDICINE

## 2021-11-18 PROCEDURE — P9016 RBC LEUKOCYTES REDUCED: HCPCS

## 2021-11-18 RX ORDER — CYCLOBENZAPRINE HCL 10 MG
10 TABLET ORAL 3 TIMES DAILY PRN
Qty: 90 TABLET | Refills: 0 | Status: SHIPPED | OUTPATIENT
Start: 2021-11-18 | End: 2021-11-21

## 2021-11-18 RX ORDER — HYDROCODONE BITARTRATE AND ACETAMINOPHEN 7.5; 325 MG/1; MG/1
2 TABLET ORAL EVERY 6 HOURS PRN
Qty: 60 TABLET | Refills: 0 | Status: SHIPPED | OUTPATIENT
Start: 2021-11-18 | End: 2022-01-03 | Stop reason: SDUPTHER

## 2021-11-18 RX ORDER — HYDROCODONE BITARTRATE AND ACETAMINOPHEN 7.5; 325 MG/1; MG/1
2 TABLET ORAL EVERY 6 HOURS PRN
Qty: 60 TABLET | Refills: 0 | Status: SHIPPED | OUTPATIENT
Start: 2021-11-18 | End: 2021-11-19 | Stop reason: SDUPTHER

## 2021-11-18 RX ORDER — CYCLOBENZAPRINE HCL 10 MG
10 TABLET ORAL 3 TIMES DAILY PRN
Qty: 90 TABLET | Refills: 0 | Status: SHIPPED | OUTPATIENT
Start: 2021-11-18 | End: 2021-11-18 | Stop reason: SDUPTHER

## 2021-11-18 RX ADMIN — VERAPAMIL HYDROCHLORIDE 80 MG: 80 TABLET ORAL at 05:51

## 2021-11-18 RX ADMIN — ROFLUMILAST 500 MCG: 500 TABLET ORAL at 05:51

## 2021-11-18 RX ADMIN — ENOXAPARIN SODIUM 40 MG: 40 INJECTION SUBCUTANEOUS at 14:12

## 2021-11-18 RX ADMIN — HYDROCODONE BITARTRATE AND ACETAMINOPHEN 2 TABLET: 7.5; 325 TABLET ORAL at 01:04

## 2021-11-18 RX ADMIN — HYDROXYCHLOROQUINE SULFATE 200 MG: 200 TABLET ORAL at 08:43

## 2021-11-18 RX ADMIN — SODIUM CHLORIDE, PRESERVATIVE FREE 10 ML: 5 INJECTION INTRAVENOUS at 08:43

## 2021-11-18 RX ADMIN — HYDROCODONE BITARTRATE AND ACETAMINOPHEN 2 TABLET: 7.5; 325 TABLET ORAL at 14:13

## 2021-11-18 RX ADMIN — HYDROCODONE BITARTRATE AND ACETAMINOPHEN 2 TABLET: 7.5; 325 TABLET ORAL at 05:51

## 2021-11-18 RX ADMIN — GABAPENTIN 300 MG: 300 CAPSULE ORAL at 08:43

## 2021-11-18 RX ADMIN — HYDROCODONE BITARTRATE AND ACETAMINOPHEN 2 TABLET: 7.5; 325 TABLET ORAL at 10:13

## 2021-11-18 RX ADMIN — ARIPIPRAZOLE 2 MG: 2 TABLET ORAL at 08:43

## 2021-11-18 RX ADMIN — BUPROPION HYDROCHLORIDE 150 MG: 150 TABLET, EXTENDED RELEASE ORAL at 08:43

## 2021-11-18 RX ADMIN — CYCLOBENZAPRINE 10 MG: 10 TABLET, FILM COATED ORAL at 03:01

## 2021-11-18 NOTE — OUTREACH NOTE
Prep Survey      Responses   [facility] patient discharged from? [facility]   Is LACE score < 7 ? No   Emergency Room discharge w/ pulse ox? No   Eligibility [facility]   Date of Admission 11/16/21   Date of Discharge 11/18/21   Discharge Disposition Home or Self Care   Discharge diagnosis L4-5 LUMBAR LAMINECTOMY TRANSFORAMINAL LUMBAR INTERBODY FUSION,  L2-3 laminectomy and facetectomy,  removal of hardware L3-4,  L2-5 fusion   Does the patient have one of the following disease processes/diagnoses(primary or secondary)? General Surgery   Does the patient have Home health ordered? Yes   What is the Home health agency?  [facility]   Is there a DME ordered? Yes   What DME was ordered? [employer] - walker   Prep survey completed? Yes          Marietta Mcnair RN

## 2021-11-19 ENCOUNTER — TRANSITIONAL CARE MANAGEMENT TELEPHONE ENCOUNTER (OUTPATIENT)
Dept: CALL CENTER | Facility: HOSPITAL | Age: 61
End: 2021-11-19

## 2021-11-19 DIAGNOSIS — J44.9 CHRONIC OBSTRUCTIVE PULMONARY DISEASE, UNSPECIFIED COPD TYPE (HCC): Primary | ICD-10-CM

## 2021-11-19 LAB
BH BB BLOOD EXPIRATION DATE: NORMAL
BH BB BLOOD TYPE BARCODE: 5100
BH BB DISPENSE STATUS: NORMAL
BH BB PRODUCT CODE: NORMAL
BH BB UNIT NUMBER: NORMAL
CROSSMATCH INTERPRETATION: NORMAL
UNIT  ABO: NORMAL
UNIT  RH: NORMAL

## 2021-11-19 RX ORDER — NEBULIZER ACCESSORIES
1 KIT MISCELLANEOUS EVERY 4 HOURS PRN
Qty: 1 EACH | Refills: 1 | Status: SHIPPED | OUTPATIENT
Start: 2021-11-19 | End: 2021-11-19

## 2021-11-19 NOTE — OUTREACH NOTE
Call Center TCM Note      Responses   [facility] patient discharged from? [facility]   Does the patient have one of the following disease processes/diagnoses(primary or secondary)? General Surgery   TCM attempt successful? Yes   Call start time 1017   Call end time 1029   Discharge diagnosis L4-5 LUMBAR LAMINECTOMY TRANSFORAMINAL LUMBAR INTERBODY FUSION,  L2-3 laminectomy and facetectomy,  removal of hardware L3-4,  L2-5 fusion, COPD   Is patient permission given to speak with other caregiver? No   Meds reviewed with patient/caregiver? Yes   Does the patient have all medications related to this admission filled (includes all antibiotics, pain medications, etc.) Yes   Is the patient taking all medications as directed (includes completed medication regime)? Yes   Does the patient have a follow up appointment scheduled with their surgeon? Yes  [Post op appt 11/30/21  1030am]   Has the patient kept scheduled appointments due by today? N/A   Comments Mitzy Rea MD PCP. Patient has office visit appt currently in place for 12/17/21. Patient declines earlier appt.    What is the Home health agency?  [facility]   Has home health visited the patient within 72 hours of discharge? Call prior to 72 hours   Home health comments Patient reports HH scheduled to come Monday.    What DME was ordered? [employer] - walker   Has all DME been delivered? Yes   DME comments Patient reports that her nebulizer is broken, >10 years old and needs a new one.    Psychosocial issues? No   Did the patient receive a copy of their discharge instructions? Yes   Nursing interventions Reviewed instructions with patient   What is the patient's perception of their health status since discharge? Improving   Nursing interventions Nurse provided patient education   Is the patient /caregiver able to teach back basic post-op care? Continue use of incentive spirometry at least 1 week post discharge,  Practice 'cough and deep breath',  No tub  bath, swimming, or hot tub until instructed by MD,  Take showers only when approved by MD-sponge bathe until then,  Keep incision areas clean,dry and protected,  Lifting as instructed by MD in discharge instructions   Is the patient/caregiver able to teach back signs and symptoms of incisional infection? Increased redness, swelling or pain at the incisonal site,  Increased drainage or bleeding,  Fever   Is the patient/caregiver able to teach back steps to recovery at home? Set small, achievable goals for return to baseline health,  Rest and rebuild strength, gradually increase activity   If the patient is a current smoker, are they able to teach back resources for cessation? Not a smoker   Is the patient/caregiver able to teach back the hierarchy of who to call/visit for symptoms/problems? PCP, Specialist, Home health nurse, Urgent Care, ED, 911 Yes   TCM call completed? Yes   Wrap up additional comments Only need identified today is need for new nebulizer machine. Patient reports that she does have plenty of albuterol to use in machine. Reports that she needs machine and all tubing accessories.           Iris Lovell RN    11/19/2021, 10:29 EST

## 2021-11-20 DIAGNOSIS — M79.7 FIBROMYALGIA: ICD-10-CM

## 2021-11-21 RX ORDER — ROFLUMILAST 500 UG/1
TABLET ORAL
Qty: 90 TABLET | Refills: 3 | Status: SHIPPED | OUTPATIENT
Start: 2021-11-21 | End: 2022-07-12 | Stop reason: SDUPTHER

## 2021-11-21 RX ORDER — POTASSIUM CHLORIDE 750 MG/1
TABLET, EXTENDED RELEASE ORAL
Qty: 270 TABLET | Refills: 1 | OUTPATIENT
Start: 2021-11-21

## 2021-11-21 RX ORDER — CYCLOBENZAPRINE HCL 10 MG
10 TABLET ORAL 3 TIMES DAILY PRN
Qty: 270 TABLET | Refills: 0 | Status: SHIPPED | OUTPATIENT
Start: 2021-11-21 | End: 2022-01-25

## 2021-11-21 RX ORDER — TIZANIDINE 2 MG/1
2 TABLET ORAL 2 TIMES DAILY PRN
Qty: 180 TABLET | Refills: 0 | OUTPATIENT
Start: 2021-11-21

## 2021-11-22 ENCOUNTER — TELEPHONE (OUTPATIENT)
Dept: FAMILY MEDICINE CLINIC | Facility: CLINIC | Age: 61
End: 2021-11-22

## 2021-11-22 NOTE — TELEPHONE ENCOUNTER
I put in the order for the nebulizer and tubing.  The other supplies should really come from her surgeon since he did surgery.

## 2021-11-22 NOTE — TELEPHONE ENCOUNTER
Caller: Diya Arreola    Relationship: Self    Best call back number:[phone]    Equipment requested: NEBULIZER, TUBING ETC,    [employer]LINE READY BATH BATHING CLOTHES. (REF # [id number]) 10-15 PACKS     [employer] BORDERED GAUZE ADHESIVE  WOUND DRESSING 4'X8' (LOT # [id number])    BODY WASH    GAUZE SPONGES PK  OF 10 (REF # [id number]) QTY 10 PACKS OF 10    WATERPROOF ADHESIVE PAPER TAPE     ICE BAG LARGE (REF# [id number]) DISPOSABLE BAGS QTY 20    Reason for the request: PATIENT NEEDS ITEMS FOR WOUNDS AFTER SURGERY.    Prescribing Provider: ANDREW BERRY (AFTER HOSP. CARE)    Additional information or concerns: PATIENT STATED THAT UNABLE TO GET TUBING AND NEBULIZER FROM [facility] IN [place].  [facility] WILL DELIVER AND PATIENT REQUESTING ITEMS ABOVE TO BE DELIVERED WITH IN THE NEXT DAY OR SO... PATIENT HAS TWO DAYS OF ITEMS FROM HOSPITAL.

## 2021-11-26 LAB
2ME-CITRATE SERPL-SCNC: 91 NMOL/L (ref 60–228)
CYSTATHIONIN SERPL-SCNC: 188 NMOL/L (ref 44–342)
HCYS SERPL-SCNC: 7.1 UMOL/L (ref 5.1–13.9)
Lab: NORMAL
Lab: NORMAL
METHYLMALONATE SERPL-SCNC: 169 NMOL/L (ref 0–378)

## 2021-11-27 ENCOUNTER — READMISSION MANAGEMENT (OUTPATIENT)
Dept: CALL CENTER | Facility: HOSPITAL | Age: 61
End: 2021-11-27

## 2021-11-27 NOTE — OUTREACH NOTE
General Surgery Week 2 Survey      Responses   [facility] patient discharged from? [facility]   Does the patient have one of the following disease processes/diagnoses(primary or secondary)? General Surgery   Week 2 attempt successful? No   Unsuccessful attempts Attempt 1          Michelle Nguyen RN

## 2021-11-29 ENCOUNTER — TELEPHONE (OUTPATIENT)
Dept: PAIN MEDICINE | Facility: CLINIC | Age: 61
End: 2021-11-29

## 2021-11-29 DIAGNOSIS — G89.4 CHRONIC PAIN SYNDROME: Primary | ICD-10-CM

## 2021-11-29 DIAGNOSIS — Z98.1 S/P LUMBAR FUSION: ICD-10-CM

## 2021-11-29 DIAGNOSIS — F32.A DEPRESSION, UNSPECIFIED DEPRESSION TYPE: ICD-10-CM

## 2021-11-29 RX ORDER — BUPROPION HYDROCHLORIDE 150 MG/1
TABLET ORAL
Qty: 90 TABLET | Refills: 1 | Status: SHIPPED | OUTPATIENT
Start: 2021-11-29 | End: 2022-07-12 | Stop reason: SDUPTHER

## 2021-11-29 RX ORDER — HYDROCODONE BITARTRATE AND ACETAMINOPHEN 7.5; 325 MG/1; MG/1
1 TABLET ORAL 4 TIMES DAILY PRN
Qty: 120 TABLET | Refills: 0 | Status: SHIPPED | OUTPATIENT
Start: 2021-12-06 | End: 2022-01-03 | Stop reason: SDUPTHER

## 2021-11-29 RX ORDER — HYDROCODONE BITARTRATE AND ACETAMINOPHEN 7.5; 325 MG/1; MG/1
2 TABLET ORAL EVERY 6 HOURS PRN
Qty: 60 TABLET | Refills: 0 | Status: CANCELLED | OUTPATIENT
Start: 2021-11-29

## 2021-11-29 NOTE — PROGRESS NOTES
"Subjective   History of Present Illness: Diya Arreola is a 61 y.o. female is here today for scheduled post op follow-up.Pt underwent Post L4-L5 decompression with Dr. Gorman on 11/16/2021. Pt originally presented with  a history of L3-4 TLIF performed several years ago who developed severe adjacent segment disease at L2-3 and L4-5.  She also had spondylolisthesis and dynamic instability at L4-5.  She failed conservative measures.       Patient doing very well with expected postoperative incisional pain.  She has no acute complaints and feels like she is ambulating better and her leg pain is much better than it was prior to her surgery.  She does report some drainage that was originally bloody but now clear that was seeping from the top of the incision.  Her  has been cleaning it and has noticed the drainage has slowed down quite a bit.  Patient reports no fever, chills, shortness of air, chest pain, leg swelling, incisional swelling or redness.    History of Present Illness    The following portions of the patient's history were reviewed and updated as appropriate: allergies, current medications, past family history, past medical history, past social history, past surgical history and problem list.    Review of Systems   Constitutional: Positive for activity change.   Respiratory: Negative.  Negative for chest tightness and shortness of breath.    Cardiovascular: Negative.  Negative for chest pain.   Musculoskeletal: Positive for arthralgias, back pain, gait problem and myalgias.        Bilateral leg pain   Neurological: Positive for numbness ( bilateral hands w/tingling).   All other systems reviewed and are negative.      Objective     ./74   Pulse 104   Temp 97.8 °F (36.6 °C) (Oral)   Resp 18   Ht 154.9 cm (61\")   Wt 64.9 kg (143 lb)   SpO2 96%   BMI 27.02 kg/m²    Body mass index is 27.02 kg/m².      Physical Exam  Neurologic Exam  Patient is alert and oriented x3  No focal motor deficits " lower extremities  Incision is well approximated with slight oozing of clear fluid at top of incisional area.  No defined fluid collection palpated but palpation does cause slight emanation of fluid.  No redness noted.    Assessment/Plan   Independent Review of Radiographic Studies:      I personally reviewed the images from the following studies.    No new imaging.    Medical Decision Making:      Diya Funes a 61 y.o. female  Patient is two weeks status post L4-L5 LAMI/TILF.  Patient doing fairly well with expected postoperative incisional pain.  She still is reporting some hip and leg soreness but much better than it was prior to her surgery.  She describes no weakness.  She did have what is felt to be small seroma at top of incisional area responsible for slight oozing of clear fluid.  This is not felt to be infectious at this time.  She has no complaints of fever, chills, increased back pain, headache.  I did clean the area with sterile saline and painted with Betadine.  I did instruct them to paint incisional area twice a day with Betadine and leave open to air.  I also explained that if patient should develop fever,increased back pain, chills or if the incision becomes reddened or swollen or drainage significantly increases to contact the office for further recommendations.  They said they would do that.  I did give them a bottle of Betadine and some gauze to do this.  Patient is scheduled to start physical therapy and Occupational Therapy.  We will follow patient back up in 3 months with Dr. Gorman with repeat imaging.      Procedures      Diagnoses and all orders for this visit:    1. S/P lumbar fusion (Primary)  -     CT Lumbar Spine Without Contrast; Future    2. Encounter for postoperative care      Return in about 3 months (around 2/28/2022).    This patient was examined wearing appropriate personal protective equipment.     Diya Arreola  reports that she quit smoking about 4 weeks ago. Her  smoking use included cigarettes. She has a 19.50 pack-year smoking history. She has never used smokeless tobacco..      Patient's Body mass index is 27.02 kg/m². indicating that she is overweight (BMI 25-29.9). Obesity-related health conditions include the following: dyslipidemias. Obesity is unchanged. BMI is is above average; BMI management plan is completed.  Recommendations for portion control and increasing exercise..      I have spent 15 minutes in direct communication with patient discussing post operative expectations and activity recommendations/restrictions including walking, lifting restrictions and limiting bending and twisting movements. Patient agrees to follow with plan.         Patient's blood pressure was reviewed.  Recommendations for  a low-salt diet and exercise to maintain/improve BP in addition to taking any presribed medications.           Gillian Eugene, APRN  11/30/21  11:39 EST

## 2021-11-29 NOTE — TELEPHONE ENCOUNTER
Caller: Diya Arreola    Relationship: Self    Best call back number: [phone] ([phone]    What medications are you currently taking:   HYDROCODONE     Which medication are you concerned about: HYDROCODONE    Who prescribed you this medication: DR. MURRAY    What are your concerns: PT HAD SURGERY ON 11/18, PT HAD TO USE HER November RX FROM DR. MURRAY AND DOUBLE UP ON IT DUE TO [facility] WOULD NOT FILL THE RX THAT HER SURGEON GAVE HER.  SHE WOULD LIKE A CALL BACK TO DISCUSS.

## 2021-11-29 NOTE — TELEPHONE ENCOUNTER
She will fill her next prescription from me on December 6 as planned.  Looks like she did fill hydrocodone after surgery on 11/27.

## 2021-11-30 ENCOUNTER — OFFICE VISIT (OUTPATIENT)
Dept: NEUROSURGERY | Facility: CLINIC | Age: 61
End: 2021-11-30

## 2021-11-30 VITALS
HEIGHT: 61 IN | RESPIRATION RATE: 18 BRPM | BODY MASS INDEX: 27 KG/M2 | HEART RATE: 104 BPM | WEIGHT: 143 LBS | DIASTOLIC BLOOD PRESSURE: 74 MMHG | OXYGEN SATURATION: 96 % | SYSTOLIC BLOOD PRESSURE: 130 MMHG | TEMPERATURE: 97.8 F

## 2021-11-30 DIAGNOSIS — Z98.1 S/P LUMBAR FUSION: Primary | ICD-10-CM

## 2021-11-30 DIAGNOSIS — Z48.89 ENCOUNTER FOR POSTOPERATIVE CARE: ICD-10-CM

## 2021-11-30 PROCEDURE — 99024 POSTOP FOLLOW-UP VISIT: CPT | Performed by: NURSE PRACTITIONER

## 2021-12-02 ENCOUNTER — TELEPHONE (OUTPATIENT)
Dept: FAMILY MEDICINE CLINIC | Facility: CLINIC | Age: 61
End: 2021-12-02

## 2021-12-02 NOTE — TELEPHONE ENCOUNTER
[facility] MONTEZ RECEIVED A ORDER FOR A NEBULIZER BUT IS REQUESTING THE APPOINTMENT NOTES WHERE PATIENT'S COPD WAS DISCUSSED. PLEASE ADVISE.     FAX: [phone]

## 2021-12-06 DIAGNOSIS — M79.7 FIBROMYALGIA: ICD-10-CM

## 2021-12-06 RX ORDER — TIZANIDINE 2 MG/1
2 TABLET ORAL 2 TIMES DAILY PRN
Qty: 180 TABLET | Refills: 0 | OUTPATIENT
Start: 2021-12-06

## 2021-12-08 ENCOUNTER — TELEPHONE (OUTPATIENT)
Dept: NEUROSURGERY | Facility: CLINIC | Age: 61
End: 2021-12-08

## 2021-12-08 NOTE — TELEPHONE ENCOUNTER
Replied to their email and they replied back indicating that they understood that I received the images which were forwarded to du,

## 2021-12-08 NOTE — TELEPHONE ENCOUNTER
Caller: ASHLEY BUTLER    Relationship: Emergency Contact    Best call back number: [phone]    What is the best time to reach you: ANYTIME    Who are you requesting to speak with (clinical staff, provider,  specific staff member): CARROLL    What was the call regarding: POST OP INCISION EMAIL    Do you require a callback: YES - PLEASE CB TO CONFIRM IMAGES WERE RECEIVED FROM PATIENT'S  ([email or web address]). THANK YOU.

## 2021-12-08 NOTE — TELEPHONE ENCOUNTER
1/4 in long opening at the top of the incision a stitch is poking out and theres a constant trickle that at times is running down her back. Light pink in color, no odor, fevers, redness or swelling. Advised patient to send email with a picture. Also sent patient a mychart email.

## 2021-12-08 NOTE — TELEPHONE ENCOUNTER
The fluid can come out and preferred as I believe this was a postoperative seroma.  The stitch should be dissolving.

## 2021-12-08 NOTE — TELEPHONE ENCOUNTER
Called patient and let them know that PARMINDER Ford reviewed the photographs.    1. The stitch is supposed to be coming out.   2. The fluid appears to be seroma and is okay as long as it doesn't become cloudy and/or smells bad.    Gillian prefers that the incision isn't covered, however if the patient feels like they need to because of the drainage then they can lightly cover it, but make sure that the site can breathe and isn't sealed off. The patient is also to continue using iodine twice a day.    Patient indicated in earlier conversation that they are unable to travel to the office right now so she does not need to make an appointment.    The patient indicated that she understood.

## 2021-12-10 ENCOUNTER — TELEPHONE (OUTPATIENT)
Dept: FAMILY MEDICINE CLINIC | Facility: CLINIC | Age: 61
End: 2021-12-10

## 2021-12-10 NOTE — TELEPHONE ENCOUNTER
They are working to get her nebulizer approved, but need an ov on or before 11/19 that documents her COPD. Fax to [phone]

## 2021-12-15 ENCOUNTER — TELEPHONE (OUTPATIENT)
Dept: FAMILY MEDICINE CLINIC | Facility: CLINIC | Age: 61
End: 2021-12-15

## 2021-12-15 NOTE — TELEPHONE ENCOUNTER
Caller: TANIA (GONZALEZ MONTEZ)    Relationship to patient:     Best call back number:[phone]    Patient is needing: TANIA FROM YIN MONTEZ CALLING IN REGARDS TO NEEDING A SIGNED OFFICE NOTE FROM September 17/21 EITHER FAXED OR ELECTRONICALLY SENT TO OFFICE FOR PATIENT TO RECEIVE A NEBULIZER    TANIA STATES RECEIVED OFFICE NOTES BUT WERE NOT SIGNED      F#[phone]

## 2021-12-17 ENCOUNTER — TELEMEDICINE (OUTPATIENT)
Dept: FAMILY MEDICINE CLINIC | Facility: CLINIC | Age: 61
End: 2021-12-17

## 2021-12-17 ENCOUNTER — DOCUMENTATION (OUTPATIENT)
Dept: NEUROSURGERY | Facility: CLINIC | Age: 61
End: 2021-12-17

## 2021-12-17 ENCOUNTER — TELEPHONE (OUTPATIENT)
Dept: NEUROSURGERY | Facility: CLINIC | Age: 61
End: 2021-12-17

## 2021-12-17 DIAGNOSIS — M54.50 ACUTE LOW BACK PAIN DUE TO TRAUMA: ICD-10-CM

## 2021-12-17 DIAGNOSIS — G89.11 ACUTE LOW BACK PAIN DUE TO TRAUMA: ICD-10-CM

## 2021-12-17 DIAGNOSIS — R53.1 WEAKNESS: ICD-10-CM

## 2021-12-17 DIAGNOSIS — Z98.1 S/P LUMBAR FUSION: ICD-10-CM
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
## 2021-12-17 DIAGNOSIS — M54.50 ACUTE LOW BACK PAIN DUE TO TRAUMA: Primary | ICD-10-CM

## 2021-12-17 DIAGNOSIS — M48.061 SPINAL STENOSIS OF LUMBAR REGION WITHOUT NEUROGENIC CLAUDICATION: Primary | ICD-10-CM

## 2021-12-17 DIAGNOSIS — G89.11 ACUTE LOW BACK PAIN DUE TO TRAUMA: Primary | ICD-10-CM

## 2021-12-17 PROCEDURE — 99213 OFFICE O/P EST LOW 20 MIN: CPT | Performed by: FAMILY MEDICINE

## 2021-12-17 RX ORDER — EPINEPHRINE 0.3 MG/.3ML
0.3 INJECTION SUBCUTANEOUS ONCE
Qty: 1 EACH | Refills: 1 | Status: SHIPPED | OUTPATIENT
Start: 2021-12-17 | End: 2021-12-17

## 2021-12-17 NOTE — PROGRESS NOTES
Chief Complaint  Back Pain    You have chosen to receive care through a telehealth visit.  Do you consent to use a video/audio connection for your medical care today? Yes    Patient presents during the COVID-19 pandemic/federally declared state of public health emergency.  This service was conducted via [employer] real-time audio/visual. I am at my office and the patient is at her home.       Subjective          Diya Arreola presents to [facility]  She had spine surgery with Dr. Gorman on 11/16/21 - L4-5 LUMBAR LAMINECTOMY TRANSFORAMINAL LUMBAR INTERBODY FUSION with placement of globus interbody spacer; L2-3 laminectomy and facetectomy; removal of hardware L3-4; L2-5 instrumented fusion, L2-5 posterior lateral fusion with allograft and autograft and iFactor, use of neuro navigation and spinal robotics    She reports that she is recovering slowly.  She is using a walker and is doing home physical therapy 1-2 times per week.  She fell at home 2 nights ago while trying to get to the bathroom.  She would like an order for a bedside commode.     Back Pain  This is a chronic problem. The current episode started more than 1 year ago. The problem occurs constantly. The problem has been waxing and waning since onset. The pain is present in the lumbar spine. The quality of the pain is described as aching.       Objective   Vital Signs:   There were no vitals taken for this visit.    Physical Exam  Pulmonary:      Effort: Pulmonary effort is normal.   Neurological:      Mental Status: She is alert.   Psychiatric:         Mood and Affect: Mood normal.        Result Review :   The following data was reviewed by: Mitzy Rea MD on 12/17/2021:  Common labs    Common Labsle 11/16/21 11/17/21 11/17/21 11/18/21 11/18/21 11/18/21     0136 [id number] 0251 0251 0950   Glucose   113 (A)  120 (A)    BUN   11  6    Creatinine   0.64  0.60    eGFR Non African Am   95  102    Sodium   137  139    Potassium    4.6  4.0    Chloride   103  104    Calcium   8.3 (A)  8.2 (A)    WBC  7.10  8.30     Hemoglobin 9.8 (A) 8.3 (A)  7.4 (A)  9.2 (A)   Hematocrit 29.1 (A) 24.2 (A)  21.7 (A)  26.2 (A)   Platelets  151  130 (A)     (A) Abnormal value       Comments are available for some flowsheets but are not being displayed.                     Assessment and Plan    Diagnoses and all orders for this visit:    1. Spinal stenosis of lumbar region without neurogenic claudication (Primary)    2. S/P lumbar fusion  -     Misc. Devices (Commode Bedside) misc; For use at home while recovering from surgery  Dispense: 1 each; Refill: 0    Other orders  -     EPINEPHrine (EPIPEN) 0.3 MG/0.3ML solution auto-injector injection; Inject 0.3 mL under the skin into the appropriate area as directed 1 (One) Time for 1 dose.  Dispense: 1 each; Refill: 1        Follow Up   No follow-ups on file.  Patient was given instructions and counseling regarding her condition or for health maintenance advice. Please see specific information pulled into the AVS if appropriate.

## 2021-12-17 NOTE — TELEPHONE ENCOUNTER
Per Gillian, okay to order and xray instead. Called the patient to let her know to get the xray. She indicated that she didn't feel the need because she thinks she pulled a muscle. I explained to her that since she had a fall it's important to check her hardware and she agreed to get the xray.

## 2021-12-17 NOTE — TELEPHONE ENCOUNTER
Just wanted to update you that Nora is attempting to get the CT authorized, however she is having a difficult time.

## 2021-12-17 NOTE — TELEPHONE ENCOUNTER
"Reports that patient had two unwitnessed falls due to her \"legs giving out on her\" while trying to get to the bathroom at night on 12/16.      Pt refused emergency care. She is complaining of pain in the lumbar back region with movement.  Pt reports pain as an 8/10. No bruising noted by staff. No red flags reported to staff by the patient.     Pt did not request that staff contact us, however they wanted to check and see if we wanted them to do anything.    "

## 2021-12-20 ENCOUNTER — HOSPITAL ENCOUNTER (OUTPATIENT)
Dept: GENERAL RADIOLOGY | Facility: HOSPITAL | Age: 61
Discharge: HOME | End: 2021-12-20
Admitting: NEUROLOGICAL SURGERY

## 2021-12-20 DIAGNOSIS — G89.11 ACUTE LOW BACK PAIN DUE TO TRAUMA: ICD-10-CM

## 2021-12-20 DIAGNOSIS — M54.50 ACUTE LOW BACK PAIN DUE TO TRAUMA: ICD-10-CM

## 2021-12-20 PROCEDURE — 72100 X-RAY EXAM L-S SPINE 2/3 VWS: CPT

## 2021-12-21 ENCOUNTER — TELEPHONE (OUTPATIENT)
Dept: FAMILY MEDICINE CLINIC | Facility: CLINIC | Age: 61
End: 2021-12-21

## 2021-12-21 NOTE — TELEPHONE ENCOUNTER
Caller: ESTEBAN    Relationship: Other    Best call back number:    What form or medical record are you requesting:     OFFICE NOTE FROM 9/17/2021 - FOR A NEBULISER      Additional notes:   THEY RECEIVED AN OFFICE NOTE FROM 9/17/2021 HOWEVER IT WAS NOT SIGNED BY THE PCP.  THEY NEED A SIGNED NOTE PLEASE.  IT CAN BE FAXED TO THE FOLLOWING NUMBER:    FAX [phone]

## 2022-01-03 ENCOUNTER — OFFICE VISIT (OUTPATIENT)
Dept: PAIN MEDICINE | Facility: CLINIC | Age: 62
End: 2022-01-03

## 2022-01-03 VITALS
OXYGEN SATURATION: 99 % | DIASTOLIC BLOOD PRESSURE: 88 MMHG | SYSTOLIC BLOOD PRESSURE: 139 MMHG | HEART RATE: 115 BPM | BODY MASS INDEX: 27 KG/M2 | RESPIRATION RATE: 16 BRPM | WEIGHT: 143 LBS | HEIGHT: 61 IN

## 2022-01-03 DIAGNOSIS — M96.1 POSTLAMINECTOMY SYNDROME OF LUMBAR REGION: ICD-10-CM

## 2022-01-03 DIAGNOSIS — Z79.899 HIGH RISK MEDICATION USE: ICD-10-CM

## 2022-01-03 DIAGNOSIS — G89.4 CHRONIC PAIN SYNDROME: Primary | ICD-10-CM

## 2022-01-03 DIAGNOSIS — M96.1 POSTLAMINECTOMY SYNDROME OF CERVICAL REGION: ICD-10-CM

## 2022-01-03 DIAGNOSIS — Z98.1 S/P LUMBAR FUSION: ICD-10-CM

## 2022-01-03 PROCEDURE — 99214 OFFICE O/P EST MOD 30 MIN: CPT | Performed by: ANESTHESIOLOGY

## 2022-01-03 RX ORDER — HYDROCODONE BITARTRATE AND ACETAMINOPHEN 7.5; 325 MG/1; MG/1
1 TABLET ORAL 4 TIMES DAILY PRN
Qty: 120 TABLET | Refills: 0 | Status: SHIPPED | OUTPATIENT
Start: 2022-01-05 | End: 2022-03-03 | Stop reason: SDUPTHER

## 2022-01-03 RX ORDER — TRAZODONE HYDROCHLORIDE 100 MG/1
TABLET ORAL
COMMUNITY
Start: 2021-12-03 | End: 2022-11-18

## 2022-01-03 RX ORDER — EPINEPHRINE 0.3 MG/.3ML
INJECTION SUBCUTANEOUS
COMMUNITY
Start: 2021-12-17 | End: 2023-01-09

## 2022-01-03 RX ORDER — HYDROCODONE BITARTRATE AND ACETAMINOPHEN 7.5; 325 MG/1; MG/1
1 TABLET ORAL 4 TIMES DAILY PRN
Qty: 120 TABLET | Refills: 0 | Status: SHIPPED | OUTPATIENT
Start: 2022-02-04 | End: 2022-03-03 | Stop reason: SDUPTHER

## 2022-01-03 NOTE — PROGRESS NOTES
Subjective    CC back, leg pain, neck pain  Diya Arreola is a 61 y.o. female with chronic neck pain status post ACDF, chronic back pain status post Fusion L3-5, here for follow-up.    status post L4-L5 LAMI/TILF 2021/[facility].  Recovering well, was prescribed hydrocodone postop.  Started home PT.  Chronic back pain upper lumbar and lower lumbar radiating to bilateral hips bilateral lower extremity worse with standing walking twisting or any activity.  Denies new injury, saddle anesthesia bladder bowel continence.  Chronic neck pain, radiating to bilateral shoulder limited range of motion in the neck and significant paraspinal muscle spasm bilateral UE radicular pain.  Denies balance issues.    Chronic tremors, history of polyarthralgia/lupus sees rheumatology.  Pain interfering with ADL.  Tried physical therapy without relief.  Seen neurosurgery, referred to try injection.        C-spine MRI  probably a plate on the anterior aspect of the spine at the C4-5 level. mild subluxation with degenerative disc disease at C3-4, causing a mild central canal stenosis. Left-sided disc extrusion at C6-7 with left foraminal impingement.   L-spine MRI  postsurgical changes L3-L4 with pedicle screw L3.  Retrolisthesis L2 on L3 moderate central lateral canal stenosis.  Grade 1 anterolisthesis L4-L5.  Degenerative changes throughout.  L-spine x-ray : 4 mm anterolisthesis on extension, 6 mm anterolisthesis on flexion, at the L4-5 level. Stable spinal fusion changes at L3-4.. Severe degenerative disc and endplate changes at L2-3, similar to  2018 examination.    Pain Assessment   Location of Pain: Lower Back, R Hip, L Hip, L Leg, neck pain, joint  Description of Pain: Dull/Aching, Throbbing, Stabbing  Previous Pain Rating :6  Current Pain Ratin  Aggravating Factors: Activity  Alleviating Factors: Rest, Medication    PEG Assessment   What number best describes your pain on average in the past week?9  What  number best describes how, during the past week, pain has interfered with your enjoyment of life?6  What number best describes how, during the past week, pain has interfered with your general activity?10      The following portions of the patient's history were reviewed and updated as appropriate: allergies, current medications, past family history, past medical history, past social history, past surgical history and problem list.     has a past medical history of Allergic, Anxiety, Arthritis, Asthma, Constipation, COPD (chronic obstructive pulmonary disease) ([facility]), Fall, Fibromyalgia, primary, Gastritis, History of pancreatitis, Low back pain (2021), Lupus ([facility]), Neuropathy, Osteopenia, PONV (postoperative nausea and vomiting), Rheumatoid arthritis ([facility]), Sjogren's syndrome ([facility]), Tachycardia, and TIA (transient ischemic attack) (2014).   has a past surgical history that includes Cholecystectomy; Hysterectomy;  section; Cystocele repair; Carpal tunnel release; Cardiac catheterization; Spine surgery; Colonoscopy (10/08/2013); Epidural block injection; Back surgery; and Lumbar fusion (N/A, 2021).  family history includes Diabetes in her father; Heart disease in her brother; Hypertension in her brother and sister; Osteoporosis in her maternal grandmother; Stroke in her sister.  Social History     Tobacco Use   • Smoking status: Current Every Day Smoker     Packs/day: 1.00     Years: 39.00     Pack years: 39.00     Types: Cigarettes     Last attempt to quit: 2021     Years since quittin.1   • Smokeless tobacco: Never Used   Substance Use Topics   • Alcohol use: No     Review of Systems   Musculoskeletal: Positive for arthralgias, back pain and neck pain.   Neurological: Positive for tremors, numbness and headache.   All other systems reviewed and are negative.    Objective   Physical Exam   Constitutional: No distress.   walker   Neck: Kernig's sign noted.   Pulmonary/Chest: Effort normal.  "  Musculoskeletal:      Cervical back: She exhibits decreased range of motion and tenderness.      Lumbar back: She exhibits decreased range of motion and tenderness.   Vitals reviewed.    /88   Pulse 115   Resp 16   Ht 154.9 cm (61\")   Wt 64.9 kg (143 lb)   SpO2 99%   BMI 27.02 kg/m²      PHQ 9 on chart  Opioid risk tool low risk    Assessment/Plan   Diagnoses and all orders for this visit:    1. Chronic pain syndrome (Primary)  -     HYDROcodone-acetaminophen (NORCO) 7.5-325 MG per tablet; Take 1 tablet by mouth 4 (Four) Times a Day As Needed for Severe Pain . DNF before 2/4/2022  Dispense: 120 tablet; Refill: 0    2. Postlaminectomy syndrome of lumbar region    3. Postlaminectomy syndrome of cervical region    4. High risk medication use    5. S/P lumbar fusion  -     HYDROcodone-acetaminophen (NORCO) 7.5-325 MG per tablet; Take 1 tablet by mouth 4 (Four) Times a Day As Needed for Severe Pain .  Dispense: 120 tablet; Refill: 0    Summary  Diya Arreola is a 61 y.o. female with chronic neck pain status post ACDF, chronic back pain status post Fusion L3-5, here for follow-up.    Chronic back pain from DDD spondylosis postlaminectomy syndrome with radicular pain.   Chronic neck pain from postlaminectomy syndrome.  Worsening chronic tremors.  Had caudal ALEXIS, transforaminal ALEXIS above the fusion at L3 L4 mild relief.      status post L4-L5 LAMI/TILF nov 2021/[facility].  Recovering well, was prescribed hydrocodone postop.  Started home PT.    Cont hydrocodone 7.5/325 4 times daily as needed for severe pain.  UDS sent and inspect reviewed  Discussed risk of tolerance, dependence, respiratory depression, coma and death associated with use of oral opioids for treatment of chronic nonmalignant pain.     RTC 2 month  "

## 2022-01-12 RX ORDER — NITROGLYCERIN 0.4 MG/1
0.4 TABLET SUBLINGUAL
Qty: 25 TABLET | Refills: 1 | Status: SHIPPED | OUTPATIENT
Start: 2022-01-12 | End: 2022-07-12 | Stop reason: SDUPTHER

## 2022-01-25 DIAGNOSIS — M79.7 FIBROMYALGIA: ICD-10-CM

## 2022-01-25 RX ORDER — TIZANIDINE 2 MG/1
2 TABLET ORAL 2 TIMES DAILY PRN
Qty: 180 TABLET | Refills: 0 | OUTPATIENT
Start: 2022-01-25

## 2022-01-25 RX ORDER — CYCLOBENZAPRINE HCL 10 MG
TABLET ORAL
Qty: 270 TABLET | Refills: 0 | Status: SHIPPED | OUTPATIENT
Start: 2022-01-25 | End: 2022-01-26 | Stop reason: SDUPTHER

## 2022-01-26 ENCOUNTER — LAB (OUTPATIENT)
Dept: FAMILY MEDICINE CLINIC | Facility: CLINIC | Age: 62
End: 2022-01-26

## 2022-01-26 ENCOUNTER — OFFICE VISIT (OUTPATIENT)
Dept: FAMILY MEDICINE CLINIC | Facility: CLINIC | Age: 62
End: 2022-01-26

## 2022-01-26 VITALS
TEMPERATURE: 98 F | HEART RATE: 128 BPM | SYSTOLIC BLOOD PRESSURE: 118 MMHG | OXYGEN SATURATION: 98 % | DIASTOLIC BLOOD PRESSURE: 77 MMHG | WEIGHT: 131 LBS | BODY MASS INDEX: 24.75 KG/M2

## 2022-01-26 DIAGNOSIS — R76.8 ANA POSITIVE: ICD-10-CM

## 2022-01-26 DIAGNOSIS — J44.9 CHRONIC OBSTRUCTIVE PULMONARY DISEASE, UNSPECIFIED COPD TYPE: Primary | ICD-10-CM

## 2022-01-26 DIAGNOSIS — M35.9 CONNECTIVE TISSUE DISEASE, UNDIFFERENTIATED: ICD-10-CM

## 2022-01-26 LAB — ERYTHROCYTE [SEDIMENTATION RATE] IN BLOOD: 38 MM/HR (ref 0–30)

## 2022-01-26 PROCEDURE — 83516 IMMUNOASSAY NONANTIBODY: CPT | Performed by: FAMILY MEDICINE

## 2022-01-26 PROCEDURE — 36415 COLL VENOUS BLD VENIPUNCTURE: CPT

## 2022-01-26 PROCEDURE — 86235 NUCLEAR ANTIGEN ANTIBODY: CPT | Performed by: FAMILY MEDICINE

## 2022-01-26 PROCEDURE — 99213 OFFICE O/P EST LOW 20 MIN: CPT | Performed by: FAMILY MEDICINE

## 2022-01-26 PROCEDURE — 85652 RBC SED RATE AUTOMATED: CPT | Performed by: FAMILY MEDICINE

## 2022-01-26 PROCEDURE — 86038 ANTINUCLEAR ANTIBODIES: CPT | Performed by: FAMILY MEDICINE

## 2022-01-26 PROCEDURE — 86225 DNA ANTIBODY NATIVE: CPT | Performed by: FAMILY MEDICINE

## 2022-01-26 RX ORDER — CYCLOBENZAPRINE HCL 10 MG
10 TABLET ORAL 3 TIMES DAILY PRN
Qty: 270 TABLET | Refills: 1 | Status: SHIPPED | OUTPATIENT
Start: 2022-01-26 | End: 2022-05-25

## 2022-01-26 RX ORDER — DOXYCYCLINE 100 MG/1
100 CAPSULE ORAL 2 TIMES DAILY
Qty: 20 CAPSULE | Refills: 0 | Status: SHIPPED | OUTPATIENT
Start: 2022-01-26 | End: 2022-04-04 | Stop reason: SDUPTHER

## 2022-01-26 RX ORDER — MELOXICAM 15 MG/1
TABLET ORAL
COMMUNITY
Start: 2022-01-08 | End: 2022-04-12 | Stop reason: SDUPTHER

## 2022-01-26 RX ORDER — GUAIFENESIN AND CODEINE PHOSPHATE 100; 10 MG/5ML; MG/5ML
5 SOLUTION ORAL 3 TIMES DAILY PRN
Qty: 120 ML | Refills: 0 | Status: SHIPPED | OUTPATIENT
Start: 2022-01-26 | End: 2022-03-03

## 2022-01-26 RX ORDER — HYDROXYCHLOROQUINE SULFATE 200 MG/1
200 TABLET, FILM COATED ORAL 2 TIMES DAILY
Qty: 180 TABLET | Refills: 0 | Status: SHIPPED | OUTPATIENT
Start: 2022-01-26 | End: 2022-03-04 | Stop reason: SDUPTHER

## 2022-01-28 LAB — ANA SER QL: POSITIVE

## 2022-01-31 LAB
CENTROMERE B AB SER-ACNC: <0.2 AI (ref 0–0.9)
CHROMATIN AB SERPL-ACNC: <0.2 AI (ref 0–0.9)
DSDNA AB SER-ACNC: <1 IU/ML (ref 0–9)
ENA JO1 AB SER-ACNC: <0.2 AI (ref 0–0.9)
ENA RNP AB SER-ACNC: 0.2 AI (ref 0–0.9)
ENA SCL70 AB SER-ACNC: <0.2 AI (ref 0–0.9)
ENA SM AB SER-ACNC: <0.2 AI (ref 0–0.9)
ENA SM+RNP AB SER-ACNC: <0.2 AI (ref 0–0.9)
ENA SS-A AB SER-ACNC: >8 AI (ref 0–0.9)
ENA SS-B AB SER-ACNC: <0.2 AI (ref 0–0.9)
Lab: ABNORMAL
RIBOSOMAL P AB SER-ACNC: <0.2 AI (ref 0–0.9)

## 2022-02-21 ENCOUNTER — HOSPITAL ENCOUNTER (OUTPATIENT)
Dept: CT IMAGING | Facility: HOSPITAL | Age: 62
Discharge: HOME | End: 2022-02-21
Admitting: NURSE PRACTITIONER

## 2022-02-21 DIAGNOSIS — Z98.1 S/P LUMBAR FUSION: ICD-10-CM

## 2022-02-21 PROCEDURE — 72131 CT LUMBAR SPINE W/O DYE: CPT

## 2022-03-03 ENCOUNTER — OFFICE VISIT (OUTPATIENT)
Dept: PAIN MEDICINE | Facility: CLINIC | Age: 62
End: 2022-03-03

## 2022-03-03 VITALS
WEIGHT: 131 LBS | HEART RATE: 123 BPM | BODY MASS INDEX: 24.73 KG/M2 | DIASTOLIC BLOOD PRESSURE: 75 MMHG | RESPIRATION RATE: 16 BRPM | SYSTOLIC BLOOD PRESSURE: 113 MMHG | OXYGEN SATURATION: 97 % | HEIGHT: 61 IN

## 2022-03-03 DIAGNOSIS — Z79.899 HIGH RISK MEDICATION USE: Primary | ICD-10-CM

## 2022-03-03 DIAGNOSIS — Z79.899 HIGH RISK MEDICATION USE: ICD-10-CM

## 2022-03-03 DIAGNOSIS — M96.1 POSTLAMINECTOMY SYNDROME OF LUMBAR REGION: ICD-10-CM

## 2022-03-03 DIAGNOSIS — G89.4 CHRONIC PAIN SYNDROME: Primary | ICD-10-CM

## 2022-03-03 DIAGNOSIS — M96.1 POSTLAMINECTOMY SYNDROME OF CERVICAL REGION: ICD-10-CM

## 2022-03-03 PROCEDURE — 99214 OFFICE O/P EST MOD 30 MIN: CPT | Performed by: ANESTHESIOLOGY

## 2022-03-03 RX ORDER — HYDROCODONE BITARTRATE AND ACETAMINOPHEN 7.5; 325 MG/1; MG/1
1 TABLET ORAL 4 TIMES DAILY PRN
Qty: 120 TABLET | Refills: 0 | Status: SHIPPED | OUTPATIENT
Start: 2022-04-04 | End: 2022-04-26 | Stop reason: SDUPTHER

## 2022-03-03 RX ORDER — HYDROCODONE BITARTRATE AND ACETAMINOPHEN 7.5; 325 MG/1; MG/1
1 TABLET ORAL 4 TIMES DAILY PRN
Qty: 120 TABLET | Refills: 0 | Status: SHIPPED | OUTPATIENT
Start: 2022-03-03 | End: 2022-04-12 | Stop reason: SDUPTHER

## 2022-03-03 NOTE — PROGRESS NOTES
Subjective    CC back, leg pain, neck pain  Diya Arreola is a 61 y.o. female with chronic neck pain status post ACDF, chronic back pain S/P  L4-L5 LAMI/TILF 2021/[facility],  here for follow-up.    Denies new complaints today except for muscle spasm.  Continuing home PT.   Chronic back pain upper lumbar and lower lumbar radiating to bilateral hips bilateral lower extremity worse with standing walking twisting or any activity.  Denies new injury, saddle anesthesia bladder bowel continence.  Chronic neck pain, radiating to bilateral shoulder limited range of motion in the neck and significant paraspinal muscle spasm bilateral UE radicular pain.  Denies balance issues.    Chronic tremors, history of polyarthralgia/lupus sees rheumatology.  Pain interfering with ADL.  Tried physical therapy without relief.  Seen neurosurgery, referred to try injection.        C-spine MRI  probably a plate on the anterior aspect of the spine at the C4-5 level. mild subluxation with degenerative disc disease at C3-4, causing a mild central canal stenosis. Left-sided disc extrusion at C6-7 with left foraminal impingement.   L-spine MRI  postsurgical changes L3-L4 with pedicle screw L3.  Retrolisthesis L2 on L3 moderate central lateral canal stenosis.  Grade 1 anterolisthesis L4-L5.  Degenerative changes throughout.  L-spine x-ray : 4 mm anterolisthesis on extension, 6 mm anterolisthesis on flexion, at the L4-5 level. Stable spinal fusion changes at L3-4.. Severe degenerative disc and endplate changes at L2-3, similar to  2018 examination.    Pain Assessment   Location of Pain: Lower Back, R Hip, L Hip, L Leg, neck pain, joint  Description of Pain: Dull/Aching, Throbbing, Stabbing  Previous Pain Rating :9  Current Pain Ratin  Aggravating Factors: Activity  Alleviating Factors: Rest, Medication    PEG Assessment   What number best describes your pain on average in the past week?9  What number best describes how,  during the past week, pain has interfered with your enjoyment of life?6  What number best describes how, during the past week, pain has interfered with your general activity?10      The following portions of the patient's history were reviewed and updated as appropriate: allergies, current medications, past family history, past medical history, past social history, past surgical history and problem list.     has a past medical history of Allergic, Anxiety, Arthritis, Asthma, Constipation, COPD (chronic obstructive pulmonary disease) ([facility]), Fall, Fibromyalgia, primary, Gastritis, History of pancreatitis, Low back pain (2021), Lupus ([facility]), Neuropathy, Osteopenia, PONV (postoperative nausea and vomiting), Rheumatoid arthritis ([facility]), Sjogren's syndrome ([facility]), Tachycardia, and TIA (transient ischemic attack) (2014).   has a past surgical history that includes Cholecystectomy; Hysterectomy;  section; Cystocele repair; Carpal tunnel release; Cardiac catheterization; Spine surgery; Colonoscopy (10/08/2013); Epidural block injection; Lumbar fusion (N/A, 2021); and Back surgery.  family history includes Diabetes in her father; Heart disease in her brother; Hypertension in her brother and sister; Osteoporosis in her maternal grandmother; Stroke in her sister.  Social History     Tobacco Use   • Smoking status: Current Every Day Smoker     Packs/day: 1.00     Years: 39.00     Pack years: 39.00     Types: Cigarettes     Last attempt to quit: 2021     Years since quittin.3   • Smokeless tobacco: Never Used   Substance Use Topics   • Alcohol use: No     Review of Systems   Musculoskeletal: Positive for arthralgias, back pain and neck pain.   Neurological: Positive for tremors, numbness and headache.   All other systems reviewed and are negative.    Objective   Physical Exam   Constitutional: No distress.   walker   Neck: Kernig's sign noted.   Pulmonary/Chest: Effort normal.   Musculoskeletal:       "Cervical back: She exhibits decreased range of motion and tenderness.      Lumbar back: She exhibits decreased range of motion and tenderness.   Vitals reviewed.    /75   Pulse (!) 123   Resp 16   Ht 154.9 cm (61\")   Wt 59.4 kg (131 lb)   SpO2 97%   BMI 24.75 kg/m²      PHQ 9 on chart  Opioid risk tool low risk    Assessment/Plan   Diagnoses and all orders for this visit:    1. Chronic pain syndrome (Primary)  -     HYDROcodone-acetaminophen (NORCO) 7.5-325 MG per tablet; Take 1 tablet by mouth 4 (Four) Times a Day As Needed for Severe Pain .  Dispense: 120 tablet; Refill: 0  -     HYDROcodone-acetaminophen (NORCO) 7.5-325 MG per tablet; Take 1 tablet by mouth 4 (Four) Times a Day As Needed for Severe Pain . DNF before 4/4/2022  Dispense: 120 tablet; Refill: 0    2. Postlaminectomy syndrome of lumbar region    3. Postlaminectomy syndrome of cervical region    4. High risk medication use    Summary  Diya Arreola is a 61 y.o. female with chronic neck pain status post ACDF, chronic back pain status post Fusion L3-5, here for follow-up.    Chronic back pain from DDD spondylosis postlaminectomy syndrome with radicular pain.   Chronic neck pain from postlaminectomy syndrome.  Worsening chronic tremors.  Had caudal ALEIXS, transforaminal ALEXIS above the fusion at L3 L4 mild relief.      Denies new complaints today except for muscle spasm.  Continuing home PT. recovering well from fusion.    Cont hydrocodone 7.5/325 4 times daily as needed for severe pain.  UDS sent and inspect reviewed  Discussed risk of tolerance, dependence, respiratory depression, coma and death associated with use of oral opioids for treatment of chronic nonmalignant pain.     RTC 2 month  "

## 2022-03-04 DIAGNOSIS — M79.7 FIBROMYALGIA: ICD-10-CM

## 2022-03-04 RX ORDER — TIZANIDINE 2 MG/1
2 TABLET ORAL 2 TIMES DAILY PRN
Qty: 180 TABLET | Refills: 0 | Status: SHIPPED | OUTPATIENT
Start: 2022-03-04 | End: 2023-03-05 | Stop reason: HOSPADM

## 2022-03-04 RX ORDER — HYDROXYCHLOROQUINE SULFATE 200 MG/1
200 TABLET, FILM COATED ORAL 2 TIMES DAILY
Qty: 180 TABLET | Refills: 0 | Status: CANCELLED | OUTPATIENT
Start: 2022-03-04

## 2022-03-04 RX ORDER — HYDROXYCHLOROQUINE SULFATE 200 MG/1
200 TABLET, FILM COATED ORAL DAILY
Qty: 90 TABLET | Refills: 1 | Status: SHIPPED | OUTPATIENT
Start: 2022-03-04 | End: 2022-09-19 | Stop reason: SDUPTHER

## 2022-03-04 NOTE — PROGRESS NOTES
"Subjective   History of Present Illness: Diya Arreola is a 61 y.o. female is here today for follow-up. Patient underwent Post L4-L5 decompression with Dr. Gorman on 11/16/2021. She is doing well, she has some back pain but nothing severe. She has some weakness in her lower extremity's if ambulating a long distance.  Overall, the patient is seen significant improvement in her low back pain and lower extremity symptoms.  No other new issues.  She does continue to smoke      Previous Treatment: Surgery L4-5 decompression 11/16/2021. Patient is still doing home physical therapy.    The following portions of the patient's history were reviewed and updated as appropriate: allergies, current medications, past family history, past medical history, past social history, past surgical history and problem list.    Review of Systems   Constitutional: Positive for activity change.   Respiratory: Negative for chest tightness and shortness of breath.    Cardiovascular: Negative for chest pain.   Musculoskeletal: Positive for back pain, gait problem and myalgias.   Neurological: Positive for weakness. Negative for numbness.       Objective     /85   Pulse 108   Temp 97.6 °F (36.4 °C)   Resp 20   Ht 154.9 cm (61\")   Wt 59.4 kg (131 lb)   SpO2 98%   BMI 24.75 kg/m²    Body mass index is 24.75 kg/m².      Neurologic Exam    Assessment/Plan   Independent Review of Radiographic Studies:      I personally reviewed and interpreted the images from the following studies.    CT lumbar spine: There is some loosening of the L5 screws and there is not fusion across the disc space at L4-5.  There does appear to be some developing posterior lateral fusion from L2-5    Medical Decision Making:      Diya Arreola is a 61 y.o. 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  She has not yet fused on CT " scan and does have some loosening of the inferior screws.  I counseled the patient that she needs to stop smoking to provide her the best chance of healing and fusion.  We will also get her a bone stimulator.  She should continue with physical therapy and limit bending and twisting.  I will see her back in 3 months to evaluate her progress.      There are no diagnoses linked to this encounter.  No follow-ups on file.    This patient was examined wearing appropriate personal protective equipment.                      Dr. Jerrell Gorman IV    03/07/22  15:35 EST

## 2022-03-07 ENCOUNTER — OFFICE VISIT (OUTPATIENT)
Dept: NEUROSURGERY | Facility: CLINIC | Age: 62
End: 2022-03-07

## 2022-03-07 VITALS
TEMPERATURE: 97.6 F | DIASTOLIC BLOOD PRESSURE: 85 MMHG | HEART RATE: 108 BPM | BODY MASS INDEX: 24.73 KG/M2 | OXYGEN SATURATION: 98 % | HEIGHT: 61 IN | WEIGHT: 131 LBS | SYSTOLIC BLOOD PRESSURE: 137 MMHG | RESPIRATION RATE: 20 BRPM

## 2022-03-07 DIAGNOSIS — Z98.1 S/P LUMBAR FUSION: Primary | ICD-10-CM

## 2022-03-07 PROCEDURE — 99213 OFFICE O/P EST LOW 20 MIN: CPT | Performed by: NEUROLOGICAL SURGERY

## 2022-03-23 DIAGNOSIS — Z98.1 S/P LUMBAR FUSION: Primary | ICD-10-CM

## 2022-03-29 ENCOUNTER — TELEPHONE (OUTPATIENT)
Dept: NEUROSURGERY | Facility: CLINIC | Age: 62
End: 2022-03-29

## 2022-03-29 DIAGNOSIS — Z98.1 S/P LUMBAR FUSION: Primary | ICD-10-CM

## 2022-03-29 NOTE — TELEPHONE ENCOUNTER
Caller: Diya Arreola    Relationship: Self    Best call back number:[phone]    What orders are you requesting (i.e. lab or imaging):PHYSICAL THERAPY    In what timeframe would the patient need to come in:ASAP    Where will you receive your lab/imaging services:[facility] [place] [phone]    Additional notes:PT CALLED AND STATES THAT SHE IS NEEDING [facility]. PT STATES THAT THIS IS CLOSER TO HER THAT SHE IS HAVING CAR ISSUES-ADVISING OFFICE THANK YOU

## 2022-03-30 RX ORDER — BREXPIPRAZOLE 0.5 MG/1
1 TABLET ORAL DAILY
Qty: 30 TABLET | Refills: 5 | Status: SHIPPED | OUTPATIENT
Start: 2022-03-30 | End: 2022-07-12 | Stop reason: SDUPTHER

## 2022-04-04 ENCOUNTER — TELEPHONE (OUTPATIENT)
Dept: FAMILY MEDICINE CLINIC | Facility: CLINIC | Age: 62
End: 2022-04-04

## 2022-04-04 DIAGNOSIS — J44.9 CHRONIC OBSTRUCTIVE PULMONARY DISEASE, UNSPECIFIED COPD TYPE: Primary | ICD-10-CM

## 2022-04-04 RX ORDER — GUAIFENESIN AND CODEINE PHOSPHATE 100; 10 MG/5ML; MG/5ML
5 SOLUTION ORAL 3 TIMES DAILY PRN
Qty: 120 ML | Refills: 0 | Status: SHIPPED | OUTPATIENT
Start: 2022-04-04 | End: 2022-04-12

## 2022-04-04 RX ORDER — DOXYCYCLINE 100 MG/1
100 CAPSULE ORAL 2 TIMES DAILY
Qty: 20 CAPSULE | Refills: 0 | Status: SHIPPED | OUTPATIENT
Start: 2022-04-04 | End: 2022-10-14 | Stop reason: SDUPTHER

## 2022-04-04 NOTE — TELEPHONE ENCOUNTER
Caller: Diya Arreola    Relationship: Self    Best call back number: [phone] ([phone])    What medication are you requesting: ANTIBIOTIC AND COUGH SYRUP     What are your current symptoms: COPD- COUGHING UP PHLEGM GREENISH IN COLOR    How long have you been experiencing symptoms:      Have you had these symptoms before:    [] Yes  [] No    Have you been treated for these symptoms before:   [] Yes  [] No    If a prescription is needed, what is your preferred pharmacy and phone number: [facility]/pharmacy #[id number] - [address] AT [place] [address] - [phone]  - [phone]          Additional notes:PATIENT STATES THAT SHE IS NEEDING SOMETHING TO HELP BREAK UP COUGH AND PHLEGM.        THANKS

## 2022-04-12 ENCOUNTER — OFFICE VISIT (OUTPATIENT)
Dept: FAMILY MEDICINE CLINIC | Facility: CLINIC | Age: 62
End: 2022-04-12

## 2022-04-12 VITALS
BODY MASS INDEX: 25.25 KG/M2 | TEMPERATURE: 98.2 F | DIASTOLIC BLOOD PRESSURE: 82 MMHG | WEIGHT: 137.2 LBS | OXYGEN SATURATION: 97 % | HEIGHT: 62 IN | SYSTOLIC BLOOD PRESSURE: 134 MMHG | HEART RATE: 98 BPM

## 2022-04-12 DIAGNOSIS — F17.200 TOBACCO DEPENDENCE: ICD-10-CM

## 2022-04-12 DIAGNOSIS — F41.1 GENERALIZED ANXIETY DISORDER: Primary | ICD-10-CM

## 2022-04-12 DIAGNOSIS — Z78.0 POSTMENOPAUSAL: ICD-10-CM

## 2022-04-12 DIAGNOSIS — R11.0 NAUSEA: ICD-10-CM

## 2022-04-12 DIAGNOSIS — Z12.31 ENCOUNTER FOR SCREENING MAMMOGRAM FOR BREAST CANCER: ICD-10-CM

## 2022-04-12 PROCEDURE — 99214 OFFICE O/P EST MOD 30 MIN: CPT | Performed by: FAMILY MEDICINE

## 2022-04-12 RX ORDER — PROMETHAZINE HYDROCHLORIDE 6.25 MG/5ML
6.25 SYRUP ORAL 4 TIMES DAILY PRN
Qty: 240 ML | Refills: 0 | Status: SHIPPED | OUTPATIENT
Start: 2022-04-12 | End: 2022-07-05

## 2022-04-12 RX ORDER — VARENICLINE TARTRATE 1 MG/1
1 TABLET, FILM COATED ORAL 2 TIMES DAILY
Qty: 60 TABLET | Refills: 2 | Status: SHIPPED | OUTPATIENT
Start: 2022-04-12 | End: 2022-04-13 | Stop reason: SDUPTHER

## 2022-04-12 RX ORDER — MELOXICAM 15 MG/1
15 TABLET ORAL DAILY
Qty: 90 TABLET | Refills: 1 | Status: SHIPPED | OUTPATIENT
Start: 2022-04-12 | End: 2022-07-12 | Stop reason: SDUPTHER

## 2022-04-12 NOTE — PROGRESS NOTES
"Chief Complaint  Anxiety and Hyperlipidemia    Subjective          Diya Arreola presents to [facility]  She has finished home PT but will start outpatient PT soon.  She is still recovering from her spine surgery.  She is hoping to get a bone stimulator to help with healing but apparently this is on backorder. She feels like she cannot stand up straight yet.     Anxiety  Presents for follow-up visit. Symptoms include nausea and nervous/anxious behavior. Patient reports no chest pain, confusion, decreased concentration, dizziness, hyperventilation, palpitations or panic. Symptoms occur most days. The severity of symptoms is moderate. The quality of sleep is fair.       Nausea  This is a recurrent problem. The current episode started more than 1 month ago. The problem occurs intermittently. The problem has been waxing and waning. Associated symptoms include nausea. Pertinent negatives include no chest pain.   Back Pain  This is a chronic problem. The current episode started more than 1 year ago. The problem occurs daily. The problem has been waxing and waning since onset. The pain is present in the lumbar spine. The quality of the pain is described as aching. Pertinent negatives include no chest pain.       Objective   Vital Signs:   /82 (BP Location: Left arm)   Pulse 98   Temp 98.2 °F (36.8 °C) (Temporal)   Ht 157.5 cm (62\")   Wt 62.2 kg (137 lb 3.2 oz)   SpO2 97%   BMI 25.09 kg/m²            Physical Exam  Constitutional:       General: She is not in acute distress.     Appearance: She is well-developed.   HENT:      Head: Normocephalic.   Eyes:      General: Lids are normal.      Conjunctiva/sclera: Conjunctivae normal.   Neck:      Thyroid: No thyroid mass or thyromegaly.      Trachea: Trachea normal.   Cardiovascular:      Rate and Rhythm: Normal rate and regular rhythm.      Heart sounds: Normal heart sounds.   Pulmonary:      Effort: Pulmonary effort is normal. "      Breath sounds: Normal breath sounds.   Abdominal:      Palpations: Abdomen is soft.   Musculoskeletal:      Cervical back: Normal range of motion.      Lumbar back: Tenderness present. Decreased range of motion.   Lymphadenopathy:      Cervical: No cervical adenopathy.   Skin:     General: Skin is warm and dry.   Neurological:      Mental Status: She is alert and oriented to person, place, and time.   Psychiatric:         Attention and Perception: She is attentive.         Mood and Affect: Mood normal.         Speech: Speech normal.         Behavior: Behavior normal.        Result Review :   The following data was reviewed by: Mitzy Rea MD on 04/12/2022:  Common labs    Common Labsle 11/16/21 11/17/21 11/17/21 11/18/21 11/18/21 11/18/21     0136 0136 0251 0251 0950   Glucose   113 (A)  120 (A)    BUN   11  6    Creatinine   0.64  0.60    eGFR Non African Am   95  102    Sodium   137  139    Potassium   4.6  4.0    Chloride   103  104    Calcium   8.3 (A)  8.2 (A)    WBC  7.10  8.30     Hemoglobin 9.8 (A) 8.3 (A)  7.4 (A)  9.2 (A)   Hematocrit 29.1 (A) 24.2 (A)  21.7 (A)  26.2 (A)   Platelets  151  130 (A)     (A) Abnormal value       Comments are available for some flowsheets but are not being displayed.                     Assessment and Plan    Diagnoses and all orders for this visit:    1. Generalized anxiety disorder (Primary)  Assessment & Plan:  Psychological condition is unchanged.  Continue current treatment regimen.  Psychological condition  will be reassessed in 3 months.      2. Nausea  -     promethazine (PHENERGAN) 6.25 MG/5ML syrup; Take 5 mL by mouth 4 (Four) Times a Day As Needed for Nausea or Vomiting.  Dispense: 240 mL; Refill: 0    3. Postmenopausal  -     DEXA Bone Density Axial    4. Encounter for screening mammogram for breast cancer  -     Mammo Screening Digital Tomosynthesis Bilateral With CAD    5. Tobacco dependence  -     varenicline (CHANTIX) 1 MG tablet; Take 1 tablet by  mouth 2 (Two) Times a Day.  Dispense: 60 tablet; Refill: 2    Other orders  -     meloxicam (MOBIC) 15 MG tablet; Take 1 tablet by mouth Daily.  Dispense: 90 tablet; Refill: 1      Follow Up   No follow-ups on file.  Patient was given instructions and counseling regarding her condition or for health maintenance advice. Please see specific information pulled into the AVS if appropriate.

## 2022-04-13 DIAGNOSIS — F17.200 TOBACCO DEPENDENCE: ICD-10-CM

## 2022-04-13 RX ORDER — VARENICLINE TARTRATE 1 MG/1
1 TABLET, FILM COATED ORAL 2 TIMES DAILY
Qty: 60 TABLET | Refills: 2 | Status: SHIPPED | OUTPATIENT
Start: 2022-04-13 | End: 2022-10-14

## 2022-04-13 NOTE — TELEPHONE ENCOUNTER
Caller: MaxwellDiya    Relationship: Self    Best call back number: [phone]    Requested Prescriptions:   Requested Prescriptions     Pending Prescriptions Disp Refills   • varenicline (CHANTIX) 1 MG tablet 60 tablet 2     Sig: Take 1 tablet by mouth 2 (Two) Times a Day.        Pharmacy where request should be sent: [facility] #[id number] - [address] AT [place] [address] - [phone] [facility] [phone] FX     Additional details provided by patient: [facility] TOLD PATIENT THEY DID NOT RECEIVE THE FIRST PRESCRIPTION REQUEST. PLEASE RESEND IT.     Does the patient have less than a 3 day supply:  [x] Yes  [] No    Iris Menjivar Rep   04/13/22 15:22 EDT

## 2022-04-26 ENCOUNTER — OFFICE VISIT (OUTPATIENT)
Dept: PAIN MEDICINE | Facility: CLINIC | Age: 62
End: 2022-04-26

## 2022-04-26 VITALS
HEART RATE: 104 BPM | BODY MASS INDEX: 25.21 KG/M2 | HEIGHT: 62 IN | SYSTOLIC BLOOD PRESSURE: 142 MMHG | WEIGHT: 137 LBS | DIASTOLIC BLOOD PRESSURE: 70 MMHG | RESPIRATION RATE: 16 BRPM | OXYGEN SATURATION: 98 %

## 2022-04-26 DIAGNOSIS — M96.1 POSTLAMINECTOMY SYNDROME OF CERVICAL REGION: ICD-10-CM

## 2022-04-26 DIAGNOSIS — G89.4 CHRONIC PAIN SYNDROME: Primary | ICD-10-CM

## 2022-04-26 DIAGNOSIS — Z79.899 HIGH RISK MEDICATION USE: ICD-10-CM

## 2022-04-26 DIAGNOSIS — M96.1 POSTLAMINECTOMY SYNDROME OF LUMBAR REGION: ICD-10-CM

## 2022-04-26 PROCEDURE — 99214 OFFICE O/P EST MOD 30 MIN: CPT | Performed by: ANESTHESIOLOGY

## 2022-04-26 RX ORDER — HYDROCODONE BITARTRATE AND ACETAMINOPHEN 7.5; 325 MG/1; MG/1
1 TABLET ORAL 4 TIMES DAILY PRN
Qty: 120 TABLET | Refills: 0 | Status: SHIPPED | OUTPATIENT
Start: 2022-06-04 | End: 2022-07-05 | Stop reason: SDUPTHER

## 2022-04-26 RX ORDER — HYDROCODONE BITARTRATE AND ACETAMINOPHEN 7.5; 325 MG/1; MG/1
1 TABLET ORAL 4 TIMES DAILY PRN
Qty: 120 TABLET | Refills: 0 | Status: SHIPPED | OUTPATIENT
Start: 2022-05-05 | End: 2022-07-05 | Stop reason: SDUPTHER

## 2022-04-26 NOTE — PROGRESS NOTES
Subjective    CC back, leg pain, neck pain  Diya Arreola is a 61 y.o. female with chronic neck pain status post ACDF, chronic back pain S/P  L4-L5 LAMI/TILF 2021/[facility],  here for follow-up.    Continues to recover from lumbar fusion.  Ambulating without walker now.  Had follow-up with neurosurgery, needing a bone stimulator however pending insurance approval.  Restarting PT this week.    Chronic back pain upper lumbar and lower lumbar radiating to bilateral hips bilateral lower extremity worse with standing walking twisting or any activity.  Denies new injury, saddle anesthesia bladder bowel continence.  Chronic neck pain, radiating to bilateral shoulder limited range of motion in the neck and significant paraspinal muscle spasm bilateral UE radicular pain.  Denies balance issues.    Chronic tremors, history of polyarthralgia/lupus sees rheumatology.  Pain interfering with ADL.  Tried physical therapy without relief.  Seen neurosurgery, referred to try injection.        C-spine MRI  probably a plate on the anterior aspect of the spine at the C4-5 level. mild subluxation with degenerative disc disease at C3-4, causing a mild central canal stenosis. Left-sided disc extrusion at C6-7 with left foraminal impingement.   L-spine MRI  postsurgical changes L3-L4 with pedicle screw L3.  Retrolisthesis L2 on L3 moderate central lateral canal stenosis.  Grade 1 anterolisthesis L4-L5.  Degenerative changes throughout.  L-spine x-ray : 4 mm anterolisthesis on extension, 6 mm anterolisthesis on flexion, at the L4-5 level. Stable spinal fusion changes at L3-4.. Severe degenerative disc and endplate changes at L2-3, similar to  2018 examination.    Pain Assessment   Location of Pain: Lower Back, R Hip, L Hip, L Leg, neck pain, joint  Description of Pain: Dull/Aching, Throbbing, Stabbing  Previous Pain Rating :9  Current Pain Ratin  Aggravating Factors: Activity  Alleviating Factors: Rest,  Medication    PEG Assessment   What number best describes your pain on average in the past week?9  What number best describes how, during the past week, pain has interfered with your enjoyment of life?6  What number best describes how, during the past week, pain has interfered with your general activity?10      The following portions of the patient's history were reviewed and updated as appropriate: allergies, current medications, past family history, past medical history, past social history, past surgical history and problem list.     has a past medical history of Allergic, Anxiety, Arthritis, Asthma, Constipation, COPD (chronic obstructive pulmonary disease) ([facility]), Fall, Fibromyalgia, primary, Gastritis, History of pancreatitis, Low back pain (2021), Lupus ([facility]), Neuropathy, Osteopenia, PONV (postoperative nausea and vomiting), Rheumatoid arthritis ([facility]), Sjogren's syndrome ([facility]), Tachycardia, and TIA (transient ischemic attack) (2014).   has a past surgical history that includes Cholecystectomy; Hysterectomy;  section; Cystocele repair; Carpal tunnel release; Cardiac catheterization; Spine surgery; Colonoscopy (10/08/2013); Epidural block injection; Lumbar fusion (N/A, 2021); and Back surgery.  family history includes Diabetes in her father; Heart disease in her brother; Hypertension in her brother and sister; Osteoporosis in her maternal grandmother; Stroke in her sister.  Social History     Tobacco Use   • Smoking status: Current Every Day Smoker     Packs/day: 1.00     Years: 39.00     Pack years: 39.00     Types: Cigarettes     Last attempt to quit: 2021     Years since quittin.4   • Smokeless tobacco: Never Used   Substance Use Topics   • Alcohol use: No     Review of Systems   Musculoskeletal: Positive for arthralgias, back pain and neck pain.   Neurological: Positive for tremors, numbness and headache.   All other systems reviewed and are negative.    Objective   Physical Exam  "  Constitutional: No distress.   walker   Neck: Kernig's sign noted.   Pulmonary/Chest: Effort normal.   Musculoskeletal:      Cervical back: She exhibits decreased range of motion and tenderness.      Lumbar back: She exhibits decreased range of motion and tenderness.   Vitals reviewed.    /70   Pulse 104   Resp 16   Ht 157.5 cm (62\")   Wt 62.1 kg (137 lb)   SpO2 98%   BMI 25.06 kg/m²      PHQ 9 on chart  Opioid risk tool low risk    Assessment/Plan   Diagnoses and all orders for this visit:    1. Chronic pain syndrome (Primary)  -     HYDROcodone-acetaminophen (NORCO) 7.5-325 MG per tablet; Take 1 tablet by mouth 4 (Four) Times a Day As Needed for Severe Pain .  Dispense: 120 tablet; Refill: 0  -     HYDROcodone-acetaminophen (NORCO) 7.5-325 MG per tablet; Take 1 tablet by mouth 4 (Four) Times a Day As Needed for Severe Pain . DNF before 6/4/2022  Dispense: 120 tablet; Refill: 0    2. Postlaminectomy syndrome of lumbar region    3. Postlaminectomy syndrome of cervical region    4. High risk medication use    Summary  Diya Arreola is a 61 y.o. female with chronic neck pain status post ACDF, chronic back pain status post Fusion L3-5, here for follow-up.    Chronic back pain from DDD spondylosis postlaminectomy syndrome with radicular pain.   Chronic neck pain from postlaminectomy syndrome.  Worsening chronic tremors.    Continues to recover from lumbar fusion.  Ambulating without walker now.  Had follow-up with neurosurgery, needing a bone stimulator however pending insurance approval.  Restarting PT this week.      Cont hydrocodone 7.5/325 4 times daily as needed for severe pain.  UDS sent and inspect reviewed  Discussed risk of tolerance, dependence, respiratory depression, coma and death associated with use of oral opioids for treatment of chronic nonmalignant pain.     RTC 2 month  "

## 2022-04-28 NOTE — TELEPHONE ENCOUNTER
PATIENT IS CALLING IN SHE STATES THAT PHARMACY IS SAYING THEY STILL  NEED PA ON THIS MEDICATION BEFORE THEY WILL FILL FOR PATIENT.      CONFIRMED PHARMACY  [id number] - [place], [place] - [address] [place] [address] AT [facility] [address] - [phone]  - [phone] FX

## 2022-05-04 DIAGNOSIS — R92.8 ABNORMAL MAMMOGRAM: Primary | ICD-10-CM

## 2022-05-06 ENCOUNTER — TELEPHONE (OUTPATIENT)
Dept: FAMILY MEDICINE CLINIC | Facility: CLINIC | Age: 62
End: 2022-05-06

## 2022-05-06 NOTE — TELEPHONE ENCOUNTER
I have faxed those orders I thought I told the patient so to make sure that those orders have been received I did call [facility] and they confirmed they do have those orders she just needs to make an appointment and I did call to let the patient know and I got her vm so I left a vm with this information and stated to call if she has any questions

## 2022-05-06 NOTE — TELEPHONE ENCOUNTER
I already put in orders for a diagnostic mammogram and ultrasound for [facility].  Please send those orders and resend her DEXA order to [facility].

## 2022-05-06 NOTE — TELEPHONE ENCOUNTER
PATIENT CALLED TO REQUEST ORDER FOR MAMMOGRAM, US TO BE SENT BY FAX TO [facility].  ALSO NEEDS TO REPEAT DEXA SCAN, WAS NOT ABLE TO COMPLETE IT

## 2022-05-25 RX ORDER — CYCLOBENZAPRINE HCL 10 MG
TABLET ORAL
Qty: 270 TABLET | Refills: 1 | Status: SHIPPED | OUTPATIENT
Start: 2022-05-25 | End: 2022-07-12 | Stop reason: SDUPTHER

## 2022-06-06 ENCOUNTER — OFFICE VISIT (OUTPATIENT)
Dept: NEUROSURGERY | Facility: CLINIC | Age: 62
End: 2022-06-06

## 2022-06-06 VITALS
BODY MASS INDEX: 25.21 KG/M2 | WEIGHT: 137 LBS | TEMPERATURE: 97.5 F | DIASTOLIC BLOOD PRESSURE: 70 MMHG | RESPIRATION RATE: 18 BRPM | SYSTOLIC BLOOD PRESSURE: 106 MMHG | OXYGEN SATURATION: 97 % | HEIGHT: 62 IN | HEART RATE: 98 BPM

## 2022-06-06 DIAGNOSIS — Z98.1 S/P LUMBAR FUSION: Primary | ICD-10-CM

## 2022-06-06 PROCEDURE — 99213 OFFICE O/P EST LOW 20 MIN: CPT | Performed by: NEUROLOGICAL SURGERY

## 2022-06-06 NOTE — PROGRESS NOTES
"Subjective   History of Present Illness: Diya Arreola is a 62 y.o. female is here today for follow-up. She has back and bilateral thigh pain. She has received her bone growth stimulator. She is currently in out patient therapy.  Patient says that her back pain has lessened, and she describes it as chronic and dull.  No significant lower extremity symptoms.  Patient has stopped smoking.    Chief Complaint   Patient presents with   • Back Pain          Previous Treatment: Surgery L4-5 decompression 11/16/2021. Patient is still doing home physical therapy.       The following portions of the patient's history were reviewed and updated as appropriate: allergies, current medications, past family history, past medical history, past social history, past surgical history and problem list.    Review of Systems   Respiratory: Negative for chest tightness and shortness of breath.    Cardiovascular: Negative for chest pain.   Musculoskeletal: Positive for back pain and myalgias.        Thigh pain       Objective     /70   Pulse 98   Temp 97.5 °F (36.4 °C)   Resp 18   Ht 157.5 cm (62\")   Wt 62.1 kg (137 lb)   SpO2 97%   BMI 25.06 kg/m²    Body mass index is 25.06 kg/m².      Neurologic Exam    Assessment & Plan   Independent Review of Radiographic Studies:      I personally reviewed and interpreted the images from the following studies.    No new imaging    Medical Decision Making:      Diya Arreola is a 62 y.o. female status post revision fusion with some evidence of screw loosening on postop CT scan but overall improving back pain and symptoms.  Patient says that overall she has seen significant improvement of her preoperative symptoms.  She has started using a bone stimulator.  She feels that physical therapy is beginning to help her.  Patient does complain of some feelings as though she is stooped over she cannot stand up straight.  I discussed with her that she has decreased lordosis of her lumbar " spine due to previous surgeries.  This is something we could consider addressing in the future but would require an extensive operation.  For now we will see how she improves.  She will follow-up in 3 months.  If she continues to have significant issues we can repeat imaging to ensure that she is fused and also look at her spinal balance with scoliosis x-rays.      Diagnoses and all orders for this visit:    1. S/P lumbar fusion (Primary)      No follow-ups on file.    This patient was examined wearing appropriate personal protective equipment.                      Dr. Jerrell Gorman IV    06/06/22  13:48 EDT

## 2022-07-05 ENCOUNTER — OFFICE VISIT (OUTPATIENT)
Dept: PAIN MEDICINE | Facility: CLINIC | Age: 62
End: 2022-07-05

## 2022-07-05 ENCOUNTER — TELEPHONE (OUTPATIENT)
Dept: PAIN MEDICINE | Facility: CLINIC | Age: 62
End: 2022-07-05

## 2022-07-05 VITALS
HEIGHT: 62 IN | HEART RATE: 94 BPM | OXYGEN SATURATION: 97 % | BODY MASS INDEX: 25.21 KG/M2 | WEIGHT: 137 LBS | DIASTOLIC BLOOD PRESSURE: 58 MMHG | RESPIRATION RATE: 16 BRPM | SYSTOLIC BLOOD PRESSURE: 145 MMHG

## 2022-07-05 DIAGNOSIS — Z79.899 HIGH RISK MEDICATION USE: Primary | ICD-10-CM

## 2022-07-05 DIAGNOSIS — M96.1 POSTLAMINECTOMY SYNDROME OF LUMBAR REGION: ICD-10-CM

## 2022-07-05 DIAGNOSIS — G89.4 CHRONIC PAIN SYNDROME: ICD-10-CM

## 2022-07-05 DIAGNOSIS — M96.1 POSTLAMINECTOMY SYNDROME OF CERVICAL REGION: ICD-10-CM

## 2022-07-05 PROCEDURE — 99214 OFFICE O/P EST MOD 30 MIN: CPT | Performed by: ANESTHESIOLOGY

## 2022-07-05 RX ORDER — HYDROCODONE BITARTRATE AND ACETAMINOPHEN 7.5; 325 MG/1; MG/1
1 TABLET ORAL 4 TIMES DAILY PRN
Qty: 120 TABLET | Refills: 0 | Status: SHIPPED | OUTPATIENT
Start: 2022-07-05 | End: 2022-09-01 | Stop reason: SDUPTHER

## 2022-07-05 RX ORDER — HYDROCODONE BITARTRATE AND ACETAMINOPHEN 7.5; 325 MG/1; MG/1
1 TABLET ORAL 4 TIMES DAILY PRN
Qty: 120 TABLET | Refills: 0 | Status: SHIPPED | OUTPATIENT
Start: 2022-08-04 | End: 2022-09-01 | Stop reason: SDUPTHER

## 2022-07-05 NOTE — PROGRESS NOTES
Subjective    CC back, leg pain, neck pain  Diya Arreola is a 62 y.o. female with chronic neck pain status post ACDF, chronic back pain S/P  L4-L5 LAMI/TILF 2021/[facility],  here for follow-up.    Continued chronic back pain upper lumbar and lower lumbar radiating to bilateral hips bilateral lower extremity worse with standing walking twisting or any activity.  Denies new injury, saddle anesthesia bladder bowel continence.  Chronic neck pain, radiating to bilateral shoulder limited range of motion in the neck and significant paraspinal muscle spasm bilateral UE radicular pain.  Denies balance issues.    Chronic tremors, history of polyarthralgia/lupus sees rheumatology.  Pain interfering with ADL.  Tried physical therapy without relief.  Seen neurosurgery, referred to try injection.        C-spine MRI  probably a plate on the anterior aspect of the spine at the C4-5 level. mild subluxation with degenerative disc disease at C3-4, causing a mild central canal stenosis. Left-sided disc extrusion at C6-7 with left foraminal impingement.   L-spine MRI  postsurgical changes L3-L4 with pedicle screw L3.  Retrolisthesis L2 on L3 moderate central lateral canal stenosis.  Grade 1 anterolisthesis L4-L5.  Degenerative changes throughout.  L-spine x-ray : 4 mm anterolisthesis on extension, 6 mm anterolisthesis on flexion, at the L4-5 level. Stable spinal fusion changes at L3-4.. Severe degenerative disc and endplate changes at L2-3, similar to  2018 examination.    Pain Assessment   Location of Pain: Lower Back, R Hip, L Hip, L Leg, neck pain, joint  Description of Pain: Dull/Aching, Throbbing, Stabbing  Previous Pain Rating :7  Current Pain Ratin  Aggravating Factors: Activity  Alleviating Factors: Rest, Medication    PEG Assessment   What number best describes your pain on average in the past week?9  What number best describes how, during the past week, pain has interfered with your enjoyment of  life?2  What number best describes how, during the past week, pain has interfered with your general activity?7      The following portions of the patient's history were reviewed and updated as appropriate: allergies, current medications, past family history, past medical history, past social history, past surgical history and problem list.     has a past medical history of Allergic, Anxiety, Arthritis, Asthma, Constipation, COPD (chronic obstructive pulmonary disease) ([facility]), Fall, Fibromyalgia, primary, Gastritis, History of pancreatitis, Low back pain (2021), Lupus ([facility]), Neuropathy, Osteopenia, PONV (postoperative nausea and vomiting), Rheumatoid arthritis ([facility]), Sjogren's syndrome ([facility]), Tachycardia, and TIA (transient ischemic attack) (2014).   has a past surgical history that includes Cholecystectomy; Hysterectomy;  section; Cystocele repair; Carpal tunnel release; Cardiac catheterization; Spine surgery; Colonoscopy (10/08/2013); Epidural block injection; Lumbar fusion (N/A, 2021); and Back surgery.  family history includes Diabetes in her father; Heart disease in her brother; Hypertension in her brother and sister; Osteoporosis in her maternal grandmother; Stroke in her sister.  Social History     Tobacco Use   • Smoking status: Current Every Day Smoker     Packs/day: 1.00     Years: 39.00     Pack years: 39.00     Types: Cigarettes     Last attempt to quit: 2021     Years since quittin.6   • Smokeless tobacco: Never Used   Substance Use Topics   • Alcohol use: No     Review of Systems   Musculoskeletal: Positive for arthralgias, back pain and neck pain.   Neurological: Positive for tremors, numbness and headache.   All other systems reviewed and are negative.    Objective   Physical Exam   Constitutional: No distress.   walker   Neck: Kernig's sign noted.   Pulmonary/Chest: Effort normal.   Musculoskeletal:      Cervical back: She exhibits decreased range of motion and tenderness.  "     Lumbar back: She exhibits decreased range of motion and tenderness.   Vitals reviewed.    /58   Pulse 94   Resp 16   Ht 157.5 cm (62\")   Wt 62.1 kg (137 lb)   SpO2 97%   BMI 25.06 kg/m²      PHQ 9 on chart  Opioid risk tool low risk    Assessment & Plan   Diagnoses and all orders for this visit:    1. Chronic pain syndrome (Primary)  -     HYDROcodone-acetaminophen (NORCO) 7.5-325 MG per tablet; Take 1 tablet by mouth 4 (Four) Times a Day As Needed for Severe Pain .  Dispense: 120 tablet; Refill: 0  -     HYDROcodone-acetaminophen (NORCO) 7.5-325 MG per tablet; Take 1 tablet by mouth 4 (Four) Times a Day As Needed for Severe Pain . DNF before 8/4/2022  Dispense: 120 tablet; Refill: 0    2. Postlaminectomy syndrome of lumbar region    3. Postlaminectomy syndrome of cervical region    4. High risk medication use    Summary  Diya Arreola is a 62 y.o. female with chronic neck pain status post ACDF, chronic back pain status post Fusion L3-5, here for follow-up.    Chronic back pain from DDD spondylosis postlaminectomy syndrome with radicular pain.   Chronic neck pain from postlaminectomy syndrome.  Worsening chronic tremors.    Denies new issues today.     Cont hydrocodone 7.5/325 4 times daily as needed for severe pain.  UDS sent and inspect reviewed  Discussed risk of tolerance, dependence, respiratory depression, coma and death associated with use of oral opioids for treatment of chronic nonmalignant pain.     RTC 2 month  "

## 2022-07-05 NOTE — TELEPHONE ENCOUNTER
Caller: LASHAUN  Relationship:     Best call back number: [phone]    Requested Prescriptions:   (NORCO) 7.5-325 MG per tablet    Pharmacy where request should be sent:  Pharmacy:  [facility]/PHARMACY #[id number] [address] AT [place] [address] - [phone] [facility] [phone]     Additional details provided by patient:   Does the patient have less than a 3 day supply:  [x] Yes  [] No    Iris Lim Rep   07/05/22 11:55 EDT

## 2022-07-12 ENCOUNTER — OFFICE VISIT (OUTPATIENT)
Dept: FAMILY MEDICINE CLINIC | Facility: CLINIC | Age: 62
End: 2022-07-12

## 2022-07-12 VITALS
TEMPERATURE: 97.7 F | WEIGHT: 144.4 LBS | HEIGHT: 62 IN | DIASTOLIC BLOOD PRESSURE: 74 MMHG | HEART RATE: 94 BPM | OXYGEN SATURATION: 94 % | SYSTOLIC BLOOD PRESSURE: 118 MMHG | RESPIRATION RATE: 16 BRPM | BODY MASS INDEX: 26.57 KG/M2

## 2022-07-12 DIAGNOSIS — F32.A DEPRESSION, UNSPECIFIED DEPRESSION TYPE: ICD-10-CM

## 2022-07-12 DIAGNOSIS — M79.7 FIBROMYALGIA: ICD-10-CM

## 2022-07-12 DIAGNOSIS — J44.9 CHRONIC OBSTRUCTIVE PULMONARY DISEASE, UNSPECIFIED COPD TYPE: ICD-10-CM

## 2022-07-12 DIAGNOSIS — Z11.59 NEED FOR HEPATITIS C SCREENING TEST: ICD-10-CM

## 2022-07-12 DIAGNOSIS — Z00.00 MEDICARE ANNUAL WELLNESS VISIT, SUBSEQUENT: Primary | ICD-10-CM

## 2022-07-12 DIAGNOSIS — M48.061 SPINAL STENOSIS OF LUMBAR REGION WITHOUT NEUROGENIC CLAUDICATION: ICD-10-CM

## 2022-07-12 PROCEDURE — G0439 PPPS, SUBSEQ VISIT: HCPCS | Performed by: FAMILY MEDICINE

## 2022-07-12 PROCEDURE — 1159F MED LIST DOCD IN RCRD: CPT | Performed by: FAMILY MEDICINE

## 2022-07-12 PROCEDURE — 1170F FXNL STATUS ASSESSED: CPT | Performed by: FAMILY MEDICINE

## 2022-07-12 RX ORDER — ROFLUMILAST 500 UG/1
500 TABLET ORAL DAILY
Qty: 90 TABLET | Refills: 3 | Status: SHIPPED | OUTPATIENT
Start: 2022-07-12 | End: 2022-10-14 | Stop reason: SDUPTHER

## 2022-07-12 RX ORDER — BUPROPION HYDROCHLORIDE 150 MG/1
150 TABLET ORAL DAILY
Qty: 90 TABLET | Refills: 3 | Status: SHIPPED | OUTPATIENT
Start: 2022-07-12

## 2022-07-12 RX ORDER — CYCLOBENZAPRINE HCL 10 MG
10 TABLET ORAL 3 TIMES DAILY PRN
Qty: 270 TABLET | Refills: 1 | Status: SHIPPED | OUTPATIENT
Start: 2022-07-12 | End: 2023-01-20 | Stop reason: SDUPTHER

## 2022-07-12 RX ORDER — BREXPIPRAZOLE 0.5 MG/1
1 TABLET ORAL DAILY
Qty: 90 TABLET | Refills: 3 | Status: SHIPPED | OUTPATIENT
Start: 2022-07-12

## 2022-07-12 RX ORDER — BUMETANIDE 2 MG/1
2 TABLET ORAL 2 TIMES DAILY
Qty: 180 TABLET | Refills: 3 | Status: SHIPPED | OUTPATIENT
Start: 2022-07-12

## 2022-07-12 RX ORDER — MELOXICAM 15 MG/1
15 TABLET ORAL DAILY
Qty: 90 TABLET | Refills: 3 | Status: ON HOLD | OUTPATIENT
Start: 2022-07-12 | End: 2023-02-28

## 2022-07-12 RX ORDER — NITROGLYCERIN 0.4 MG/1
0.4 TABLET SUBLINGUAL
Qty: 25 TABLET | Refills: 1 | Status: SHIPPED | OUTPATIENT
Start: 2022-07-12

## 2022-07-12 RX ORDER — VERAPAMIL HYDROCHLORIDE 240 MG/1
240 TABLET, FILM COATED, EXTENDED RELEASE ORAL DAILY
Qty: 90 TABLET | Refills: 3 | Status: SHIPPED | OUTPATIENT
Start: 2022-07-12

## 2022-07-12 NOTE — PROGRESS NOTES
The ABCs of the Annual Wellness Visit  Subsequent Medicare Wellness Visit    Chief Complaint   Patient presents with   • Annual Exam     refills      Subjective    History of Present Illness:  Diya Arreola is a 62 y.o. female who presents for a Subsequent Medicare Wellness Visit. Her son recently  of a drug overdose. She plans to start counseling soon.     The following portions of the patient's history were reviewed and   updated as appropriate: allergies, current medications, past family history, past medical history, past social history, past surgical history and problem list.    Compared to one year ago, the patient feels her physical   health is the same.    Compared to one year ago, the patient feels her mental   health is worse.    Recent Hospitalizations:  This patient has had a [facility] admission record on file within the last 365 days.    Current Medical Providers:  Patient Care Team:  iMtzy Rea MD as PCP - General  Mitzy Rea MD as PCP - Family Medicine  Lizeth Mcclellan MD as Consulting Physician (Pain Medicine)    Outpatient Medications Prior to Visit   Medication Sig Dispense Refill   • diphenhydrAMINE HCl, Sleep, (SLEEP-AID PO) Take 50 mg by mouth Every Night.     • EPINEPHrine (EPIPEN) 0.3 MG/0.3ML solution auto-injector injection      • gabapentin (NEURONTIN) 300 MG capsule Take 1 capsule by mouth 3 (Three) Times a Day. 270 capsule 3   • HYDROcodone-acetaminophen (NORCO) 7.5-325 MG per tablet Take 1 tablet by mouth 4 (Four) Times a Day As Needed for Severe Pain . 120 tablet 0   • [START ON 2022] HYDROcodone-acetaminophen (NORCO) 7.5-325 MG per tablet Take 1 tablet by mouth 4 (Four) Times a Day As Needed for Severe Pain . DNF before 2022 120 tablet 0   • hydroxychloroquine (PLAQUENIL) 200 MG tablet Take 1 tablet by mouth Daily for 180 days. Indications: Systemic Lupus Erythematosus 90 tablet 1   • polyethylene glycol (MIRALAX) 17 g packet Take 17 g by  mouth Every Night.     • tiZANidine (ZANAFLEX) 2 MG tablet TAKE 1 TABLET BY MOUTH 2 (TWO) TIMES A DAY AS NEEDED FOR MUSCLE SPASMS. 180 tablet 0   • traZODone (DESYREL) 100 MG tablet      • albuterol (PROVENTIL) (2.5 MG/3ML) 0.083% nebulizer solution Take 2.5 mg by nebulization Every 4 (Four) Hours As Needed for Wheezing. 360 vial 3   • albuterol sulfate  (90 Base) MCG/ACT inhaler TAKE 2 PUFFS BY MOUTH EVERY 4 HOURS AS NEEDED FOR WHEEZE (Patient taking differently: Inhale 2 puffs Every 4 (Four) Hours As Needed. Bring dos) 18 g 3   • bumetanide (BUMEX) 2 MG tablet Take 1 tablet by mouth 2 (Two) Times a Day. (Patient taking differently: Take 2 mg by mouth 2 (Two) Times a Day. Do not take dos) 180 tablet 3   • buPROPion XL (WELLBUTRIN XL) 150 MG 24 hr tablet TAKE 1 TABLET BY MOUTH EVERY DAY 90 tablet 1   • cyclobenzaprine (FLEXERIL) 10 MG tablet TAKE 1 TABLET BY MOUTH 3 TIMES A DAY AS NEEDED FOR MUSCLE SPASMS. 270 tablet 1   • Daliresp 500 MCG tablet tablet TAKE 1 TABLET BY MOUTH EVERY DAY 90 tablet 3   • Fluticasone-Umeclidin-Vilant (Trelegy Ellipta) 200-62.5-25 MCG/INH inhaler Inhale 1 puff Daily. 90 each 3   • meloxicam (MOBIC) 15 MG tablet Take 1 tablet by mouth Daily. 90 tablet 1   • nitroglycerin (NITROSTAT) 0.4 MG SL tablet PLACE 1 TABLET UNDER THE TONGUE EVERY 5 (FIVE) MINUTES AS NEEDED FOR CHEST PAIN. TAKE NO MORE THAN 3 DOSES IN 15 MINUTES. 25 tablet 1   • Rexulti 0.5 MG tablet TAKE 0.5 MG BY MOUTH DAILY. 30 tablet 5   • verapamil SR (CALAN-SR) 240 MG CR tablet Take 1 tablet by mouth Daily. (Patient taking differently: Take 240 mg by mouth Every Morning. Take dos) 90 tablet 3   • Cholecalciferol (VITAMIN D) 2000 units capsule Take 2,000 Units by mouth Daily.     • doxycycline (MONODOX) 100 MG capsule Take 1 capsule by mouth 2 (Two) Times a Day. 20 capsule 0   • folic acid (FOLVITE) 1 MG tablet Take 1 mg by mouth Daily.     • multivitamin with minerals tablet tablet Take 1 tablet by mouth Daily.     •  varenicline (CHANTIX) 1 MG tablet Take 1 tablet by mouth 2 (Two) Times a Day. 60 tablet 2   • Wheat Dextrin (Benefiber) powder Take  by mouth every night at bedtime.       No facility-administered medications prior to visit.       Opioid medication/s are on active medication list.  and I have evaluated her active treatment plan and pain score trends (see table).  There were no vitals filed for this visit.  I have reviewed the chart for potential of high risk medication and harmful drug interactions in the elderly.            Aspirin is not on active medication list.  Aspirin use is not indicated based on review of current medical condition/s. Risk of harm outweighs potential benefits.  .    Patient Active Problem List   Diagnosis   • Allergic rhinitis   • Anxiety disorder   • Asthma   • Carpal tunnel syndrome, bilateral   • Chronic obstructive pulmonary disease (HCC)   • Connective tissue disease, undifferentiated (HCC)   • Constipation   • Cystic fibrosis carrier   • DDD (degenerative disc disease), cervical   • Dyspepsia   • Edema   • Elevated antinuclear antibody (LILI) level   • Essential (primary) hypertension   • Fatigue   • Hyperlipidemia   • Insomnia   • Irritable bowel syndrome   • Fibromyalgia   • Headache   • Lupus (HCC)   • Sjogren's syndrome (HCC)   • Nonrheumatic mitral valve insufficiency   • Other long term (current) drug therapy   • Other specified dorsopathies, site unspecified   • Palpitations   • Paroxysmal tachycardia (HCC)   • Sciatica   • Sleep disorder   • Spinal stenosis of lumbar region   • Temporary cerebral vascular dysfunction   • Vitamin D deficiency   • Hypokalemia   • Depression   • LILI positive   • Need for immunization against influenza   • Acute cystitis without hematuria   • Spondylolisthesis of lumbar region   • S/P lumbar fusion   • Encounter for postoperative care   • Nausea   • Tobacco dependence   • Encounter for screening mammogram for breast cancer   • Postmenopausal   •  "Medicare annual wellness visit, subsequent     Advance Care Planning  Advance Directive is not on file.  ACP discussion was held with the patient during this visit. Patient does not have an advance directive, information provided.          Objective    Vitals:    22 0917   BP: 118/74   BP Location: Left arm   Patient Position: Sitting   Cuff Size: Adult   Pulse: 94   Resp: 16   Temp: 97.7 °F (36.5 °C)   TempSrc: Temporal   SpO2: 94%   Weight: 65.5 kg (144 lb 6.4 oz)   Height: 157.5 cm (62.01\")     Estimated body mass index is 26.4 kg/m² as calculated from the following:    Height as of this encounter: 157.5 cm (62.01\").    Weight as of this encounter: 65.5 kg (144 lb 6.4 oz).    BMI is >= 25 and <30. (Overweight) The following options were offered after discussion;: exercise counseling/recommendations      Does the patient have evidence of cognitive impairment? No    Physical Exam            HEALTH RISK ASSESSMENT    Smoking Status:  Social History     Tobacco Use   Smoking Status Current Every Day Smoker   • Packs/day: 1.00   • Years: 39.00   • Pack years: 39.00   • Types: Cigarettes   • Last attempt to quit: 2021   • Years since quittin.7   Smokeless Tobacco Never Used     Alcohol Consumption:  Social History     Substance and Sexual Activity   Alcohol Use No     Fall Risk Screen:    CRISTINAADI Fall Risk Assessment has not been completed.    Depression Screening:  PHQ-2/PHQ-9 Depression Screening 2022   Retired PHQ-9 Total Score -   Retired Total Score -   Little Interest or Pleasure in Doing Things 0-->not at all   Feeling Down, Depressed or Hopeless 0-->not at all   PHQ-9: Brief Depression Severity Measure Score 0       Health Habits and Functional and Cognitive Screening:  Functional & Cognitive Status 2022   Do you have difficulty preparing food and eating? No   Do you have difficulty bathing yourself, getting dressed or grooming yourself? No   Do you have difficulty using the toilet? No "   Do you have difficulty moving around from place to place? No   Do you have trouble with steps or getting out of a bed or a chair? No   Current Diet Well Balanced Diet   Dental Exam Up to date   Eye Exam Up to date   Exercise (times per week) 2 times per week   Current Exercises Include Walking   Do you need help using the phone?  No   Are you deaf or do you have serious difficulty hearing?  No   Do you need help with transportation? No   Do you need help shopping? No   Do you need help preparing meals?  No   Do you need help with housework?  No   Do you need help with laundry? No   Do you need help taking your medications? No   Do you ever drive or ride in a car without wearing a seat belt? No   Have you felt unusual stress, anger or loneliness in the last month? Yes   Who do you live with? Spouse   If you need help, do you have trouble finding someone available to you? No   Do you have difficulty concentrating, remembering or making decisions? No       Age-appropriate Screening Schedule:  Refer to the list below for future screening recommendations based on patient's age, sex and/or medical conditions. Orders for these recommended tests are listed in the plan section. The patient has been provided with a written plan.    Health Maintenance   Topic Date Due   • TDAP/TD VACCINES (1 - Tdap) Never done   • ZOSTER VACCINE (2 of 3) 09/29/2014   • PAP SMEAR  Never done   • LIPID PANEL  Never done   • INFLUENZA VACCINE  10/01/2022   • DXA SCAN  05/02/2024   • MAMMOGRAM  07/06/2024              Assessment & Plan   CMS Preventative Services Quick Reference  Risk Factors Identified During Encounter  Depression/Dysphoria  The above risks/problems have been discussed with the patient.  Follow up actions/plans if indicated are seen below in the Assessment/Plan Section.  Pertinent information has been shared with the patient in the After Visit Summary.    Diagnoses and all orders for this visit:    1. Medicare annual wellness  visit, subsequent (Primary)    2. Depression, unspecified depression type  Comments:  Feeling some better on current medicines.  Continue same.   Orders:  -     buPROPion XL (WELLBUTRIN XL) 150 MG 24 hr tablet; Take 1 tablet by mouth Daily.  Dispense: 90 tablet; Refill: 3    3. Chronic obstructive pulmonary disease, unspecified COPD type (HCC)  -     Fluticasone-Umeclidin-Vilant (Trelegy Ellipta) 200-62.5-25 MCG/INH inhaler; Inhale 1 puff Daily.  Dispense: 90 each; Refill: 3    4. Fibromyalgia  -     Ambulatory Referral to Physical Therapy Evaluate and treat    5. Spinal stenosis of lumbar region without neurogenic claudication  -     Ambulatory Referral to Physical Therapy Evaluate and treat    6. Need for hepatitis C screening test  -     Hepatitis C Antibody; Future    Other orders  -     roflumilast (Daliresp) 500 MCG tablet tablet; Take 1 tablet by mouth Daily.  Dispense: 90 tablet; Refill: 3  -     verapamil SR (CALAN-SR) 240 MG CR tablet; Take 1 tablet by mouth Daily.  Dispense: 90 tablet; Refill: 3  -     bumetanide (BUMEX) 2 MG tablet; Take 1 tablet by mouth 2 (Two) Times a Day.  Dispense: 180 tablet; Refill: 3  -     nitroglycerin (NITROSTAT) 0.4 MG SL tablet; Place 1 tablet under the tongue Every 5 (Five) Minutes As Needed for Chest Pain. Take no more than 3 doses in 15 minutes.  Dispense: 25 tablet; Refill: 1  -     Brexpiprazole (Rexulti) 0.5 MG tablet; Take 0.5 mg by mouth Daily.  Dispense: 90 tablet; Refill: 3  -     cyclobenzaprine (FLEXERIL) 10 MG tablet; Take 1 tablet by mouth 3 (Three) Times a Day As Needed for Muscle Spasms.  Dispense: 270 tablet; Refill: 1  -     meloxicam (MOBIC) 15 MG tablet; Take 1 tablet by mouth Daily.  Dispense: 90 tablet; Refill: 3        Follow Up:   Return in about 1 year (around 7/12/2023) for Medicare Wellness.     An After Visit Summary and PPPS were made available to the patient.

## 2022-07-20 NOTE — TELEPHONE ENCOUNTER
Caller: MaxwellDiya    Relationship: Self    Best call back number: [phone]    Requested Prescriptions:   Requested Prescriptions     Pending Prescriptions Disp Refills   • albuterol (PROVENTIL) (2.5 MG/3ML) 0.083% nebulizer solution 360 each 3     Sig: Take 2.5 mg by nebulization Every 4 (Four) Hours As Needed for Wheezing.   • albuterol sulfate  (90 Base) MCG/ACT inhaler 18 g 3     Sig: Inhale 2 puffs Every 4 (Four) Hours As Needed for Wheezing.        Pharmacy where request should be sent: [facility]/PHARMACY #[id number] - [address] AT [place] [address] - [phone] [place] [phone]      Additional details provided by patient: OUT    Does the patient have less than a 3 day supply:  [x] Yes  [] No    Jerrell Seo   07/20/22 10:19 EDT

## 2022-07-21 RX ORDER — ALBUTEROL SULFATE 2.5 MG/3ML
2.5 SOLUTION RESPIRATORY (INHALATION) EVERY 4 HOURS PRN
Qty: 360 EACH | Refills: 3 | Status: SHIPPED | OUTPATIENT
Start: 2022-07-21

## 2022-07-21 RX ORDER — ALBUTEROL SULFATE 90 UG/1
2 AEROSOL, METERED RESPIRATORY (INHALATION) EVERY 4 HOURS PRN
Qty: 18 G | Refills: 3 | Status: SHIPPED | OUTPATIENT
Start: 2022-07-21 | End: 2023-03-05 | Stop reason: HOSPADM

## 2022-07-31 PROBLEM — Z00.00 MEDICARE ANNUAL WELLNESS VISIT, SUBSEQUENT: Status: ACTIVE | Noted: 2022-07-31

## 2022-07-31 NOTE — PATIENT INSTRUCTIONS
Medicare Wellness  Personal Prevention Plan of Service     Date of Office Visit:    Encounter Provider:  Mitzy Rea MD  Place of Service:  [facility]  Patient Name: Diya Arreola  :  1960    As part of the Medicare Wellness portion of your visit today, we are providing you with this personalized preventive plan of services (PPPS). This plan is based upon recommendations of the [place] Preventive Services Task Force (USPSTF) and the Advisory Committee on Immunization Practices (ACIP).    This lists the preventive care services that should be considered, and provides dates of when you are due. Items listed as completed are up-to-date and do not require any further intervention.    Health Maintenance   Topic Date Due    TDAP/TD VACCINES (1 - Tdap) Never done    LUNG CANCER SCREENING  Never done    ZOSTER VACCINE (2 of 3) 2014    HEPATITIS C SCREENING  Never done    PAP SMEAR  Never done    LIPID PANEL  Never done    Pneumococcal Vaccine 0-64 (2 - PPSV23 or PCV20) 2019    COVID-19 Vaccine (2 - Booster for Miri series) 2021    INFLUENZA VACCINE  10/01/2022    ANNUAL WELLNESS VISIT  2023    COLORECTAL CANCER SCREENING  10/08/2023    DXA SCAN  2024    MAMMOGRAM  2024       Orders Placed This Encounter   Procedures    Hepatitis C Antibody     Standing Status:   Future     Standing Expiration Date:   2023     Order Specific Question:   Release to patient     Answer:   Immediate    Ambulatory Referral to Physical Therapy Evaluate and treat     Referral Priority:   Routine     Referral Type:   Physical Therapy     Referral Reason:   Specialty Services Required     Requested Specialty:   Physical Therapy     Number of Visits Requested:   1       No follow-ups on file.

## 2022-08-16 RX ORDER — GABAPENTIN 300 MG/1
300 CAPSULE ORAL 3 TIMES DAILY
Qty: 270 CAPSULE | Refills: 3 | Status: SHIPPED | OUTPATIENT
Start: 2022-08-16 | End: 2023-01-20

## 2022-08-31 ENCOUNTER — TELEPHONE (OUTPATIENT)
Dept: FAMILY MEDICINE CLINIC | Facility: CLINIC | Age: 62
End: 2022-08-31

## 2022-08-31 NOTE — TELEPHONE ENCOUNTER
Caller: LORI    Relationship: Other    Best call back number: [phone]      LORI WITH [facility] CALLED TO VERIFY WHETHER OR NOT PATIENT HAS HAD A PHYSICAL THERAPY REFERRAL SUBMITTED OR NOT.    LORI ASKS THAT WE PLEASE CONTACT THE PATIENT IN REGARDS TO THIS.

## 2022-08-31 NOTE — TELEPHONE ENCOUNTER
THE [facility] PHYSICAL THERAPY OFFICE IN [place] TRIED CALLING HER OVER 3 TIMES WITH OUT SUCCESS.  SHE NEVER RETURNED THEIR CALL.

## 2022-09-01 ENCOUNTER — OFFICE VISIT (OUTPATIENT)
Dept: PAIN MEDICINE | Facility: CLINIC | Age: 62
End: 2022-09-01

## 2022-09-01 VITALS
HEART RATE: 110 BPM | OXYGEN SATURATION: 96 % | RESPIRATION RATE: 16 BRPM | SYSTOLIC BLOOD PRESSURE: 132 MMHG | DIASTOLIC BLOOD PRESSURE: 84 MMHG

## 2022-09-01 DIAGNOSIS — M96.1 POSTLAMINECTOMY SYNDROME OF CERVICAL REGION: ICD-10-CM

## 2022-09-01 DIAGNOSIS — Z79.899 HIGH RISK MEDICATION USE: ICD-10-CM

## 2022-09-01 DIAGNOSIS — M46.1 SACROILIITIS: ICD-10-CM

## 2022-09-01 DIAGNOSIS — M96.1 POSTLAMINECTOMY SYNDROME OF LUMBAR REGION: ICD-10-CM

## 2022-09-01 DIAGNOSIS — G89.4 CHRONIC PAIN SYNDROME: Primary | ICD-10-CM

## 2022-09-01 PROCEDURE — 99214 OFFICE O/P EST MOD 30 MIN: CPT | Performed by: ANESTHESIOLOGY

## 2022-09-01 RX ORDER — HYDROCODONE BITARTRATE AND ACETAMINOPHEN 7.5; 325 MG/1; MG/1
1 TABLET ORAL 4 TIMES DAILY PRN
Qty: 120 TABLET | Refills: 0 | Status: SHIPPED | OUTPATIENT
Start: 2022-10-01 | End: 2022-11-01 | Stop reason: SDUPTHER

## 2022-09-01 RX ORDER — HYDROCODONE BITARTRATE AND ACETAMINOPHEN 7.5; 325 MG/1; MG/1
1 TABLET ORAL 4 TIMES DAILY PRN
Qty: 120 TABLET | Refills: 0 | Status: SHIPPED | OUTPATIENT
Start: 2022-09-01 | End: 2022-11-01 | Stop reason: SDUPTHER

## 2022-09-01 NOTE — PROGRESS NOTES
Subjective    CC back, leg pain, neck pain  Diya Arreola is a 62 y.o. female with chronic neck pain status post ACDF, chronic back pain S/P  L4-L5 LAMI/TILF 2021/[facility],  here for follow-up.    Denies new complaint today except for the last 3 weeks has noticed worsening left-sided lower back pain/SI pain radiating to posterior thigh and lower leg.  Starting physical therapy this week.  Has follow-up with neurosurgery next week.  Chronic back pain upper lumbar and lower lumbar radiating to bilateral hips bilateral lower extremity worse with standing walking twisting or any activity.  Denies new injury, saddle anesthesia bladder bowel continence.  Chronic neck pain, radiating to bilateral shoulder limited range of motion in the neck and significant paraspinal muscle spasm bilateral UE radicular pain.  Denies balance issues.    Chronic tremors, history of polyarthralgia/lupus sees rheumatology.  Pain interfering with ADL.  Tried physical therapy without relief.  Seen neurosurgery, referred to try injection.        C-spine MRI  probably a plate on the anterior aspect of the spine at the C4-5 level. mild subluxation with degenerative disc disease at C3-4, causing a mild central canal stenosis. Left-sided disc extrusion at C6-7 with left foraminal impingement.   L-spine MRI  postsurgical changes L3-L4 with pedicle screw L3.  Retrolisthesis L2 on L3 moderate central lateral canal stenosis.  Grade 1 anterolisthesis L4-L5.  Degenerative changes throughout.  L-spine x-ray : 4 mm anterolisthesis on extension, 6 mm anterolisthesis on flexion, at the L4-5 level. Stable spinal fusion changes at L3-4.. Severe degenerative disc and endplate changes at L2-3, similar to  2018 examination.    Pain Assessment   Location of Pain: Lower Back, R Hip, L Hip, L Leg, neck pain, joint  Description of Pain: Dull/Aching, Throbbing, Stabbing  Previous Pain Rating :7  Current Pain Ratin  Aggravating Factors:  Activity  Alleviating Factors: Rest, Medication    PEG Assessment   What number best describes your pain on average in the past week?9  What number best describes how, during the past week, pain has interfered with your enjoyment of life?2  What number best describes how, during the past week, pain has interfered with your general activity?10      The following portions of the patient's history were reviewed and updated as appropriate: allergies, current medications, past family history, past medical history, past social history, past surgical history and problem list.     has a past medical history of Allergic, Anxiety, Arthritis, Asthma, Constipation, COPD (chronic obstructive pulmonary disease) ([facility]), Fall, Fibromyalgia, primary, Gastritis, History of pancreatitis, Low back pain (2021), Lupus ([facility]), Neuropathy, Osteopenia, PONV (postoperative nausea and vomiting), Rheumatoid arthritis ([facility]), Scoliosis, Sjogren's syndrome ([facility]), Tachycardia, and TIA (transient ischemic attack) (2014).   has a past surgical history that includes Cholecystectomy; Hysterectomy;  section; Cystocele repair; Carpal tunnel release; Cardiac catheterization; Spine surgery; Colonoscopy (10/08/2013); Epidural block injection; Lumbar fusion (N/A, 2021); and Back surgery.  family history includes Diabetes in her father; Heart disease in her brother; Hypertension in her brother and sister; Osteoporosis in her maternal grandmother; Stroke in her sister.  Social History     Tobacco Use   • Smoking status: Former Smoker     Packs/day: 1.00     Years: 39.00     Pack years: 39.00     Types: Cigarettes     Quit date: 2021     Years since quittin.8   • Smokeless tobacco: Never Used   • Tobacco comment: on Nicotiene  patch 22   Substance Use Topics   • Alcohol use: No     Review of Systems   Musculoskeletal: Positive for arthralgias, back pain and neck pain.   Neurological: Positive for tremors, numbness and headache.    All other systems reviewed and are negative.    Objective   Physical Exam   Constitutional: No distress.   walker   Neck: Kernig's sign noted.   Pulmonary/Chest: Effort normal.   Musculoskeletal:      Cervical back: She exhibits decreased range of motion and tenderness.      Lumbar back: She exhibits decreased range of motion and tenderness.   Vitals reviewed.    /84   Pulse 110   Resp 16   SpO2 96%      PHQ 9 on chart  Opioid risk tool low risk    Assessment & Plan   Diagnoses and all orders for this visit:    1. Chronic pain syndrome (Primary)  -     HYDROcodone-acetaminophen (NORCO) 7.5-325 MG per tablet; Take 1 tablet by mouth 4 (Four) Times a Day As Needed for Severe Pain.  Dispense: 120 tablet; Refill: 0  -     HYDROcodone-acetaminophen (NORCO) 7.5-325 MG per tablet; Take 1 tablet by mouth 4 (Four) Times a Day As Needed for Severe Pain. DNF before 10/1/2022  Dispense: 120 tablet; Refill: 0    2. Sacroiliitis (HCC)    3. Postlaminectomy syndrome of lumbar region    4. Postlaminectomy syndrome of cervical region    5. High risk medication use    Summary  Diya Arreola is a 62 y.o. female with chronic neck pain status post ACDF, chronic back pain status post Fusion L3-5, here for follow-up.    Chronic back pain from DDD spondylosis postlaminectomy syndrome with radicular pain.   Chronic neck pain from postlaminectomy syndrome.  Worsening chronic tremors.    Denies new complaint today except for the last 3 weeks has noticed worsening left-sided lower back pain/SI pain radiating to posterior thigh and lower leg.  Starting physical therapy this week.  Has follow-up with neurosurgery next week.  If not improved with PT will consider diagnostic/therapeutic left SI injection.    Cont hydrocodone 7.5/325 4 times daily as needed for severe pain.  UDS sent and inspect reviewed  Discussed risk of tolerance, dependence, respiratory depression, coma and death associated with use of oral opioids for treatment  of chronic nonmalignant pain.     RTC 2 month

## 2022-09-09 NOTE — PROGRESS NOTES
"Subjective   History of Present Illness: Diya Arreola is a 62 y.o. female is here today for follow-up. Patients reports increased pain in her low back as well as muscle spasms.  She has noticed worsening radicular pain that had initially improved after surgery as well.  She is scheduled to begin more physical therapy.  No other new imaging.    Chief Complaint   Patient presents with   • Back Pain     Follow up          Previous Treatment:    The following portions of the patient's history were reviewed and updated as appropriate: allergies, current medications, past family history, past medical history, past social history, past surgical history and problem list.    Review of Systems   Constitutional: Positive for activity change.   HENT: Negative.    Eyes: Negative.    Respiratory: Negative.    Cardiovascular: Negative.    Endocrine: Negative.    Genitourinary: Negative.    Musculoskeletal: Positive for back pain (down into hips). Negative for neck pain.   Skin: Negative.    Allergic/Immunologic: Negative.    Neurological: Positive for weakness (some in left leg). Negative for numbness.   Hematological: Negative.    Psychiatric/Behavioral: Positive for sleep disturbance.       Objective     /88   Pulse 75   Ht 157.5 cm (62.01\")   Wt 61 kg (134 lb 6.4 oz)   SpO2 99%   BMI 24.57 kg/m²    Body mass index is 24.57 kg/m².      Neurologic Exam    Assessment & Plan   Independent Review of Radiographic Studies:      I personally reviewed and interpreted the images from the following studies.    No new imaging    Medical Decision Making:      Diya Arreola is a 62 y.o. female status post L2-5 decompression fusion for adjacent segment disease initially saw significant improvement in her symptoms following surgery, however she has worsening back and leg pain.  3-month CT scan demonstrated some loosening of the L5 screws.  Further evaluate her hardware and look for evidence of fusion we will get another CT " scan of her lumbar spine.  We will also get scoliosis x-rays to evaluate her spinal alignment.  We can come up with a plan when she completes these.      Diagnoses and all orders for this visit:    1. S/P lumbar fusion (Primary)    2. Lumbar radiculopathy      No follow-ups on file.    This patient was examined wearing appropriate personal protective equipment.                      Dr. Jerrell Gorman IV    09/12/22  12:14 EDT

## 2022-09-12 ENCOUNTER — OFFICE VISIT (OUTPATIENT)
Dept: NEUROSURGERY | Facility: CLINIC | Age: 62
End: 2022-09-12

## 2022-09-12 VITALS
WEIGHT: 134.4 LBS | SYSTOLIC BLOOD PRESSURE: 153 MMHG | BODY MASS INDEX: 24.73 KG/M2 | HEIGHT: 62 IN | DIASTOLIC BLOOD PRESSURE: 88 MMHG | HEART RATE: 75 BPM | OXYGEN SATURATION: 99 %

## 2022-09-12 DIAGNOSIS — M54.16 LUMBAR RADICULOPATHY: ICD-10-CM

## 2022-09-12 DIAGNOSIS — Z98.1 S/P LUMBAR FUSION: Primary | ICD-10-CM

## 2022-09-12 PROCEDURE — 99213 OFFICE O/P EST LOW 20 MIN: CPT | Performed by: NEUROLOGICAL SURGERY

## 2022-09-19 RX ORDER — HYDROXYCHLOROQUINE SULFATE 200 MG/1
200 TABLET, FILM COATED ORAL DAILY
Qty: 90 TABLET | Refills: 1 | Status: SHIPPED | OUTPATIENT
Start: 2022-09-19 | End: 2022-12-27 | Stop reason: SDUPTHER

## 2022-09-19 RX ORDER — HYDROXYCHLOROQUINE SULFATE 200 MG/1
200 TABLET, FILM COATED ORAL DAILY
Qty: 90 TABLET | Refills: 1 | Status: CANCELLED | OUTPATIENT
Start: 2022-09-19

## 2022-10-04 ENCOUNTER — TELEPHONE (OUTPATIENT)
Dept: FAMILY MEDICINE CLINIC | Facility: CLINIC | Age: 62
End: 2022-10-04

## 2022-10-04 ENCOUNTER — TELEPHONE (OUTPATIENT)
Dept: PAIN MEDICINE | Facility: CLINIC | Age: 62
End: 2022-10-04

## 2022-10-04 NOTE — TELEPHONE ENCOUNTER
Caller: Diya Arreola    Relationship: Self    Best call back number: [phone] (M)    What is the best time to reach you: ASAP/EARLIEST CONVENIENCE     Who are you requesting to speak with (clinical staff, provider,  specific staff member): DR. BERRY     What was the call regarding: PATIENT STATES THE RA DOCTOR WANTS TO PUT HER ON BUSPAR LOW DOSE AND THEY TOOK A LOT OF LABS ON HER.     Do you require a callback: YES

## 2022-10-04 NOTE — TELEPHONE ENCOUNTER
Patient went to her RA/LUPUS doctors and she states they were going to prescribe her Buspar, she is asking the Ok from you.

## 2022-10-05 ENCOUNTER — TELEPHONE (OUTPATIENT)
Dept: FAMILY MEDICINE CLINIC | Facility: CLINIC | Age: 62
End: 2022-10-05

## 2022-10-05 ENCOUNTER — HOSPITAL ENCOUNTER (OUTPATIENT)
Dept: GENERAL RADIOLOGY | Facility: HOSPITAL | Age: 62
Discharge: HOME | End: 2022-10-05

## 2022-10-05 ENCOUNTER — HOSPITAL ENCOUNTER (OUTPATIENT)
Dept: CT IMAGING | Facility: HOSPITAL | Age: 62
Discharge: HOME | End: 2022-10-05

## 2022-10-05 DIAGNOSIS — Z98.1 S/P LUMBAR FUSION: ICD-10-CM

## 2022-10-05 PROCEDURE — 72131 CT LUMBAR SPINE W/O DYE: CPT

## 2022-10-05 PROCEDURE — 72082 X-RAY EXAM ENTIRE SPI 2/3 VW: CPT

## 2022-10-05 RX ORDER — BUSPIRONE HYDROCHLORIDE 5 MG/1
5 TABLET ORAL 3 TIMES DAILY
COMMUNITY
End: 2023-01-17 | Stop reason: ALTCHOICE

## 2022-10-05 NOTE — TELEPHONE ENCOUNTER
The patient seen Karolina Aguilar for her Lupus and had some labs done I called their office to have those faxed over to us. I had to leave a vm no one answered   [phone]

## 2022-10-05 NOTE — TELEPHONE ENCOUNTER
Patient stated she seen Karolina Agiular and she had some labs done so I called and asked for those to be faxed to us and she stated she is now on Buspar 5 mg

## 2022-10-14 ENCOUNTER — LAB (OUTPATIENT)
Dept: FAMILY MEDICINE CLINIC | Facility: CLINIC | Age: 62
End: 2022-10-14

## 2022-10-14 ENCOUNTER — OFFICE VISIT (OUTPATIENT)
Dept: FAMILY MEDICINE CLINIC | Facility: CLINIC | Age: 62
End: 2022-10-14

## 2022-10-14 VITALS
DIASTOLIC BLOOD PRESSURE: 89 MMHG | HEART RATE: 97 BPM | OXYGEN SATURATION: 96 % | TEMPERATURE: 97.8 F | WEIGHT: 134 LBS | HEIGHT: 62 IN | SYSTOLIC BLOOD PRESSURE: 135 MMHG | BODY MASS INDEX: 24.66 KG/M2

## 2022-10-14 DIAGNOSIS — J40 BRONCHITIS: Primary | ICD-10-CM

## 2022-10-14 DIAGNOSIS — Z11.59 ENCOUNTER FOR HEPATITIS C VIRUS SCREENING TEST FOR HIGH RISK PATIENT: ICD-10-CM

## 2022-10-14 DIAGNOSIS — Z91.89 ENCOUNTER FOR HEPATITIS C VIRUS SCREENING TEST FOR HIGH RISK PATIENT: ICD-10-CM

## 2022-10-14 DIAGNOSIS — Z23 NEED FOR VACCINATION: ICD-10-CM

## 2022-10-14 LAB — HCV AB SER DONR QL: NORMAL

## 2022-10-14 PROCEDURE — 99213 OFFICE O/P EST LOW 20 MIN: CPT | Performed by: FAMILY MEDICINE

## 2022-10-14 PROCEDURE — 86803 HEPATITIS C AB TEST: CPT | Performed by: FAMILY MEDICINE

## 2022-10-14 PROCEDURE — 36415 COLL VENOUS BLD VENIPUNCTURE: CPT

## 2022-10-14 PROCEDURE — 90686 IIV4 VACC NO PRSV 0.5 ML IM: CPT | Performed by: FAMILY MEDICINE

## 2022-10-14 PROCEDURE — G0008 ADMIN INFLUENZA VIRUS VAC: HCPCS | Performed by: FAMILY MEDICINE

## 2022-10-14 RX ORDER — ROFLUMILAST 500 UG/1
500 TABLET ORAL DAILY
Qty: 90 TABLET | Refills: 3 | Status: SHIPPED | OUTPATIENT
Start: 2022-10-14

## 2022-10-14 RX ORDER — DOXYCYCLINE 100 MG/1
100 CAPSULE ORAL 2 TIMES DAILY
Qty: 20 CAPSULE | Refills: 0 | Status: ON HOLD | OUTPATIENT
Start: 2022-10-14 | End: 2023-02-28

## 2022-10-14 NOTE — PROGRESS NOTES
"Chief Complaint  Anxiety (3 month f/u ) and COPD    Subjective        Diay Arreola presents to [facility]  Anxiety      Her past medical history is significant for asthma.   COPD  She complains of cough and wheezing. Associated symptoms include nasal congestion. Pertinent negatives include no fever. Her past medical history is significant for asthma and COPD.   Cough  This is a new problem. The current episode started in the past 7 days. The problem has been unchanged. The problem occurs every few minutes. The cough is productive of purulent sputum. Associated symptoms include nasal congestion and wheezing. Pertinent negatives include no chills or fever. The symptoms are aggravated by exercise. She has tried a beta-agonist inhaler for the symptoms. The treatment provided mild relief. Her past medical history is significant for asthma and COPD.       Objective   Vital Signs:  /89 (BP Location: Left arm, Patient Position: Sitting, Cuff Size: Adult)   Pulse 97   Temp 97.8 °F (36.6 °C) (Infrared)   Ht 157.5 cm (62\")   Wt 60.8 kg (134 lb)   SpO2 96%   BMI 24.51 kg/m²   Estimated body mass index is 24.51 kg/m² as calculated from the following:    Height as of this encounter: 157.5 cm (62\").    Weight as of this encounter: 60.8 kg (134 lb).    BMI is within normal parameters. No other follow-up for BMI required.      Physical Exam  Vitals and nursing note reviewed.   Constitutional:       General: She is not in acute distress.     Appearance: She is well-developed.   HENT:      Head: Normocephalic.   Eyes:      General: Lids are normal.      Conjunctiva/sclera: Conjunctivae normal.   Neck:      Thyroid: No thyroid mass or thyromegaly.      Trachea: Trachea normal.   Cardiovascular:      Rate and Rhythm: Normal rate and regular rhythm.      Heart sounds: Normal heart sounds.   Pulmonary:      Effort: Pulmonary effort is normal.      Breath sounds: Rhonchi present. "   Abdominal:      Palpations: Abdomen is soft.   Musculoskeletal:         General: Tenderness present.      Cervical back: Normal range of motion.   Lymphadenopathy:      Cervical: No cervical adenopathy.   Skin:     General: Skin is warm and dry.   Neurological:      Mental Status: She is alert and oriented to person, place, and time.   Psychiatric:         Attention and Perception: She is attentive.         Mood and Affect: Mood normal.         Speech: Speech normal.         Behavior: Behavior normal.        Result Review :  The following data was reviewed by: Mitzy Rea MD on 10/14/2022:  Common labs    Common Labs 11/16/21 11/17/21 11/17/21 11/18/21 11/18/21 11/18/21     [id number] 0251 0251 0950   Glucose   113 (A)  120 (A)    BUN   11  6    Creatinine   0.64  0.60    eGFR Non African Am   95  102    Sodium   137  139    Potassium   4.6  4.0    Chloride   103  104    Calcium   8.3 (A)  8.2 (A)    WBC  7.10  8.30     Hemoglobin 9.8 (A) 8.3 (A)  7.4 (A)  9.2 (A)   Hematocrit 29.1 (A) 24.2 (A)  21.7 (A)  26.2 (A)   Platelets  151  130 (A)     (A) Abnormal value       Comments are available for some flowsheets but are not being displayed.                     Assessment and Plan   Diagnoses and all orders for this visit:    1. Bronchitis (Primary)  -     doxycycline (MONODOX) 100 MG capsule; Take 1 capsule by mouth 2 (Two) Times a Day.  Dispense: 20 capsule; Refill: 0    2. Encounter for hepatitis C virus screening test for high risk patient  -     Hepatitis C antibody; Future    3. Need for vaccination  -     FluLaval/Fluzone >6 mos ([id number])    Other orders  -     roflumilast (Daliresp) 500 MCG tablet tablet; Take 1 tablet by mouth Daily.  Dispense: 90 tablet; Refill: 3             Follow Up   No follow-ups on file.  Patient was given instructions and counseling regarding her condition or for health maintenance advice. Please see specific information pulled into the AVS if appropriate.

## 2022-10-14 NOTE — PROGRESS NOTES
"Subjective   History of Present Illness: Diya Arreola is a 62 y.o. female is here today for follow-up. In the office today patient reports no improvement with her back and leg pain.  Patient continues to have severe back buttock and thigh pain throughout the day.  This has progressively worsened over the course of time.  She is now nearly a year out from surgery.         Previous Treatment: Physical therapy    The following portions of the patient's history were reviewed and updated as appropriate: allergies, current medications, past family history, past medical history, past social history, past surgical history and problem list.    Review of Systems   Constitutional: Positive for activity change.   HENT: Negative.    Eyes: Negative.    Respiratory: Negative.    Cardiovascular: Negative.    Gastrointestinal: Negative.    Endocrine: Negative.    Genitourinary: Negative.    Musculoskeletal: Negative.    Skin: Negative.    Allergic/Immunologic: Negative.    Neurological: Positive for weakness (mild in left leg).   Hematological: Negative.    Psychiatric/Behavioral: Positive for sleep disturbance.       Objective     /81   Pulse 114   Ht 157.5 cm (62\")   Wt 60.1 kg (132 lb 9.6 oz)   SpO2 97%   BMI 24.25 kg/m²    Body mass index is 24.25 kg/m².      Neurologic Exam    Assessment & Plan   Independent Review of Radiographic Studies:      I personally reviewed and interpreted the images from the following studies.    CT lumbar spine: Pseudoarthrosis of the L4-5 level with loosening of L5 screws.    Scoliosis x-ray: Patient is 12 cm positive on SVA and balanced in the coronal plane.  There is a very large PI to LL mismatch    Medical Decision Making:      Diya Arreola is a 62 y.o. 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  Imaging demonstrates kyphotic deformity.  In order to address the patient's " pseudoarthrosis and deformity, she will require surgical intervention.  We will plan for L5-S1 ALIF with hyperlordotic  graft on day 1 followed by T9 to pelvic fusion with sheldon osteotomies from T11-L2 and L5-S1.  Patient understands the significant risks of surgery as well as lengthy recovery.  She will require preoperative clearance by her primary care doctor.      Diagnoses and all orders for this visit:    1. S/P lumbar fusion (Primary)    2. Pseudoarthrosis of lumbar spine    3. Other secondary kyphosis, thoracolumbar region      No follow-ups on file.    This patient was examined wearing appropriate personal protective equipment.                      Dr. Jerrell Gorman IV    10/17/22  11:46 EDT

## 2022-10-17 ENCOUNTER — OFFICE VISIT (OUTPATIENT)
Dept: NEUROSURGERY | Facility: CLINIC | Age: 62
End: 2022-10-17

## 2022-10-17 ENCOUNTER — TELEPHONE (OUTPATIENT)
Dept: FAMILY MEDICINE CLINIC | Facility: CLINIC | Age: 62
End: 2022-10-17

## 2022-10-17 ENCOUNTER — PREP FOR SURGERY (OUTPATIENT)
Dept: OTHER | Facility: HOSPITAL | Age: 62
End: 2022-10-17

## 2022-10-17 VITALS
HEIGHT: 62 IN | DIASTOLIC BLOOD PRESSURE: 81 MMHG | BODY MASS INDEX: 24.4 KG/M2 | SYSTOLIC BLOOD PRESSURE: 136 MMHG | WEIGHT: 132.6 LBS | OXYGEN SATURATION: 97 % | HEART RATE: 114 BPM

## 2022-10-17 DIAGNOSIS — M40.15 OTHER SECONDARY KYPHOSIS, THORACOLUMBAR REGION: Primary | ICD-10-CM

## 2022-10-17 DIAGNOSIS — R79.1 ABNORMAL COAGULATION PROFILE: ICD-10-CM

## 2022-10-17 DIAGNOSIS — M40.15 OTHER SECONDARY KYPHOSIS, THORACOLUMBAR REGION: ICD-10-CM

## 2022-10-17 DIAGNOSIS — Z98.1 S/P LUMBAR FUSION: Primary | ICD-10-CM

## 2022-10-17 DIAGNOSIS — R79.9 ABNORMAL FINDING OF BLOOD CHEMISTRY, UNSPECIFIED: ICD-10-CM

## 2022-10-17 DIAGNOSIS — S32.009K PSEUDOARTHROSIS OF LUMBAR SPINE: ICD-10-CM

## 2022-10-17 PROCEDURE — 99214 OFFICE O/P EST MOD 30 MIN: CPT | Performed by: NEUROLOGICAL SURGERY

## 2022-11-01 ENCOUNTER — OFFICE VISIT (OUTPATIENT)
Dept: PAIN MEDICINE | Facility: CLINIC | Age: 62
End: 2022-11-01

## 2022-11-01 VITALS
RESPIRATION RATE: 16 BRPM | HEART RATE: 105 BPM | SYSTOLIC BLOOD PRESSURE: 135 MMHG | DIASTOLIC BLOOD PRESSURE: 80 MMHG | OXYGEN SATURATION: 99 %

## 2022-11-01 DIAGNOSIS — G89.4 CHRONIC PAIN SYNDROME: Primary | ICD-10-CM

## 2022-11-01 DIAGNOSIS — M96.1 POSTLAMINECTOMY SYNDROME OF LUMBAR REGION: ICD-10-CM

## 2022-11-01 DIAGNOSIS — M96.1 POSTLAMINECTOMY SYNDROME OF CERVICAL REGION: ICD-10-CM

## 2022-11-01 DIAGNOSIS — M46.1 SACROILIITIS: ICD-10-CM

## 2022-11-01 DIAGNOSIS — Z79.899 HIGH RISK MEDICATION USE: Primary | ICD-10-CM

## 2022-11-01 DIAGNOSIS — Z79.899 HIGH RISK MEDICATION USE: ICD-10-CM

## 2022-11-01 PROCEDURE — 99214 OFFICE O/P EST MOD 30 MIN: CPT | Performed by: ANESTHESIOLOGY

## 2022-11-01 RX ORDER — HYDROCODONE BITARTRATE AND ACETAMINOPHEN 7.5; 325 MG/1; MG/1
1 TABLET ORAL 4 TIMES DAILY PRN
Qty: 120 TABLET | Refills: 0 | Status: SHIPPED | OUTPATIENT
Start: 2022-12-01 | End: 2023-01-17 | Stop reason: SDUPTHER

## 2022-11-01 RX ORDER — HYDROCODONE BITARTRATE AND ACETAMINOPHEN 7.5; 325 MG/1; MG/1
1 TABLET ORAL 4 TIMES DAILY PRN
Qty: 120 TABLET | Refills: 0 | Status: SHIPPED | OUTPATIENT
Start: 2022-12-30 | End: 2023-01-17 | Stop reason: SDUPTHER

## 2022-11-01 RX ORDER — HYDROCODONE BITARTRATE AND ACETAMINOPHEN 7.5; 325 MG/1; MG/1
1 TABLET ORAL 4 TIMES DAILY PRN
Qty: 120 TABLET | Refills: 0 | Status: SHIPPED | OUTPATIENT
Start: 2022-11-01 | End: 2023-03-05 | Stop reason: HOSPADM

## 2022-11-01 NOTE — PROGRESS NOTES
Subjective    CC back, leg pain, neck pain  Diya Arreola is a 62 y.o. female with chronic neck pain status post ACDF, chronic back pain S/P  L4-L5 LAMI/TILF 2021/[facility],  here for follow-up.    Continues to have back pain and bilateral lower extremity pain.  Found to have pseudoarthrosis and scoliosis.  Seen neurosurgery, planning extensive spinal surgery with fusion from T9 to sacrum.   Chronic back pain upper lumbar and lower lumbar radiating to bilateral hips bilateral lower extremity worse with standing walking twisting or any activity.  Denies new injury, saddle anesthesia bladder bowel continence.  Chronic neck pain, radiating to bilateral shoulder limited range of motion in the neck and significant paraspinal muscle spasm bilateral UE radicular pain.  Denies balance issues.    Chronic tremors, history of polyarthralgia/lupus sees rheumatology.  Pain interfering with ADL.  Tried physical therapy without relief.  Seen neurosurgery, referred to try injection.        C-spine MRI  probably a plate on the anterior aspect of the spine at the C4-5 level. mild subluxation with degenerative disc disease at C3-4, causing a mild central canal stenosis. Left-sided disc extrusion at C6-7 with left foraminal impingement.   L-spine MRI  postsurgical changes L3-L4 with pedicle screw L3.  Retrolisthesis L2 on L3 moderate central lateral canal stenosis.  Grade 1 anterolisthesis L4-L5.  Degenerative changes throughout.  L-spine x-ray : 4 mm anterolisthesis on extension, 6 mm anterolisthesis on flexion, at the L4-5 level. Stable spinal fusion changes at L3-4.. Severe degenerative disc and endplate changes at L2-3, similar to  2018 examination.    Pain Assessment   Location of Pain: Lower Back, R Hip, L Hip, L Leg, neck pain, joint  Description of Pain: Dull/Aching, Throbbing, Stabbing  Previous Pain Rating :8  Current Pain Ratin  Aggravating Factors: Activity  Alleviating Factors: Rest,  Medication    PEG Assessment   What number best describes your pain on average in the past week?8  What number best describes how, during the past week, pain has interfered with your enjoyment of life?2  What number best describes how, during the past week, pain has interfered with your general activity?10      The following portions of the patient's history were reviewed and updated as appropriate: allergies, current medications, past family history, past medical history, past social history, past surgical history and problem list.     has a past medical history of Allergic, Anxiety, Arthritis, Asthma, Constipation, COPD (chronic obstructive pulmonary disease) ([facility]), Fall, Fibromyalgia, primary, Gastritis, History of pancreatitis, Low back pain (2021), Lupus ([facility]), Neuropathy, Osteopenia, PONV (postoperative nausea and vomiting), Rheumatoid arthritis ([facility]), Scoliosis, Sjogren's syndrome ([facility]), Tachycardia, and TIA (transient ischemic attack) (2014).   has a past surgical history that includes Cholecystectomy; Hysterectomy;  section; Cystocele repair; Carpal tunnel release; Cardiac catheterization; Spine surgery; Colonoscopy (10/08/2013); Epidural block injection; Lumbar fusion (N/A, 2021); and Back surgery.  family history includes Diabetes in her father; Heart disease in her brother; Hypertension in her brother and sister; Osteoporosis in her maternal grandmother; Stroke in her sister.  Social History     Tobacco Use   • Smoking status: Former     Packs/day: 1.00     Years: 39.00     Pack years: 39.00     Types: Cigarettes     Quit date: 2021     Years since quittin.0   • Smokeless tobacco: Never   • Tobacco comments:     on Nicotiene  patch 22   Substance Use Topics   • Alcohol use: No     Review of Systems   Musculoskeletal: Positive for arthralgias, back pain and neck pain.   Neurological: Positive for tremors, numbness and headache.   All other systems reviewed and are  negative.    Objective   Physical Exam   Constitutional: No distress.   walker   Neck: Kernig's sign noted.   Pulmonary/Chest: Effort normal.   Musculoskeletal:      Cervical back: She exhibits decreased range of motion and tenderness.      Lumbar back: She exhibits decreased range of motion and tenderness.   Vitals reviewed.    /80   Pulse 105   Resp 16   SpO2 99%      PHQ 9 on chart  Opioid risk tool low risk    Assessment & Plan   Diagnoses and all orders for this visit:    1. Chronic pain syndrome (Primary)  -     HYDROcodone-acetaminophen (NORCO) 7.5-325 MG per tablet; Take 1 tablet by mouth 4 (Four) Times a Day As Needed for Severe Pain.  Dispense: 120 tablet; Refill: 0  -     HYDROcodone-acetaminophen (NORCO) 7.5-325 MG per tablet; Take 1 tablet by mouth 4 (Four) Times a Day As Needed for Severe Pain. DNF before 12/30/2022  Dispense: 120 tablet; Refill: 0  -     HYDROcodone-acetaminophen (NORCO) 7.5-325 MG per tablet; Take 1 tablet by mouth 4 (Four) Times a Day As Needed for Severe Pain. DNF before 12/1/2022  Dispense: 120 tablet; Refill: 0    2. Postlaminectomy syndrome of lumbar region    3. Postlaminectomy syndrome of cervical region    4. Sacroiliitis (HCC)    5. High risk medication use    Summary  Diya Arreola is a 62 y.o. female with chronic neck pain status post ACDF, chronic back pain status post Fusion L3-5, here for follow-up.    Chronic back pain from DDD spondylosis postlaminectomy syndrome with radicular pain.   Chronic neck pain from postlaminectomy syndrome.  Worsening chronic tremors.    Continues to have back pain and bilateral lower extremity pain.  Found to have pseudoarthrosis and scoliosis.  Seen neurosurgery, planning extensive spinal surgery with fusion from T9 to sacrum.   Marginal relief of physical therapy  Continues to have good relief with hydrocodone denies side effects.  Ambulating with cane.    Cont hydrocodone 7.5/325 4 times daily as needed for severe pain.  UDS  sent and inspect reviewed  Discussed risk of tolerance, dependence, respiratory depression, coma and death associated with use of oral opioids for treatment of chronic nonmalignant pain.     RTC 2 month

## 2022-11-15 ENCOUNTER — TELEPHONE (OUTPATIENT)
Dept: FAMILY MEDICINE CLINIC | Facility: CLINIC | Age: 62
End: 2022-11-15

## 2022-11-15 NOTE — TELEPHONE ENCOUNTER
PATIENT CALLED IN WITH /? , SHE WAITED 20 MINUTES AND IT /? . I COULD NOT REMEMBER THE BOTTOM NUMBERS. I RECOMMENDED HER TO GO TO THE ER.

## 2022-11-18 ENCOUNTER — PREP FOR SURGERY (OUTPATIENT)
Dept: OTHER | Facility: HOSPITAL | Age: 62
End: 2022-11-18

## 2022-11-18 DIAGNOSIS — R79.1 ABNORMAL COAGULATION PROFILE: ICD-10-CM

## 2022-11-18 DIAGNOSIS — S32.009K PSEUDOARTHROSIS OF LUMBAR SPINE: Primary | ICD-10-CM

## 2022-11-18 DIAGNOSIS — R79.9 ABNORMAL FINDING OF BLOOD CHEMISTRY, UNSPECIFIED: ICD-10-CM

## 2022-11-18 DIAGNOSIS — M40.15 OTHER SECONDARY KYPHOSIS, THORACOLUMBAR REGION: Primary | ICD-10-CM

## 2022-11-18 RX ORDER — TRAZODONE HYDROCHLORIDE 100 MG/1
TABLET ORAL
Qty: 90 TABLET | Refills: 3 | Status: SHIPPED | OUTPATIENT
Start: 2022-11-18

## 2022-11-29 DIAGNOSIS — F17.200 TOBACCO DEPENDENCE: ICD-10-CM

## 2022-11-29 RX ORDER — VARENICLINE TARTRATE 1 MG/1
TABLET, FILM COATED ORAL
Qty: 180 TABLET | Refills: 1 | Status: SHIPPED | OUTPATIENT
Start: 2022-11-29

## 2022-12-06 ENCOUNTER — TELEPHONE (OUTPATIENT)
Dept: NEUROSURGERY | Facility: CLINIC | Age: 62
End: 2022-12-06

## 2022-12-06 PROBLEM — M40.15 OTHER SECONDARY KYPHOSIS, THORACOLUMBAR REGION: Status: ACTIVE | Noted: 2022-12-06

## 2022-12-06 NOTE — TELEPHONE ENCOUNTER
Diya called and received her surgery authorization. I told Diya that we can get her scheduled for surgery on 12-29-22. Diya wanted to wait until after the holidays. I have her rescheduled to 2-2-23. I told Diya I will call her insurance and change the dates and mail her surgery letter.    @ 447 pm -- I called and spoke with [employer] and was unable to push the authorization out to 2-2-23. I will need to restart the authorization in January.

## 2022-12-22 ENCOUNTER — TELEPHONE (OUTPATIENT)
Dept: FAMILY MEDICINE CLINIC | Facility: CLINIC | Age: 62
End: 2022-12-22

## 2022-12-22 DIAGNOSIS — R92.8 ABNORMAL MAMMOGRAM: Primary | ICD-10-CM

## 2022-12-26 RX ORDER — HYDROXYCHLOROQUINE SULFATE 200 MG/1
200 TABLET, FILM COATED ORAL DAILY
Qty: 90 TABLET | Refills: 1 | Status: CANCELLED | OUTPATIENT
Start: 2022-12-26

## 2022-12-27 RX ORDER — HYDROXYCHLOROQUINE SULFATE 200 MG/1
200 TABLET, FILM COATED ORAL DAILY
Qty: 90 TABLET | Refills: 1 | Status: SHIPPED | OUTPATIENT
Start: 2022-12-27 | End: 2023-06-25

## 2023-01-09 RX ORDER — EPINEPHRINE 0.3 MG/.3ML
INJECTION SUBCUTANEOUS
Qty: 2 EACH | Refills: 1 | Status: SHIPPED | OUTPATIENT
Start: 2023-01-09

## 2023-01-17 ENCOUNTER — OFFICE VISIT (OUTPATIENT)
Dept: PAIN MEDICINE | Facility: CLINIC | Age: 63
End: 2023-01-17
Payer: MEDICARE

## 2023-01-17 VITALS
RESPIRATION RATE: 16 BRPM | OXYGEN SATURATION: 97 % | DIASTOLIC BLOOD PRESSURE: 73 MMHG | SYSTOLIC BLOOD PRESSURE: 129 MMHG | HEART RATE: 107 BPM

## 2023-01-17 DIAGNOSIS — M96.1 POSTLAMINECTOMY SYNDROME OF LUMBAR REGION: ICD-10-CM

## 2023-01-17 DIAGNOSIS — Z79.899 HIGH RISK MEDICATION USE: ICD-10-CM

## 2023-01-17 DIAGNOSIS — M46.1 SACROILIITIS: ICD-10-CM

## 2023-01-17 DIAGNOSIS — G89.4 CHRONIC PAIN SYNDROME: Primary | ICD-10-CM

## 2023-01-17 DIAGNOSIS — M96.1 POSTLAMINECTOMY SYNDROME OF CERVICAL REGION: ICD-10-CM

## 2023-01-17 PROCEDURE — 99214 OFFICE O/P EST MOD 30 MIN: CPT | Performed by: ANESTHESIOLOGY

## 2023-01-17 RX ORDER — BUSPIRONE HYDROCHLORIDE 15 MG/1
TABLET ORAL
COMMUNITY
Start: 2022-12-27 | End: 2023-01-25

## 2023-01-17 RX ORDER — HYDROCODONE BITARTRATE AND ACETAMINOPHEN 7.5; 325 MG/1; MG/1
1 TABLET ORAL 4 TIMES DAILY PRN
Qty: 120 TABLET | Refills: 0 | Status: SHIPPED | OUTPATIENT
Start: 2023-02-28 | End: 2023-03-05 | Stop reason: HOSPADM

## 2023-01-17 RX ORDER — HYDROCODONE BITARTRATE AND ACETAMINOPHEN 7.5; 325 MG/1; MG/1
1 TABLET ORAL 4 TIMES DAILY PRN
Qty: 120 TABLET | Refills: 0 | Status: SHIPPED | OUTPATIENT
Start: 2023-01-29 | End: 2023-03-05 | Stop reason: HOSPADM

## 2023-01-17 NOTE — PROGRESS NOTES
Subjective    CC back, leg pain, neck pain  Diya Arreola is a 62 y.o. female with chronic neck pain status post ACDF, chronic back pain S/P  L4-L5 LAMI/TILF 2021/[facility],  here for follow-up.    Denies new complaints today. planning extensive spinal surgery with fusion from T9 to sacrum in February/Dr Gorman.   Chronic back pain upper lumbar and lower lumbar radiating to bilateral hips bilateral lower extremity worse with standing walking twisting or any activity.  Denies new injury, saddle anesthesia bladder bowel continence.  Chronic neck pain, radiating to bilateral shoulder limited range of motion in the neck and significant paraspinal muscle spasm bilateral UE radicular pain.  Denies balance issues.    Chronic tremors, history of polyarthralgia/lupus sees rheumatology.  Pain interfering with ADL.  Tried physical therapy without relief.  Seen neurosurgery, referred to try injection.        C-spine MRI  probably a plate on the anterior aspect of the spine at the C4-5 level. mild subluxation with degenerative disc disease at C3-4, causing a mild central canal stenosis. Left-sided disc extrusion at C6-7 with left foraminal impingement.   L-spine MRI  postsurgical changes L3-L4 with pedicle screw L3.  Retrolisthesis L2 on L3 moderate central lateral canal stenosis.  Grade 1 anterolisthesis L4-L5.  Degenerative changes throughout.  L-spine x-ray : 4 mm anterolisthesis on extension, 6 mm anterolisthesis on flexion, at the L4-5 level. Stable spinal fusion changes at L3-4.. Severe degenerative disc and endplate changes at L2-3, similar to  2018 examination.    Pain Assessment   Location of Pain: Lower Back, R Hip, L Hip, L Leg, neck pain, joint  Description of Pain: Dull/Aching, Throbbing, Stabbing  Previous Pain Rating :8  Current Pain Ratin  Aggravating Factors: Activity  Alleviating Factors: Rest, Medication    PEG Assessment   What number best describes your pain on average in the past  week?8  What number best describes how, during the past week, pain has interfered with your enjoyment of life?2  What number best describes how, during the past week, pain has interfered with your general activity?10      The following portions of the patient's history were reviewed and updated as appropriate: allergies, current medications, past family history, past medical history, past social history, past surgical history and problem list.     has a past medical history of Allergic, Anxiety, Arthritis, Asthma, Constipation, COPD (chronic obstructive pulmonary disease) ([facility]), Fall, Fibromyalgia, primary, Gastritis, History of pancreatitis, Low back pain (2021), Lupus ([facility]), Neuropathy, Osteopenia, PONV (postoperative nausea and vomiting), Rheumatoid arthritis ([facility]), Scoliosis, Sjogren's syndrome ([facility]), Tachycardia, and TIA (transient ischemic attack) (2014).   has a past surgical history that includes Cholecystectomy; Hysterectomy;  section; Cystocele repair; Carpal tunnel release; Cardiac catheterization; Spine surgery; Colonoscopy (10/08/2013); Epidural block injection; Lumbar fusion (N/A, 2021); and Back surgery.  family history includes Diabetes in her father; Heart disease in her brother; Hypertension in her brother and sister; Osteoporosis in her maternal grandmother; Stroke in her sister.  Social History     Tobacco Use   • Smoking status: Former     Packs/day: 1.00     Years: 39.00     Pack years: 39.00     Types: Cigarettes     Quit date: 2021     Years since quittin.2   • Smokeless tobacco: Never   • Tobacco comments:     on Nicotiene  patch 22   Substance Use Topics   • Alcohol use: No     Review of Systems   Musculoskeletal: Positive for arthralgias, back pain and neck pain.   Neurological: Positive for tremors, numbness and headache.   All other systems reviewed and are negative.    Objective   Physical Exam   Constitutional: No distress.   walker   Neck: Kernig's  sign noted.   Pulmonary/Chest: Effort normal.   Musculoskeletal:      Cervical back: She exhibits decreased range of motion and tenderness.      Lumbar back: She exhibits decreased range of motion and tenderness.   Vitals reviewed.    /73   Pulse 107   Resp 16   SpO2 97%      PHQ 9 on chart  Opioid risk tool low risk    Assessment & Plan   Diagnoses and all orders for this visit:    1. Chronic pain syndrome (Primary)  -     HYDROcodone-acetaminophen (NORCO) 7.5-325 MG per tablet; Take 1 tablet by mouth 4 (Four) Times a Day As Needed for Severe Pain. DNF before 1/29/2023  Dispense: 120 tablet; Refill: 0  -     HYDROcodone-acetaminophen (NORCO) 7.5-325 MG per tablet; Take 1 tablet by mouth 4 (Four) Times a Day As Needed for Severe Pain. DNF before 2/28/2023  Dispense: 120 tablet; Refill: 0    2. Postlaminectomy syndrome of lumbar region    3. Postlaminectomy syndrome of cervical region    4. Sacroiliitis (HCC)    5. High risk medication use  -     Urine Drug Screen - Urine, Clean Catch; Future    Summary  Diya Arreola is a 62 y.o. female with chronic neck pain status post ACDF, chronic back pain status post Fusion L3-5, here for follow-up.    Chronic back pain from DDD spondylosis postlaminectomy syndrome with radicular pain.   Chronic neck pain from postlaminectomy syndrome.  Worsening chronic tremors.    Denies new complaints today. planning extensive spinal surgery with fusion from T9 to sacrum in February/Dr Gorman.     Cont hydrocodone 7.5/325 4 times daily as needed for severe pain.  UDS sent and inspect reviewed  Discussed risk of tolerance, dependence, respiratory depression, coma and death associated with use of oral opioids for treatment of chronic nonmalignant pain.     RTC 2 month

## 2023-01-20 ENCOUNTER — OFFICE VISIT (OUTPATIENT)
Dept: FAMILY MEDICINE CLINIC | Facility: CLINIC | Age: 63
End: 2023-01-20
Payer: MEDICARE

## 2023-01-20 VITALS
DIASTOLIC BLOOD PRESSURE: 82 MMHG | OXYGEN SATURATION: 96 % | SYSTOLIC BLOOD PRESSURE: 129 MMHG | BODY MASS INDEX: 24.29 KG/M2 | WEIGHT: 132 LBS | TEMPERATURE: 98.4 F | HEART RATE: 98 BPM | HEIGHT: 62 IN

## 2023-01-20 DIAGNOSIS — I10 ESSENTIAL (PRIMARY) HYPERTENSION: ICD-10-CM

## 2023-01-20 DIAGNOSIS — E78.5 HYPERLIPIDEMIA, UNSPECIFIED HYPERLIPIDEMIA TYPE: ICD-10-CM

## 2023-01-20 DIAGNOSIS — M43.16 SPONDYLOLISTHESIS OF LUMBAR REGION: Primary | ICD-10-CM

## 2023-01-20 DIAGNOSIS — F33.41 RECURRENT MAJOR DEPRESSIVE DISORDER, IN PARTIAL REMISSION: ICD-10-CM

## 2023-01-20 PROCEDURE — 99214 OFFICE O/P EST MOD 30 MIN: CPT | Performed by: FAMILY MEDICINE

## 2023-01-20 RX ORDER — CYCLOBENZAPRINE HCL 10 MG
10 TABLET ORAL 3 TIMES DAILY PRN
Qty: 270 TABLET | Refills: 1 | Status: SHIPPED | OUTPATIENT
Start: 2023-01-20

## 2023-01-20 RX ORDER — GABAPENTIN 400 MG/1
400 CAPSULE ORAL 4 TIMES DAILY
Qty: 120 CAPSULE | Refills: 0 | Status: SHIPPED | OUTPATIENT
Start: 2023-01-20 | End: 2023-01-25 | Stop reason: SDUPTHER

## 2023-01-24 NOTE — TELEPHONE ENCOUNTER
PATIENT CALLED IN REGARDS TO MEDICATION  gabapentin (NEURONTIN) 400 MG capsule    PHARMACY NEEDS A NEW PRESCRIPTION AND PROOF IT CHANGED FROM 3 TABLETS A DAY TO 4 TABLETS A DAY AND FROM 300 MG  MG      [facility]/pharmacy #[id number] - [facility] [place] - [address] [place] [address] AT [place] [address] - [phone]  - [phone]   [phone]    CALL BACK NUMBER [phone]

## 2023-01-25 RX ORDER — GABAPENTIN 400 MG/1
400 CAPSULE ORAL 4 TIMES DAILY
Qty: 120 CAPSULE | Refills: 0 | Status: SHIPPED | OUTPATIENT
Start: 2023-01-25 | End: 2023-03-23

## 2023-01-25 RX ORDER — BUSPIRONE HYDROCHLORIDE 15 MG/1
TABLET ORAL
Qty: 90 TABLET | Refills: 3 | Status: SHIPPED | OUTPATIENT
Start: 2023-01-25 | End: 2023-04-05

## 2023-01-31 ENCOUNTER — APPOINTMENT (OUTPATIENT)
Dept: CT IMAGING | Facility: HOSPITAL | Age: 63
End: 2023-01-31
Payer: MEDICARE

## 2023-02-02 DIAGNOSIS — Z98.1 S/P LUMBAR FUSION: Primary | ICD-10-CM

## 2023-02-05 NOTE — ASSESSMENT & PLAN NOTE
Lipid abnormalities are unchanged.  Lipid-lowering therapy was not prescribed due to previous adverse reaction.  Lipids will be reassessed in 3 months.

## 2023-02-13 ENCOUNTER — TELEPHONE (OUTPATIENT)
Dept: NEUROSURGERY | Facility: CLINIC | Age: 63
End: 2023-02-13
Payer: MEDICARE

## 2023-02-13 NOTE — TELEPHONE ENCOUNTER
Caller: Clifford Arreolaqueline    Relationship: Self    Best call back number: [phone]    Who are you requesting to speak with (clinical staff, provider,  specific staff member): CLINICAL STAFF    What was the call regarding: PATIENT IS CALLING TO CLARIFY SOME THINGS PRIOR TO SX SCHEDULED 2/28/23:  1. SHE WOULD LIKE TO MAKE SURE SHE RECEIVES POST-OP INSTRUCTIONS.  2. SHE IS CURRENTLY TAKING FLEXARIL 10MG 3x/DAY AND GABAPENTIN 400MG 4x/DAY (AS PRESCRIBED BY PCP DR BERRY) AND WOULD LIKE TO CLARIFY WHAT TO DO ABOUT THESE MEDICATIONS PRIOR TO SX.  3. SHE WOULD LIKE TO GO TO [facility] FOLLOWING SURGERY.    Do you require a callback: YES, PLEASE CALL PATIENT TO ADVISE.

## 2023-02-20 ENCOUNTER — LAB (OUTPATIENT)
Dept: LAB | Facility: HOSPITAL | Age: 63
End: 2023-02-20
Payer: MEDICARE

## 2023-02-20 ENCOUNTER — HOSPITAL ENCOUNTER (OUTPATIENT)
Dept: CARDIOLOGY | Facility: HOSPITAL | Age: 63
Discharge: HOME | End: 2023-02-20
Payer: MEDICARE

## 2023-02-20 ENCOUNTER — HOSPITAL ENCOUNTER (OUTPATIENT)
Dept: GENERAL RADIOLOGY | Facility: HOSPITAL | Age: 63
Discharge: HOME | End: 2023-02-20
Payer: MEDICARE

## 2023-02-20 DIAGNOSIS — M40.15 OTHER SECONDARY KYPHOSIS, THORACOLUMBAR REGION: ICD-10-CM

## 2023-02-20 LAB
ABO GROUP BLD: NORMAL
ANION GAP SERPL CALCULATED.3IONS-SCNC: 12 MMOL/L (ref 5–15)
BASOPHILS # BLD AUTO: 0.05 10*3/MM3 (ref 0–0.2)
BASOPHILS NFR BLD AUTO: 0.7 % (ref 0–1.5)
BILIRUB UR QL STRIP: NEGATIVE
BLD GP AB SCN SERPL QL: NEGATIVE
BUN SERPL-MCNC: 11 MG/DL (ref 8–23)
BUN/CREAT SERPL: 15.5 (ref 7–25)
CALCIUM SPEC-SCNC: 9.4 MG/DL (ref 8.6–10.5)
CHLORIDE SERPL-SCNC: 102 MMOL/L (ref 98–107)
CLARITY UR: CLEAR
CO2 SERPL-SCNC: 25 MMOL/L (ref 22–29)
COLOR UR: YELLOW
CREAT SERPL-MCNC: 0.71 MG/DL (ref 0.57–1)
DEPRECATED RDW RBC AUTO: 55.6 FL (ref 37–54)
EGFRCR SERPLBLD CKD-EPI 2021: 97.5 ML/MIN/1.73
EOSINOPHIL # BLD AUTO: 0.12 10*3/MM3 (ref 0–0.4)
EOSINOPHIL NFR BLD AUTO: 1.7 % (ref 0.3–6.2)
ERYTHROCYTE [DISTWIDTH] IN BLOOD BY AUTOMATED COUNT: 15.7 % (ref 12.3–15.4)
GLUCOSE SERPL-MCNC: 83 MG/DL (ref 65–99)
GLUCOSE UR STRIP-MCNC: NEGATIVE MG/DL
HBA1C MFR BLD: 5.3 % (ref 3.5–5.6)
HCT VFR BLD AUTO: 41.2 % (ref 34–46.6)
HGB BLD-MCNC: 13.8 G/DL (ref 12–15.9)
HGB UR QL STRIP.AUTO: NEGATIVE
IMM GRANULOCYTES # BLD AUTO: 0.02 10*3/MM3 (ref 0–0.05)
IMM GRANULOCYTES NFR BLD AUTO: 0.3 % (ref 0–0.5)
INR PPP: 1.02 (ref 0.93–1.1)
KETONES UR QL STRIP: NEGATIVE
LEUKOCYTE ESTERASE UR QL STRIP.AUTO: ABNORMAL
LYMPHOCYTES # BLD AUTO: 1.72 10*3/MM3 (ref 0.7–3.1)
LYMPHOCYTES NFR BLD AUTO: 24.3 % (ref 19.6–45.3)
MCH RBC QN AUTO: 32 PG (ref 26.6–33)
MCHC RBC AUTO-ENTMCNC: 33.5 G/DL (ref 31.5–35.7)
MCV RBC AUTO: 95.6 FL (ref 79–97)
MONOCYTES # BLD AUTO: 0.45 10*3/MM3 (ref 0.1–0.9)
MONOCYTES NFR BLD AUTO: 6.3 % (ref 5–12)
MRSA DNA SPEC QL NAA+PROBE: NORMAL
NEUTROPHILS NFR BLD AUTO: 4.73 10*3/MM3 (ref 1.7–7)
NEUTROPHILS NFR BLD AUTO: 66.7 % (ref 42.7–76)
NITRITE UR QL STRIP: NEGATIVE
NRBC BLD AUTO-RTO: 0 /100 WBC (ref 0–0.2)
PH UR STRIP.AUTO: 6 [PH] (ref 5–8)
PLATELET # BLD AUTO: 344 10*3/MM3 (ref 140–450)
PMV BLD AUTO: 11.2 FL (ref 6–12)
POTASSIUM SERPL-SCNC: 3.8 MMOL/L (ref 3.5–5.2)
PROT UR QL STRIP: NEGATIVE
PROTHROMBIN TIME: 10.5 SECONDS (ref 9.6–11.7)
RBC # BLD AUTO: 4.31 10*6/MM3 (ref 3.77–5.28)
RH BLD: POSITIVE
SODIUM SERPL-SCNC: 139 MMOL/L (ref 136–145)
SP GR UR STRIP: 1.01 (ref 1–1.03)
T&S EXPIRATION DATE: NORMAL
UROBILINOGEN UR QL STRIP: ABNORMAL
WBC NRBC COR # BLD: 7.09 10*3/MM3 (ref 3.4–10.8)

## 2023-02-20 PROCEDURE — 71046 X-RAY EXAM CHEST 2 VIEWS: CPT

## 2023-02-20 PROCEDURE — 86901 BLOOD TYPING SEROLOGIC RH(D): CPT

## 2023-02-20 PROCEDURE — 93005 ELECTROCARDIOGRAM TRACING: CPT | Performed by: NEUROLOGICAL SURGERY

## 2023-02-20 PROCEDURE — 81003 URINALYSIS AUTO W/O SCOPE: CPT

## 2023-02-20 PROCEDURE — 36415 COLL VENOUS BLD VENIPUNCTURE: CPT

## 2023-02-20 PROCEDURE — 87641 MR-STAPH DNA AMP PROBE: CPT

## 2023-02-20 PROCEDURE — 86900 BLOOD TYPING SEROLOGIC ABO: CPT

## 2023-02-20 PROCEDURE — 80048 BASIC METABOLIC PNL TOTAL CA: CPT

## 2023-02-20 PROCEDURE — 85025 COMPLETE CBC W/AUTO DIFF WBC: CPT

## 2023-02-20 PROCEDURE — 93010 ELECTROCARDIOGRAM REPORT: CPT | Performed by: INTERNAL MEDICINE

## 2023-02-20 PROCEDURE — 83036 HEMOGLOBIN GLYCOSYLATED A1C: CPT

## 2023-02-20 PROCEDURE — 86850 RBC ANTIBODY SCREEN: CPT

## 2023-02-20 PROCEDURE — 85610 PROTHROMBIN TIME: CPT

## 2023-02-21 LAB — QT INTERVAL: 345 MS

## 2023-02-27 ENCOUNTER — ANESTHESIA EVENT (OUTPATIENT)
Dept: PERIOP | Facility: HOSPITAL | Age: 63
DRG: 454 | End: 2023-02-27
Payer: MEDICARE

## 2023-02-27 ENCOUNTER — HOSPITAL ENCOUNTER (OUTPATIENT)
Dept: CT IMAGING | Facility: HOSPITAL | Age: 63
Discharge: HOME | DRG: 454 | End: 2023-02-27
Admitting: NEUROLOGICAL SURGERY
Payer: MEDICARE

## 2023-02-27 DIAGNOSIS — S32.009K PSEUDOARTHROSIS OF LUMBAR SPINE: ICD-10-CM

## 2023-02-27 PROCEDURE — 72131 CT LUMBAR SPINE W/O DYE: CPT

## 2023-02-27 NOTE — ANESTHESIA PREPROCEDURE EVALUATION
Anesthesia Evaluation     Patient summary reviewed and Nursing notes reviewed   no history of anesthetic complications:  NPO Solid Status: > 8 hours  NPO Liquid Status: > 2 hours           Airway   Dental      Pulmonary    (+) a smoker Former, COPD, asthma,  Cardiovascular     ECG reviewed    (+) hypertension, valvular problems/murmurs MR, dysrhythmias Tachycardia, hyperlipidemia,       Neuro/Psych  (+) TIA, headaches, numbness, psychiatric history Anxiety and Depression,    GI/Hepatic/Renal/Endo      Musculoskeletal     (+) back pain, chronic pain, myalgias,   Abdominal    Substance History      OB/GYN          Other   arthritis, autoimmune disease lupus, rheumatoid arthritis and Sjogren syndrome,      ROS/Med Hx Other: Additional History:  Allergies, fibromyalgia, edema, h/o pancreatitis, neuropathy, palpitations, IBS, fatigue, nausea    PSH:  CHOLECYSTECTOMY HYSTERECTOMY   SECTION CYSTOCELE REPAIR  CARPAL TUNNEL RELEASE CARDIAC CATHETERIZATION  SPINE SURGERY COLONOSCOPY  EPIDURAL BLOCK LUMBAR FUSION  BACK SURGERY                   Anesthesia Plan    ASA 3     general, ERAS Protocol and [place]   total IV anesthesia  (Patient identified; pre-operative vital signs, all relevant labs/studies, complete medical/surgical/anesthetic history, full medication list, full allergy list, and NPO status obtained/reviewed; physical assessment performed; anesthetic options, side effects, potential complications, risks, and benefits discussed; questions answered; written anesthesia consent obtained; patient cleared for procedure; anesthesia machine and equipment checked and functioning)  intravenous induction     Anesthetic plan, risks, benefits, and alternatives have been provided, discussed and informed consent has been obtained with: patient.    Plan discussed with CRNA and CAA.        CODE STATUS:

## 2023-02-28 ENCOUNTER — APPOINTMENT (OUTPATIENT)
Dept: GENERAL RADIOLOGY | Facility: HOSPITAL | Age: 63
DRG: 454 | End: 2023-02-28
Payer: MEDICARE

## 2023-02-28 ENCOUNTER — HOSPITAL ENCOUNTER (INPATIENT)
Facility: HOSPITAL | Age: 63
LOS: 5 days | Discharge: HOME | DRG: 454 | End: 2023-03-05
Attending: NEUROLOGICAL SURGERY | Admitting: NEUROLOGICAL SURGERY
Payer: MEDICARE

## 2023-02-28 ENCOUNTER — ANESTHESIA (OUTPATIENT)
Dept: PERIOP | Facility: HOSPITAL | Age: 63
DRG: 454 | End: 2023-02-28
Payer: MEDICARE

## 2023-02-28 DIAGNOSIS — Z98.1 S/P LUMBAR FUSION: Primary | ICD-10-CM

## 2023-02-28 DIAGNOSIS — M40.15 OTHER SECONDARY KYPHOSIS, THORACOLUMBAR REGION: ICD-10-CM

## 2023-02-28 LAB
ANION GAP SERPL CALCULATED.3IONS-SCNC: 11 MMOL/L (ref 5–15)
BASOPHILS # BLD AUTO: 0 10*3/MM3 (ref 0–0.2)
BASOPHILS NFR BLD AUTO: 0.1 % (ref 0–1.5)
BUN SERPL-MCNC: 11 MG/DL (ref 8–23)
BUN/CREAT SERPL: 20.4 (ref 7–25)
CALCIUM SPEC-SCNC: 8.8 MG/DL (ref 8.6–10.5)
CHLORIDE SERPL-SCNC: 103 MMOL/L (ref 98–107)
CO2 SERPL-SCNC: 25 MMOL/L (ref 22–29)
CREAT SERPL-MCNC: 0.54 MG/DL (ref 0.57–1)
DEPRECATED RDW RBC AUTO: 60.8 FL (ref 37–54)
EGFRCR SERPLBLD CKD-EPI 2021: 105.6 ML/MIN/1.73
EOSINOPHIL # BLD AUTO: 0 10*3/MM3 (ref 0–0.4)
EOSINOPHIL NFR BLD AUTO: 0 % (ref 0.3–6.2)
ERYTHROCYTE [DISTWIDTH] IN BLOOD BY AUTOMATED COUNT: 17.5 % (ref 12.3–15.4)
GLUCOSE SERPL-MCNC: 136 MG/DL (ref 65–99)
HCT VFR BLD AUTO: 28.5 % (ref 34–46.6)
HGB BLD-MCNC: 9 G/DL (ref 12–15.9)
LYMPHOCYTES # BLD AUTO: 1 10*3/MM3 (ref 0.7–3.1)
LYMPHOCYTES NFR BLD AUTO: 7.9 % (ref 19.6–45.3)
MCH RBC QN AUTO: 31.4 PG (ref 26.6–33)
MCHC RBC AUTO-ENTMCNC: 31.7 G/DL (ref 31.5–35.7)
MCV RBC AUTO: 99 FL (ref 79–97)
MONOCYTES # BLD AUTO: 0.6 10*3/MM3 (ref 0.1–0.9)
MONOCYTES NFR BLD AUTO: 4.7 % (ref 5–12)
NEUTROPHILS NFR BLD AUTO: 11 10*3/MM3 (ref 1.7–7)
NEUTROPHILS NFR BLD AUTO: 87.3 % (ref 42.7–76)
NRBC BLD AUTO-RTO: 0 /100 WBC (ref 0–0.2)
PLATELET # BLD AUTO: 214 10*3/MM3 (ref 140–450)
PMV BLD AUTO: 9.6 FL (ref 6–12)
POTASSIUM SERPL-SCNC: 3.7 MMOL/L (ref 3.5–5.2)
RBC # BLD AUTO: 2.88 10*6/MM3 (ref 3.77–5.28)
SODIUM SERPL-SCNC: 139 MMOL/L (ref 136–145)
WBC NRBC COR # BLD: 12.6 10*3/MM3 (ref 3.4–10.8)

## 2023-02-28 PROCEDURE — 8E0WXBZ COMPUTER ASSISTED PROCEDURE OF TRUNK REGION: ICD-10-PCS | Performed by: NEUROLOGICAL SURGERY

## 2023-02-28 PROCEDURE — 22843 INSERT SPINE FIXATION DEVICE: CPT

## 2023-02-28 PROCEDURE — 25010000002 VANCOMYCIN 1 G RECONSTITUTED SOLUTION 1 EACH VIAL: Performed by: NEUROLOGICAL SURGERY

## 2023-02-28 PROCEDURE — XRGF058 FUSION OF LEFT SACROILIAC JOINT USING INTERNAL FIXATION DEVICE WITH TULIP CONNECTOR, OPEN APPROACH, NEW TECHNOLOGY GROUP 8: ICD-10-PCS | Performed by: NEUROLOGICAL SURGERY

## 2023-02-28 PROCEDURE — C1713 ANCHOR/SCREW BN/BN,TIS/BN: HCPCS | Performed by: NEUROLOGICAL SURGERY

## 2023-02-28 PROCEDURE — 25010000002 FENTANYL CITRATE (PF) 100 MCG/2ML SOLUTION: Performed by: NURSE ANESTHETIST, CERTIFIED REGISTERED

## 2023-02-28 PROCEDURE — 25010000002 PROPOFOL 200 MG/20ML EMULSION: Performed by: NURSE ANESTHETIST, CERTIFIED REGISTERED

## 2023-02-28 PROCEDURE — 25010000002 ONDANSETRON PER 1 MG: Performed by: NURSE ANESTHETIST, CERTIFIED REGISTERED

## 2023-02-28 PROCEDURE — 27280 ARTHR SI JT OPN B1GRF INSTRM: CPT

## 2023-02-28 PROCEDURE — XRGE058 FUSION OF RIGHT SACROILIAC JOINT USING INTERNAL FIXATION DEVICE WITH TULIP CONNECTOR, OPEN APPROACH, NEW TECHNOLOGY GROUP 8: ICD-10-PCS | Performed by: NEUROLOGICAL SURGERY

## 2023-02-28 PROCEDURE — 22216 INCIS ADDL SPINE SEGMENT: CPT

## 2023-02-28 PROCEDURE — 22216 INCIS ADDL SPINE SEGMENT: CPT | Performed by: NEUROLOGICAL SURGERY

## 2023-02-28 PROCEDURE — 22843 INSERT SPINE FIXATION DEVICE: CPT | Performed by: NEUROLOGICAL SURGERY

## 2023-02-28 PROCEDURE — 22214 INCIS 1 VERTEBRAL SEG LUMBAR: CPT

## 2023-02-28 PROCEDURE — 22802 ARTHRD PST DFRM 7-12 VRT SGM: CPT

## 2023-02-28 PROCEDURE — 22633 ARTHRD CMBN 1NTRSPC LUMBAR: CPT

## 2023-02-28 PROCEDURE — 63052 LAM FACETC/FRMT ARTHRD LUM 1: CPT | Performed by: NEUROLOGICAL SURGERY

## 2023-02-28 PROCEDURE — 22853 INSJ BIOMECHANICAL DEVICE: CPT | Performed by: NEUROLOGICAL SURGERY

## 2023-02-28 PROCEDURE — 0SG1071 FUSION OF 2 OR MORE LUMBAR VERTEBRAL JOINTS WITH AUTOLOGOUS TISSUE SUBSTITUTE, POSTERIOR APPROACH, POSTERIOR COLUMN, OPEN APPROACH: ICD-10-PCS | Performed by: NEUROLOGICAL SURGERY

## 2023-02-28 PROCEDURE — 25010000002 PROPOFOL 10 MG/ML EMULSION: Performed by: NURSE ANESTHETIST, CERTIFIED REGISTERED

## 2023-02-28 PROCEDURE — 85025 COMPLETE CBC W/AUTO DIFF WBC: CPT

## 2023-02-28 PROCEDURE — 0 HYDROMORPHONE HCL PF 50 MG/5ML SOLUTION

## 2023-02-28 PROCEDURE — 72100 X-RAY EXAM L-S SPINE 2/3 VWS: CPT

## 2023-02-28 PROCEDURE — 63052 LAM FACETC/FRMT ARTHRD LUM 1: CPT

## 2023-02-28 PROCEDURE — 22853 INSJ BIOMECHANICAL DEVICE: CPT

## 2023-02-28 PROCEDURE — 25010000002 HYDROMORPHONE 1 MG/ML SOLUTION: Performed by: NURSE ANESTHETIST, CERTIFIED REGISTERED

## 2023-02-28 PROCEDURE — 25010000002 HEPARIN (PORCINE) 1000-0.9 UT/500ML-% SOLUTION: Performed by: ANESTHESIOLOGY

## 2023-02-28 PROCEDURE — 25010000002 VANCOMYCIN 1 G RECONSTITUTED SOLUTION: Performed by: NEUROLOGICAL SURGERY

## 2023-02-28 PROCEDURE — 25010000002 VANCOMYCIN 1 G RECONSTITUTED SOLUTION 1 EACH VIAL

## 2023-02-28 PROCEDURE — 80048 BASIC METABOLIC PNL TOTAL CA: CPT

## 2023-02-28 PROCEDURE — 0RGA071 FUSION OF THORACOLUMBAR VERTEBRAL JOINT WITH AUTOLOGOUS TISSUE SUBSTITUTE, POSTERIOR APPROACH, POSTERIOR COLUMN, OPEN APPROACH: ICD-10-PCS | Performed by: NEUROLOGICAL SURGERY

## 2023-02-28 PROCEDURE — 22633 ARTHRD CMBN 1NTRSPC LUMBAR: CPT | Performed by: NEUROLOGICAL SURGERY

## 2023-02-28 PROCEDURE — P9041 ALBUMIN (HUMAN),5%, 50ML: HCPCS | Performed by: NURSE ANESTHETIST, CERTIFIED REGISTERED

## 2023-02-28 PROCEDURE — 61783 SCAN PROC SPINAL: CPT | Performed by: NEUROLOGICAL SURGERY

## 2023-02-28 PROCEDURE — 22214 INCIS 1 VERTEBRAL SEG LUMBAR: CPT | Performed by: NEUROLOGICAL SURGERY

## 2023-02-28 PROCEDURE — 76000 FLUOROSCOPY <1 HR PHYS/QHP: CPT

## 2023-02-28 PROCEDURE — 0SG30AJ FUSION OF LUMBOSACRAL JOINT WITH INTERBODY FUSION DEVICE, POSTERIOR APPROACH, ANTERIOR COLUMN, OPEN APPROACH: ICD-10-PCS | Performed by: NEUROLOGICAL SURGERY

## 2023-02-28 PROCEDURE — C1776 JOINT DEVICE (IMPLANTABLE): HCPCS | Performed by: NEUROLOGICAL SURGERY

## 2023-02-28 PROCEDURE — 94799 UNLISTED PULMONARY SVC/PX: CPT

## 2023-02-28 PROCEDURE — 25010000002 ALBUMIN HUMAN 5% PER 50 ML: Performed by: NURSE ANESTHETIST, CERTIFIED REGISTERED

## 2023-02-28 PROCEDURE — 0SG3071 FUSION OF LUMBOSACRAL JOINT WITH AUTOLOGOUS TISSUE SUBSTITUTE, POSTERIOR APPROACH, POSTERIOR COLUMN, OPEN APPROACH: ICD-10-PCS | Performed by: NEUROLOGICAL SURGERY

## 2023-02-28 PROCEDURE — 0RG7071 FUSION OF 2 TO 7 THORACIC VERTEBRAL JOINTS WITH AUTOLOGOUS TISSUE SUBSTITUTE, POSTERIOR APPROACH, POSTERIOR COLUMN, OPEN APPROACH: ICD-10-PCS | Performed by: NEUROLOGICAL SURGERY

## 2023-02-28 PROCEDURE — 0SB40ZZ EXCISION OF LUMBOSACRAL DISC, OPEN APPROACH: ICD-10-PCS | Performed by: NEUROLOGICAL SURGERY

## 2023-02-28 PROCEDURE — 22802 ARTHRD PST DFRM 7-12 VRT SGM: CPT | Performed by: NEUROLOGICAL SURGERY

## 2023-02-28 PROCEDURE — 27280 ARTHR SI JT OPN B1GRF INSTRM: CPT | Performed by: NEUROLOGICAL SURGERY

## 2023-02-28 DEVICE — ROD SPEC SPINE UNID 4/PLS LVL CUST: Type: IMPLANTABLE DEVICE | Site: SPINE LUMBAR | Status: FUNCTIONAL

## 2023-02-28 DEVICE — ALLOGRAFT BONE OSTEOAMP SELECT FLO 5CC: Type: IMPLANTABLE DEVICE | Site: SPINE LUMBAR | Status: FUNCTIONAL

## 2023-02-28 DEVICE — RISE SPACER 10X26MM, 10-17MM, 15&DEG;
Type: IMPLANTABLE DEVICE | Site: SPINE LUMBAR | Status: FUNCTIONAL
Brand: RISE

## 2023-02-28 DEVICE — THREADED LOCKING CAP, CREO
Type: IMPLANTABLE DEVICE | Site: SPINE LUMBAR | Status: FUNCTIONAL
Brand: CREO

## 2023-02-28 DEVICE — DEV CONTRL TISS STRATAFIX SPIRAL MNCRYL UD 3/0 PLS 30CM: Type: IMPLANTABLE DEVICE | Site: SPINE LUMBAR | Status: FUNCTIONAL

## 2023-02-28 DEVICE — DEV FUSN SI IFUSE/GRANIT 10.5X80MM: Type: IMPLANTABLE DEVICE | Site: SPINE LUMBAR | Status: FUNCTIONAL

## 2023-02-28 DEVICE — ALLOGRFT FIBR OSTEOAMP SELECT 10CC: Type: IMPLANTABLE DEVICE | Site: SPINE LUMBAR | Status: FUNCTIONAL

## 2023-02-28 DEVICE — CREO® THREADED 6.5 X 35MM POLYAXIAL SCREW
Type: IMPLANTABLE DEVICE | Site: SPINE LUMBAR | Status: FUNCTIONAL
Brand: CREO

## 2023-02-28 DEVICE — CREO® THREADED 5.5 X 35MM POLYAXIAL SCREW
Type: IMPLANTABLE DEVICE | Site: SPINE LUMBAR | Status: FUNCTIONAL
Brand: CREO

## 2023-02-28 DEVICE — CREO® THREADED 5.5 X 40MM POLYAXIAL SCREW
Type: IMPLANTABLE DEVICE | Site: SPINE LUMBAR | Status: FUNCTIONAL
Brand: CREO

## 2023-02-28 DEVICE — DEV WND/CLS CONTRL TISS STRATAFIX SYMM PDS PLS CTX 60CM VIL: Type: IMPLANTABLE DEVICE | Site: SPINE LUMBAR | Status: FUNCTIONAL

## 2023-02-28 RX ORDER — BUMETANIDE 1 MG/1
2 TABLET ORAL 2 TIMES DAILY
Status: DISCONTINUED | OUTPATIENT
Start: 2023-02-28 | End: 2023-03-05 | Stop reason: HOSPADM

## 2023-02-28 RX ORDER — ONDANSETRON 4 MG/1
4 TABLET, FILM COATED ORAL EVERY 6 HOURS PRN
Status: DISCONTINUED | OUTPATIENT
Start: 2023-02-28 | End: 2023-03-05 | Stop reason: HOSPADM

## 2023-02-28 RX ORDER — SCOLOPAMINE TRANSDERMAL SYSTEM 1 MG/1
PATCH, EXTENDED RELEASE TRANSDERMAL AS NEEDED
Status: DISCONTINUED | OUTPATIENT
Start: 2023-02-28 | End: 2023-02-28 | Stop reason: SURG

## 2023-02-28 RX ORDER — SODIUM CHLORIDE 0.9 % (FLUSH) 0.9 %
10 SYRINGE (ML) INJECTION EVERY 12 HOURS SCHEDULED
Status: DISCONTINUED | OUTPATIENT
Start: 2023-02-28 | End: 2023-03-05 | Stop reason: HOSPADM

## 2023-02-28 RX ORDER — SODIUM CHLORIDE 9 MG/ML
INJECTION, SOLUTION INTRAVENOUS CONTINUOUS PRN
Status: DISCONTINUED | OUTPATIENT
Start: 2023-02-28 | End: 2023-02-28 | Stop reason: SURG

## 2023-02-28 RX ORDER — LIDOCAINE HYDROCHLORIDE 10 MG/ML
0.5 INJECTION, SOLUTION INFILTRATION; PERINEURAL ONCE AS NEEDED
Status: DISCONTINUED | OUTPATIENT
Start: 2023-02-28 | End: 2023-02-28 | Stop reason: HOSPADM

## 2023-02-28 RX ORDER — ACETAMINOPHEN 650 MG/1
650 SUPPOSITORY RECTAL ONCE AS NEEDED
Status: DISCONTINUED | OUTPATIENT
Start: 2023-02-28 | End: 2023-02-28 | Stop reason: HOSPADM

## 2023-02-28 RX ORDER — DIPHENHYDRAMINE HYDROCHLORIDE 50 MG/ML
12.5 INJECTION INTRAMUSCULAR; INTRAVENOUS
Status: DISCONTINUED | OUTPATIENT
Start: 2023-02-28 | End: 2023-02-28 | Stop reason: HOSPADM

## 2023-02-28 RX ORDER — HYDROCODONE BITARTRATE AND ACETAMINOPHEN 10; 325 MG/1; MG/1
TABLET ORAL AS NEEDED
Status: DISCONTINUED | OUTPATIENT
Start: 2023-02-28 | End: 2023-02-28 | Stop reason: SURG

## 2023-02-28 RX ORDER — NEOSTIGMINE METHYLSULFATE 5 MG/5 ML
SYRINGE (ML) INTRAVENOUS AS NEEDED
Status: DISCONTINUED | OUTPATIENT
Start: 2023-02-28 | End: 2023-02-28 | Stop reason: SURG

## 2023-02-28 RX ORDER — TRANEXAMIC ACID 10 MG/ML
INJECTION, SOLUTION INTRAVENOUS CONTINUOUS PRN
Status: DISCONTINUED | OUTPATIENT
Start: 2023-02-28 | End: 2023-02-28 | Stop reason: SURG

## 2023-02-28 RX ORDER — PROPOFOL 10 MG/ML
INJECTION, EMULSION INTRAVENOUS AS NEEDED
Status: DISCONTINUED | OUTPATIENT
Start: 2023-02-28 | End: 2023-02-28 | Stop reason: SURG

## 2023-02-28 RX ORDER — GABAPENTIN 400 MG/1
400 CAPSULE ORAL 4 TIMES DAILY
Status: DISCONTINUED | OUTPATIENT
Start: 2023-02-28 | End: 2023-03-05 | Stop reason: HOSPADM

## 2023-02-28 RX ORDER — ONDANSETRON 2 MG/ML
4 INJECTION INTRAMUSCULAR; INTRAVENOUS ONCE AS NEEDED
Status: DISCONTINUED | OUTPATIENT
Start: 2023-02-28 | End: 2023-02-28 | Stop reason: HOSPADM

## 2023-02-28 RX ORDER — SODIUM CHLORIDE, SODIUM LACTATE, POTASSIUM CHLORIDE, CALCIUM CHLORIDE 600; 310; 30; 20 MG/100ML; MG/100ML; MG/100ML; MG/100ML
1000 INJECTION, SOLUTION INTRAVENOUS CONTINUOUS
Status: DISCONTINUED | OUTPATIENT
Start: 2023-02-28 | End: 2023-02-28

## 2023-02-28 RX ORDER — GLYCOPYRROLATE 0.2 MG/ML
INJECTION INTRAMUSCULAR; INTRAVENOUS AS NEEDED
Status: DISCONTINUED | OUTPATIENT
Start: 2023-02-28 | End: 2023-02-28 | Stop reason: SURG

## 2023-02-28 RX ORDER — ROCURONIUM BROMIDE 10 MG/ML
INJECTION, SOLUTION INTRAVENOUS AS NEEDED
Status: DISCONTINUED | OUTPATIENT
Start: 2023-02-28 | End: 2023-02-28 | Stop reason: SURG

## 2023-02-28 RX ORDER — PHENYLEPHRINE HCL IN 0.9% NACL 1 MG/10 ML
SYRINGE (ML) INTRAVENOUS AS NEEDED
Status: DISCONTINUED | OUTPATIENT
Start: 2023-02-28 | End: 2023-02-28 | Stop reason: SURG

## 2023-02-28 RX ORDER — BISACODYL 10 MG
10 SUPPOSITORY, RECTAL RECTAL DAILY PRN
Status: DISCONTINUED | OUTPATIENT
Start: 2023-02-28 | End: 2023-03-05 | Stop reason: HOSPADM

## 2023-02-28 RX ORDER — TRANEXAMIC ACID 10 MG/ML
INJECTION, SOLUTION INTRAVENOUS AS NEEDED
Status: DISCONTINUED | OUTPATIENT
Start: 2023-02-28 | End: 2023-02-28 | Stop reason: SURG

## 2023-02-28 RX ORDER — ACETAMINOPHEN 325 MG/1
650 TABLET ORAL ONCE AS NEEDED
Status: DISCONTINUED | OUTPATIENT
Start: 2023-02-28 | End: 2023-02-28 | Stop reason: HOSPADM

## 2023-02-28 RX ORDER — LIDOCAINE HYDROCHLORIDE 20 MG/ML
INJECTION, SOLUTION EPIDURAL; INFILTRATION; INTRACAUDAL; PERINEURAL AS NEEDED
Status: DISCONTINUED | OUTPATIENT
Start: 2023-02-28 | End: 2023-02-28 | Stop reason: SURG

## 2023-02-28 RX ORDER — EPHEDRINE SULFATE 5 MG/ML
INJECTION INTRAVENOUS AS NEEDED
Status: DISCONTINUED | OUTPATIENT
Start: 2023-02-28 | End: 2023-02-28 | Stop reason: SURG

## 2023-02-28 RX ORDER — ALBUTEROL SULFATE 2.5 MG/3ML
2.5 SOLUTION RESPIRATORY (INHALATION) ONCE AS NEEDED
Status: DISCONTINUED | OUTPATIENT
Start: 2023-02-28 | End: 2023-02-28 | Stop reason: HOSPADM

## 2023-02-28 RX ORDER — VANCOMYCIN HYDROCHLORIDE 1 G/20ML
INJECTION, POWDER, LYOPHILIZED, FOR SOLUTION INTRAVENOUS AS NEEDED
Status: DISCONTINUED | OUTPATIENT
Start: 2023-02-28 | End: 2023-02-28 | Stop reason: HOSPADM

## 2023-02-28 RX ORDER — HYDROCODONE BITARTRATE AND ACETAMINOPHEN 7.5; 325 MG/1; MG/1
2 TABLET ORAL EVERY 4 HOURS PRN
Status: DISCONTINUED | OUTPATIENT
Start: 2023-02-28 | End: 2023-02-28 | Stop reason: HOSPADM

## 2023-02-28 RX ORDER — HEPARIN SODIUM 200 [USP'U]/100ML
INJECTION, SOLUTION INTRAVENOUS
Status: COMPLETED | OUTPATIENT
Start: 2023-02-28 | End: 2023-02-28

## 2023-02-28 RX ORDER — ONDANSETRON 2 MG/ML
INJECTION INTRAMUSCULAR; INTRAVENOUS AS NEEDED
Status: DISCONTINUED | OUTPATIENT
Start: 2023-02-28 | End: 2023-02-28 | Stop reason: SURG

## 2023-02-28 RX ORDER — BUPIVACAINE HYDROCHLORIDE AND EPINEPHRINE 2.5; 5 MG/ML; UG/ML
INJECTION, SOLUTION EPIDURAL; INFILTRATION; INTRACAUDAL; PERINEURAL AS NEEDED
Status: DISCONTINUED | OUTPATIENT
Start: 2023-02-28 | End: 2023-02-28 | Stop reason: HOSPADM

## 2023-02-28 RX ORDER — ACETAMINOPHEN 500 MG
TABLET ORAL AS NEEDED
Status: DISCONTINUED | OUTPATIENT
Start: 2023-02-28 | End: 2023-02-28 | Stop reason: SURG

## 2023-02-28 RX ORDER — FLUMAZENIL 0.1 MG/ML
0.1 INJECTION INTRAVENOUS AS NEEDED
Status: DISCONTINUED | OUTPATIENT
Start: 2023-02-28 | End: 2023-02-28 | Stop reason: HOSPADM

## 2023-02-28 RX ORDER — ENOXAPARIN SODIUM 100 MG/ML
40 INJECTION SUBCUTANEOUS DAILY
Status: DISCONTINUED | OUTPATIENT
Start: 2023-03-01 | End: 2023-03-05 | Stop reason: HOSPADM

## 2023-02-28 RX ORDER — ONDANSETRON 2 MG/ML
4 INJECTION INTRAMUSCULAR; INTRAVENOUS EVERY 6 HOURS PRN
Status: DISCONTINUED | OUTPATIENT
Start: 2023-02-28 | End: 2023-03-05 | Stop reason: HOSPADM

## 2023-02-28 RX ORDER — SODIUM CHLORIDE 9 MG/ML
40 INJECTION, SOLUTION INTRAVENOUS AS NEEDED
Status: DISCONTINUED | OUTPATIENT
Start: 2023-02-28 | End: 2023-03-05 | Stop reason: HOSPADM

## 2023-02-28 RX ORDER — BUSPIRONE HYDROCHLORIDE 15 MG/1
15 TABLET ORAL 3 TIMES DAILY
Status: DISCONTINUED | OUTPATIENT
Start: 2023-02-28 | End: 2023-03-05 | Stop reason: HOSPADM

## 2023-02-28 RX ORDER — BUDESONIDE AND FORMOTEROL FUMARATE DIHYDRATE 160; 4.5 UG/1; UG/1
2 AEROSOL RESPIRATORY (INHALATION)
Status: DISCONTINUED | OUTPATIENT
Start: 2023-02-28 | End: 2023-02-28

## 2023-02-28 RX ORDER — HYDROCODONE BITARTRATE AND ACETAMINOPHEN 5; 325 MG/1; MG/1
2 TABLET ORAL EVERY 4 HOURS PRN
Status: DISCONTINUED | OUTPATIENT
Start: 2023-02-28 | End: 2023-03-02

## 2023-02-28 RX ORDER — FOLIC ACID 1 MG/1
1 TABLET ORAL EVERY EVENING
Status: DISCONTINUED | OUTPATIENT
Start: 2023-02-28 | End: 2023-03-05 | Stop reason: HOSPADM

## 2023-02-28 RX ORDER — FENTANYL CITRATE 50 UG/ML
50 INJECTION, SOLUTION INTRAMUSCULAR; INTRAVENOUS
Status: DISCONTINUED | OUTPATIENT
Start: 2023-02-28 | End: 2023-02-28 | Stop reason: HOSPADM

## 2023-02-28 RX ORDER — LABETALOL HYDROCHLORIDE 5 MG/ML
5 INJECTION, SOLUTION INTRAVENOUS
Status: DISCONTINUED | OUTPATIENT
Start: 2023-02-28 | End: 2023-02-28 | Stop reason: HOSPADM

## 2023-02-28 RX ORDER — HYDRALAZINE HYDROCHLORIDE 20 MG/ML
5 INJECTION INTRAMUSCULAR; INTRAVENOUS
Status: DISCONTINUED | OUTPATIENT
Start: 2023-02-28 | End: 2023-02-28 | Stop reason: HOSPADM

## 2023-02-28 RX ORDER — FENTANYL CITRATE 50 UG/ML
25 INJECTION, SOLUTION INTRAMUSCULAR; INTRAVENOUS
Status: DISCONTINUED | OUTPATIENT
Start: 2023-02-28 | End: 2023-02-28 | Stop reason: HOSPADM

## 2023-02-28 RX ORDER — TRAZODONE HYDROCHLORIDE 100 MG/1
100 TABLET ORAL NIGHTLY PRN
Status: DISCONTINUED | OUTPATIENT
Start: 2023-02-28 | End: 2023-03-05 | Stop reason: HOSPADM

## 2023-02-28 RX ORDER — CYCLOBENZAPRINE HCL 10 MG
10 TABLET ORAL 3 TIMES DAILY
Status: DISCONTINUED | OUTPATIENT
Start: 2023-02-28 | End: 2023-03-05 | Stop reason: HOSPADM

## 2023-02-28 RX ORDER — SODIUM CHLORIDE 0.9 % (FLUSH) 0.9 %
10 SYRINGE (ML) INJECTION AS NEEDED
Status: DISCONTINUED | OUTPATIENT
Start: 2023-02-28 | End: 2023-02-28 | Stop reason: HOSPADM

## 2023-02-28 RX ORDER — ROFLUMILAST 500 UG/1
500 TABLET ORAL DAILY
Status: DISCONTINUED | OUTPATIENT
Start: 2023-02-28 | End: 2023-03-05 | Stop reason: HOSPADM

## 2023-02-28 RX ORDER — NITROGLYCERIN 0.4 MG/1
0.4 TABLET SUBLINGUAL
Status: DISCONTINUED | OUTPATIENT
Start: 2023-02-28 | End: 2023-03-05 | Stop reason: HOSPADM

## 2023-02-28 RX ORDER — ALBUTEROL SULFATE 90 UG/1
2 AEROSOL, METERED RESPIRATORY (INHALATION) EVERY 4 HOURS PRN
Status: DISCONTINUED | OUTPATIENT
Start: 2023-02-28 | End: 2023-02-28 | Stop reason: SDUPTHER

## 2023-02-28 RX ORDER — HYDROXYCHLOROQUINE SULFATE 200 MG/1
200 TABLET, FILM COATED ORAL DAILY
Status: DISCONTINUED | OUTPATIENT
Start: 2023-02-28 | End: 2023-03-05 | Stop reason: HOSPADM

## 2023-02-28 RX ORDER — BUDESONIDE AND FORMOTEROL FUMARATE DIHYDRATE 160; 4.5 UG/1; UG/1
2 AEROSOL RESPIRATORY (INHALATION)
Status: DISCONTINUED | OUTPATIENT
Start: 2023-02-28 | End: 2023-03-05 | Stop reason: HOSPADM

## 2023-02-28 RX ORDER — MULTIPLE VITAMINS W/ MINERALS TAB 9MG-400MCG
1 TAB ORAL DAILY
Status: DISCONTINUED | OUTPATIENT
Start: 2023-02-28 | End: 2023-03-05 | Stop reason: HOSPADM

## 2023-02-28 RX ORDER — ALBUTEROL SULFATE 2.5 MG/3ML
2.5 SOLUTION RESPIRATORY (INHALATION) EVERY 4 HOURS PRN
Status: DISCONTINUED | OUTPATIENT
Start: 2023-02-28 | End: 2023-03-05 | Stop reason: HOSPADM

## 2023-02-28 RX ORDER — GABAPENTIN 300 MG/1
CAPSULE ORAL AS NEEDED
Status: DISCONTINUED | OUTPATIENT
Start: 2023-02-28 | End: 2023-02-28 | Stop reason: SURG

## 2023-02-28 RX ORDER — PROCHLORPERAZINE EDISYLATE 5 MG/ML
10 INJECTION INTRAMUSCULAR; INTRAVENOUS ONCE AS NEEDED
Status: DISCONTINUED | OUTPATIENT
Start: 2023-02-28 | End: 2023-02-28 | Stop reason: HOSPADM

## 2023-02-28 RX ORDER — FENTANYL CITRATE 50 UG/ML
INJECTION, SOLUTION INTRAMUSCULAR; INTRAVENOUS AS NEEDED
Status: DISCONTINUED | OUTPATIENT
Start: 2023-02-28 | End: 2023-02-28 | Stop reason: SURG

## 2023-02-28 RX ORDER — ALBUMIN, HUMAN INJ 5% 5 %
SOLUTION INTRAVENOUS CONTINUOUS PRN
Status: DISCONTINUED | OUTPATIENT
Start: 2023-02-28 | End: 2023-02-28 | Stop reason: SURG

## 2023-02-28 RX ORDER — NALOXONE HCL 0.4 MG/ML
0.4 VIAL (ML) INJECTION AS NEEDED
Status: DISCONTINUED | OUTPATIENT
Start: 2023-02-28 | End: 2023-02-28 | Stop reason: HOSPADM

## 2023-02-28 RX ORDER — MELATONIN
2000 DAILY
Status: DISCONTINUED | OUTPATIENT
Start: 2023-02-28 | End: 2023-03-05 | Stop reason: HOSPADM

## 2023-02-28 RX ORDER — SODIUM CHLORIDE 0.9 % (FLUSH) 0.9 %
10 SYRINGE (ML) INJECTION AS NEEDED
Status: DISCONTINUED | OUTPATIENT
Start: 2023-02-28 | End: 2023-03-05 | Stop reason: HOSPADM

## 2023-02-28 RX ORDER — DOCUSATE SODIUM 100 MG/1
100 CAPSULE, LIQUID FILLED ORAL 2 TIMES DAILY PRN
Status: DISCONTINUED | OUTPATIENT
Start: 2023-02-28 | End: 2023-03-02

## 2023-02-28 RX ORDER — ACETAMINOPHEN 325 MG/1
650 TABLET ORAL EVERY 4 HOURS PRN
Status: DISCONTINUED | OUTPATIENT
Start: 2023-02-28 | End: 2023-03-02

## 2023-02-28 RX ORDER — BUPROPION HYDROCHLORIDE 150 MG/1
150 TABLET ORAL DAILY
Status: DISCONTINUED | OUTPATIENT
Start: 2023-02-28 | End: 2023-03-05 | Stop reason: HOSPADM

## 2023-02-28 RX ADMIN — Medication 200 MCG: at 08:10

## 2023-02-28 RX ADMIN — MULTIPLE VITAMINS W/ MINERALS TAB 1 TABLET: TAB at 16:01

## 2023-02-28 RX ADMIN — ONDANSETRON 4 MG: 2 INJECTION INTRAMUSCULAR; INTRAVENOUS at 12:18

## 2023-02-28 RX ADMIN — HYDROMORPHONE HYDROCHLORIDE 0.5 MG: 1 INJECTION, SOLUTION INTRAMUSCULAR; INTRAVENOUS; SUBCUTANEOUS at 11:43

## 2023-02-28 RX ADMIN — SODIUM CHLORIDE, POTASSIUM CHLORIDE, SODIUM LACTATE AND CALCIUM CHLORIDE 1000 ML: 600; 310; 30; 20 INJECTION, SOLUTION INTRAVENOUS at 06:51

## 2023-02-28 RX ADMIN — LIDOCAINE HYDROCHLORIDE 100 MG: 20 INJECTION, SOLUTION EPIDURAL; INFILTRATION; INTRACAUDAL; PERINEURAL at 07:41

## 2023-02-28 RX ADMIN — PROPOFOL 80 MG: 10 INJECTION, EMULSION INTRAVENOUS at 08:09

## 2023-02-28 RX ADMIN — ROCURONIUM BROMIDE 30 MG: 50 INJECTION, SOLUTION INTRAVENOUS at 08:40

## 2023-02-28 RX ADMIN — EPHEDRINE SULFATE 10 MG: 5 INJECTION INTRAVENOUS at 08:15

## 2023-02-28 RX ADMIN — FENTANYL CITRATE 100 MCG: 50 INJECTION INTRAMUSCULAR; INTRAVENOUS at 09:15

## 2023-02-28 RX ADMIN — TRANEXAMIC ACID 2.09 MG/KG/HR: 10 INJECTION, SOLUTION INTRAVENOUS at 08:40

## 2023-02-28 RX ADMIN — GABAPENTIN 400 MG: 400 CAPSULE ORAL at 20:22

## 2023-02-28 RX ADMIN — HYDROCODONE BITARTRATE AND ACETAMINOPHEN 1 TABLET: 10; 325 TABLET ORAL at 07:21

## 2023-02-28 RX ADMIN — BUSPIRONE HYDROCHLORIDE 15 MG: 15 TABLET ORAL at 20:23

## 2023-02-28 RX ADMIN — FENTANYL CITRATE 100 MCG: 50 INJECTION INTRAMUSCULAR; INTRAVENOUS at 07:38

## 2023-02-28 RX ADMIN — HEPARIN SODIUM 1000 UNITS: 200 INJECTION, SOLUTION INTRAVENOUS at 07:46

## 2023-02-28 RX ADMIN — HYDROXYCHLOROQUINE SULFATE 200 MG: 200 TABLET ORAL at 16:02

## 2023-02-28 RX ADMIN — GABAPENTIN 600 MG: 300 CAPSULE ORAL at 07:21

## 2023-02-28 RX ADMIN — BUDESONIDE AND FORMOTEROL FUMARATE DIHYDRATE 2 PUFF: 160; 4.5 AEROSOL RESPIRATORY (INHALATION) at 21:05

## 2023-02-28 RX ADMIN — ACETAMINOPHEN 500 MG: 500 TABLET, FILM COATED ORAL at 07:21

## 2023-02-28 RX ADMIN — ROCURONIUM BROMIDE 20 MG: 50 INJECTION, SOLUTION INTRAVENOUS at 07:41

## 2023-02-28 RX ADMIN — REMIFENTANIL HYDROCHLORIDE 0.2 MCG/KG/MIN: 1 INJECTION, POWDER, LYOPHILIZED, FOR SOLUTION INTRAVENOUS at 07:41

## 2023-02-28 RX ADMIN — HYDROCODONE BITARTRATE AND ACETAMINOPHEN 2 TABLET: 7.5; 325 TABLET ORAL at 13:30

## 2023-02-28 RX ADMIN — Medication 2 MG: at 12:15

## 2023-02-28 RX ADMIN — ALBUMIN HUMAN: 0.05 INJECTION, SOLUTION INTRAVENOUS at 07:55

## 2023-02-28 RX ADMIN — VANCOMYCIN HYDROCHLORIDE 1000 MG: 1 INJECTION, POWDER, LYOPHILIZED, FOR SOLUTION INTRAVENOUS at 20:21

## 2023-02-28 RX ADMIN — BUSPIRONE HYDROCHLORIDE 15 MG: 15 TABLET ORAL at 16:02

## 2023-02-28 RX ADMIN — PROPOFOL 150 MCG/KG/MIN: 10 INJECTION, EMULSION INTRAVENOUS at 07:41

## 2023-02-28 RX ADMIN — HYDROMORPHONE HYDROCHLORIDE 0.5 MG: 1 INJECTION, SOLUTION INTRAMUSCULAR; INTRAVENOUS; SUBCUTANEOUS at 13:30

## 2023-02-28 RX ADMIN — VANCOMYCIN HYDROCHLORIDE 2 G: 1 INJECTION, POWDER, LYOPHILIZED, FOR SOLUTION INTRAVENOUS at 08:15

## 2023-02-28 RX ADMIN — PROPOFOL 50 MG: 10 INJECTION, EMULSION INTRAVENOUS at 10:34

## 2023-02-28 RX ADMIN — BUMETANIDE 2 MG: 1 TABLET ORAL at 20:24

## 2023-02-28 RX ADMIN — SCOPALAMINE 1 PATCH: 1 PATCH, EXTENDED RELEASE TRANSDERMAL at 07:21

## 2023-02-28 RX ADMIN — HYDROMORPHONE HYDROCHLORIDE 0.5 MG: 1 INJECTION, SOLUTION INTRAMUSCULAR; INTRAVENOUS; SUBCUTANEOUS at 12:21

## 2023-02-28 RX ADMIN — ROFLUMILAST 500 MCG: 500 TABLET ORAL at 16:01

## 2023-02-28 RX ADMIN — CYCLOBENZAPRINE 10 MG: 10 TABLET, FILM COATED ORAL at 20:24

## 2023-02-28 RX ADMIN — FOLIC ACID 1 MG: 1 TABLET ORAL at 17:50

## 2023-02-28 RX ADMIN — HYDROMORPHONE HYDROCHLORIDE 0.5 MG: 1 INJECTION, SOLUTION INTRAMUSCULAR; INTRAVENOUS; SUBCUTANEOUS at 13:55

## 2023-02-28 RX ADMIN — GLYCOPYRROLATE 0.4 MG: 0.2 INJECTION INTRAMUSCULAR; INTRAVENOUS at 12:15

## 2023-02-28 RX ADMIN — HYDROMORPHONE HYDROCHLORIDE 0.5 MG: 1 INJECTION, SOLUTION INTRAMUSCULAR; INTRAVENOUS; SUBCUTANEOUS at 10:54

## 2023-02-28 RX ADMIN — HYDROMORPHONE HYDROCHLORIDE 0.5 MG: 1 INJECTION, SOLUTION INTRAMUSCULAR; INTRAVENOUS; SUBCUTANEOUS at 13:06

## 2023-02-28 RX ADMIN — BUPROPION HYDROCHLORIDE 150 MG: 150 TABLET, FILM COATED, EXTENDED RELEASE ORAL at 16:01

## 2023-02-28 RX ADMIN — HYDROMORPHONE HYDROCHLORIDE 0.5 MG: 1 INJECTION, SOLUTION INTRAMUSCULAR; INTRAVENOUS; SUBCUTANEOUS at 10:50

## 2023-02-28 RX ADMIN — Medication 2000 UNITS: at 16:02

## 2023-02-28 RX ADMIN — CYCLOBENZAPRINE 10 MG: 10 TABLET, FILM COATED ORAL at 16:02

## 2023-02-28 RX ADMIN — HYDROMORPHONE HYDROCHLORIDE: 10 INJECTION, SOLUTION INTRAMUSCULAR; INTRAVENOUS; SUBCUTANEOUS at 15:44

## 2023-02-28 RX ADMIN — GABAPENTIN 400 MG: 400 CAPSULE ORAL at 17:50

## 2023-02-28 RX ADMIN — TRANEXAMIC ACID 1000 MG: 10 INJECTION, SOLUTION INTRAVENOUS at 08:40

## 2023-02-28 RX ADMIN — PROPOFOL 150 MG: 10 INJECTION, EMULSION INTRAVENOUS at 07:41

## 2023-02-28 RX ADMIN — SODIUM CHLORIDE: 0.9 INJECTION, SOLUTION INTRAVENOUS at 07:41

## 2023-02-28 RX ADMIN — PROPOFOL 125 MCG/KG/MIN: 10 INJECTION, EMULSION INTRAVENOUS at 11:26

## 2023-02-28 RX ADMIN — Medication 100 MCG: at 08:05

## 2023-02-28 NOTE — ANESTHESIA PROCEDURE NOTES
Airway  Urgency: elective    Date/Time: 2/28/2023 7:42 AM    General Information and Staff    Patient location during procedure: OR  CRNA/CAA: Aron Reagan CRNA    Indications and Patient Condition  Indications for airway management: airway protection    Preoxygenated: yes  Mask difficulty assessment: 1 - vent by mask    Final Airway Details  Final airway type: endotracheal airway      Successful airway: ETT  Cuffed: yes (mop)   Successful intubation technique: direct laryngoscopy  Facilitating devices/methods: intubating stylet  Endotracheal tube insertion site: oral  Blade: Cain  Blade size: 2  ETT size (mm): 7.0  Cormack-Lehane Classification: grade I - full view of glottis  Placement verified by: chest auscultation and capnometry   Measured from: gums  ETT/EBT to gums (cm): 21  Number of attempts at approach: 1  Assessment: lips, teeth, and gum same as pre-op and atraumatic intubation

## 2023-02-28 NOTE — PLAN OF CARE
Goal Outcome Evaluation:      Pt arrived from PACU after L5-S1 fusion, T10-L2 & L4-5 sheldon osteotomies, T9-Pelvis fusion, hardware removal and replacement. Complaining of severe pain but will be starting a Dilaudid PCA pump. Room air. VSS. A/O x4. Call light and personal items within reach. Spouse and daughter at bedside. Care plan ongoing.     Progress: no change

## 2023-02-28 NOTE — OP NOTE
TRANSFORAMINAL LUMBAR INTERBODY FUSION WITH NEURO ROBOT  Procedure Report    Patient Name:  Diya Arreola  YOB: 1960    Date of Surgery:  2/28/2023     Indications: 62-year-old female status post L2-5 fusion most recently L4-5 TLIF for adjacent segment disease he developed pseudoarthrosis at this level.  She also had a significant kyphotic deformity and PI to LL mismatch.  Given this and failure of conservative measures patient was taken to the OR for thoracic to pelvic fusion and correction of deformity with TLIF and osteotomies.  Patient understood the significant risks of surgery including bleeding vascular injury internal organ injury infection CSF leak nerve damage spinal cord injury hardware failure pseudoarthrosis with no improvement in preoperative symptoms and need for future operation among many other risks and she agreed to undergo the procedure.    Pre-op Diagnosis:   Other secondary kyphosis, thoracolumbar region [M40.15]       Post-Op Diagnosis Codes:     * Other secondary kyphosis, thoracolumbar region [M40.15]    Procedure/CPT® Codes:      Procedure(s):  L5-S1 posterior lumbar interbody fusion  Placement of globus expandable interbody cage L5-S1  Park osteotomies T12-L1, L1-L2, L4-L5  Removal of instrumentation L5  Exploration of fusion L2-4  Instrumented fusion with globus pedicle screws T9-S1  Pelvic fixation with SI bone screw system  Open sacroiliac joint fusion bilaterally with SI bone screw system, iFactor, allograft and autograft  Posterior lateral fusion with iFactor, allograft and autograft T9-S1  Use of neuro navigation and robotic technology all      Staff:  Surgeon(s):  Jerrell Gorman IV, MD    Assistant: Jose Manuel Tenorio PA    Anesthesia: General    Estimated Blood Loss: 400 mL    Implants:    Implant Name Type Inv. Item Serial No.  Lot No. LRB No. Used Action   KT SEAL HEMOS ABS FLOSEAL MATRX FAST/PREP 10ML - [id number] Implant KT SEAL HEMOS ABS FLOSEAL  MATRX FAST/PREP 10ML  [employer] [id number] N/A 1 Implanted   KT SEAL HEMOS ABS FLOSEAL MATRX FAST/PREP 10ML - [id number] Implant KT SEAL HEMOS ABS FLOSEAL MATRX FAST/PREP 10ML  [employer] [id number] N/A 2 Implanted   IGOR ADAPTIVE SPINE UNID TI CHROMIUM CUST [id number] - [id number] Implant IGOR ADAPTIVE SPINE UNID TI CHROMIUM CUST [id number]  [employer] [id number] N/A 1 Implanted   DEV WND/CLS CONTRL TISS STRATAFIX SYMM PDS PLS CTX 60CM BRY - [id number] Implant DEV WND/CLS CONTRL TISS STRATAFIX SYMM PDS PLS CTX 60CM BRY  [employer]  DIV OF J AND J [id number] N/A 3 Implanted   DEV CONTRL TISS STRATAFIX SPIRAL MNCRYL UD 3/0 PLS 30CM - [id number] Implant DEV CONTRL TISS STRATAFIX SPIRAL MNCRYL UD 3/0 PLS 30CM  [employer]  DIV OF J AND J [id number] N/A 2 Implanted   DEV FUSN SI IFUSE/GRANIT 10.5X80MM - [id number] Implant DEV FUSN SI IFUSE/GRANIT 10.5X80MM  [employer] [id number] N/A 2 Implanted   GRFT BONE IFACTOR PUTTY 2.5ML - [id number] Implant GRFT BONE IFACTOR PUTTY 2.5ML  [employer] [id number] N/A 1 Implanted   ALLOGRAFT BONE OSTEOAMP SELECT LAURENT 5CC - [id number] - [id number] Implant ALLOGRAFT BONE OSTEOAMP SELECT LAURENT 5CC [id number] [facility] [facility] [id number] N/A 1 Implanted   2 rods, 2 screws,  8 set screws     . N/A 12 Explanted   GRFT BONE IFACTOR PUTTY 5ML - [id number] Implant GRFT BONE IFACTOR PUTTY 5ML  [employer] [id number] N/A 3 Implanted   ALLOGRFT FIBR OSTEOAMP SELECT 10CC - [id number] - [id number] Implant ALLOGRFT FIBR OSTEOAMP SELECT 10CC [id number] [employer] [id number] N/A 1 Implanted   ALLOGRFT FIBR OSTEOAMP SELECT 10CC - [id number] - [id number] Implant ALLOGRFT FIBR OSTEOAMP SELECT 10CC [id number] [employer] [id number] N/A 1 Implanted   ALLOGRFT FIBR OSTEOAMP SELECT 10CC - [id number] - [id number] Implant ALLOGRFT FIBR OSTEOAMP SELECT 10 [id number] [employer] [facility] [id number] N/A 1 Implanted   ALLOGRFT FIBR OSTEOAMP SELECT 10 - [id number] - [id number] Implant ALLOGRFT FIBR OSTEOAMP SELECT 10  [id number] [employer] [facility] [id number] N/A 1 Implanted   SCRW PEDCL CREO5.5 PRE/ASMBL THRD 5.5X35MM - [id number] Implant SCRW PEDCL CREO5.5 PRE/ASMBL THRD 5.5X35MM  [employer] . N/A 4 Implanted   SCRW PEDCL CREO5.5 PRE/ASMBL THRD 5.5X40MM - [id number] Implant SCRW PEDCL CREO5.5 PRE/ASMBL THRD 5.5X40MM  [employer] . N/A 6 Implanted   SCRW PEDCL CREO5.5 PRE/ASMBL THRD 6.5X35MM - [id number] Implant SCRW PEDCL CREO5.5 PRE/ASMBL THRD 6.5X35MM  [employer] . N/A 2 Implanted   SPCR TLIF RISE TI 15DEG [id number] [id number] - [id number] Implant SPCR TLIF RISE TI 15DEG [id number]  [employer] . N/A 1 Implanted   CP SCRW LK SPINE CREO THRD TI - [id number] Implant CP SCRW LK SPINE CREO THRD TI  [employer] . N/A 12 Implanted       Specimen:          None        Findings: Pseudoarthrosis L4-5 and loose L5 screws    Complications: None    Description of Procedure: Patient was brought to the OR placed under general endotracheal anesthesia.  She was flipped in the prone position on a Waylon table and prepped and draped in usual fashion.  An incision was made from T9 down to the sacrum and carried down through the deep dermal layer and fat layers with monopolar cautery.  Fascia was opened with monopolar cautery and a subperiosteal dissection was undertaken from T9-S2 using monopolar cautery bipolar cautery and blunt dissection including exposure of previous instrumentation from L2-5.  Once exposure was complete, DRB and surveillance marker were placed in the patient's posterior iliac crest.  X-rays were taken to orient the Prismaticus robot and its navigation software to the patient's body.  The robot was then brought in the field.  Robotic arm was used to place screws from T9 to pelvis.  Screw trajectories were planned on a preoperative CT scan.  High-speed bur was used to make the initial cut through the cortical bone followed by a drill through the pedicle and the proper trajectory followed by placement of the screw.   The pelvic screws were placed in the S2 AI trajectory across the SI joint.  SI bone screw system was used for these S2 AI screws.  High-speed drill was used to decorticate the sacroiliac joints bilaterally and the SI joint fusion in addition to the screw crossing.  Once the screws have been placed, attention was turned to the L5-S1 interbody fusion.  High-speed drill was used to remove the facet joints bilaterally and L5 lamina.  Curette was used to separate the ligamentum flavum from underlying dura which was removed with 3 and 4 Kerrisons completely unroofing the traversing exiting nerve roots bilaterally.  The microscope was brought in the field.  Disc space was uncovered in Kambin's triangle and bipolar cautery was used to coagulate epidural venous plexus and tissue.  Disc space was opened with a 15 blade, and a series of endplate scraping devices were passed into the disc space scraping away soft disc and cartilaginous endplate.  Curettes were used to remove remaining cartilaginous endplate.  All disc prep was performed under neuro navigation guidance.  Pituitary rongeur was used to remove remaining soft disc and cartilaginous endplate from the disc space.  Disc space was then packed with iFactor and allograft.  Globus expandable interbody cage was then passed into the disc space navigation guidance and expanded.  Attention was then turned to sheldon osteotomies.  Scar tissue was removed from over the L4-5 facet joints and L4 lamina and high-speed drill was used to remove the facets and portion of the lamina bilaterally.  3 and 4 Kerrisons were used to remove ligamentum flavum and remaining shelled out bone thus completing the posterior sheldon osteotomy at L4-5.  The same series of steps were undertaken at L1-2 and T12-L1 with complete removal of facet joints and ligamentum flavum.  Bed was then broken increasing the lordosis in the patient's lumbar spine by closing down the osteotomy sites.  Custom Medicrea rods  were then placed in the screw heads and locking caps were placed and final tightened.  High-speed drill was used to decorticate the remaining lamina facet joints transverse processes and sacral ala from T9-S1 and posterior lateral was performed S1 and iFactor.  Final x-rays demonstrate good positioning of all hardware and improvement in lumbar lordosis.  Attention was then turned to closure.  2 Hemovac drains were placed.  Hemostasis was achieved with bipolar cautery and Gelfoam powder.  The muscle was reapproximated using 0 Vicryl sutures, the fascia was closed with a running strata fix 1, the deep dermal layer was closed with 2-0 Vicryl sutures and the skin was closed with a running subcuticular Monocryl.  Dermabond was placed over the wound and a bandage.  There were no changes in neuro monitoring throughout the case.  All new lumbar and sacral screws stimulated above 20 mA.                Assistant: Jose Manuel Tenorio PA  was responsible for performing the following activities: Retraction, Suction, Irrigation, Suturing, Closing and Placing Dressing and their skilled assistance was necessary for the success of this case.    Jerrell Gorman IV, MD     Date: 2/28/2023  Time: 12:53 EST

## 2023-02-28 NOTE — CONSULTS
[facility] Medicine Services   Consult Note    Patient Name: Diya Arreola  : 1960  MRN: [id number]  Primary Care Physician:  Mitzy Rea MD  Referring Physician: Jerrell Gorman IV, MD  Date of admission: 2023  Date and Time of Care: 2022   at 18:25 EST      Inpatient Hospitalist Consult  Consult performed by: Angella Pérez APRN  Consult ordered by: Jose Manuel Tenorio PA          Subjective      Reason for Consult/ Chief Complaint: Medical management s/p lumbar fusion    Consult Requested By: Dr. Gorman    History of Present Illness: Diya Arreola is a 62 y.o. female who presented to [facility] on 2023 for a scheduled lumbar fusion surgery with .  Patient has an extensive history including lupus, fibromyalgia, rheumatoid arthritis, COPD, scoliosis, and anxiety/depression.    Preop labs and other tests on 2023 were all unremarkable.    CT Lumbar Spine Without Contrast    Result Date: 2023  Impression: 1. Status post fusion from the L2-L5 levels with posterior rohit and pedicle screw fixation. There is loosening around the bilateral L5 pedicle screws as well as subsidence of the arthrodesis device the L4/5 level. 2. 3 mm noncalcified nodule within the right lower lobe. Follow-up noncontrast CT scan of the chest would be recommended in 12 months. Electronically Signed: Misael Ly  2023 2:35 PM EST  Workstation ID: [id number]    XR Chest PA & Lateral    Result Date: 2023  Impression: No acute chest finding. Electronically Signed: Nica Griffin  2023 2:25 PM EST  Workstation ID: [id number]        Review of Systems   Constitutional: Positive for malaise/fatigue.   Musculoskeletal: Positive for arthritis, back pain, muscle weakness, myalgias and stiffness.        Tearful related to pain   Gastrointestinal: Positive for constipation.   All other systems reviewed and are negative.       Personal History     Past Medical  History:   Diagnosis Date   • Allergic    • Anxiety    • Arthritis    • Asthma    • Constipation     improved with diet   • COPD (chronic obstructive pulmonary disease) ([facility])    • Depression    • Fall     10-8-20-- was in [facility]   • Fibromyalgia, primary    • Gastritis    • History of pancreatitis     age 5   • Insomnia    • Low back pain 2021   • Lupus ([facility])    • Neuropathy     feet   • Osteopenia    • Palpitations    • Peripheral edema     none for many years   • PONV (postoperative nausea and vomiting)    • Rheumatoid arthritis ([facility])    • Scoliosis     on CT  2021   • Sjogren's syndrome ([facility])    • Tachycardia     with anxiety   • TIA (transient ischemic attack) 2014    unaware of this       Past Surgical History:   Procedure Laterality Date   • BACK SURGERY      lumbar fusion 21- robotic surgery   • CARDIAC CATHETERIZATION     • CARPAL TUNNEL RELEASE     •  SECTION     • CHOLECYSTECTOMY     • COLONOSCOPY  10/08/2013   • CYSTOCELE REPAIR      a&P   • EPIDURAL BLOCK     • HYSTERECTOMY     • LUMBAR FUSION N/A 2021    Procedure: L4-5 LUMBAR LAMINECTOMY TRANSFORAMINAL LUMBAR INTERBODY FUSION; L2-3 laminectomy and facetectomy; removal of hardware L3-4; L2-5 fusion, ROBOTIC;  Surgeon: Jerrell Gorman IV, MD;  Location: [facility] MAIN OR;  Service: Robotics - Neuro;  Laterality: N/A;   • SPINE SURGERY      neck  , ; lumbar 2012       Family History: family history includes Diabetes in her father; Heart disease in her brother; Hypertension in her brother and sister; Osteoporosis in her maternal grandmother; Stroke in her sister. Otherwise pertinent FHx was reviewed and not pertinent to current issue.    Social History:  reports that she quit smoking about 15 months ago. Her smoking use included cigarettes. She has a 39.00 pack-year smoking history. She has never used smokeless tobacco. She reports that she does not drink alcohol and does not use drugs.    Home Medications:    Benefiber, Brexpiprazole, Doxylamine Succinate (Sleep), EPINEPHrine, Fluticasone-Umeclidin-Vilant, HYDROcodone-acetaminophen, Vitamin D, albuterol, albuterol sulfate HFA, buPROPion XL, bumetanide, busPIRone, cyclobenzaprine, folic acid, gabapentin, hydroxychloroquine, multivitamin with minerals, nitroglycerin, polyethylene glycol, roflumilast, tiZANidine, traZODone, varenicline, and verapamil SR    Allergies:  Allergies   Allergen Reactions   • Adhesive Tape Rash     Paper tape ok   • Cephalexin Rash   • Ciprofloxacin Rash   • Corticosteroids Unknown (See Comments)     Nerve burning    • Latex Rash   • Other Unknown (See Comments)     Oline Abx and [employer] soap   • Penciclovir Anaphylaxis   • Penicillin G Anaphylaxis   • Sulfa Antibiotics Anaphylaxis   • Oxycodone Delirium and Irritability   • Azithromycin Rash         Objective      Vitals:  Temp:  [97.4 °F (36.3 °C)-98 °F (36.7 °C)] 97.8 °F (36.6 °C)  Heart Rate:  [83-92] 92  Resp:  [9-20] 13  BP: (106-137)/(55-87) 132/84  Flow (L/min):  [3-4] 3    Physical Exam  Vitals reviewed.   Constitutional:       Appearance: Normal appearance.   HENT:      Head: Normocephalic.      Mouth/Throat:      Mouth: Mucous membranes are moist.   Eyes:      Pupils: Pupils are equal, round, and reactive to light.   Cardiovascular:      Rate and Rhythm: Normal rate and regular rhythm.      Pulses: Normal pulses.      Heart sounds: Normal heart sounds.   Pulmonary:      Effort: Pulmonary effort is normal.      Breath sounds: Normal breath sounds.   Abdominal:      General: Bowel sounds are normal.      Palpations: Abdomen is soft.   Musculoskeletal:      Comments: Patient has rheumatoid arthritis, fibromyalgia, and hurts all over right now.  Dilaudid PCA present.   Skin:     General: Skin is warm and dry.      Capillary Refill: Capillary refill takes less than 2 seconds.      Comments: Surgical incision not assessed due to patient's pain and not wanting to move, plus she was  sitting up in a recliner at the time.   Neurological:      General: No focal deficit present.      Mental Status: She is alert and oriented to person, place, and time.          Result Review    Result Review:  I have personally reviewed the results from the time of this admission to 2/28/2023 18:25 EST and agree with these findings:  [x]  Laboratory  [x]  Microbiology  [x]  Radiology  [x]  EKG/Telemetry   []  Cardiology/Vascular   []  Pathology  []  Old records  []  Other:      Assessment & Plan        Active Hospital Problems:  Active Hospital Problems    Diagnosis    • **Other secondary kyphosis, thoracolumbar region      Plan:     S/P lumbar fusion  - Postop care and pain control per surgeon  - PT/OT to follow  - Finishing IV vancomycin  - Dilaudid PCA present    COPD  - Currently on room air, continue monitor O2 saturation  - Continue Daliresp, Symbicort and Spiriva    Hypertension  - BP currently 132/84  - Continue verapamil and Bumex    Rheumatoid arthritis  H/O lupus and connective tissue disease  - Continue Plaquenil    Fibromyalgia  - Continue Neurontin and Flexeril    Anxiety/depression   - Continue Wellbutrin and BuSpar    DVT prophylaxis  -Continue Lovenox    Signature: Electronically signed by PARMINDER Frank, 02/28/23, 18:25 EST.  [facility]ist Team

## 2023-02-28 NOTE — SIGNIFICANT NOTE
02/28/23 1516   OTHER   Discipline physical therapist   Rehab Time/Intention   Session Not Performed patient unavailable for evaluation  (Will follow up for PT evaluation POD 1)   Recommendation   PT - Next Appointment 03/01/23

## 2023-02-28 NOTE — ANESTHESIA PROCEDURE NOTES
Arterial Line      Patient reassessed immediately prior to procedure    Patient location during procedure: OR  Start time: 2/28/2023 7:46 AM  Stop Time:2/28/2023 7:50 AM       Line placed for hemodynamic monitoring and ABGs/Labs/ISTAT.  Performed By   CRNA/CAA: Keri Myrick CRNA   Preanesthetic Checklist  Completed: patient identified, IV checked, site marked, risks and benefits discussed, surgical consent, monitors and equipment checked, pre-op evaluation and timeout performed  Arterial Line Procedure   Laterality:left  Location:  radial artery  Catheter size: 20 G   Guidance: landmark technique and palpation technique  Number of attempts: 2  Successful placement: yes  Heparin (Porcine) in NaCl 1000-0.9 UT/500ML-% for arterial line pressure bag - Intra-arterial   1,000 Units - 2/28/2023 7:46:00 AM   Post Assessment   Dressing Type: occlusive dressing applied, secured with tape and wrist guard applied.   Complications no  Circ/Move/Sens Assessment: normal.   Patient Tolerance: patient tolerated the procedure well with no apparent complications

## 2023-03-01 PROBLEM — R91.1 RIGHT LOWER LOBE PULMONARY NODULE: Chronic | Status: ACTIVE | Noted: 2023-03-01

## 2023-03-01 LAB
ABO GROUP BLD: NORMAL
BLD GP AB SCN SERPL QL: NEGATIVE
RH BLD: POSITIVE
T&S EXPIRATION DATE: NORMAL

## 2023-03-01 PROCEDURE — 97166 OT EVAL MOD COMPLEX 45 MIN: CPT

## 2023-03-01 PROCEDURE — 25010000002 ENOXAPARIN PER 10 MG

## 2023-03-01 PROCEDURE — 97162 PT EVAL MOD COMPLEX 30 MIN: CPT

## 2023-03-01 PROCEDURE — 94664 DEMO&/EVAL PT USE INHALER: CPT

## 2023-03-01 PROCEDURE — 94799 UNLISTED PULMONARY SVC/PX: CPT

## 2023-03-01 PROCEDURE — 86901 BLOOD TYPING SEROLOGIC RH(D): CPT | Performed by: NEUROLOGICAL SURGERY

## 2023-03-01 PROCEDURE — 86900 BLOOD TYPING SEROLOGIC ABO: CPT | Performed by: NEUROLOGICAL SURGERY

## 2023-03-01 PROCEDURE — 25010000002 VANCOMYCIN 1 G RECONSTITUTED SOLUTION 1 EACH VIAL

## 2023-03-01 PROCEDURE — 86850 RBC ANTIBODY SCREEN: CPT | Performed by: NEUROLOGICAL SURGERY

## 2023-03-01 RX ADMIN — GABAPENTIN 400 MG: 400 CAPSULE ORAL at 11:23

## 2023-03-01 RX ADMIN — ROFLUMILAST 500 MCG: 500 TABLET ORAL at 07:31

## 2023-03-01 RX ADMIN — HYDROXYCHLOROQUINE SULFATE 200 MG: 200 TABLET ORAL at 07:31

## 2023-03-01 RX ADMIN — BUDESONIDE AND FORMOTEROL FUMARATE DIHYDRATE 2 PUFF: 160; 4.5 AEROSOL RESPIRATORY (INHALATION) at 19:14

## 2023-03-01 RX ADMIN — BUPROPION HYDROCHLORIDE 150 MG: 150 TABLET, FILM COATED, EXTENDED RELEASE ORAL at 07:32

## 2023-03-01 RX ADMIN — Medication 10 ML: at 07:37

## 2023-03-01 RX ADMIN — SODIUM CHLORIDE 40 ML: 9 INJECTION, SOLUTION INTRAVENOUS at 05:45

## 2023-03-01 RX ADMIN — BUDESONIDE AND FORMOTEROL FUMARATE DIHYDRATE 2 PUFF: 160; 4.5 AEROSOL RESPIRATORY (INHALATION) at 07:29

## 2023-03-01 RX ADMIN — BUSPIRONE HYDROCHLORIDE 15 MG: 15 TABLET ORAL at 19:35

## 2023-03-01 RX ADMIN — GABAPENTIN 400 MG: 400 CAPSULE ORAL at 15:21

## 2023-03-01 RX ADMIN — BREXPIPRAZOLE 0.5 MG: 0.5 TABLET ORAL at 07:37

## 2023-03-01 RX ADMIN — BUSPIRONE HYDROCHLORIDE 15 MG: 15 TABLET ORAL at 07:32

## 2023-03-01 RX ADMIN — VANCOMYCIN HYDROCHLORIDE 1000 MG: 1 INJECTION, POWDER, LYOPHILIZED, FOR SOLUTION INTRAVENOUS at 07:31

## 2023-03-01 RX ADMIN — GABAPENTIN 400 MG: 400 CAPSULE ORAL at 07:31

## 2023-03-01 RX ADMIN — BUMETANIDE 2 MG: 1 TABLET ORAL at 07:31

## 2023-03-01 RX ADMIN — BUSPIRONE HYDROCHLORIDE 15 MG: 15 TABLET ORAL at 13:51

## 2023-03-01 RX ADMIN — GABAPENTIN 400 MG: 400 CAPSULE ORAL at 19:35

## 2023-03-01 RX ADMIN — MULTIPLE VITAMINS W/ MINERALS TAB 1 TABLET: TAB at 07:31

## 2023-03-01 RX ADMIN — FOLIC ACID 1 MG: 1 TABLET ORAL at 07:32

## 2023-03-01 RX ADMIN — ENOXAPARIN SODIUM 40 MG: 100 INJECTION SUBCUTANEOUS at 15:21

## 2023-03-01 RX ADMIN — CYCLOBENZAPRINE 10 MG: 10 TABLET, FILM COATED ORAL at 07:32

## 2023-03-01 RX ADMIN — VERAPAMIL HYDROCHLORIDE 240 MG: 120 TABLET, FILM COATED, EXTENDED RELEASE ORAL at 07:32

## 2023-03-01 RX ADMIN — Medication 2000 UNITS: at 07:31

## 2023-03-01 RX ADMIN — HYDROCODONE BITARTRATE AND ACETAMINOPHEN 2 TABLET: 5; 325 TABLET ORAL at 07:31

## 2023-03-01 RX ADMIN — CYCLOBENZAPRINE 10 MG: 10 TABLET, FILM COATED ORAL at 13:51

## 2023-03-01 RX ADMIN — CYCLOBENZAPRINE 10 MG: 10 TABLET, FILM COATED ORAL at 19:35

## 2023-03-01 RX ADMIN — TIOTROPIUM BROMIDE INHALATION SPRAY 2 PUFF: 3.12 SPRAY, METERED RESPIRATORY (INHALATION) at 19:14

## 2023-03-01 NOTE — PLAN OF CARE
Goal Outcome Evaluation:              Outcome Evaluation: Pt's VSS. Complaints of pain this shift, managed by PCA pump and repositioning. [place]more cath to be removed this morning. Able to make needs known. Call light within reach. Plan of care ongoing. Will continue to monitor.

## 2023-03-01 NOTE — PLAN OF CARE
Goal Outcome Evaluation:  Plan of Care Reviewed With: patient           Outcome Evaluation: 62-year-old female with PMH of lupus, fibromyalgia, rheumatoid arthritis, COPD, scoliosis, and anxiety/depression. 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 She also had a significant kyphotic deformity and PI to LL mismatch. Pt elected to proceed with sx per Dr. Gorman, now POD #1 for thoracic to pelvic fusion and correction of deformity with TLIF and osteotomies. Specifically, pt underwent lumbar 5 to sacral 1 transforaminal lumbar interbody fusion;  thoracic 10 to lumbar 2 and lumbar 4-5 sheldon osteotomies; T9 to pelvic fusion. Patient normally independent with mobility and functiona at home, uses a RW. Initially Mod Ax2 to get to sitting EOB, however patient writhing in pain falling back wards with PT supporting trunk to prevent head contact with foot board. Pt reports sudden intense pain in the RLE. Assisted back to R side lying via Mod x2. Educated to attempt refraining ffrom sudden and quick movements even when in pain, but patient reports she is unable. Again sitting EOB completed with Mod Ax2, with improved tolerance noted. She then stood with Min Ax2 and ambulated in room with Min A and RW prior to return to bed. Patient currently requires Max A for all lower body self-care due to presence of pain and spinal precautions. At current level, patient would benefit most from SNF at d/c. WIll follow.

## 2023-03-01 NOTE — PROGRESS NOTES
Neurosurgery Progress Note    Date: 23     Patient: Diya Arreola   Sex: female   : 1960    POD-1: L5-S1 TLIF; T10-L2 and L4-5 sheldon osteotomies; T9-pelvic fusion     SUBJECTIVE    Interval history:  No adverse events overnight.  Patient complains of expected postoperative pain and soreness well controlled with PCA.  Patient was able to transfer to chair with assistance.  No acute neurologic complaints.  [place] catheter has been removed, patient voiding freely.  She denies chest pain, SOA, fever, chills.        Current Medications:  Scheduled Meds:Brexpiprazole, 0.5 mg, Oral, Daily  budesonide-formoterol, 2 puff, Inhalation, BID - RT   And  tiotropium bromide monohydrate, 2 puff, Inhalation, Daily - RT  bumetanide, 2 mg, Oral, BID  buPROPion XL, 150 mg, Oral, Daily  busPIRone, 15 mg, Oral, TID  cholecalciferol, 2,000 Units, Oral, Daily  cyclobenzaprine, 10 mg, Oral, TID  enoxaparin, 40 mg, Subcutaneous, Daily  folic acid, 1 mg, Oral, Q PM  gabapentin, 400 mg, Oral, 4x Daily  hydroxychloroquine, 200 mg, Oral, Daily  multivitamin with minerals, 1 tablet, Oral, Daily  roflumilast, 500 mcg, Oral, Daily  sodium chloride, 10 mL, Intravenous, Q12H  verapamil SR, 240 mg, Oral, Daily      Continuous Infusions:HYDROmorphone,       PRN Meds: •  acetaminophen  •  albuterol  •  bisacodyl  •  docusate sodium  •  doxylamine  •  HYDROcodone-acetaminophen  •  nitroglycerin  •  ondansetron  •  ondansetron  •  sodium chloride  •  sodium chloride  •  traZODone      OBJECTIVE  Vitals:    23 0500 23 0729 23 0730 23 0922   BP: 135/84   118/68   BP Location: Right arm   Left arm   Patient Position: Lying   Lying   Pulse: 108 108 109 103   Resp: 20 18 18 18   Temp: 99.8 °F (37.7 °C)   99.1 °F (37.3 °C)   TempSrc: Oral   Oral   SpO2: 92% 92% 92% 93%   Weight:       Height:           Physical exam    General  - WD/WN female, appears their stated age, awake, cooperative, in no acute distress  HEENT  -  Normocephalic, atraumatic, PERRLA, EOM intact  Respiratory  - Normal respiratory rate and effort  Musculoskeletal  - No joint redness, swelling, or tenderness  Skin  - Large surgical incision CDI with no swelling redness or drainage.  2 medium Hemovac drains in place with serosanguineous fluid in the reservoirs.  NEUROLOGIC  - A/O x3  - Moves all extremities symmetrically and w/ good strength  - Sensation intact throughout      Results review  CBC    CBC 2/20/23 2/28/23   WBC 7.09 12.60 (A)   RBC 4.31 2.88 (A)   Hemoglobin 13.8 9.0 (A)   Hematocrit 41.2 28.5 (A)   MCV 95.6 99.0 (A)   MCH 32.0 31.4   MCHC 33.5 31.7   RDW 15.7 (A) 17.5 (A)   Platelets 344 214   (A) Abnormal value              BMP    BMP 2/20/23 2/28/23   BUN 11 11   Creatinine 0.71 0.54 (A)   Sodium 139 139   Potassium 3.8 3.7   Chloride 102 103   CO2 25.0 25.0   Calcium 9.4 8.8   (A) Abnormal value       Comments are available for some flowsheets but are not being displayed.                  CT Lumbar Spine Without Contrast    Result Date: 2/27/2023  CT LUMBAR SPINE WO CONTRAST Date of Exam: 2/27/2023 11:08 AM EST Indication: Globus robot preop protocol. Comparison: 2/21/2022. Technique: Axial CT images were obtained of the lumbar spine without contrast administration.  Sagittal and coronal reconstructions were performed.  Automated exposure control and iterative reconstruction methods were used. Findings: There is normal height and alignment of the lumbar vertebral bodies. Patient is again noted be status post fusion from the L2-L5 levels with posterior rohit and pedicle screw fixation. There arthrodesis devices in place the L3/4 and L4/5 levels. There is now bony sclerosis and subsidence of the arthrodesis device at the L4/5 level. There is lucency around the bilateral L5 pedicle screws consistent with hardware loosening. No hardware fracture identified. No significant spinal canal stenosis identified. There appear to been bilateral L2 4/5 facet  tectum is. There is diffuse bony fragments posteriorly at the L4/5 and L5/S1 levels. There is no significant neural foraminal narrowing identified. Again seen is a low-density 3.8 cm left adrenal nodule consistent with an adrenal adenoma. Other paraspinal soft tissues are within normal limits. There is a calcified granuloma within the left lower lobe. Within the right lower lobe there is a 3 mm noncalcified pulmonary nodule. Follow-up noncontrast CT scan of the chest be performed in 12 months to document stability.     Impression: Impression: 1. Status post fusion from the L2-L5 levels with posterior rohit and pedicle screw fixation. There is loosening around the bilateral L5 pedicle screws as well as subsidence of the arthrodesis device the L4/5 level. 2. 3 mm noncalcified nodule within the right lower lobe. Follow-up noncontrast CT scan of the chest would be recommended in 12 months. Electronically Signed: Misael Ly  2/27/2023 2:35 PM EST  Workstation ID: [id number]          ASSESSMENT  This is a 62 y.o. female 1 day status post extensive thoracolumbar revision fusion with Dr. Gorman.  Patient doing well postoperatively with no new focal neurologic deficits.  She has expected levels of postoperative pain and soreness which is well controlled with her PCA.  She is moving all her extremities and able to transfer to chair.  Her surgical incision shows no signs of breakdown or fluid buildup. She will need to work with PT/OT and will likely need rehab at discharge.  She needs her standing postoperative scoliosis x-ray.  We will wean her off IV pain medications.  Continue her surgical drains for now.      PLAN  - Continue both surgical drains  - PT/OT  - Postoperative standing scoliosis films  - Wean off IV pain medications  - Rehab at discharge  - Call with any questions or concerns    I discussed my assessment and recommendations with Dr. Gorman and the nursing staff    Jose Manuel Tenorio PA-C  12/06/2022  11:47  EST      Part of this note may be an electronic transcription/translation of spoken language to printed text using the Dragon Dictation System.

## 2023-03-01 NOTE — PLAN OF CARE
Goal Outcome Evaluation:   VSS. Aox4. Up w/1 assist w/walker to bedside commode. Incision clean, dry, & intact. Pain controlled w/PCA & p.o. norco. Bed in lowest position. Call light in reach. Able to make needs known. Care plan ongoing.

## 2023-03-01 NOTE — DISCHARGE PLACEMENT REQUEST
"Maxwell, Abdoulaye (62 y.o. Female)     Date of Birth   1960    Social Security Number       Address   [address] [place] [address] [place] [address]    Home Phone   [phone]    MRN   [id number]       [facility]    Marital Status                               Admission Date   2/28/23    Admission Type   Elective    Admitting Provider   Jerrell Gorman IV, MD    Attending Provider   Jerrell Gorman IV, MD    Department, Room/Bed   [facility] SURGICAL INPATIENT, 4119/1       Discharge Date       Discharge Disposition       Discharge Destination                               Attending Provider: Jerrell Gorman IV, MD    Allergies: Adhesive Tape, Cephalexin, Ciprofloxacin, Corticosteroids, Latex, Other, Penciclovir, Penicillin G, Sulfa Antibiotics, Oxycodone, Azithromycin    Isolation: None   Infection: None   Code Status: Prior    Ht: 157.5 cm (62\")   Wt: 59.9 kg (132 lb)    Admission Cmt: None   Principal Problem: Other secondary kyphosis, thoracolumbar region [M40.15]                 Active Insurance as of 2/28/2023     Primary Coverage     Payor Plan Insurance Group Employer/Plan Group    ANTHEM MEDICARE REPLACEMENT ANTHEM MEDICARE ADVANTAGE [id number]     Payor Plan Address Payor Plan Phone Number Payor Plan Fax Number Effective Dates    [address] [phone]  9/1/2021 - None Entered    [facility] [address]       Subscriber Name Subscriber Birth Date Member ID       ABDOULAYE BUTLER 1960 [id number]           Secondary Coverage     Payor Plan Insurance Group Employer/Plan Group    [place]      Payor Plan Address Payor Plan Phone Number Payor Plan Fax Number Effective Dates    [address]   9/2/2021 - None Entered    [place] [id number]       Subscriber Name Subscriber Birth Date Member ID       ABDOULAYE BUTLER 1960 [id number]                 Emergency Contacts      (Rel.) Home Phone Work Phone Mobile Phone    ASHLEY BUTLER (Spouse) " [phone] -- [phone]

## 2023-03-01 NOTE — PLAN OF CARE
Goal Outcome Evaluation:  Plan of Care Reviewed With: patient           Outcome Evaluation: Pt is a 62 y.o. female who underwent L5-S1 interbody fusion, T10-L2 and L4-5 sheldon osteotomies, T9 to pelvis fusion on 2/2823 by Dr. Gorman.  PMH: Lupus, Fibromyalgia, RA, COPD, Scoliosis,  L2-5 fusion, L4-5 TLIF, Anxiety and depression.  Pt lives w/ spouse in a H with 4 CRISTINA. She has a RW at home. At time of evaluation, she is A&O but tearful and appears anxous and fearful of movement.  She initially moved impulsively from sit back to supine, due to RLE pain. She required education and mod A 2 to move to a safer position.  Upon 2nd attempt, pt acheived sitting with mod A 2 as well as mod verbal & physical cues; tolerating sitting better on this attempt.  She was able to transfer to stand and ambulate 10' with RW and Min A but declined to be OOB due to fear of pain.  Pt is currently functioning below baseline and limited by pain. She would benefit from SNF for continued PT at discharge.

## 2023-03-01 NOTE — THERAPY EVALUATION
Patient Name: Diya Arreola  : 1960    MRN: [id number]                              Today's Date: 3/1/2023       Admit Date: 2023    Visit Dx:     ICD-10-CM ICD-9-CM   1. Other secondary kyphosis, thoracolumbar region  M40.15 737.41     Patient Active Problem List   Diagnosis   • Allergic rhinitis   • Anxiety disorder   • Asthma   • Carpal tunnel syndrome, bilateral   • Chronic obstructive pulmonary disease (HCC)   • Connective tissue disease, undifferentiated (HCC)   • Constipation   • Cystic fibrosis carrier   • DDD (degenerative disc disease), cervical   • Dyspepsia   • Edema   • Elevated antinuclear antibody (LILI) level   • Essential (primary) hypertension   • Fatigue   • Hyperlipidemia   • Insomnia   • Irritable bowel syndrome   • Fibromyalgia   • Headache   • Lupus (HCC)   • Sjogren's syndrome ([facility])   • Nonrheumatic mitral valve insufficiency   • Other long term (current) drug therapy   • Other specified dorsopathies, site unspecified   • Palpitations   • Paroxysmal tachycardia ([facility])   • Sciatica   • Sleep disorder   • Spinal stenosis of lumbar region   • Temporary cerebral vascular dysfunction   • Vitamin D deficiency   • Hypokalemia   • Depression   • LILI positive   • Need for immunization against influenza   • Acute cystitis without hematuria   • Spondylolisthesis of lumbar region   • S/P lumbar fusion   • Encounter for postoperative care   • Nausea   • Tobacco dependence   • Encounter for screening mammogram for breast cancer   • Postmenopausal   • Medicare annual wellness visit, subsequent   • Encounter for hepatitis C virus screening test for high risk patient   • Bronchitis   • Other secondary kyphosis, thoracolumbar region   • Right lower lobe pulmonary nodule     Past Medical History:   Diagnosis Date   • Allergic    • Anxiety    • Arthritis    • Asthma    • Constipation     improved with diet   • COPD (chronic obstructive pulmonary disease) (HCC)    • Depression    • Fall      10-8-20-- was in [facility]   • Fibromyalgia, primary    • Gastritis    • History of pancreatitis     age 5   • Insomnia    • Low back pain 2021   • Lupus (HCC)    • Neuropathy     feet   • Osteopenia    • Palpitations    • Peripheral edema     none for many years   • PONV (postoperative nausea and vomiting)    • Rheumatoid arthritis (HCC)    • Right lower lobe pulmonary nodule 3/1/2023   • Scoliosis     on CT  2021   • Sjogren's syndrome (HCC)    • Tachycardia     with anxiety   • TIA (transient ischemic attack) 2014    unaware of this     Past Surgical History:   Procedure Laterality Date   • BACK SURGERY      lumbar fusion 21- robotic surgery   • CARDIAC CATHETERIZATION     • CARPAL TUNNEL RELEASE     •  SECTION     • CHOLECYSTECTOMY     • COLONOSCOPY  10/08/2013   • CYSTOCELE REPAIR      a&P   • EPIDURAL BLOCK     • HYSTERECTOMY     • LUMBAR FUSION N/A 2021    Procedure: L4-5 LUMBAR LAMINECTOMY TRANSFORAMINAL LUMBAR INTERBODY FUSION; L2-3 laminectomy and facetectomy; removal of hardware L3-4; L2-5 fusion, ROBOTIC;  Surgeon: Jerrell Gorman IV, MD;  Location: [facility] OR;  Service: Robotics - Neuro;  Laterality: N/A;   • SPINE SURGERY      neck  , ; lumbar 2012      General Information     Row Name 23 1044          Physical Therapy Time and Intention    Document Type evaluation  -CL     Mode of Treatment co-treatment;physical therapy;occupational therapy  -CL     Row Name 23 1044          General Information    Patient Profile Reviewed yes  -CL     Prior Level of Function independent:  uses RW at baseline  -CL     Existing Precautions/Restrictions spinal;fall  -CL     Barriers to Rehab medically complex  -CL     Row Name 23 1044          Living Environment    People in Home spouse  -CL     Name(s) of People in Home Spouse: Jose  -CL     Row Name 23 1044          Home Main Entrance    Number of Stairs, Main Entrance four  -CL     Row Name  03/01/23 1044          Stairs Within Home, Primary    Stairs, Within Home, Primary SSH  -CL     Row Name 03/01/23 1044          Cognition    Orientation Status (Cognition) oriented x 4  -CL     Row Name 03/01/23 1044          Safety Issues, Functional Mobility    Safety Issues Affecting Function (Mobility) impulsivity  -CL     Impairments Affecting Function (Mobility) pain  -CL     Comment, Safety Issues/Impairments (Mobility) Pt anxious, tearful and moves impulsively when in pain.  -CL           User Key  (r) = Recorded By, (t) = Taken By, (c) = Cosigned By    Initials Name Provider Type    Barby Cardoza, PT Physical Therapist               Mobility     Row Name 03/01/23 1048          Bed Mobility    Bed Mobility supine-sit-supine  -CL     Supine-Sit-Supine [place] (Bed Mobility) moderate assist (50% patient effort);2 person assist;verbal cues  -CL     Assistive Device (Bed Mobility) bed rails  -CL     Comment, (Bed Mobility) Upon initial attempt; pt c/o pain and moves impulsively into supine with head at foot of bed. Requires mod A 2 to reposition  -CL     Row Name 03/01/23 1048          Bed-Chair Transfer    Bed-Chair [place] (Transfers) moderate assist (50% patient effort);2 person assist  -CL     Row Name 03/01/23 1048          Sit-Stand Transfer    Sit-Stand [place] (Transfers) minimum assist (75% patient effort)  -CL     Assistive Device (Sit-Stand Transfers) walker, front-wheeled  -CL     Row Name 03/01/23 1048          Gait/Stairs (Locomotion)    [place] Level (Gait) minimum assist (75% patient effort)  -CL     Assistive Device (Gait) walker, front-wheeled  -CL     Distance in Feet (Gait) 10'  -CL           User Key  (r) = Recorded By, (t) = Taken By, (c) = Cosigned By    Initials Name Provider Type    Barby Cardoza PT Physical Therapist               Obj/Interventions     Row Name 03/01/23 1054          Range of Motion Comprehensive    General Range of Motion bilateral  lower extremity ROM WFL  -CL     Row Name 03/01/23 1054          Balance    Balance Assessment sitting dynamic balance;sitting static balance  -CL     Static Sitting Balance contact guard;verbal cues  -CL     Dynamic Sitting Balance verbal cues;contact guard  -CL           User Key  (r) = Recorded By, (t) = Taken By, (c) = Cosigned By    Initials Name Provider Type    CL Barby Guevara, PT Physical Therapist               Goals/Plan     Row Name 03/01/23 1101          Bed Mobility Goal 1 (PT)    Activity/Assistive Device (Bed Mobility Goal 1, PT) bed mobility activities, all  -CL     [place] Level/Cues Needed (Bed Mobility Goal 1, PT) modified independence  -CL     Time Frame (Bed Mobility Goal 1, PT) long term goal (LTG);2 weeks  -CL     Row Name 03/01/23 1101          Transfer Goal 1 (PT)    Activity/Assistive Device (Transfer Goal 1, PT) sit-to-stand/stand-to-sit;bed-to-chair/chair-to-bed  -CL     [place] Level/Cues Needed (Transfer Goal 1, PT) modified independence  -CL     Time Frame (Transfer Goal 1, PT) long term goal (LTG);2 weeks  -CL     Row Name 03/01/23 1101          Gait Training Goal 1 (PT)    Activity/Assistive Device (Gait Training Goal 1, PT) gait (walking locomotion);assistive device use  -CL     [place] Level (Gait Training Goal 1, PT) modified independence  -CL     Distance (Gait Training Goal 1, PT) 200'  -CL     Time Frame (Gait Training Goal 1, PT) long term goal (LTG);2 weeks  -CL     Row Name 03/01/23 1101          Stairs Goal 1 (PT)    Activity/Assistive Device (Stairs Goal 1, PT) ascending stairs;descending stairs  -CL     [place] Level/Cues Needed (Stairs Goal 1, PT) modified independence  -CL     Number of Stairs (Stairs Goal 1, PT) 4  -CL     Time Frame (Stairs Goal 1, PT) long term goal (LTG);2 weeks  -CL     Row Name 03/01/23 1101          Therapy Assessment/Plan (PT)    Planned Therapy Interventions (PT) balance training;bed mobility training;gait  training;neuromuscular re-education;patient/family education;postural re-education;stair training;strengthening;transfer training  -CL           User Key  (r) = Recorded By, (t) = Taken By, (c) = Cosigned By    Initials Name Provider Type    CL Barby Guevara, PT Physical Therapist               Clinical Impression     Row Name 03/01/23 1054          Pain    Pretreatment Pain Rating 7/10  -CL     Posttreatment Pain Rating 5/10  -CL     Pain Location - back  -CL     Pain Intervention(s) Repositioned;Ambulation/increased activity  -CL     Row Name 03/01/23 1058          Plan of Care Review    Plan of Care Reviewed With patient  -CL     Outcome Evaluation Pt is a 62 y.o. female who underwent L5-S1 interbody fusion, T10-L2 and L4-5 sheldon osteotomies, T9 to pelvis fusion on 2/2823 by Dr. Gorman.  PMH: Lupus, Fibromyalgia, RA, COPD, Scoliosis,  L2-5 fusion, L4-5 TLIF, Anxiety and depression.  Pt lives w/ spouse in a H with 4 CRISTINA. She has a RW at home. At time of evaluation, she is A&O but tearful and appears anxous and fearful of movement.  She initially moved impulsively from sit back to supine, due to RLE pain. She required education and mod A 2 to move to a safer position.  Upon 2nd attempt, pt acheived sitting with mod A 2 as well as mod verbal & physical cues; tolerating sitting better on this attempt.  She was able to transfer to stand and ambulate 10' with RW and Min A but declined to be OOB due to fear of pain.  Pt is currently functioning below baseline and limited by pain. She would benefit from SNF for continued PT at discharge.  -CL     Row Name 03/01/23 1050          Therapy Assessment/Plan (PT)    Patient/Family Therapy Goals Statement (PT) Acute rehab  -CL     Rehab Potential (PT) good, to achieve stated therapy goals  -CL     Criteria for Skilled Interventions Met (PT) yes;skilled treatment is necessary  -CL     Therapy Frequency (PT) 5 times/wk  -CL     Predicted Duration of Therapy Intervention (PT)  Until discharge  -CL     Row Name 03/01/23 1054          Positioning and Restraints    Pre-Treatment Position in bed  -CL     Post Treatment Position bed  -CL     In Bed side lying right;call light within reach  -CL           User Key  (r) = Recorded By, (t) = Taken By, (c) = Cosigned By    Initials Name Provider Type    CL Barby Guevara, PT Physical Therapist               Outcome Measures     Row Name 03/01/23 1101          How much help from another person do you currently need...    Turning from your back to your side while in flat bed without using bedrails? 3  -CL     Moving from lying on back to sitting on the side of a flat bed without bedrails? 2  -CL     Moving to and from a bed to a chair (including a wheelchair)? 2  -CL     Standing up from a chair using your arms (e.g., wheelchair, bedside chair)? 3  -CL     Climbing 3-5 steps with a railing? 1  -CL     To walk in hospital room? 3  -CL     AM-PAC 6 Clicks Score (PT) 14  -CL     Highest level of mobility 4 --> Transferred to chair/commode  -CL           User Key  (r) = Recorded By, (t) = Taken By, (c) = Cosigned By    Initials Name Provider Type    CL Barby Guevara, PT Physical Therapist                             Physical Therapy Education     Title: PT OT SLP Therapies (Done)     Topic: Physical Therapy (Done)     Point: Mobility training (Done)     Learning Progress Summary           Patient Acceptance, E,TB, VU by CL at 3/1/2023 1102                   Point: Body mechanics (Done)     Learning Progress Summary           Patient Acceptance, E,TB, VU by CL at 3/1/2023 1102                   Point: Precautions (Done)     Learning Progress Summary           Patient Acceptance, E,TB, VU by CL at 3/1/2023 1102                               User Key     Initials Effective Dates Name Provider Type Discipline    CL 03/02/22 -  Barby Guevara, PT Physical Therapist PT              PT Recommendation and Plan  Planned Therapy Interventions (PT):  balance training, bed mobility training, gait training, neuromuscular re-education, patient/family education, postural re-education, stair training, strengthening, transfer training  Plan of Care Reviewed With: patient  Outcome Evaluation: Pt is a 62 y.o. female who underwent L5-S1 interbody fusion, T10-L2 and L4-5 sheldon osteotomies, T9 to pelvis fusion on 2/2823 by Dr. Gorman.  PMH: Lupus, Fibromyalgia, RA, COPD, Scoliosis,  L2-5 fusion, L4-5 TLIF, Anxiety and depression.  Pt lives w/ spouse in a SSH with 4 CRISTINA. She has a RW at home. At time of evaluation, she is A&O but tearful and appears anxous and fearful of movement.  She initially moved impulsively from sit back to supine, due to RLE pain. She required education and mod A 2 to move to a safer position.  Upon 2nd attempt, pt acheived sitting with mod A 2 as well as mod verbal & physical cues; tolerating sitting better on this attempt.  She was able to transfer to stand and ambulate 10' with RW and Min A but declined to be OOB due to fear of pain.  Pt is currently functioning below baseline and limited by pain. She would benefit from SNF for continued PT at discharge.     Time Calculation:    PT Charges     Row Name 03/01/23 1102             Time Calculation    Start Time 0847  -CL      Stop Time 0910  -CL      Time Calculation (min) 23 min  -CL      PT Received On 03/01/23  -CL      PT - Next Appointment 03/02/23  -CL      PT Goal Re-Cert Due Date 03/15/23  -CL            User Key  (r) = Recorded By, (t) = Taken By, (c) = Cosigned By    Initials Name Provider Type    Barby Cardoza, PT Physical Therapist              Therapy Charges for Today     Code Description Service Date Service Provider Modifiers Qty    [id number] HC PT EVAL MOD COMPLEXITY 4 3/1/2023 Barby Guevara, PT GP 1          PT G-Codes  AM-PAC 6 Clicks Score (PT): 14  PT Discharge Summary  Anticipated Discharge Disposition (PT): skilled nursing facility    Barby Guevara  PT  3/1/2023

## 2023-03-01 NOTE — CASE MANAGEMENT/SOCIAL WORK
Discharge Planning Assessment  [facility]     Patient Name: Diya Arreola  MRN: [id number]  Today's Date: 3/1/2023    Admit Date: 2/28/2023    Plan: Referral to [facility] pending acceptance and precert.  Family can transport at discharge,   Discharge Needs Assessment     Row Name 03/01/23 1550       Living Environment    People in Home spouse    Name(s) of People in Home spouse Jose    Current Living Arrangements home    Primary Care Provided by self    Provides Primary Care For no one    Family Caregiver if Needed spouse    Family Caregiver Names spouse Jose    Quality of Family Relationships supportive;helpful    Able to Return to Prior Arrangements yes       Resource/Environmental Concerns    Resource/Environmental Concerns none    Transportation Concerns none       Transition Planning    Patient/Family Anticipates Transition to inpatient rehabilitation facility    Patient/Family Anticipated Services at Transition rehabilitation services    Transportation Anticipated family or friend will provide       Discharge Needs Assessment    Readmission Within the Last 30 Days no previous admission in last 30 days    Equipment Currently Used at Home walker, rolling;nebulizer    Concerns to be Addressed care coordination/care conferences;discharge planning    Anticipated Changes Related to Illness none    Equipment Needed After Discharge none    Outpatient/Agency/Support Group Needs skilled nursing facility;inpatient rehabilitation facility    Discharge Facility/Level of Care Needs nursing facility, skilled;rehabilitation facility    Provided Post Acute Provider List? Yes    Post Acute Provider List Nursing Home;Inpatient Rehab    Delivered To Patient    Method of Delivery In person    Patient's Choice of Community Agency(s) [facility]               Discharge Plan     Row Name 03/01/23 1554       Plan    Plan Referral to [facility] pending acceptance and precert.  Family can transport at  discharge,    Patient/Family in Agreement with Plan yes    Plan Comments Met with patient at bedside.  Confirmed pcp and pharmacy.  Lives at home with spouse.  Will need rehab.  Selected [facility] PT recommends IPR, selected [facility] if they recommend a snf.  Referal to [facility] pending acceptance and precert.              Continued Care and Services - Admitted Since 2/28/2023     Destination     Service Provider Request Status Selected Services Address Phone Fax Patient Preferred    [facility] [address] [facility] [id number] [phone] --              Expected Discharge Date and Time     Expected Discharge Date Expected Discharge Time    Mar 3, 2023          Demographic Summary     Row Name 03/01/23 1549       General Information    Admission Type inpatient    Arrived From home    Referral Source admission list    Reason for Consult discharge planning    Preferred Language English       Contact Information    Permission Granted to Share Info With                Functional Status     Row Name 03/01/23 1550       Functional Status    Usual Activity Tolerance moderate    Current Activity Tolerance moderate       Functional Status, IADL    Medications assistive person    Meal Preparation independent    Housekeeping independent    Laundry independent    Shopping assistive person       Mental Status    General Appearance WDL WDL       Mental Status Summary    Recent Changes in Mental Status/Cognitive Functioning no changes                         Safia Ruiz, TEE   Met with patient in room wearing PPE: mask, face shield/goggles, gloves, gown.      Maintained distance greater than six feet and spent less than 15 minutes in the room.

## 2023-03-01 NOTE — PROGRESS NOTES
[facility] Medicine Services   Daily Progress Note    Patient Name: Diya Arreola  : 1960  MRN: [id number]  Primary Care Physician:  Mitzy Rea MD  Date of admission: 2023    Subjective      Admitted to neurosurgery for spinal surgery.  We are consulted for postoperative medical management.    Postoperative day 1, doing well today.  Still has bilateral drains in place with bloody output.  She remains on a PCA for pain management, but her Whit[place] has been removed.  Her hemoglobin is down to 9 from 13.8 on .  Admission imaging showed a 3 mm right lower lobe pulmonary nodule that needs follow-up imaging in 12 months.  She plans to go to [facility] for postoperative rehab.  They have yet to assess her however.  Discussed the case with the bedside nursing staff. No other acute concerns.     Objective      Vitals:   Temp:  [97.4 °F (36.3 °C)-99.8 °F (37.7 °C)] 99.1 °F (37.3 °C)  Heart Rate:  [] 103  Resp:  [9-20] 18  BP: (106-137)/(55-87) 118/68  Flow (L/min):  [0-4] 0    Physical Exam   GEN: WDWN, no acute distress  HEENT: NCAT, PERRLA, moist mucous membranes  NECK: Supple, midline trachea  CARD: RRR, no peripheral edema  PULM: CTAB, non-distressed  ABD: soft, NTND, normoactive bowel sounds throughout  SKIN: Warm, dry--bilateral Hemovac drains in place--surgical dressings look good  NEURO: Grossly intact, non-focal exam  PSYCH: Pleasant    Result Review    I have personally reviewed the results from the time of this admission to 3/1/2023 11:13 EST and agree with these findings:  [x]  Laboratory  [x]  Microbiology  [x]  Radiology  [x]  EKG/Telemetry   []  Cardiology/Vascular   []  Pathology  [x]  Old records  []  Other:    Wounds (last 24 hours)     LDA Wound     Row Name 23 0801 23 0420 23 0015       Wound 23 0824 lumbar spine Incision    Wound - Properties Group Placement Date: 23  -LT Placement Time: 824  -LT Location:  lumbar spine  -LT Primary Wound Type: Incision  -LT    Dressing Appearance dry;intact;no drainage  -RS dry;intact;no drainage  -JE dry;intact;no drainage  -JE    Closure SIRI  -RS SIRI  -JE SIRI  -JE    Base dressing in place, unable to visualize  -RS dressing in place, unable to visualize  -JE dressing in place, unable to visualize  -JE    Drainage Amount none  -RS none  -JE none  -JE    Dressing Care border dressing  -RS -- --    Retired Wound - Properties Group Placement Date: 02/28/23 -LT Placement Time: 0824 -LT Location: lumbar spine  -LT Primary Wound Type: Incision  -LT    Retired Wound - Properties Group Date first assessed: 02/28/23 -LT Time first assessed: 0824 -LT Location: lumbar spine  -LT Primary Wound Type: Incision  -LT       Wound 02/28/23 0824 thoracic spine Incision    Wound - Properties Group Placement Date: 02/28/23 -LT Placement Time: 0824 -LT Location: thoracic spine  -LT Primary Wound Type: Incision  -LT    Dressing Appearance dry;intact;no drainage  -RS intact;dry;no drainage  -JE dry;intact;no drainage  -JE    Closure SIRI  -RS SIRI  -JE SIRI  -JE    Base dressing in place, unable to visualize  -RS dressing in place, unable to visualize  -JE dressing in place, unable to visualize  -JE    Drainage Amount none  -RS none  -JE none  -JE    Dressing Care border dressing  -RS -- --    Retired Wound - Properties Group Placement Date: 02/28/23 -LT Placement Time: 0824 -LT Location: thoracic spine  -LT Primary Wound Type: Incision  -LT    Retired Wound - Properties Group Date first assessed: 02/28/23 -LT Time first assessed: 0824 -LT Location: thoracic spine  -LT Primary Wound Type: Incision  -LT    Row Name 02/28/23 2020 02/28/23 1600 02/28/23 1409       Wound 02/28/23 0824 lumbar spine Incision    Wound - Properties Group Placement Date: 02/28/23 -LT Placement Time: 0824 -LT Location: lumbar spine  -LT Primary Wound Type: Incision  -LT    Dressing Appearance dry;intact;no drainage  -JE  dry;intact;no drainage  -TA dry;intact;no drainage  -SD    Closure SIRI  -JE SIRI  -TA Adhesive bandage;SIRI  -SD    Base dressing in place, unable to visualize  -JE dressing in place, unable to visualize  -TA dressing in place, unable to visualize  -SD    Drainage Amount none  -JE none  -TA none  -SD    Retired Wound - Properties Group Placement Date: 02/28/23 -LT Placement Time: 0824 -LT Location: lumbar spine  -LT Primary Wound Type: Incision  -LT    Retired Wound - Properties Group Date first assessed: 02/28/23 -LT Time first assessed: 0824 -LT Location: lumbar spine  -LT Primary Wound Type: Incision  -LT       Wound 02/28/23 0824 thoracic spine Incision    Wound - Properties Group Placement Date: 02/28/23 -LT Placement Time: 0824 -LT Location: thoracic spine  -LT Primary Wound Type: Incision  -LT    Dressing Appearance dry;intact;no drainage  -JE dry;intact;no drainage  -TA dry;intact;no drainage  -SD    Closure SIRI  -JE SIRI  -TA Adhesive bandage;SIRI  -SD    Base dressing in place, unable to visualize  -JE dressing in place, unable to visualize  -TA dressing in place, unable to visualize  -SD    Drainage Amount none  -JE none  -TA none  -SD    Retired Wound - Properties Group Placement Date: 02/28/23 -LT Placement Time: 0824 -LT Location: thoracic spine  -LT Primary Wound Type: Incision  -LT    Retired Wound - Properties Group Date first assessed: 02/28/23 -LT Time first assessed: 0824 -LT Location: thoracic spine  -LT Primary Wound Type: Incision  -LT    Row Name 02/28/23 1354 02/28/23 1339 02/28/23 1324       Wound 02/28/23 0824 lumbar spine Incision    Wound - Properties Group Placement Date: 02/28/23 -LT Placement Time: 0824 -LT Location: lumbar spine  -LT Primary Wound Type: Incision  -LT    Dressing Appearance dry;intact;no drainage  -SD dry;intact;no drainage  -SD dry;intact;no drainage  -SD    Closure Adhesive bandage;SIRI  -SD Adhesive bandage;SIRI  -SD Adhesive bandage;SIRI  -SD    Base dressing  in place, unable to visualize  -SD dressing in place, unable to visualize  -SD dressing in place, unable to visualize  -SD    Drainage Amount none  -SD none  -SD none  -SD    Retired Wound - Properties Group Placement Date: 02/28/23 -LT Placement Time: 0824 -LT Location: lumbar spine  -LT Primary Wound Type: Incision  -LT    Retired Wound - Properties Group Date first assessed: 02/28/23 -LT Time first assessed: 0824 -LT Location: lumbar spine  -LT Primary Wound Type: Incision  -LT       Wound 02/28/23 0824 thoracic spine Incision    Wound - Properties Group Placement Date: 02/28/23 -LT Placement Time: 0824 -LT Location: thoracic spine  -LT Primary Wound Type: Incision  -LT    Dressing Appearance dry;intact;no drainage  -SD dry;intact;no drainage  -SD dry;intact;no drainage  -SD    Closure Adhesive bandage;SIRI  -SD Adhesive bandage;SIRI  -SD Adhesive bandage;SIRI  -SD    Base dressing in place, unable to visualize  -SD dressing in place, unable to visualize  -SD dressing in place, unable to visualize  -SD    Drainage Amount none  -SD none  -SD none  -SD    Retired Wound - Properties Group Placement Date: 02/28/23 -LT Placement Time: 0824 -LT Location: thoracic spine  -LT Primary Wound Type: Incision  -LT    Retired Wound - Properties Group Date first assessed: 02/28/23 -LT Time first assessed: 0824 -LT Location: thoracic spine  -LT Primary Wound Type: Incision  -LT    Row Name 02/28/23 1309 02/28/23 1254 02/28/23 1222       Wound 02/28/23 0824 lumbar spine Incision    Wound - Properties Group Placement Date: 02/28/23 -LT Placement Time: 0824 -LT Location: lumbar spine  -LT Primary Wound Type: Incision  -LT    Dressing Appearance dry;intact;no drainage  -SD dry;intact;no drainage  -SD --    Closure Adhesive bandage;SIRI  -SD Adhesive bandage;SIRI  -SD --    Base dressing in place, unable to visualize  -SD dressing in place, unable to visualize  -SD --    Drainage Amount none  -SD none  -SD --    Retired Wound  - Properties Group Placement Date: 02/28/23  -LT Placement Time: 0824 -LT Location: lumbar spine  -LT Primary Wound Type: Incision  -LT    Retired Wound - Properties Group Date first assessed: 02/28/23  -LT Time first assessed: 0824 -LT Location: lumbar spine  -LT Primary Wound Type: Incision  -LT       Wound 02/28/23 0824 thoracic spine Incision    Wound - Properties Group Placement Date: 02/28/23  -LT Placement Time: 0824 -LT Location: thoracic spine  -LT Primary Wound Type: Incision  -LT    Dressing Appearance dry;intact;no drainage  -SD dry;intact;no drainage  -SD --    Closure Adhesive bandage;SIRI  -SD Adhesive bandage;SIRI  -SD Liquid skin adhesive;Adhesive bandage  exofin, covaderm over midline back incision. covaderm around each drain.  -LT    Base dressing in place, unable to visualize  -SD dressing in place, unable to visualize  -SD --    Drainage Amount none  -SD none  -SD --    Retired Wound - Properties Group Placement Date: 02/28/23  -LT Placement Time: 0824 -LT Location: thoracic spine  -LT Primary Wound Type: Incision  -LT    Retired Wound - Properties Group Date first assessed: 02/28/23 -LT Time first assessed: 0824 -LT Location: thoracic spine  -LT Primary Wound Type: Incision  -LT          User Key  (r) = Recorded By, (t) = Taken By, (c) = Cosigned By    Initials Name Provider Type    Marianne Jolly RN Registered Nurse    Kyung Bowman, RN Registered Nurse    LT Kyung Aguilar RN Registered Nurse    Carmen Sanz RN Registered Nurse    Mirella Dye RN Registered Nurse    Coco Mancia RN Registered Nurse                  Assessment & Plan      Brexpiprazole, 0.5 mg, Oral, Daily  budesonide-formoterol, 2 puff, Inhalation, BID - RT   And  tiotropium bromide monohydrate, 2 puff, Inhalation, Daily - RT  bumetanide, 2 mg, Oral, BID  buPROPion XL, 150 mg, Oral, Daily  busPIRone, 15 mg, Oral, TID  cholecalciferol, 2,000 Units, Oral, Daily  cyclobenzaprine, 10 mg, Oral,  TID  enoxaparin, 40 mg, Subcutaneous, Daily  folic acid, 1 mg, Oral, Q PM  gabapentin, 400 mg, Oral, 4x Daily  hydroxychloroquine, 200 mg, Oral, Daily  multivitamin with minerals, 1 tablet, Oral, Daily  roflumilast, 500 mcg, Oral, Daily  sodium chloride, 10 mL, Intravenous, Q12H  verapamil SR, 240 mg, Oral, Daily       HYDROmorphone,          Active Hospital Problems:  Active Hospital Problems    Diagnosis    • **Other secondary kyphosis, thoracolumbar region    • Right lower lobe pulmonary nodule      Plan:   S/P lumbar fusion  - Postop care, drain management, and pain control per surgeon  - PT/OT to follow  - Finished IV vancomycin  - Dilaudid PCA present     COPD  - Currently on room air, continue monitor O2 saturation  - Continue Daliresp, Symbicort and Spiriva     Hypertension  - BP currently 132/84  - Continue verapamil and Bumex     Rheumatoid arthritis  H/O lupus and connective tissue disease  - Continue Plaquenil     Fibromyalgia  - Continue Neurontin and Flexeril     Anxiety/depression   - Continue Wellbutrin and BuSpar     DVT prophylaxis:  Medical and mechanical DVT prophylaxis orders are present.    CODE STATUS:    Level Of Support Discussed With: Patient  Code Status (Patient has no pulse and is not breathing): CPR (Attempt to Resuscitate)  Medical Interventions (Patient has pulse or is breathing): Full Support  Release to patient: Routine Release    Disposition:  I expect patient to be discharged to [facility] if accepted within the next 2-3 days (barriers to DC include drains and PCA for pain mgmt.)    Electronically signed by Jonathan Costa MD, 03/01/23, 11:13 EST.  [facility] Hospitalist Team

## 2023-03-01 NOTE — THERAPY EVALUATION
Patient Name: Diya Arreola  : 1960    MRN: [id number]                              Today's Date: 3/1/2023       Admit Date: 2023    Visit Dx:     ICD-10-CM ICD-9-CM   1. Other secondary kyphosis, thoracolumbar region  M40.15 737.41     Patient Active Problem List   Diagnosis   • Allergic rhinitis   • Anxiety disorder   • Asthma   • Carpal tunnel syndrome, bilateral   • Chronic obstructive pulmonary disease (HCC)   • Connective tissue disease, undifferentiated (HCC)   • Constipation   • Cystic fibrosis carrier   • DDD (degenerative disc disease), cervical   • Dyspepsia   • Edema   • Elevated antinuclear antibody (LILI) level   • Essential (primary) hypertension   • Fatigue   • Hyperlipidemia   • Insomnia   • Irritable bowel syndrome   • Fibromyalgia   • Headache   • Lupus (HCC)   • Sjogren's syndrome ([facility])   • Nonrheumatic mitral valve insufficiency   • Other long term (current) drug therapy   • Other specified dorsopathies, site unspecified   • Palpitations   • Paroxysmal tachycardia ([facility])   • Sciatica   • Sleep disorder   • Spinal stenosis of lumbar region   • Temporary cerebral vascular dysfunction   • Vitamin D deficiency   • Hypokalemia   • Depression   • LILI positive   • Need for immunization against influenza   • Acute cystitis without hematuria   • Spondylolisthesis of lumbar region   • S/P lumbar fusion   • Encounter for postoperative care   • Nausea   • Tobacco dependence   • Encounter for screening mammogram for breast cancer   • Postmenopausal   • Medicare annual wellness visit, subsequent   • Encounter for hepatitis C virus screening test for high risk patient   • Bronchitis   • Other secondary kyphosis, thoracolumbar region   • Right lower lobe pulmonary nodule     Past Medical History:   Diagnosis Date   • Allergic    • Anxiety    • Arthritis    • Asthma    • Constipation     improved with diet   • COPD (chronic obstructive pulmonary disease) (HCC)    • Depression    • [facility]      [facility]8-20[facility]   • Fibromyalgia, primary    • Gastritis    • History of pancreatitis     age 5   • Insomnia    • Low back pain 2021   • Lupus (HCC)    • Neuropathy     feet   • Osteopenia    • Palpitations    • Peripheral edema     none for many years   • PONV (postoperative nausea and vomiting)    • Rheumatoid arthritis (HCC)    • Right lower lobe pulmonary nodule 3/1/2023   • Scoliosis     on CT  2021   • Sjogren's syndrome (HCC)    • Tachycardia     with anxiety   • TIA (transient ischemic attack) 2014    unaware of this     Past Surgical History:   Procedure Laterality Date   • BACK SURGERY      lumbar fusion 21- robotic surgery   • CARDIAC CATHETERIZATION     • CARPAL TUNNEL RELEASE     •  SECTION     • CHOLECYSTECTOMY     • COLONOSCOPY  10/08/2013   • CYSTOCELE REPAIR      a&P   • EPIDURAL BLOCK     • HYSTERECTOMY     • LUMBAR FUSION N/A 2021    Procedure: L4-5 LUMBAR LAMINECTOMY TRANSFORAMINAL LUMBAR INTERBODY FUSION; L2-3 laminectomy and facetectomy; removal of hardware L3-4; L2-5 fusion, ROBOTIC;  Surgeon: Jerrell Gorman IV, MD;  Location: [facility] MAIN OR;  Service: Robotics - Neuro;  Laterality: N/A;   • LUMBAR FUSION N/A 2023    Procedure: Lumbar 5 to sacral 1 transforaminal lumbar interbody fusion; thoracic 10 to lumbar 2 and lumbar 4-5 sheldon osteotomies; T9 to pelvic fusion with neuro robot, hardware removal;  Surgeon: Jerrell Gorman IV, MD;  Location: [facility] MAIN OR;  Service: Robotics - Neuro;  Laterality: N/A;   • SPINE SURGERY      neck  2006; lumbar 2012      General Information     Row Name 23 1532          OT Time and Intention    Document Type evaluation  -LS     Mode of Treatment occupational therapy;co-treatment;physical therapy  -LS     Row Name 23 1532          General Information    Patient Profile Reviewed yes  -LS     Prior Level of Function independent:;ADL's;all household mobility  Uses RW  -LS     Existing  Precautions/Restrictions spinal;fall  -LS     Barriers to Rehab medically complex  -LS     Row Name 03/01/23 1532          Living Environment    People in Home spouse  -LS     Row Name 03/01/23 1532          Home Main Entrance    Number of Stairs, Main Entrance four  -LS     Row Name 03/01/23 1532          Cognition    Orientation Status (Cognition) oriented x 4  -LS     Row Name 03/01/23 1532          Safety Issues, Functional Mobility    Safety Issues Affecting Function (Mobility) impulsivity  -LS     Impairments Affecting Function (Mobility) pain  -LS     Comment, Safety Issues/Impairments (Mobility) Impulsivity only present when in pain  -LS           User Key  (r) = Recorded By, (t) = Taken By, (c) = Cosigned By    Initials Name Provider Type    LS Margarito Mcgee OT Occupational Therapist                 Mobility/ADL's     Row Name 03/01/23 1533          Bed Mobility    Bed Mobility supine-sit;sit-supine;rolling left  -LS     Rolling Left [place] (Bed Mobility) moderate assist (50% patient effort)  -     Supine-Sit [place] (Bed Mobility) moderate assist (50% patient effort);2 person assist  -LS     Sit-Supine [place] (Bed Mobility) moderate assist (50% patient effort);2 person assist  -LS     Assistive Device (Bed Mobility) bed rails  -     Row Name 03/01/23 1533          Transfers    Transfers sit-stand transfer  -LS     Row Name 03/01/23 1533          Sit-Stand Transfer    Sit-Stand [place] (Transfers) minimum assist (75% patient effort)  -     Assistive Device (Sit-Stand Transfers) walker, front-wheeled  -     Row Name 03/01/23 1533          Activities of Daily Living    BADL Assessment/Intervention lower body dressing  -     Row Name 03/01/23 1533          Lower Body Dressing Assessment/Training    [place] Level (Lower Body Dressing) don;socks;maximum assist (25% patient effort)  -           User Key  (r) = Recorded By, (t) = Taken By, (c) = Cosigned By    Initials Name  Provider Type    Margarito Reyes OT Occupational Therapist               Obj/Interventions     Row Name 03/01/23 1534          Range of Motion Comprehensive    General Range of Motion bilateral upper extremity ROM WFL  -LS     Row Name 03/01/23 1534          Strength Comprehensive (MMT)    Comment, General Manual Muscle Testing (MMT) Assessment BUEs grossly 4-/5  -LS     Row Name 03/01/23 1534          Balance    Balance Assessment sitting static balance;standing static balance;standing dynamic balance;sitting dynamic balance  -LS     Static Sitting Balance contact guard  -LS     Dynamic Sitting Balance contact guard  -LS     Position, Sitting Balance sitting edge of bed  -LS     Static Standing Balance contact guard  -LS     Dynamic Standing Balance minimal assist  -LS     Position/Device Used, Standing Balance walker, front-wheeled  -LS           User Key  (r) = Recorded By, (t) = Taken By, (c) = Cosigned By    Initials Name Provider Type    Margarito Reyes OT Occupational Therapist               Goals/Plan     Row Name 03/01/23 1541          Bed Mobility Goal 1 (OT)    Activity/Assistive Device (Bed Mobility Goal 1, OT) bed mobility activities, all  -LS     [place] Level/Cues Needed (Bed Mobility Goal 1, OT) standby assist  -LS     Time Frame (Bed Mobility Goal 1, OT) long term goal (LTG);2 weeks  -     Row Name 03/01/23 1541          Transfer Goal 1 (OT)    Activity/Assistive Device (Transfer Goal 1, OT) sit-to-stand/stand-to-sit;bed-to-chair/chair-to-bed;toilet  -LS     [place] Level/Cues Needed (Transfer Goal 1, OT) standby assist  -LS     Time Frame (Transfer Goal 1, OT) long term goal (LTG);2 weeks  -     Row Name 03/01/23 1541          Dressing Goal 1 (OT)    Activity/Device (Dressing Goal 1, OT) upper body dressing;lower body dressing  -LS     [place]/Cues Needed (Dressing Goal 1, OT) contact guard required  -LS     Time Frame (Dressing Goal 1, OT) long term goal (LTG);2 weeks  -      Row Name 03/01/23 1541          Toileting Goal 1 (OT)    Activity/Device (Toileting Goal 1, OT) toileting skills, all  -LS     [place] Level/Cues Needed (Toileting Goal 1, OT) standby assist  -LS     Time Frame (Toileting Goal 1, OT) long term goal (LTG);2 weeks  -LS     Row Name 03/01/23 1541          Therapy Assessment/Plan (OT)    Planned Therapy Interventions (OT) activity tolerance training;BADL retraining;functional balance retraining;IADL retraining;occupation/activity based interventions;ROM/therapeutic exercise;strengthening exercise;transfer/mobility retraining;neuromuscular control/coordination retraining  -LS           User Key  (r) = Recorded By, (t) = Taken By, (c) = Cosigned By    Initials Name Provider Type    LS Margarito Mcgee OT Occupational Therapist               Clinical Impression     Row Name 03/01/23 1534          Pain Assessment    Pretreatment Pain Rating 7/10  -LS     Posttreatment Pain Rating 5/10  -LS     Pain Location - Side/Orientation Right  -LS     Pain Location lower  -LS     Pain Location - extremity  -LS     Pain Intervention(s) Repositioned  -LS     Row Name 03/01/23 1538          Plan of Care Review    Plan of Care Reviewed With patient  -LS     Outcome Evaluation 62-year-old female with PMH of lupus, fibromyalgia, rheumatoid arthritis, COPD, scoliosis, and anxiety/depression. 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 She also had a significant kyphotic deformity and PI to LL mismatch. Pt elected to proceed with sx per Dr. Gorman, now POD #1 for thoracic to pelvic fusion and correction of deformity with TLIF and osteotomies. Specifically, pt underwent lumbar 5 to sacral 1 transforaminal lumbar interbody fusion;  thoracic 10 to lumbar 2 and lumbar 4-5 sheldon osteotomies; T9 to pelvic fusion. Patient normally independent with mobility and functiona at home, uses a RW. Initially Mod Ax2 to get  to sitting EOB, however patient writhing in pain falling back wards with PT supporting trunk to prevent head contact with foot board. Pt reports sudden intense pain in the RLE. Assisted back to R side lying via Mod x2. Educated to attempt refraining ffrom sudden and quick movements even when in pain, but patient reports she is unable. Again sitting EOB completed with Mod Ax2, with improved tolerance noted. She then stood with Min Ax2 and ambulated in room with Min A and RW prior to return to bed. Patient currently requires Max A for all lower body self-care due to presence of pain and spinal precautions. At current level, patient would benefit most from SNF at d/c. WIll follow.  -     Row Name 03/01/23 1538          Therapy Assessment/Plan (OT)    Rehab Potential (OT) good, to achieve stated therapy goals  -     Criteria for Skilled Therapeutic Interventions Met (OT) yes;skilled treatment is necessary  -LS     Therapy Frequency (OT) 3 times/wk  -LS     Predicted Duration of Therapy Intervention (OT) until dc  -     Row Name 03/01/23 8216          Therapy Plan Review/Discharge Plan (OT)    Anticipated Discharge Disposition (OT) skilled nursing facility  -     Row Name 03/01/23 1533          Vital Signs    Pre Patient Position Side Lying  -LS     Intra Patient Position Standing  -LS     Post Patient Position Side Lying  -LS     Row Name 03/01/23 1539          Positioning and Restraints    Pre-Treatment Position in bed  -LS     Post Treatment Position bed  -LS     In Bed notified nsg;side lying right;call light within reach;encouraged to call for assist;exit alarm on;SCD pump applied  -LS           User Key  (r) = Recorded By, (t) = Taken By, (c) = Cosigned By    Initials Name Provider Type    Margarito Reyes, CALLUM Occupational Therapist               Outcome Measures     Row Name 03/01/23 1980          How much help from another is currently needed...    Putting on and taking off regular lower body clothing? 2   -LS     Bathing (including washing, rinsing, and drying) 2  -LS     Toileting (which includes using toilet bed pan or urinal) 2  -LS     Putting on and taking off regular upper body clothing 3  -LS     Taking care of personal grooming (such as brushing teeth) 3  -LS     Eating meals 4  -LS     AM-PAC 6 Clicks Score (OT) 16  -LS     Row Name 03/01/23 1101 03/01/23 0801       How much help from another person do you currently need...    Turning from your back to your side while in flat bed without using bedrails? 3  -CL 4  -RS    Moving from lying on back to sitting on the side of a flat bed without bedrails? 2  -CL 4  -RS    Moving to and from a bed to a chair (including a wheelchair)? 2  -CL 3  -RS    Standing up from a chair using your arms (e.g., wheelchair, bedside chair)? 3  -CL 3  -RS    Climbing 3-5 steps with a railing? 1  -CL 2  -RS    To walk in hospital room? 3  -CL 3  -RS    AM-PAC 6 Clicks Score (PT) 14  -CL 19  -RS    Highest level of mobility 4 --> Transferred to chair/commode  -CL 6 --> Walked 10 steps or more  -RS    Row Name 03/01/23 1542          Functional Assessment    Outcome Measure Options AM-PAC 6 Clicks Daily Activity (OT)  -           User Key  (r) = Recorded By, (t) = Taken By, (c) = Cosigned By    Initials Name Provider Type    RS Carmen Agarwal, RN Registered Nurse    Margarito Reyes OT Occupational Therapist    Barby Cardoza, PT Physical Therapist                Occupational Therapy Education     Title: PT OT SLP Therapies (Done)     Topic: Occupational Therapy (Done)     Point: ADL training (Done)     Description:   Instruct learner(s) on proper safety adaptation and remediation techniques during self care or transfers.   Instruct in proper use of assistive devices.              Learning Progress Summary           Patient Acceptance, E,TB, VU by HERVE at 3/1/2023 1543                               User Key     Initials Effective Dates Name Provider Type Discipline    LS  09/22/22 -  Margarito Mcgee OT Occupational Therapist OT              OT Recommendation and Plan  Planned Therapy Interventions (OT): activity tolerance training, BADL retraining, functional balance retraining, IADL retraining, occupation/activity based interventions, ROM/therapeutic exercise, strengthening exercise, transfer/mobility retraining, neuromuscular control/coordination retraining  Therapy Frequency (OT): 3 times/wk  Plan of Care Review  Plan of Care Reviewed With: patient  Outcome Evaluation: 62-year-old female with PMH of lupus, fibromyalgia, rheumatoid arthritis, COPD, scoliosis, and anxiety/depression. 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 She also had a significant kyphotic deformity and PI to LL mismatch. Pt elected to proceed with sx per Dr. Gorman, now POD #1 for thoracic to pelvic fusion and correction of deformity with TLIF and osteotomies. Specifically, pt underwent lumbar 5 to sacral 1 transforaminal lumbar interbody fusion;  thoracic 10 to lumbar 2 and lumbar 4-5 sheldon osteotomies; T9 to pelvic fusion. Patient normally independent with mobility and functiona at home, uses a RW. Initially Mod Ax2 to get to sitting EOB, however patient writhing in pain falling back wards with PT supporting trunk to prevent head contact with foot board. Pt reports sudden intense pain in the RLE. Assisted back to R side lying via Mod x2. Educated to attempt refraining ffrom sudden and quick movements even when in pain, but patient reports she is unable. Again sitting EOB completed with Mod Ax2, with improved tolerance noted. She then stood with Min Ax2 and ambulated in room with Min A and RW prior to return to bed. Patient currently requires Max A for all lower body self-care due to presence of pain and spinal precautions. At current level, patient would benefit most from SNF at d/c. WIll follow.     Time Calculation:    Time  Calculation- OT     Row Name 03/01/23 1543             Time Calculation- OT    OT Start Time 0847  -LS      OT Stop Time 0910  -LS      OT Time Calculation (min) 23 min  -LS      OT Received On 03/01/23  -      OT - Next Appointment 03/03/23  -      OT Goal Re-Cert Due Date 03/15/23  -         Untimed Charges    OT Eval/Re-eval Minutes 23  -LS         Total Minutes    Untimed Charges Total Minutes 23  -LS       Total Minutes 23  -LS            User Key  (r) = Recorded By, (t) = Taken By, (c) = Cosigned By    Initials Name Provider Type    Margarito Reyes OT Occupational Therapist              Therapy Charges for Today     Code Description Service Date Service Provider Modifiers Qty    [id number] HC OT EVAL MOD COMPLEXITY 4 3/1/2023 Margarito Mcgee OT GO 1               Margarito Mcgee OT  3/1/2023

## 2023-03-02 PROCEDURE — 94799 UNLISTED PULMONARY SVC/PX: CPT

## 2023-03-02 PROCEDURE — 94664 DEMO&/EVAL PT USE INHALER: CPT

## 2023-03-02 PROCEDURE — 99024 POSTOP FOLLOW-UP VISIT: CPT | Performed by: NURSE PRACTITIONER

## 2023-03-02 PROCEDURE — 25010000002 ENOXAPARIN PER 10 MG

## 2023-03-02 PROCEDURE — 97530 THERAPEUTIC ACTIVITIES: CPT

## 2023-03-02 RX ORDER — DOCUSATE SODIUM 100 MG/1
100 CAPSULE, LIQUID FILLED ORAL 2 TIMES DAILY
Status: DISCONTINUED | OUTPATIENT
Start: 2023-03-02 | End: 2023-03-05 | Stop reason: HOSPADM

## 2023-03-02 RX ORDER — HYDROCODONE BITARTRATE AND ACETAMINOPHEN 7.5; 325 MG/1; MG/1
2 TABLET ORAL EVERY 4 HOURS PRN
Status: DISCONTINUED | OUTPATIENT
Start: 2023-03-02 | End: 2023-03-05 | Stop reason: HOSPADM

## 2023-03-02 RX ADMIN — TIOTROPIUM BROMIDE INHALATION SPRAY 2 PUFF: 3.12 SPRAY, METERED RESPIRATORY (INHALATION) at 08:15

## 2023-03-02 RX ADMIN — GABAPENTIN 400 MG: 400 CAPSULE ORAL at 12:01

## 2023-03-02 RX ADMIN — GABAPENTIN 400 MG: 400 CAPSULE ORAL at 07:14

## 2023-03-02 RX ADMIN — BUDESONIDE AND FORMOTEROL FUMARATE DIHYDRATE 2 PUFF: 160; 4.5 AEROSOL RESPIRATORY (INHALATION) at 08:15

## 2023-03-02 RX ADMIN — BUSPIRONE HYDROCHLORIDE 15 MG: 15 TABLET ORAL at 14:12

## 2023-03-02 RX ADMIN — HYDROCODONE BITARTRATE AND ACETAMINOPHEN 2 TABLET: 5; 325 TABLET ORAL at 09:54

## 2023-03-02 RX ADMIN — Medication 10 ML: at 21:14

## 2023-03-02 RX ADMIN — BUDESONIDE AND FORMOTEROL FUMARATE DIHYDRATE 2 PUFF: 160; 4.5 AEROSOL RESPIRATORY (INHALATION) at 19:53

## 2023-03-02 RX ADMIN — MULTIPLE VITAMINS W/ MINERALS TAB 1 TABLET: TAB at 07:13

## 2023-03-02 RX ADMIN — GABAPENTIN 400 MG: 400 CAPSULE ORAL at 20:59

## 2023-03-02 RX ADMIN — Medication 10 ML: at 07:17

## 2023-03-02 RX ADMIN — HYDROCODONE BITARTRATE AND ACETAMINOPHEN 2 TABLET: 7.5; 325 TABLET ORAL at 16:03

## 2023-03-02 RX ADMIN — BUPROPION HYDROCHLORIDE 150 MG: 150 TABLET, FILM COATED, EXTENDED RELEASE ORAL at 07:13

## 2023-03-02 RX ADMIN — Medication 2000 UNITS: at 07:13

## 2023-03-02 RX ADMIN — BISACODYL 10 MG: 10 SUPPOSITORY RECTAL at 21:14

## 2023-03-02 RX ADMIN — DOCUSATE SODIUM 100 MG: 100 CAPSULE, LIQUID FILLED ORAL at 20:59

## 2023-03-02 RX ADMIN — BUSPIRONE HYDROCHLORIDE 15 MG: 15 TABLET ORAL at 07:13

## 2023-03-02 RX ADMIN — CYCLOBENZAPRINE 10 MG: 10 TABLET, FILM COATED ORAL at 20:59

## 2023-03-02 RX ADMIN — FOLIC ACID 1 MG: 1 TABLET ORAL at 07:14

## 2023-03-02 RX ADMIN — HYDROXYCHLOROQUINE SULFATE 200 MG: 200 TABLET ORAL at 07:13

## 2023-03-02 RX ADMIN — BUMETANIDE 2 MG: 1 TABLET ORAL at 14:12

## 2023-03-02 RX ADMIN — CYCLOBENZAPRINE 10 MG: 10 TABLET, FILM COATED ORAL at 14:12

## 2023-03-02 RX ADMIN — ENOXAPARIN SODIUM 40 MG: 100 INJECTION SUBCUTANEOUS at 16:03

## 2023-03-02 RX ADMIN — DOCUSATE SODIUM 100 MG: 100 CAPSULE, LIQUID FILLED ORAL at 07:13

## 2023-03-02 RX ADMIN — GABAPENTIN 400 MG: 400 CAPSULE ORAL at 16:03

## 2023-03-02 RX ADMIN — ROFLUMILAST 500 MCG: 500 TABLET ORAL at 07:13

## 2023-03-02 RX ADMIN — BREXPIPRAZOLE 0.5 MG: 0.5 TABLET ORAL at 07:16

## 2023-03-02 RX ADMIN — CYCLOBENZAPRINE 10 MG: 10 TABLET, FILM COATED ORAL at 07:14

## 2023-03-02 RX ADMIN — HYDROCODONE BITARTRATE AND ACETAMINOPHEN 2 TABLET: 7.5; 325 TABLET ORAL at 21:14

## 2023-03-02 RX ADMIN — BUSPIRONE HYDROCHLORIDE 15 MG: 15 TABLET ORAL at 20:59

## 2023-03-02 RX ADMIN — VERAPAMIL HYDROCHLORIDE 240 MG: 120 TABLET, FILM COATED, EXTENDED RELEASE ORAL at 07:19

## 2023-03-02 NOTE — THERAPY TREATMENT NOTE
"Subjective: Pt agreeable to therapeutic plan of care. Pt states \"I walked earlier and its only the second day, can we skip it?\"    Objective:     Bed mobility - Supervision  Transfers -Performs supine to sit min A first attempt with c/o RLE pain. After return to supine with supervision and rest break, performed supine to sit with HOB elevated, use of bed rail and supervision only.  PT assisted to don/doff TLSO.  Ambulation -  N/A or Not attempted. due to c/o pain.    Vitals: WNL    Pain: 7 VAS   Location: back   Intervention for pain: Repositioned; pt uses PCA pump    Education: Provided education on the importance of mobility in the acute care setting. PT demonstrated don/doff of TLSO and applied TLSO to pt.     Assessment: Diya Arreola presents with functional mobility impairments following spinal surgery which indicate the need for skilled intervention. Tolerating session without incident but limited by emotional lability and pain. Will continue to follow and progress as tolerated.     Plan/Recommendations:   Moderate Intensity Therapy recommended post-acute care. This is recommended as therapy feels the patient would require 3-4 days per week and wouldn't tolerate \"3 hour daily\" rehab intensity. SNF would be the preferred choice. If the patient does not agree to SNF, arrange HH or OP depending on home bound status. If patient is medically complex, consider LTACH.. Pt requires no DME at discharge.     Pt desires Inpatient Rehabilitation placement at discharge. Pt cooperative; agreeable to therapeutic recommendations and plan of care.         Basic Mobility 6-click:  Rollin = Total, A lot = 2, A little = 3; 4 = None  Supine>Sit:   1 = Total, A lot = 2, A little = 3; 4 = None   Sit>Stand with arms:  1 = Total, A lot = 2, A little = 3; 4 = None  Bed>Chair:   1 = Total, A lot = 2, A little = 3; 4 = None  Ambulate in room:  1 = Total, A lot = 2, A little = 3; 4 = None  3-5 Steps with railin = " Total, A lot = 2, A little = 3; 4 = None  Score: 11    Modified [place]: N/A = No pre-op stroke/TIA    Post-Tx Position: [place] with HOB Elevated, Alarms activated and Call light and personal items within reach  PPE: gloves and surgical mask

## 2023-03-02 NOTE — PROGRESS NOTES
[facility] Medicine Services   Daily Progress Note    Patient Name: Diya Arreola  : 1960  MRN: [id number]  Primary Care Physician:  Mitzy Rea MD  Date of admission: 2023    Subjective      Admitted to neurosurgery for spinal surgery.  We are consulted for postoperative medical management.      3/1/23-Postoperative day 1, doing well today.  Still has bilateral drains in place with bloody output.  She remains on a PCA for pain management, but her Whitmore has been removed.  Her hemoglobin is down to 9 from 13.8 on .  Admission imaging showed a 3 mm right lower lobe pulmonary nodule that needs follow-up imaging in 12 months.  She plans to go to [facility] for postoperative rehab.  They have yet to assess her however.  Discussed the case with the bedside nursing staff. No other acute concerns.    3/2/23 seen and examined in bed NAD, no new complaints, DW RN, pain well controled.      Objective      Vitals:   Temp:  [98.5 °F (36.9 °C)-100.2 °F (37.9 °C)] 98.5 °F (36.9 °C)  Heart Rate:  [] 79  Resp:  [18-20] 20  BP: (101-115)/(66-71) 115/71  Flow (L/min):  [2] 2    Physical Exam   GEN: WDWN, no acute distress  HEENT: NCAT, PERRLA, moist mucous membranes  NECK: Supple, midline trachea  CARD: RRR, no peripheral edema  PULM: CTAB, non-distressed  ABD: soft, NTND, normoactive bowel sounds throughout  SKIN: Warm, dry--bilateral Hemovac drains in place--surgical dressings look good  NEURO: Grossly intact, non-focal exam  PSYCH: Pleasant    Result Review    I have personally reviewed the results from the time of this admission to 3/2/2023 14:03 EST and agree with these findings:  [x]  Laboratory  [x]  Microbiology  [x]  Radiology  [x]  EKG/Telemetry   []  Cardiology/Vascular   []  Pathology  [x]  Old records  []  Other:    Wounds (last 24 hours)     LDA Wound     Row Name 23 1210 23 0811 23       Wound 23 lumbar spine Incision     Wound - Properties Group Placement Date: 02/28/23 -LT Placement Time: 0824 -LT Location: lumbar spine  -LT Primary Wound Type: Incision  -LT    Dressing Appearance open to air  -RS open to air  -RS --    Closure SIRI  -RS SIRI  -RS SIRI  -SL    Base dry;clean  -RS dry;clean  -RS dressing in place, unable to visualize  -SL    Drainage Amount none  -RS none  -RS none  -SL    Dressing Care -- open to air  -RS --    Retired Wound - Properties Group Placement Date: 02/28/23  -LT Placement Time: 0824 -LT Location: lumbar spine  -LT Primary Wound Type: Incision  -LT    Retired Wound - Properties Group Date first assessed: 02/28/23  -LT Time first assessed: 0824 -LT Location: lumbar spine  -LT Primary Wound Type: Incision  -LT       Wound 02/28/23 0824 thoracic spine Incision    Wound - Properties Group Placement Date: 02/28/23 -LT Placement Time: 0824 -LT Location: thoracic spine  -LT Primary Wound Type: Incision  -LT    Dressing Appearance open to air  -RS open to air  -RS --    Closure Open to air  -RS Open to air  -RS SIRI  -SL    Base dry;clean  -RS dry;clean  -RS dressing in place, unable to visualize  -SL    Drainage Amount none  -RS none  -RS none  -SL    Retired Wound - Properties Group Placement Date: 02/28/23  -LT Placement Time: 0824 -LT Location: thoracic spine  -LT Primary Wound Type: Incision  -LT    Retired Wound - Properties Group Date first assessed: 02/28/23 -LT Time first assessed: 0824 -LT Location: thoracic spine  -LT Primary Wound Type: Incision  -LT    Row Name 03/01/23 1606             Wound 02/28/23 0824 lumbar spine Incision    Wound - Properties Group Placement Date: 02/28/23 -LT Placement Time: 0824 -LT Location: lumbar spine  -LT Primary Wound Type: Incision  -LT    Dressing Appearance open to air  -RS      Closure Staples  -RS      Base dry;clean  -RS      Drainage Amount none  -RS      Retired Wound - Properties Group Placement Date: 02/28/23 -LT Placement Time: 0824 -LT Location:  lumbar spine  -LT Primary Wound Type: Incision  -LT    Retired Wound - Properties Group Date first assessed: 02/28/23 -LT Time first assessed: 0824 -LT Location: lumbar spine  -LT Primary Wound Type: Incision  -LT       Wound 02/28/23 0824 thoracic spine Incision    Wound - Properties Group Placement Date: 02/28/23 -LT Placement Time: 0824 -LT Location: thoracic spine  -LT Primary Wound Type: Incision  -LT    Dressing Appearance open to air  -RS      Closure Staples  -RS      Base dry;clean  -RS      Drainage Amount none  -RS      Retired Wound - Properties Group Placement Date: 02/28/23 -LT Placement Time: 0824 -LT Location: thoracic spine  -LT Primary Wound Type: Incision  -LT    Retired Wound - Properties Group Date first assessed: 02/28/23 -LT Time first assessed: 0824 -LT Location: thoracic spine  -LT Primary Wound Type: Incision  -LT          User Key  (r) = Recorded By, (t) = Taken By, (c) = Cosigned By    Initials Name Provider Type    LT Kyung Aguilar, RN Registered Nurse    Delores Bustillos RN Registered Nurse    RS Carmen Agarwal RN Registered Nurse                  Assessment & Plan      Brexpiprazole, 0.5 mg, Oral, Daily  budesonide-formoterol, 2 puff, Inhalation, BID - RT   And  tiotropium bromide monohydrate, 2 puff, Inhalation, Daily - RT  bumetanide, 2 mg, Oral, BID  buPROPion XL, 150 mg, Oral, Daily  busPIRone, 15 mg, Oral, TID  cholecalciferol, 2,000 Units, Oral, Daily  cyclobenzaprine, 10 mg, Oral, TID  enoxaparin, 40 mg, Subcutaneous, Daily  folic acid, 1 mg, Oral, Q PM  gabapentin, 400 mg, Oral, 4x Daily  hydroxychloroquine, 200 mg, Oral, Daily  multivitamin with minerals, 1 tablet, Oral, Daily  roflumilast, 500 mcg, Oral, Daily  sodium chloride, 10 mL, Intravenous, Q12H  verapamil SR, 240 mg, Oral, Daily       HYDROmorphone,          Active Hospital Problems:  Active Hospital Problems    Diagnosis    • **Other secondary kyphosis, thoracolumbar region    • Right lower lobe  pulmonary nodule      Plan:   S/P lumbar fusion  - Postop care, drain management, and pain control per surgeon  - PT/OT to follow  - Finished IV vancomycin  - Dilaudid PCA present     COPD  - Currently on room air, continue monitor O2 saturation  - Continue Daliresp, Symbicort and Spiriva     Hypertension  - BP currently 132/84  - Continue verapamil and Bumex     Rheumatoid arthritis  H/O lupus and connective tissue disease  - Continue Plaquenil     Fibromyalgia  - Continue Neurontin and Flexeril     Anxiety/depression   - Continue Wellbutrin and BuSpar     DVT prophylaxis:  Medical and mechanical DVT prophylaxis orders are present.    CODE STATUS:    Level Of Support Discussed With: Patient  Code Status (Patient has no pulse and is not breathing): CPR (Attempt to Resuscitate)  Medical Interventions (Patient has pulse or is breathing): Full Support  Release to patient: Routine Release    Disposition:  I expect patient to be discharged to [facility] if accepted within the next 2-3 days (barriers to DC include drains and PCA for pain mgmt.)    Electronically signed by Saqib Tomlinson MD, 03/02/23, 14:03 EST.  [facility] Hospitalist Team

## 2023-03-02 NOTE — PROGRESS NOTES
Neurosurgery Progress Note      Patient: Dyia Arreola    YOB: 1960    Medical Record Number: [id number]    Attending Physician: Jerrell Gorman IV, MD    Date of Admission: 2/28/2023  5:31 AM    Status Post: [place] ARTHRODESIS POSTERIOR INTERBODY 1 NTRSPC LUMBAR [41101] (Lumbar 5 to sacral 1 transforaminal lumbar interbody fusion; thoracic 10 to lumbar 2 and lumbar 4-5 sheldon osteotomies; T9 to pelvic fusion)    Post Operative Day Number: 2    Admitting Dx: Other secondary kyphosis, thoracolumbar region [M40.15]    General Appearance:  62 y.o. female awake and alert semirecumbent lying on her right side.  She is moving all extremities.  She is having some right lower extremity pain in her hip and muscle cramping in her calf.  She is taking in good p.o. and urinating with no difficulty.  2 surgical drains still in place with serous to serosanguineous drainage.  Her pain seems well controlled.    Current Problem List:   Patient Active Problem List   Diagnosis   • Allergic rhinitis   • Anxiety disorder   • Asthma   • Carpal tunnel syndrome, bilateral   • Chronic obstructive pulmonary disease (HCC)   • Connective tissue disease, undifferentiated ([facility])   • Constipation   • Cystic fibrosis carrier   • DDD (degenerative disc disease), cervical   • Dyspepsia   • Edema   • Elevated antinuclear antibody (LILI) level   • Essential (primary) hypertension   • Fatigue   • Hyperlipidemia   • Insomnia   • Irritable bowel syndrome   • Fibromyalgia   • Headache   • Lupus ([facility])   • Sjogren's syndrome ([facility])   • Nonrheumatic mitral valve insufficiency   • Other long term (current) drug therapy   • Other specified dorsopathies, site unspecified   • Palpitations   • Paroxysmal tachycardia ([facility])   • Sciatica   • Sleep disorder   • Spinal stenosis of lumbar region   • Temporary cerebral vascular dysfunction   • Vitamin D deficiency   • Hypokalemia   • Depression   • LILI positive   • Need for immunization against influenza   • Acute  cystitis without hematuria   • Spondylolisthesis of lumbar region   • S/P lumbar fusion   • Encounter for postoperative care   • Nausea   • Tobacco dependence   • Encounter for screening mammogram for breast cancer   • Postmenopausal   • Medicare annual wellness visit, subsequent   • Encounter for hepatitis C virus screening test for high risk patient   • Bronchitis   • Other secondary kyphosis, thoracolumbar region   • Right lower lobe pulmonary nodule           Current Medications:  Scheduled Meds:Brexpiprazole, 0.5 mg, Oral, Daily  budesonide-formoterol, 2 puff, Inhalation, BID - RT   And  tiotropium bromide monohydrate, 2 puff, Inhalation, Daily - RT  bumetanide, 2 mg, Oral, BID  buPROPion XL, 150 mg, Oral, Daily  busPIRone, 15 mg, Oral, TID  cholecalciferol, 2,000 Units, Oral, Daily  cyclobenzaprine, 10 mg, Oral, TID  enoxaparin, 40 mg, Subcutaneous, Daily  folic acid, 1 mg, Oral, Q PM  gabapentin, 400 mg, Oral, 4x Daily  hydroxychloroquine, 200 mg, Oral, Daily  multivitamin with minerals, 1 tablet, Oral, Daily  roflumilast, 500 mcg, Oral, Daily  sodium chloride, 10 mL, Intravenous, Q12H  verapamil SR, 240 mg, Oral, Daily      Continuous Infusions:HYDROmorphone,       PRN Meds:.•  acetaminophen  •  albuterol  •  bisacodyl  •  docusate sodium  •  doxylamine  •  HYDROcodone-acetaminophen  •  nitroglycerin  •  ondansetron  •  ondansetron  •  sodium chloride  •  sodium chloride  •  traZODone      Diagnostic Tests:    Lab Results (last 24 hours)     ** No results found for the last 24 hours. **          Imaging Results (Last 24 Hours)     ** No results found for the last 24 hours. **          Physical Exam:   General Appearance:  Alert, cooperative, in no acute distress   Head:  Normocephalic, without obvious abnormality, atraumatic   Back:    Incision is well approximated with no drainage or obvious fluid collection palpated.  2 surgical drains intact with no drainage around site.  Reservoirs with mild serous to  serosanguineous drainage.   Abdomen:   nontender, nondistended   Extremities/MSK: Moves all extremities well, no edema, no cyanosis, no             Redness no sign of DVT   Skin: No bleeding, bruising or rash   Neurologic: Cranial nerves 2 - 12 grossly intact,         Vitals:    03/01/23 1916 03/01/23 1917 03/01/23 2023 03/02/23 0444   BP:   101/66 108/70   BP Location:   Left arm Left arm   Patient Position:   Lying Lying   Pulse: 89 89 95 102   Resp: 18 18 20 20   Temp:   100.2 °F (37.9 °C) 99.5 °F (37.5 °C)   TempSrc:   Oral Oral   SpO2: 95% 95% 98% 92%   Weight:       Height:             Assessment: Patient is a 62-year-old female that is POD 2 extensive thoracolumbar revision fusion with Dr. Gorman.  She continues to do well postoperatively with no acute focal neurologic deficits.  She has been up working with physical therapy.  She is urinating with no issues.  She is having some right lower extremity soreness/pain that is likely postoperative decompressive neuritis.  This should better over time.  Her incision looks good.  Her left drain has significantly slowed and output and can be removed.  We will continue right drain.  I will contact bracing rep for TLSO brace the patient should have on anytime she is out of bed.    Plan:     Encourage oral pain medication  Remove left drain  TLSO brace on anytime patient is out of bed.  Continue Pain management efforts  Continue mobilization efforts  Continue incisional care  Continue DVT Prophylaxis    Discharge Plan: Likely rehab    This patient was examined wearing appropriate personal protective equipment.     Findings were discussed with patient, nursing staff and Dr. Gorman.    Date: 3/2/2023    Gillian Eugene, PARMINDER  09:22 EST

## 2023-03-02 NOTE — PLAN OF CARE
Goal Outcome Evaluation:   VSS. Aox4. Up w/1 assist w/walker. Pain controlled w/PCA & po norco. Bilateral hemovacs removed. Able to make needs known. Call light in reach. Bed alarm on. Bed in lowest position. Care plan ongoing.

## 2023-03-02 NOTE — PROGRESS NOTES
"    [facility] Medicine Services   Daily Progress Note    Patient Name: Diya Arreola  : 1960  MRN: [id number]  Primary Care Physician:  Mitzy Rea MD  Date of admission: 2023    Subjective      Admitted to neurosurgery for spinal surgery.  We are consulted for postoperative medical management.    Postoperative day 2, doing fairly well today. Having some \"weird foot pain\" but is otherwise doing OK from a pain perspective.  She lost her PCA IV site this morning however, so her PCA is currently off.  She had a bit of a temperature last night of 100.2 °F.  All of her chest imaging and everything has been unremarkable.  Her exam is unremarkable and she feels well.  I suspect this is likely a bit of atelectasis.  I encouraged her to use her incentive spirometer.  She has a referral to [facility] for rehab vice SIR COLLINS.  Awaiting pre-CERT.  Working with therapy. Still has bilateral drains in place with bloody output. Discussed the case with the bedside nursing staff. No other acute concerns.     Objective      Vitals:   Temp:  [98.8 °F (37.1 °C)-100.2 °F (37.9 °C)] 99.5 °F (37.5 °C)  Heart Rate:  [] 100  Resp:  [17-20] 20  BP: (101-128)/(66-71) 108/70  Flow (L/min):  [2] 2    Physical Exam   GEN: WDWN, no acute distress  HEENT: NCAT, PERRLA, moist mucous membranes  NECK: Supple, midline trachea  CARD: RRR, no peripheral edema  PULM: CTAB, non-distressed  ABD: soft, NTND, normoactive bowel sounds throughout  SKIN: Warm, dry--bilateral Hemovac drains in place--surgical dressings look good  NEURO: Grossly intact, non-focal exam  PSYCH: Pleasant    Result Review    I have personally reviewed the results from the time of this admission to 3/2/2023 11:21 EST and agree with these findings:  [x]  Laboratory  [x]  Microbiology  [x]  Radiology  [x]  EKG/Telemetry   []  Cardiology/Vascular   []  Pathology  [x]  Old records  []  Other:    Wounds (last 24 hours)     LDA Wound     Row Name " 03/01/23 2035 03/01/23 1606 03/01/23 1209       Wound 02/28/23 0824 lumbar spine Incision    Wound - Properties Group Placement Date: 02/28/23 -LT Placement Time: 0824 -LT Location: lumbar spine  -LT Primary Wound Type: Incision  -LT    Dressing Appearance -- open to air  -RS open to air  -RS    Closure SIRI  -SL Staples  -RS Staples  -RS    Base dressing in place, unable to visualize  -SL dry;clean  -RS dry;clean  -RS    Drainage Amount none  -SL none  -RS none  -RS    Retired Wound - Properties Group Placement Date: 02/28/23  -LT Placement Time: 0824 -LT Location: lumbar spine  -LT Primary Wound Type: Incision  -LT    Retired Wound - Properties Group Date first assessed: 02/28/23  -LT Time first assessed: 0824 -LT Location: lumbar spine  -LT Primary Wound Type: Incision  -LT       Wound 02/28/23 0824 thoracic spine Incision    Wound - Properties Group Placement Date: 02/28/23  -LT Placement Time: 0824 -LT Location: thoracic spine  -LT Primary Wound Type: Incision  -LT    Dressing Appearance -- open to air  -RS dry;intact;no drainage  -RS    Closure SIRI  -SL Staples  -RS SIRI  -RS    Base dressing in place, unable to visualize  -SL dry;clean  -RS dressing in place, unable to visualize  -RS    Drainage Amount none  -SL none  -RS none  -RS    Retired Wound - Properties Group Placement Date: 02/28/23 -LT Placement Time: 0824 -LT Location: thoracic spine  -LT Primary Wound Type: Incision  -LT    Retired Wound - Properties Group Date first assessed: 02/28/23 -LT Time first assessed: 0824 -LT Location: thoracic spine  -LT Primary Wound Type: Incision  -LT          User Key  (r) = Recorded By, (t) = Taken By, (c) = Cosigned By    Initials Name Provider Type    LT Kyung Aguilar RN Registered Nurse    Delores Bustillos RN Registered Nurse    RS Carmen Agarwal RN Registered Nurse                  Assessment & Plan      Brexpiprazole, 0.5 mg, Oral, Daily  budesonide-formoterol, 2 puff, Inhalation, BID - RT    And  tiotropium bromide monohydrate, 2 puff, Inhalation, Daily - RT  bumetanide, 2 mg, Oral, BID  buPROPion XL, 150 mg, Oral, Daily  busPIRone, 15 mg, Oral, TID  cholecalciferol, 2,000 Units, Oral, Daily  cyclobenzaprine, 10 mg, Oral, TID  enoxaparin, 40 mg, Subcutaneous, Daily  folic acid, 1 mg, Oral, Q PM  gabapentin, 400 mg, Oral, 4x Daily  hydroxychloroquine, 200 mg, Oral, Daily  multivitamin with minerals, 1 tablet, Oral, Daily  roflumilast, 500 mcg, Oral, Daily  sodium chloride, 10 mL, Intravenous, Q12H  verapamil SR, 240 mg, Oral, Daily       HYDROmorphone,          Active Hospital Problems:  Active Hospital Problems    Diagnosis    • **Other secondary kyphosis, thoracolumbar region    • Right lower lobe pulmonary nodule      Plan:   S/P lumbar fusion  - Postop care, drain management, and pain control per surgeon  - PT/OT to follow  - Finished IV vancomycin  - Dilaudid PCA present     COPD  - Currently on room air, continue monitor O2 saturation  - Continue Daliresp, Symbicort and Spiriva     Hypertension  - BP currently 132/84  - Continue verapamil and Bumex     Rheumatoid arthritis  H/O lupus and connective tissue disease  - Continue Plaquenil     Fibromyalgia  - Continue Neurontin and Flexeril     Anxiety/depression   - Continue Wellbutrin and BuSpar     DVT prophylaxis:  Medical and mechanical DVT prophylaxis orders are present.    CODE STATUS:    Level Of Support Discussed With: Patient  Code Status (Patient has no pulse and is not breathing): CPR (Attempt to Resuscitate)  Medical Interventions (Patient has pulse or is breathing): Full Support  Release to patient: Routine Release    Disposition:  I expect patient to be discharged to [facility] if accepted within the next 2-3 days (barriers to DC include drains, which will hopefully be discontinued today, and PCA for pain mgmt, which will hopefully be discontinued at some point over the next 24 hours.)    Electronically signed by Jonathan Costa MD,  03/02/23, 11:21 EST.  [facility]ist Team

## 2023-03-02 NOTE — PLAN OF CARE
Goal Outcome Evaluation:         Pt is having really bad muscle cramps with  movement and she was not able to get out of bed this shift. Yaakov quintana pending.

## 2023-03-02 NOTE — PLAN OF CARE
"Goal Outcome Evaluation:  Plan of Care Reviewed With: patient        Assessment: Diya Arreola presents with functional mobility impairments following spinal surgery which indicate the need for skilled intervention. Tolerating session without incident but limited by emotional lability and pain. Will continue to follow and progress as tolerated.      Plan/Recommendations:   Moderate Intensity Therapy recommended post-acute care. This is recommended as therapy feels the patient would require 3-4 days per week and wouldn't tolerate \"3 hour daily\" rehab intensity. SNF would be the preferred choice. If the patient does not agree to SNF, arrange HH or OP depending on home bound status. If patient is medically complex, consider LTACH.. Pt requires no DME at discharge.      Pt desires Inpatient Rehabilitation placement at discharge. Pt cooperative; agreeable to therapeutic recommendations and plan of care.   "

## 2023-03-03 PROCEDURE — 25010000002 ENOXAPARIN PER 10 MG

## 2023-03-03 PROCEDURE — 25010000002 DEXAMETHASONE PER 1 MG: Performed by: NURSE PRACTITIONER

## 2023-03-03 PROCEDURE — 94799 UNLISTED PULMONARY SVC/PX: CPT

## 2023-03-03 PROCEDURE — 99024 POSTOP FOLLOW-UP VISIT: CPT | Performed by: NURSE PRACTITIONER

## 2023-03-03 PROCEDURE — 94664 DEMO&/EVAL PT USE INHALER: CPT

## 2023-03-03 RX ORDER — POLYETHYLENE GLYCOL 3350 17 G/17G
17 POWDER, FOR SOLUTION ORAL DAILY PRN
Status: DISCONTINUED | OUTPATIENT
Start: 2023-03-03 | End: 2023-03-05 | Stop reason: HOSPADM

## 2023-03-03 RX ORDER — DEXAMETHASONE SODIUM PHOSPHATE 4 MG/ML
6 INJECTION, SOLUTION INTRA-ARTICULAR; INTRALESIONAL; INTRAMUSCULAR; INTRAVENOUS; SOFT TISSUE ONCE
Status: COMPLETED | OUTPATIENT
Start: 2023-03-03 | End: 2023-03-03

## 2023-03-03 RX ORDER — DIAZEPAM 5 MG/1
5 TABLET ORAL EVERY 8 HOURS PRN
Status: DISCONTINUED | OUTPATIENT
Start: 2023-03-03 | End: 2023-03-05 | Stop reason: HOSPADM

## 2023-03-03 RX ADMIN — TIOTROPIUM BROMIDE INHALATION SPRAY 2 PUFF: 3.12 SPRAY, METERED RESPIRATORY (INHALATION) at 07:35

## 2023-03-03 RX ADMIN — BUSPIRONE HYDROCHLORIDE 15 MG: 15 TABLET ORAL at 13:08

## 2023-03-03 RX ADMIN — GABAPENTIN 400 MG: 400 CAPSULE ORAL at 10:44

## 2023-03-03 RX ADMIN — GABAPENTIN 400 MG: 400 CAPSULE ORAL at 08:12

## 2023-03-03 RX ADMIN — BUDESONIDE AND FORMOTEROL FUMARATE DIHYDRATE 2 PUFF: 160; 4.5 AEROSOL RESPIRATORY (INHALATION) at 20:14

## 2023-03-03 RX ADMIN — BUMETANIDE 2 MG: 1 TABLET ORAL at 18:33

## 2023-03-03 RX ADMIN — CYCLOBENZAPRINE 10 MG: 10 TABLET, FILM COATED ORAL at 18:33

## 2023-03-03 RX ADMIN — CYCLOBENZAPRINE 10 MG: 10 TABLET, FILM COATED ORAL at 08:12

## 2023-03-03 RX ADMIN — Medication 2000 UNITS: at 08:12

## 2023-03-03 RX ADMIN — BUMETANIDE 2 MG: 1 TABLET ORAL at 08:13

## 2023-03-03 RX ADMIN — Medication 10 ML: at 20:07

## 2023-03-03 RX ADMIN — GABAPENTIN 400 MG: 400 CAPSULE ORAL at 16:04

## 2023-03-03 RX ADMIN — BUSPIRONE HYDROCHLORIDE 15 MG: 15 TABLET ORAL at 08:12

## 2023-03-03 RX ADMIN — MULTIPLE VITAMINS W/ MINERALS TAB 1 TABLET: TAB at 08:11

## 2023-03-03 RX ADMIN — VERAPAMIL HYDROCHLORIDE 240 MG: 120 TABLET, FILM COATED, EXTENDED RELEASE ORAL at 08:13

## 2023-03-03 RX ADMIN — BUPROPION HYDROCHLORIDE 150 MG: 150 TABLET, FILM COATED, EXTENDED RELEASE ORAL at 08:12

## 2023-03-03 RX ADMIN — DIAZEPAM 5 MG: 5 TABLET ORAL at 09:58

## 2023-03-03 RX ADMIN — ENOXAPARIN SODIUM 40 MG: 100 INJECTION SUBCUTANEOUS at 16:04

## 2023-03-03 RX ADMIN — DIAZEPAM 5 MG: 5 TABLET ORAL at 20:06

## 2023-03-03 RX ADMIN — HYDROCODONE BITARTRATE AND ACETAMINOPHEN 2 TABLET: 7.5; 325 TABLET ORAL at 08:16

## 2023-03-03 RX ADMIN — BUDESONIDE AND FORMOTEROL FUMARATE DIHYDRATE 2 PUFF: 160; 4.5 AEROSOL RESPIRATORY (INHALATION) at 07:35

## 2023-03-03 RX ADMIN — BUSPIRONE HYDROCHLORIDE 15 MG: 15 TABLET ORAL at 18:33

## 2023-03-03 RX ADMIN — BREXPIPRAZOLE 0.5 MG: 0.5 TABLET ORAL at 08:10

## 2023-03-03 RX ADMIN — DEXAMETHASONE SODIUM PHOSPHATE 6 MG: 4 INJECTION, SOLUTION INTRAMUSCULAR; INTRAVENOUS at 09:59

## 2023-03-03 RX ADMIN — GABAPENTIN 400 MG: 400 CAPSULE ORAL at 18:33

## 2023-03-03 RX ADMIN — HYDROXYCHLOROQUINE SULFATE 200 MG: 200 TABLET ORAL at 08:12

## 2023-03-03 RX ADMIN — HYDROCODONE BITARTRATE AND ACETAMINOPHEN 2 TABLET: 7.5; 325 TABLET ORAL at 16:04

## 2023-03-03 RX ADMIN — ROFLUMILAST 500 MCG: 500 TABLET ORAL at 08:12

## 2023-03-03 RX ADMIN — CYCLOBENZAPRINE 10 MG: 10 TABLET, FILM COATED ORAL at 13:08

## 2023-03-03 RX ADMIN — Medication 10 ML: at 08:13

## 2023-03-03 RX ADMIN — FOLIC ACID 1 MG: 1 TABLET ORAL at 08:12

## 2023-03-03 RX ADMIN — DOCUSATE SODIUM 100 MG: 100 CAPSULE, LIQUID FILLED ORAL at 20:06

## 2023-03-03 RX ADMIN — DOCUSATE SODIUM 100 MG: 100 CAPSULE, LIQUID FILLED ORAL at 08:11

## 2023-03-03 NOTE — PLAN OF CARE
Goal Outcome Evaluation:      Pt ambulated in room with 2 assist and walker. This AM pt is having more muscle spasms in her leg. Pending [facility].

## 2023-03-03 NOTE — CASE MANAGEMENT/SOCIAL WORK
Continued Stay Note   [facility]     Patient Name: Diya Arreola  MRN: [id number]  Today's Date: 3/3/2023    Admit Date: 2/28/2023    Plan: [facility], accepted, precert approved 3/3.  Need to admit by 3/5.  Family to transport at dc   Discharge Plan     Row Name 03/03/23 1521       Plan    Plan [facility], accepted, precert approved 3/3.  Need to admit by 3/5.  Family to transport at [place]    Plan Comments received word that precert approved.  Notified nursing and md.  per rounds- pt still on pca pump.  order was d/c this am.                Expected Discharge Date and Time     Expected Discharge Date Expected Discharge Time    Mar 3, 2023         Phone communication or documentation only - no physical contact with patient or family.      Amy Baig RN

## 2023-03-03 NOTE — PLAN OF CARE
Goal Outcome Evaluation:               Patient states her pain is doing better. Still having her to have sever right Lower extremity spasms that prevent there from using bedside commode at times, requiring to use bed pan if she is having spasms.

## 2023-03-03 NOTE — PROGRESS NOTES
[facility] Medicine Services   Daily Progress Note    Patient Name: Diya Arreola  : 1960  MRN: [id number]  Primary Care Physician:  Mitzy Rea MD  Date of admission: 2023    Subjective      Admitted to neurosurgery for spinal surgery.  We are consulted for postoperative medical management.    Postoperative day 3, doing fairly well today.  Getting benzodiazepines and steroids for suspected post decompressive neuritis in the right leg.  Otherwise her back pain is not existent.  Continuing to await rehab discharge.  Both drains have been discontinued.  She remains on her PCA however.  Discussed the case with the bedside nursing staff. No other acute concerns.    Objective      Vitals:   Temp:  [98.5 °F (36.9 °C)-99.8 °F (37.7 °C)] 98.9 °F (37.2 °C)  Heart Rate:  [77-99] 96  Resp:  [16-18] 16  BP: (110-135)/(70-75) 116/75    Physical Exam   GEN: WDWN, no acute distress  HEENT: NCAT, PERRLA, moist mucous membranes  NECK: Supple, midline trachea  CARD: RRR, no peripheral edema  PULM: CTAB, non-distressed  ABD: soft, NTND, normoactive bowel sounds throughout  SKIN: Warm, dry--surgical dressings look good  NEURO: Grossly intact, non-focal exam  PSYCH: Pleasant    Result Review    I have personally reviewed the results from the time of this admission to 3/3/2023 18:31 EST and agree with these findings:  [x]  Laboratory  [x]  Microbiology  [x]  Radiology  [x]  EKG/Telemetry   []  Cardiology/Vascular   []  Pathology  [x]  Old records  []  Other:    Wounds (last 24 hours)     LDA Wound     Row Name 23 1600 23 1200 23 0730       Wound 23 0824 lumbar spine Incision    Wound - Properties Group Placement Date: 23  -LT Placement Time: 824  -LT Location: lumbar spine  -LT Primary Wound Type: Incision  -LT    Dressing Appearance open to air  -EH open to air  -EH open to air  -EH    Closure Liquid skin adhesive  -EH Liquid skin adhesive  -EH Liquid skin adhesive  -EH     Base dry;clean  -EH dry;clean  -EH dry;clean  -EH    Retired Wound - Properties Group Placement Date: 02/28/23 -LT Placement Time: 0824 -LT Location: lumbar spine  -LT Primary Wound Type: Incision  -LT    Retired Wound - Properties Group Date first assessed: 02/28/23 -LT Time first assessed: 0824 -LT Location: lumbar spine  -LT Primary Wound Type: Incision  -LT       Wound 02/28/23 0824 thoracic spine Incision    Wound - Properties Group Placement Date: 02/28/23 -LT Placement Time: 0824 -LT Location: thoracic spine  -LT Primary Wound Type: Incision  -LT    Dressing Appearance open to air  -EH open to air  -EH open to air  -EH    Closure Open to air  -EH Open to air  -EH Open to air  -EH    Base dry;clean  -EH dry;clean  -EH dry;clean  -EH    Retired Wound - Properties Group Placement Date: 02/28/23 -LT Placement Time: 0824 -LT Location: thoracic spine  -LT Primary Wound Type: Incision  -LT    Retired Wound - Properties Group Date first assessed: 02/28/23 -LT Time first assessed: 0824 -LT Location: thoracic spine  -LT Primary Wound Type: Incision  -LT    Row Name 03/02/23 2015             Wound 02/28/23 0824 lumbar spine Incision    Wound - Properties Group Placement Date: 02/28/23 -LT Placement Time: 0824 -LT Location: lumbar spine  -LT Primary Wound Type: Incision  -LT    Closure SIRI  -SL      Base dry;clean  -SL      Drainage Amount none  -SL      Retired Wound - Properties Group Placement Date: 02/28/23 -LT Placement Time: 0824 -LT Location: lumbar spine  -LT Primary Wound Type: Incision  -LT    Retired Wound - Properties Group Date first assessed: 02/28/23 -LT Time first assessed: 0824 -LT Location: lumbar spine  -LT Primary Wound Type: Incision  -LT       Wound 02/28/23 0824 thoracic spine Incision    Wound - Properties Group Placement Date: 02/28/23 -LT Placement Time: 0824 -LT Location: thoracic spine  -LT Primary Wound Type: Incision  -LT    Closure Open to air  -SL      Base dry;clean   -SL      Drainage Amount none  -SL      Retired Wound - Properties Group Placement Date: 02/28/23  -LT Placement Time: 0824 -LT Location: thoracic spine  -LT Primary Wound Type: Incision  -LT    Retired Wound - Properties Group Date first assessed: 02/28/23  -LT Time first assessed: 0824  -LT Location: thoracic spine  -LT Primary Wound Type: Incision  -LT          User Key  (r) = Recorded By, (t) = Taken By, (c) = Cosigned By    Initials Name Provider Type    LT Kyung Aguilar, RN Registered Nurse    Delores Bustillos RN Registered Nurse     Tony Smith RN Registered Nurse                  Assessment & Plan      Brexpiprazole, 0.5 mg, Oral, Daily  budesonide-formoterol, 2 puff, Inhalation, BID - RT   And  tiotropium bromide monohydrate, 2 puff, Inhalation, Daily - RT  bumetanide, 2 mg, Oral, BID  buPROPion XL, 150 mg, Oral, Daily  busPIRone, 15 mg, Oral, TID  cholecalciferol, 2,000 Units, Oral, Daily  cyclobenzaprine, 10 mg, Oral, TID  docusate sodium, 100 mg, Oral, BID  enoxaparin, 40 mg, Subcutaneous, Daily  folic acid, 1 mg, Oral, Q PM  gabapentin, 400 mg, Oral, 4x Daily  hydroxychloroquine, 200 mg, Oral, Daily  multivitamin with minerals, 1 tablet, Oral, Daily  roflumilast, 500 mcg, Oral, Daily  sodium chloride, 10 mL, Intravenous, Q12H  verapamil SR, 240 mg, Oral, Daily             Active Hospital Problems:  Active Hospital Problems    Diagnosis    • **Other secondary kyphosis, thoracolumbar region    • Right lower lobe pulmonary nodule      Plan:   S/P lumbar fusion/suspected post decompression neuritis of the right leg  - Continue routine postoperative care. Drains discontinued, pain control per surgeon, remains on Dilaudid PCA  - PT/OT to follow  - Finished IV vancomycin     COPD  - Currently on room air, continue monitor O2 saturation  - Continue Daliresp, Symbicort and Spiriva     Hypertension  - Continue verapamil and Bumex     Rheumatoid arthritis  H/O lupus and connective tissue disease  -  Continue Plaquenil     Fibromyalgia  - Continue Neurontin and Flexeril     Anxiety/depression   - Continue Wellbutrin and BuSpar     DVT prophylaxis:  Medical and mechanical DVT prophylaxis orders are present.    CODE STATUS:    Level Of Support Discussed With: Patient  Code Status (Patient has no pulse and is not breathing): CPR (Attempt to Resuscitate)  Medical Interventions (Patient has pulse or is breathing): Full Support  Release to patient: Routine Release    Disposition:  I expect patient to be discharged to [facility] once her pre-CERT has been approved.  Need to get her off of the PCA.    Electronically signed by Jonathan Costa MD, 03/03/23, 18:31 EST.  [facility] Hospitalist Team

## 2023-03-03 NOTE — PROGRESS NOTES
Neurosurgery Progress Note      Patient: Diya Arreola    YOB: 1960    Medical Record Number: [id number]    Attending Physician: Jerrell Gorman IV, MD    Date of Admission: 2/28/2023  5:31 AM    Status Post: PA ARTHRODESIS POSTERIOR INTERBODY 1 NTRSPC LUMBAR [85109] (Lumbar 5 to sacral 1 transforaminal lumbar interbody fusion; thoracic 10 to lumbar 2 and lumbar 4-5 sheldon osteotomies; T9 to pelvic fusion)    Post Operative Day Number: 3    Admitting Dx: Other secondary kyphosis, thoracolumbar region [M40.15]    General Appearance:  62 y.o. female awake and alert semirecumbent lying on her right side.  She is moving all extremities.  Right calf and thigh cramping still present she is taking in good p.o. and urinating with no difficulty.  Her pain seems well controlled.     Current Problem List:   Patient Active Problem List   Diagnosis   • Allergic rhinitis   • Anxiety disorder   • Asthma   • Carpal tunnel syndrome, bilateral   • Chronic obstructive pulmonary disease (HCC)   • Connective tissue disease, undifferentiated ([facility])   • Constipation   • Cystic fibrosis carrier   • DDD (degenerative disc disease), cervical   • Dyspepsia   • Edema   • Elevated antinuclear antibody (LILI) level   • Essential (primary) hypertension   • Fatigue   • Hyperlipidemia   • Insomnia   • Irritable bowel syndrome   • Fibromyalgia   • Headache   • Lupus ([facility])   • Sjogren's syndrome ([facility])   • Nonrheumatic mitral valve insufficiency   • Other long term (current) drug therapy   • Other specified dorsopathies, site unspecified   • Palpitations   • Paroxysmal tachycardia ([facility])   • Sciatica   • Sleep disorder   • Spinal stenosis of lumbar region   • Temporary cerebral vascular dysfunction   • Vitamin D deficiency   • Hypokalemia   • Depression   • LILI positive   • Need for immunization against influenza   • Acute cystitis without hematuria   • Spondylolisthesis of lumbar region   • S/P lumbar fusion   • Encounter for postoperative  care   • Nausea   • Tobacco dependence   • Encounter for screening mammogram for breast cancer   • Postmenopausal   • Medicare annual wellness visit, subsequent   • Encounter for hepatitis C virus screening test for high risk patient   • Bronchitis   • Other secondary kyphosis, thoracolumbar region   • Right lower lobe pulmonary nodule           Current Medications:  Scheduled Meds:Brexpiprazole, 0.5 mg, Oral, Daily  budesonide-formoterol, 2 puff, Inhalation, BID - RT   And  tiotropium bromide monohydrate, 2 puff, Inhalation, Daily - RT  bumetanide, 2 mg, Oral, BID  buPROPion XL, 150 mg, Oral, Daily  busPIRone, 15 mg, Oral, TID  cholecalciferol, 2,000 Units, Oral, Daily  cyclobenzaprine, 10 mg, Oral, TID  dexamethasone, 6 mg, Intravenous, Once  docusate sodium, 100 mg, Oral, BID  enoxaparin, 40 mg, Subcutaneous, Daily  folic acid, 1 mg, Oral, Q PM  gabapentin, 400 mg, Oral, 4x Daily  hydroxychloroquine, 200 mg, Oral, Daily  multivitamin with minerals, 1 tablet, Oral, Daily  roflumilast, 500 mcg, Oral, Daily  sodium chloride, 10 mL, Intravenous, Q12H  verapamil SR, 240 mg, Oral, Daily      Continuous Infusions:HYDROmorphone,       PRN Meds:.•  albuterol  •  bisacodyl  •  diazePAM  •  doxylamine  •  HYDROcodone-acetaminophen  •  nitroglycerin  •  ondansetron  •  ondansetron  •  polyethylene glycol  •  sodium chloride  •  sodium chloride  •  traZODone      Diagnostic Tests:    Lab Results (last 24 hours)     ** No results found for the last 24 hours. **          Imaging Results (Last 24 Hours)     ** No results found for the last 24 hours. **          Physical Exam:   General Appearance:  Alert, cooperative, in no acute distress   Head:  Normocephalic, without obvious abnormality, atraumatic   Back:    Incision is well approximated with no drainage or obvious fluid collection palpated.  Drainage sites well approximated with no drainage or swelling.   Abdomen:   nontender, nondistended   Extremities/MSK: Moves all  extremities well, bilateral lower extremity strength 5/5, no edema, no cyanosis, no Redness no sign of DVT   Skin: No bleeding, bruising or rash   Neurologic: Cranial nerves 2 - 12 grossly intact,         Vitals:    03/03/23 0735 03/03/23 0738 03/03/23 0739 03/03/23 0742   BP:       BP Location:       Patient Position:       Pulse: 87 86 84 87   Resp: 16 16 16 16   Temp:       TempSrc:       SpO2: 92% 92% 92% 92%   Weight:       Height:             Assessment: Patient is a 62-year-old female that is POD 3 extensive thoracolumbar revision fusion with Dr. Gorman.  She continues to do well postoperatively with no acute focal neurologic deficits.  She has been up working with physical therapy.  She is urinating with no issues.  Order was for left drain to be removed yesterday but both drains were inadvertently removed by nursing.  Drainage sites and incision looks okay.  She continues with right lower extremity muscle spasms and soreness along the L5 dermatome which she has likely related to post decompressive neuritis.  Will add Valium and 1 dose of Decadron to see if this helps with some of her spasms.  She reports no true allergy just mood changes with steroids.  She is still pending her scoliosis imaging and once obtained,  patient okay for discharge per neurosurgery.    Plan:   Valium 5 mg 3 times daily as needed for muscle spasm  One-time dose Decadron  Scoliosis imaging pending  Encourage oral pain medication  TLSO brace on anytime patient is out of bed.  Continue Pain management efforts  Continue mobilization efforts  Continue incisional care  Continue DVT Prophylaxis    Discharge Plan: Likely rehab    This patient was examined wearing appropriate personal protective equipment.     Findings were discussed with patient, nursing staff and Dr. Gorman.    Date: 3/3/2023    PARMINDER Ford  09:32 EST

## 2023-03-04 ENCOUNTER — APPOINTMENT (OUTPATIENT)
Dept: GENERAL RADIOLOGY | Facility: HOSPITAL | Age: 63
DRG: 454 | End: 2023-03-04
Payer: MEDICARE

## 2023-03-04 PROCEDURE — 94799 UNLISTED PULMONARY SVC/PX: CPT

## 2023-03-04 PROCEDURE — 94761 N-INVAS EAR/PLS OXIMETRY MLT: CPT

## 2023-03-04 PROCEDURE — 97116 GAIT TRAINING THERAPY: CPT

## 2023-03-04 PROCEDURE — 25010000002 ENOXAPARIN PER 10 MG

## 2023-03-04 PROCEDURE — 94664 DEMO&/EVAL PT USE INHALER: CPT

## 2023-03-04 PROCEDURE — 72082 X-RAY EXAM ENTIRE SPI 2/3 VW: CPT

## 2023-03-04 RX ADMIN — BUDESONIDE AND FORMOTEROL FUMARATE DIHYDRATE 2 PUFF: 160; 4.5 AEROSOL RESPIRATORY (INHALATION) at 20:45

## 2023-03-04 RX ADMIN — DOCUSATE SODIUM 100 MG: 100 CAPSULE, LIQUID FILLED ORAL at 08:01

## 2023-03-04 RX ADMIN — FOLIC ACID 1 MG: 1 TABLET ORAL at 08:01

## 2023-03-04 RX ADMIN — BUPROPION HYDROCHLORIDE 150 MG: 150 TABLET, FILM COATED, EXTENDED RELEASE ORAL at 08:01

## 2023-03-04 RX ADMIN — ENOXAPARIN SODIUM 40 MG: 100 INJECTION SUBCUTANEOUS at 17:14

## 2023-03-04 RX ADMIN — VERAPAMIL HYDROCHLORIDE 240 MG: 120 TABLET, FILM COATED, EXTENDED RELEASE ORAL at 08:01

## 2023-03-04 RX ADMIN — BUSPIRONE HYDROCHLORIDE 15 MG: 15 TABLET ORAL at 19:18

## 2023-03-04 RX ADMIN — CYCLOBENZAPRINE 10 MG: 10 TABLET, FILM COATED ORAL at 08:01

## 2023-03-04 RX ADMIN — GABAPENTIN 400 MG: 400 CAPSULE ORAL at 11:36

## 2023-03-04 RX ADMIN — TIOTROPIUM BROMIDE INHALATION SPRAY 2 PUFF: 3.12 SPRAY, METERED RESPIRATORY (INHALATION) at 06:40

## 2023-03-04 RX ADMIN — Medication 10 ML: at 08:01

## 2023-03-04 RX ADMIN — HYDROCODONE BITARTRATE AND ACETAMINOPHEN 2 TABLET: 7.5; 325 TABLET ORAL at 13:49

## 2023-03-04 RX ADMIN — HYDROCODONE BITARTRATE AND ACETAMINOPHEN 2 TABLET: 7.5; 325 TABLET ORAL at 18:26

## 2023-03-04 RX ADMIN — CYCLOBENZAPRINE 10 MG: 10 TABLET, FILM COATED ORAL at 19:18

## 2023-03-04 RX ADMIN — DIAZEPAM 5 MG: 5 TABLET ORAL at 18:26

## 2023-03-04 RX ADMIN — MULTIPLE VITAMINS W/ MINERALS TAB 1 TABLET: TAB at 08:01

## 2023-03-04 RX ADMIN — HYDROXYCHLOROQUINE SULFATE 200 MG: 200 TABLET ORAL at 08:01

## 2023-03-04 RX ADMIN — BUSPIRONE HYDROCHLORIDE 15 MG: 15 TABLET ORAL at 08:01

## 2023-03-04 RX ADMIN — ROFLUMILAST 500 MCG: 500 TABLET ORAL at 08:01

## 2023-03-04 RX ADMIN — BUMETANIDE 2 MG: 1 TABLET ORAL at 08:01

## 2023-03-04 RX ADMIN — BUDESONIDE AND FORMOTEROL FUMARATE DIHYDRATE 2 PUFF: 160; 4.5 AEROSOL RESPIRATORY (INHALATION) at 06:40

## 2023-03-04 RX ADMIN — HYDROCODONE BITARTRATE AND ACETAMINOPHEN 2 TABLET: 7.5; 325 TABLET ORAL at 10:01

## 2023-03-04 RX ADMIN — HYDROCODONE BITARTRATE AND ACETAMINOPHEN 2 TABLET: 7.5; 325 TABLET ORAL at 05:23

## 2023-03-04 RX ADMIN — DIAZEPAM 5 MG: 5 TABLET ORAL at 10:01

## 2023-03-04 RX ADMIN — GABAPENTIN 400 MG: 400 CAPSULE ORAL at 17:14

## 2023-03-04 RX ADMIN — GABAPENTIN 400 MG: 400 CAPSULE ORAL at 19:19

## 2023-03-04 RX ADMIN — BREXPIPRAZOLE 0.5 MG: 0.5 TABLET ORAL at 08:03

## 2023-03-04 RX ADMIN — Medication 10 ML: at 20:39

## 2023-03-04 RX ADMIN — Medication 2000 UNITS: at 08:01

## 2023-03-04 RX ADMIN — CYCLOBENZAPRINE 10 MG: 10 TABLET, FILM COATED ORAL at 13:49

## 2023-03-04 RX ADMIN — BUSPIRONE HYDROCHLORIDE 15 MG: 15 TABLET ORAL at 13:49

## 2023-03-04 RX ADMIN — GABAPENTIN 400 MG: 400 CAPSULE ORAL at 08:01

## 2023-03-04 NOTE — PLAN OF CARE
Goal Outcome Evaluation:      Patient able to make needs known. VSS, Room air. Pain treated per MAR. Patient seems to be resting well. Personal items and call light with in reach. Plan of care on going.

## 2023-03-04 NOTE — PLAN OF CARE
Assessment: Diya Arreola presents with functional mobility impairments which indicate the need for skilled intervention. Tolerating session today without incident. Pt req assist to don TLSO, demonstrated proper fit. Pt doing much better today and wants to go to OPT vs rehab.Will continue to follow and progress as tolerated.      ary

## 2023-03-04 NOTE — PROGRESS NOTES
[facility] Medicine Services   Daily Progress Note    Patient Name: Diya Arreola  : 1960  MRN: [id number]  Primary Care Physician:  Mitzy Rea MD  Date of admission: 2023    Subjective      Admitted to neurosurgery for spinal surgery.  We are consulted for postoperative medical management.    Postoperative day 4, doing great today.  Off her PCA, pain in her leg is all but gone.  She has been up and around quite a bit today.  She actually wants to discharge home instead of to rehab even though her pre-CERT went through yesterday afternoon.  Primary surgical service is okay with that.  They are working to get things arranged for her with home health and to ensure she has good follow-up.  Hoping for her to discharge tomorrow.  Discussed the case with the bedside nursing staff. No other acute concerns.    Objective      Vitals:   Temp:  [98.2 °F (36.8 °C)-98.3 °F (36.8 °C)] 98.3 °F (36.8 °C)  Heart Rate:  [86-95] 95  Resp:  [14-16] 16  BP: (129-133)/(72-74) 129/74    Physical Exam   GEN: WDWN, no acute distress  HEENT: NCAT, PERRLA, moist mucous membranes  NECK: Supple, midline trachea  CARD: RRR, no peripheral edema  PULM: CTAB, non-distressed  ABD: soft, NTND, normoactive bowel sounds throughout  SKIN: Warm, dry--surgical dressings look good  NEURO: Grossly intact, non-focal exam  PSYCH: Pleasant    Result Review    I have personally reviewed the results from the time of this admission to 3/4/2023 12:15 EST and agree with these findings:  [x]  Laboratory  [x]  Microbiology  [x]  Radiology  [x]  EKG/Telemetry   []  Cardiology/Vascular   []  Pathology  [x]  Old records  []  Other:    Wounds (last 24 hours)     LDA Wound     Row Name 23 0805 23 0400 23 0047       Wound 23 0824 lumbar spine Incision    Wound - Properties Group Placement Date: 23  -LT Placement Time: 824  -LT Location: lumbar spine  -LT Primary Wound Type: Incision  -LT    Dressing  Appearance open to air  -XC open to air  -SM open to air  -SM    Closure Liquid skin adhesive  -XC Liquid skin adhesive  -SM Liquid skin adhesive  -SM    Base dry;clean  -XC dry;clean  -SM dry;clean  -SM    Retired Wound - Properties Group Placement Date: 02/28/23 -LT Placement Time: 0824 -LT Location: lumbar spine  -LT Primary Wound Type: Incision  -LT    Retired Wound - Properties Group Date first assessed: 02/28/23 -LT Time first assessed: 0824 -LT Location: lumbar spine  -LT Primary Wound Type: Incision  -LT       Wound 02/28/23 0824 thoracic spine Incision    Wound - Properties Group Placement Date: 02/28/23 -LT Placement Time: 0824 -LT Location: thoracic spine  -LT Primary Wound Type: Incision  -LT    Dressing Appearance open to air  -XC open to air  -SM open to air  -SM    Closure Open to air  -XC Open to air  -SM Open to air  -SM    Base dry;clean  -XC dry;clean  -SM dry;clean  -SM    Retired Wound - Properties Group Placement Date: 02/28/23 -LT Placement Time: 0824 -LT Location: thoracic spine  -LT Primary Wound Type: Incision  -LT    Retired Wound - Properties Group Date first assessed: 02/28/23 -LT Time first assessed: 0824 -LT Location: thoracic spine  -LT Primary Wound Type: Incision  -LT    Row Name 03/03/23 2010 03/03/23 1600          Wound 02/28/23 0824 lumbar spine Incision    Wound - Properties Group Placement Date: 02/28/23 -LT Placement Time: 0824 -LT Location: lumbar spine  -LT Primary Wound Type: Incision  -LT    Dressing Appearance open to air  -SM open to air  -EH     Closure Liquid skin adhesive  -SM Liquid skin adhesive  -EH     Base dry;clean  -SM dry;clean  -EH     Dressing Care open to air  -SM --     Retired Wound - Properties Group Placement Date: 02/28/23  -LT Placement Time: 0824 -LT Location: lumbar spine  -LT Primary Wound Type: Incision  -LT    Retired Wound - Properties Group Date first assessed: 02/28/23 -LT Time first assessed: 0824 -LT Location: lumbar spine   -LT Primary Wound Type: Incision  -LT       Wound 02/28/23 0824 thoracic spine Incision    Wound - Properties Group Placement Date: 02/28/23  -LT Placement Time: 0824 -LT Location: thoracic spine  -LT Primary Wound Type: Incision  -LT    Dressing Appearance open to air  -SM open to air  -EH     Closure Open to air  -SM Open to air  -EH     Base dry;clean  -SM dry;clean  -EH     Drainage Amount none  -SM --     Retired Wound - Properties Group Placement Date: 02/28/23  -LT Placement Time: 0824 -LT Location: thoracic spine  -LT Primary Wound Type: Incision  -LT    Retired Wound - Properties Group Date first assessed: 02/28/23  -LT Time first assessed: 0824 -LT Location: thoracic spine  -LT Primary Wound Type: Incision  -LT          User Key  (r) = Recorded By, (t) = Taken By, (c) = Cosigned By    Initials Name Provider Type    LT Kyung Aguilar, RN Registered Nurse    Ronel Ramirez RN Registered Nurse    Adele English LPN Licensed Nurse    Tony Brown RN Registered Nurse                  Assessment & Plan      Brexpiprazole, 0.5 mg, Oral, Daily  budesonide-formoterol, 2 puff, Inhalation, BID - RT   And  tiotropium bromide monohydrate, 2 puff, Inhalation, Daily - RT  bumetanide, 2 mg, Oral, BID  buPROPion XL, 150 mg, Oral, Daily  busPIRone, 15 mg, Oral, TID  cholecalciferol, 2,000 Units, Oral, Daily  cyclobenzaprine, 10 mg, Oral, TID  docusate sodium, 100 mg, Oral, BID  enoxaparin, 40 mg, Subcutaneous, Daily  folic acid, 1 mg, Oral, Q PM  gabapentin, 400 mg, Oral, 4x Daily  hydroxychloroquine, 200 mg, Oral, Daily  multivitamin with minerals, 1 tablet, Oral, Daily  roflumilast, 500 mcg, Oral, Daily  sodium chloride, 10 mL, Intravenous, Q12H  verapamil SR, 240 mg, Oral, Daily             Active Hospital Problems:  Active Hospital Problems    Diagnosis    • **Other secondary kyphosis, thoracolumbar region    • Right lower lobe pulmonary nodule      Plan:   S/P lumbar fusion/suspected post decompression  neuritis of the right leg  - Continue routine postoperative care. Drains discontinued, pain is well managed, off of her Dilaudid PCA  -  Doing great with PT/OT     COPD  - Currently on room air, continue monitor O2 saturation  - Continue Daliresp, Symbicort and Spiriva     Hypertension  - Continue verapamil and Bumex     Rheumatoid arthritis  H/O lupus and connective tissue disease  - Continue Plaquenil     Fibromyalgia  - Continue Neurontin and Flexeril     Anxiety/depression   - Continue Wellbutrin and BuSpar     DVT prophylaxis:  Medical and mechanical DVT prophylaxis orders are present.    CODE STATUS:    Level Of Support Discussed With: Patient  Code Status (Patient has no pulse and is not breathing): CPR (Attempt to Resuscitate)  Medical Interventions (Patient has pulse or is breathing): Full Support  Release to patient: Routine Release    Disposition: Doing great, wants to discharge home instead.  Primary service is working on her discharge arrangements and supportive care.  Hopefully she will discharge tomorrow.  We appreciate the consultation and will follow until she goes home.    Electronically signed by Jonathan Costa MD, 03/04/23, 12:15 EST.  [facility] Hospitalist Team

## 2023-03-04 NOTE — THERAPY TREATMENT NOTE
"Subjective: Pt agreeable to therapeutic plan of care. Pt wanting to go to OPT in [place] vs rehab    Objective:     Bed mobility - Supervision  Transfers - CGA and with rolling walker once TLSO donned  Ambulation - 100 feet CGA and with rolling walker with TLSO    Vitals: WNL    Pain: still c/o some spasms in RLE  Intervention for pain: Repositioned, RN notified, Increased Activity and Therapeutic Presence    Education: Provided education on the importance of mobility in the acute care setting, Verbal/Tactile Cues, Transfer Training, Gait Training and Post-Op Precautions    Assessment: Diya Arreola presents with functional mobility impairments which indicate the need for skilled intervention. Tolerating session today without incident. Pt req assist to don TLSO, demonstrated proper fit. Pt doing much better today and wants to go to OPT vs rehab.Will continue to follow and progress as tolerated.     Plan/Recommendations:   Low Intensity Therapy recommended post-acute care - This is recommended as therapy feels this patient would require 2-3 visits per week. OP or HH would be the best option depending on patient's home bound status. Consider, if the patient has other  \"skilled\" needs such as wounds, IV antibiotics, etc. Combined with \"low intensity\" could also equate to a SNF. If patient is medically complex, consider LTAC.. Pt requires no DME at discharge.     Pt desires Home with family assist and Outpatient therapy at discharge. Pt cooperative; agreeable to therapeutic recommendations and plan of care.         Basic Mobility 6-click:  Rollin = Total, A lot = 2, A little = 3; 4 = None  Supine>Sit:   1 = Total, A lot = 2, A little = 3; 4 = None   Sit>Stand with arms:  1 = Total, A lot = 2, A little = 3; 4 = None  Bed>Chair:   1 = Total, A lot = 2, A little = 3; 4 = None  Ambulate in room:  1 = Total, A lot = 2, A little = 3; 4 = None  3-5 Steps with railin = Total, A lot = 2, A little = 3; 4 = " None  Score: 19    Post-Tx Position: Up in Chair, Alarms activated and Call light and personal items within reach  PPE: gloves and surgical mask

## 2023-03-04 NOTE — PLAN OF CARE
Goal Outcome Evaluation:  Plan of Care Reviewed With: patient        Progress: improving  Outcome Evaluation: Pt is stable and abed. Ambulated in [place] with PT. Pain controlled with PO medication. Will continue to monitor.

## 2023-03-04 NOTE — CASE MANAGEMENT/SOCIAL WORK
Continued Stay Note   [facility]     Patient Name: Diya Arreola  MRN: [id number]  Today's Date: 3/4/2023    Admit Date: 2/28/2023    Plan: home with [facility] [place]- Order requested. family can transport on 3/5   Discharge Plan     Row Name 03/04/23 1542       Plan    Plan home with [facility] [place]- Order requested. family can transport on 3/5    Plan Comments CM notified that pt no longer wants to go to SNF.  Reports having all needed DME at home. has used [facility] out[facility] pt in [place] and desires to use again.  Spouse is out of town today and will be back home tomorrow to transport home. Requested outpatient PT order from md.  updated nursing.  text sent to page with [facility] infroming her of pt's decision.                Expected Discharge Date and Time     Expected Discharge Date Expected Discharge Time    Mar 5, 2023         Met with patient in room wearing PPE: mask..      Maintained distance greater than six feet and spent less than 15 minutes in the room.          Amy Baig RN

## 2023-03-04 NOTE — PROGRESS NOTES
Neurosurgical progress note:    Subjective:  Patient seen and examined this morning.  She is sitting up in the bed.  She is without obvious pain.  She comments that she is feeling well.  She comments that she has ambulated a significant amount even since yesterday.  She does not have specific complaints at this juncture.  She expresses desire to discharge home as opposed to going to a nursing home.    Objective:  GCS: 4 - 6 - 5 without any signs of cognitive deficits  Moving all extremities fully without any signs of new motor weakness      Assessment/plan:  This is a 62-year-old woman who is postoperative day #4 from a deformity correction surgery with resultant T9 to pelvis fusion.  Her drain has been removed.  She is off the PCA since yesterday evening.  She feels as though her pain is adequately controlled.  She has been ambulatory multiple times.  She was recommended to go to a nursing facility and this has been improved.  She is now expressing desire to go home.  I have asked her to speak with case management about this decision.  We would need to make sure that she had all of the necessary medical equipment at home as well as likely home health therapy.  If this can all be arranged today she may be able to be discharged today.  She is still pending scoliosis x-rays which need to be completed before discharge.  I encourage questions to be directed towards me if there are any issues.

## 2023-03-05 ENCOUNTER — READMISSION MANAGEMENT (OUTPATIENT)
Dept: CALL CENTER | Facility: HOSPITAL | Age: 63
End: 2023-03-05
Payer: MEDICARE

## 2023-03-05 VITALS
OXYGEN SATURATION: 90 % | WEIGHT: 132 LBS | SYSTOLIC BLOOD PRESSURE: 115 MMHG | HEART RATE: 90 BPM | DIASTOLIC BLOOD PRESSURE: 68 MMHG | BODY MASS INDEX: 24.29 KG/M2 | HEIGHT: 62 IN | TEMPERATURE: 98.1 F | RESPIRATION RATE: 18 BRPM

## 2023-03-05 PROCEDURE — 94761 N-INVAS EAR/PLS OXIMETRY MLT: CPT

## 2023-03-05 PROCEDURE — 94799 UNLISTED PULMONARY SVC/PX: CPT

## 2023-03-05 PROCEDURE — 94664 DEMO&/EVAL PT USE INHALER: CPT

## 2023-03-05 RX ORDER — CYCLOBENZAPRINE HCL 10 MG
10 TABLET ORAL 3 TIMES DAILY
Qty: 45 TABLET | Refills: 0 | Status: SHIPPED | OUTPATIENT
Start: 2023-03-05

## 2023-03-05 RX ORDER — DIAZEPAM 5 MG/1
5 TABLET ORAL EVERY 8 HOURS PRN
Qty: 45 TABLET | Refills: 0 | Status: SHIPPED | OUTPATIENT
Start: 2023-03-05 | End: 2023-03-07 | Stop reason: SDUPTHER

## 2023-03-05 RX ORDER — PSEUDOEPHEDRINE HCL 30 MG
100 TABLET ORAL 2 TIMES DAILY
Qty: 45 CAPSULE | Refills: 0 | Status: SHIPPED | OUTPATIENT
Start: 2023-03-05

## 2023-03-05 RX ORDER — HYDROCODONE BITARTRATE AND ACETAMINOPHEN 7.5; 325 MG/1; MG/1
2 TABLET ORAL EVERY 4 HOURS PRN
Qty: 40 TABLET | Refills: 0 | Status: SHIPPED | OUTPATIENT
Start: 2023-03-05 | End: 2023-03-05 | Stop reason: SDUPTHER

## 2023-03-05 RX ORDER — HYDROCODONE BITARTRATE AND ACETAMINOPHEN 7.5; 325 MG/1; MG/1
2 TABLET ORAL EVERY 6 HOURS PRN
Qty: 80 TABLET | Refills: 0 | Status: SHIPPED | OUTPATIENT
Start: 2023-03-05 | End: 2023-03-30

## 2023-03-05 RX ORDER — HYDROCODONE BITARTRATE AND ACETAMINOPHEN 7.5; 325 MG/1; MG/1
1 TABLET ORAL EVERY 4 HOURS PRN
Qty: 60 TABLET | Refills: 0 | Status: SHIPPED | OUTPATIENT
Start: 2023-03-05 | End: 2023-03-30

## 2023-03-05 RX ADMIN — Medication 2000 UNITS: at 06:31

## 2023-03-05 RX ADMIN — HYDROCODONE BITARTRATE AND ACETAMINOPHEN 2 TABLET: 7.5; 325 TABLET ORAL at 07:46

## 2023-03-05 RX ADMIN — FOLIC ACID 1 MG: 1 TABLET ORAL at 06:31

## 2023-03-05 RX ADMIN — GABAPENTIN 400 MG: 400 CAPSULE ORAL at 12:17

## 2023-03-05 RX ADMIN — DIAZEPAM 5 MG: 5 TABLET ORAL at 09:34

## 2023-03-05 RX ADMIN — CYCLOBENZAPRINE 10 MG: 10 TABLET, FILM COATED ORAL at 06:31

## 2023-03-05 RX ADMIN — BUPROPION HYDROCHLORIDE 150 MG: 150 TABLET, FILM COATED, EXTENDED RELEASE ORAL at 06:31

## 2023-03-05 RX ADMIN — HYDROCODONE BITARTRATE AND ACETAMINOPHEN 2 TABLET: 7.5; 325 TABLET ORAL at 03:53

## 2023-03-05 RX ADMIN — BREXPIPRAZOLE 0.5 MG: 0.5 TABLET ORAL at 07:43

## 2023-03-05 RX ADMIN — MULTIPLE VITAMINS W/ MINERALS TAB 1 TABLET: TAB at 06:30

## 2023-03-05 RX ADMIN — HYDROXYCHLOROQUINE SULFATE 200 MG: 200 TABLET ORAL at 06:30

## 2023-03-05 RX ADMIN — BUSPIRONE HYDROCHLORIDE 15 MG: 15 TABLET ORAL at 06:31

## 2023-03-05 RX ADMIN — ROFLUMILAST 500 MCG: 500 TABLET ORAL at 06:30

## 2023-03-05 RX ADMIN — BUDESONIDE AND FORMOTEROL FUMARATE DIHYDRATE 2 PUFF: 160; 4.5 AEROSOL RESPIRATORY (INHALATION) at 06:28

## 2023-03-05 RX ADMIN — BUMETANIDE 2 MG: 1 TABLET ORAL at 06:31

## 2023-03-05 RX ADMIN — DOCUSATE SODIUM 100 MG: 100 CAPSULE, LIQUID FILLED ORAL at 07:43

## 2023-03-05 RX ADMIN — VERAPAMIL HYDROCHLORIDE 240 MG: 120 TABLET, FILM COATED, EXTENDED RELEASE ORAL at 06:30

## 2023-03-05 RX ADMIN — HYDROCODONE BITARTRATE AND ACETAMINOPHEN 2 TABLET: 7.5; 325 TABLET ORAL at 12:17

## 2023-03-05 RX ADMIN — GABAPENTIN 400 MG: 400 CAPSULE ORAL at 06:30

## 2023-03-05 RX ADMIN — TIOTROPIUM BROMIDE INHALATION SPRAY 2 PUFF: 3.12 SPRAY, METERED RESPIRATORY (INHALATION) at 06:31

## 2023-03-05 NOTE — OUTREACH NOTE
Prep Survey    Flowsheet Row Responses   [facility] patient discharged from? [facility]   Is LACE score < 7 ? No   Eligibility [facility]   [facility]   Date of Admission 02/28/23   Date of Discharge 03/05/23   Discharge Disposition Home or Self Care   Discharge diagnosis L5-S1 posterior lumbar interbody fusion, Pelvic fixation with SI bone screw system   Does the patient have one of the following disease processes/diagnoses(primary or secondary)? General Surgery   Does the patient have Home health ordered? No   Is there a DME ordered? No   Prep survey completed? Yes          Judy LOPEZ - Registered Nurse

## 2023-03-05 NOTE — PLAN OF CARE
Problem: Adult Inpatient Plan of Care  Goal: Plan of Care Review  Outcome: Ongoing, Progressing  Goal: Patient-Specific Goal (Individualized)  Outcome: Ongoing, Progressing  Goal: Absence of Hospital-Acquired Illness or Injury  Outcome: Ongoing, Progressing  Intervention: Identify and Manage Fall Risk  Recent Flowsheet Documentation  Taken 3/5/2023 0044 by Adele Lin LPN  Safety Promotion/Fall Prevention: safety round/check completed  Taken 3/4/2023 2200 by Adele Lin LPN  Safety Promotion/Fall Prevention: safety round/check completed  Taken 3/4/2023 2030 by Adele Lin LPN  Safety Promotion/Fall Prevention: safety round/check completed  Intervention: Prevent Skin Injury  Recent Flowsheet Documentation  Taken 3/4/2023 2030 by Adele Lin LPN  Skin Protection:   adhesive use limited   tubing/devices free from skin contact  Intervention: Prevent and Manage VTE (Venous Thromboembolism) Risk  Recent Flowsheet Documentation  Taken 3/4/2023 2030 by Adele Lin LPN  VTE Prevention/Management:   bilateral   sequential compression devices on  Range of Motion: active ROM (range of motion) encouraged  Intervention: Prevent Infection  Recent Flowsheet Documentation  Taken 3/5/2023 0044 by Adele Lin LPN  Infection Prevention:   visitors restricted/screened   single patient room provided   rest/sleep promoted   personal protective equipment utilized   hand hygiene promoted  Taken 3/4/2023 2030 by Adele Lin LPN  Infection Prevention:   visitors restricted/screened   single patient room provided   rest/sleep promoted   personal protective equipment utilized   hand hygiene promoted  Goal: Optimal Comfort and Wellbeing  Outcome: Ongoing, Progressing  Intervention: Provide Person-Centered Care  Recent Flowsheet Documentation  Taken 3/4/2023 2030 by Adele Lin LPN  Trust Relationship/Rapport:   care explained   choices provided   questions answered   questions encouraged   reassurance provided  Goal: Readiness  for Transition of Care  Outcome: Ongoing, Progressing     Problem: Fall Injury Risk  Goal: Absence of Fall and Fall-Related Injury  Outcome: Ongoing, Progressing  Intervention: Identify and Manage Contributors  Recent Flowsheet Documentation  Taken 3/4/2023 2030 by Adele Lin LPN  Medication Review/Management: medications reviewed  Intervention: Promote Injury-Free Environment  Recent Flowsheet Documentation  Taken 3/5/2023 0044 by Adele Lin LPN  Safety Promotion/Fall Prevention: safety round/check completed  Taken 3/4/2023 2200 by Adele Lin LPN  Safety Promotion/Fall Prevention: safety round/check completed  Taken 3/4/2023 2030 by Adele Lin LPN  Safety Promotion/Fall Prevention: safety round/check completed     Problem: Behavioral Health Comorbidity  Goal: Maintenance of Behavioral Health Symptom Control  Outcome: Ongoing, Progressing  Intervention: Maintain Behavioral Health Symptom Control  Recent Flowsheet Documentation  Taken 3/4/2023 2030 by Adele Lin LPN  Medication Review/Management: medications reviewed     Problem: Obstructive Sleep Apnea Risk or Actual Comorbidity Management  Goal: Unobstructed Breathing During Sleep  Outcome: Ongoing, Progressing     Problem: Pain Chronic (Persistent) (Comorbidity Management)  Goal: Acceptable Pain Control and Functional Ability  Outcome: Ongoing, Progressing  Intervention: Manage Persistent Pain  Recent Flowsheet Documentation  Taken 3/4/2023 2030 by Adele Lin LPN  Bowel Elimination Promotion: adequate fluid intake promoted  Medication Review/Management: medications reviewed  Intervention: Optimize Psychosocial Wellbeing  Recent Flowsheet Documentation  Taken 3/4/2023 2030 by Adele Lin LPN  Supportive Measures: active listening utilized  Diversional Activities:   television   smartphone  Family/Support System Care:   support provided   self-care encouraged   Goal Outcome Evaluation:

## 2023-03-05 NOTE — PROGRESS NOTES
[facility] Medicine Services   Daily Progress Note    Patient Name: Diya Arreola  : 1960  MRN: [id number]  Primary Care Physician:  Mitzy Rea MD  Date of admission: 2023    Subjective      Admitted to neurosurgery for spinal surgery.  We are consulted for postoperative medical management.    Postoperative day 5, doing great today. Pain is well managed. Waiting to go home today. Discussed the case with the bedside nursing staff. No other acute concerns.    Objective      Vitals:   Temp:  [98.1 °F (36.7 °C)-98.6 °F (37 °C)] 98.1 °F (36.7 °C)  Heart Rate:  [90-97] 90  Resp:  [15-18] 18  BP: (115-136)/(68-76) 115/68    Physical Exam   GEN: WDWN, no acute distress  HEENT: NCAT, PERRLA, moist mucous membranes  NECK: Supple, midline trachea  CARD: RRR, no peripheral edema  PULM: CTAB, non-distressed  ABD: soft, NTND, normoactive bowel sounds throughout  SKIN: Warm, dry--surgical dressings look good  NEURO: Grossly intact, non-focal exam  PSYCH: Pleasant    Result Review    I have personally reviewed the results from the time of this admission to 3/5/2023 08:54 EST and agree with these findings:  [x]  Laboratory  [x]  Microbiology  [x]  Radiology  [x]  EKG/Telemetry   []  Cardiology/Vascular   []  Pathology  [x]  Old records  []  Other:    Wounds (last 24 hours)     LDA Wound     Row Name 23 0400 23 0044 23 2030       Wound 23 0824 lumbar spine Incision    Wound - Properties Group Placement Date: 23  -LT Placement Time: 824 Location: lumbar spine  -LT Primary Wound Type: Incision  -LT    Dressing Appearance open to air  -SM open to air  -SM open to air  -SM    Closure Liquid skin adhesive  -SM Liquid skin adhesive  -SM Liquid skin adhesive  -SM    Base clean;moist;other (see comments)  Pink drainage.  -SM dry;clean  -SM dry;clean  -SM    Retired Wound - Properties Group Placement Date: 23  -LT Placement Time: 824 Location: lumbar spine   -LT Primary Wound Type: Incision  -LT    Retired Wound - Properties Group Date first assessed: 02/28/23 -LT Time first assessed: 0824 -LT Location: lumbar spine  -LT Primary Wound Type: Incision  -LT       Wound 02/28/23 0824 thoracic spine Incision    Wound - Properties Group Placement Date: 02/28/23 -LT Placement Time: 0824 -LT Location: thoracic spine  -LT Primary Wound Type: Incision  -LT    Dressing Appearance open to air  -SM open to air  -SM open to air  -SM    Closure Open to air  -SM Open to air  -SM Open to air  -SM    Base moist  -SM dry;clean  -SM dry;clean  -SM    Retired Wound - Properties Group Placement Date: 02/28/23 -LT Placement Time: 0824 -LT Location: thoracic spine  -LT Primary Wound Type: Incision  -LT    Retired Wound - Properties Group Date first assessed: 02/28/23 -LT Time first assessed: 0824 -LT Location: thoracic spine  -LT Primary Wound Type: Incision  -LT          User Key  (r) = Recorded By, (t) = Taken By, (c) = Cosigned By    Initials Name Provider Type    LT Kyung Aguilar, RN Registered Nurse    Adele English LPN Licensed Nurse                  Assessment & Plan      Brexpiprazole, 0.5 mg, Oral, Daily  budesonide-formoterol, 2 puff, Inhalation, BID - RT   And  tiotropium bromide monohydrate, 2 puff, Inhalation, Daily - RT  bumetanide, 2 mg, Oral, BID  buPROPion XL, 150 mg, Oral, Daily  busPIRone, 15 mg, Oral, TID  cholecalciferol, 2,000 Units, Oral, Daily  cyclobenzaprine, 10 mg, Oral, TID  docusate sodium, 100 mg, Oral, BID  enoxaparin, 40 mg, Subcutaneous, Daily  folic acid, 1 mg, Oral, Q PM  gabapentin, 400 mg, Oral, 4x Daily  hydroxychloroquine, 200 mg, Oral, Daily  multivitamin with minerals, 1 tablet, Oral, Daily  roflumilast, 500 mcg, Oral, Daily  sodium chloride, 10 mL, Intravenous, Q12H  verapamil SR, 240 mg, Oral, Daily             Active Hospital Problems:  Active Hospital Problems    Diagnosis    • **Other secondary kyphosis, thoracolumbar region    • Right  lower lobe pulmonary nodule      Plan:   S/P lumbar fusion/suspected post decompression neuritis of the right leg  - Continue routine postoperative care. Drains discontinued, pain is well managed off the Dilaudid PCA  -  Doing great with PT/OT     COPD  - Currently on room air, continue monitor O2 saturation  - Continue Daliresp, Symbicort and Spiriva     Hypertension  - Continue verapamil and Bumex     Rheumatoid arthritis  H/O lupus and connective tissue disease  - Continue Plaquenil     Fibromyalgia  - Continue Neurontin and Flexeril     Anxiety/depression   - Continue Wellbutrin and BuSpar     DVT prophylaxis:  Medical and mechanical DVT prophylaxis orders are present.    CODE STATUS:    Level Of Support Discussed With: Patient  Code Status (Patient has no pulse and is not breathing): CPR (Attempt to Resuscitate)  Medical Interventions (Patient has pulse or is breathing): Full Support  Release to patient: Routine Release    Disposition: Doing great, should discharge home today with [facility].     Electronically signed by Jonathan Costa MD, 03/05/23, 08:54 EST.  [facility] Hospitalist Team

## 2023-03-05 NOTE — DISCHARGE PLACEMENT REQUEST
[facility] SURGICAL INPATIENT  [address] [place] [address]  Phone:  [phone]  Fax:  [phone] Date: Mar 4, 2023      Ambulatory Referral to Physical Therapy POST OP     Patient:  Diya Arreola MRN:  [id number]   [address]  [place] [address] :  1960  SSN:    Phone: [phone] Sex:  F      INSURANCE PAYOR PLAN GROUP # SUBSCRIBER ID   Primary:  Secondary:    ANTHEM MEDICARE REPLACEMENT  [place] [id number]  [id number]    [id number]  [id number]      Referring Provider Information:  YASMANY PALACIO Phone: [phone] Fax: [phone]       Referral Information:   # Visits:  1 Referral Type: Physical Therapy [AE1]   Urgency:  Routine Referral Reason: Specialty Services Required   Start Date: Mar 4, 2023 End Date:  To be determined by Insurer   Diagnosis: Other secondary kyphosis, thoracolumbar region (M40.15 [ICD-10-CM] 737.41 [ICD-9-CM])      Refer to Dept:   Refer to Provider:   Refer to Provider Phone:   Refer to Facility:       Specialty needed: POST OP  Follow-up needed: Yes     This document serves as a request of services and does not constitute Insurance authorization or approval of services.  To determine eligibility, please contact the members Insurance carrier to verify and review coverage.     If you have medical questions regarding this request for services. Please contact [facility] SURGICAL INPATIENT at [phone] during normal business hours.        Authorizing Provider:Yasmany Palacio MD  Authorizing Provider's NPI: [id number]  Order Entered By: Yasmany Palacio MD 3/4/2023  2:26 PM     Electronically signed by: Yasmany Palacio MD 3/4/2023  2:26 PM          Diya Arreola (62 y.o. Female)     Date of Birth   1960    Social Security Number       Address   [place] [address]    Home Phone   [phone]    MRN   [id number]       Latter-day   Sabianism    Marital Status  "                              Admission Date   2/28/23    Admission Type   Elective    Admitting Provider   Jerrell Gorman IV, MD    Attending Provider   Jerrell Gorman IV, MD    Department, Room/Bed   [facility] SURGICAL INPATIENT, 4119/1       Discharge Date       Discharge Disposition   Home or Self Care    Discharge Destination                               Attending Provider: Jerrell Gorman IV, MD    Allergies: Adhesive Tape, Cephalexin, Ciprofloxacin, Corticosteroids, Latex, Other, Penciclovir, Penicillin G, Sulfa Antibiotics, Oxycodone, Azithromycin    Isolation: None   Infection: None   Code Status: CPR    Ht: 157.5 cm (62\")   Wt: 59.9 kg (132 lb)    Admission Cmt: None   Principal Problem: Other secondary kyphosis, thoracolumbar region [M40.15]                 Active Insurance as of 2/28/2023     Primary Coverage     Payor Plan Insurance Group Employer/Plan Group    ANTHEM MEDICARE REPLACEMENT ANTHEM MEDICARE ADVANTAGE [id number]     Payor Plan Address Payor Plan Phone Number Payor Plan Fax Number Effective Dates    [address] [phone]  9/1/2021 - None Entered    [facility] [address]       Subscriber Name Subscriber Birth Date Member ID       DIYA BUTLER 1960 [id number]           Secondary Coverage     Payor Plan Insurance Group Employer/Plan Group    [place]      Payor Plan Address Payor Plan Phone Number Payor Plan Fax Number Effective Dates    [address]   9/2/2021 - None Entered    [place] [address]       Subscriber Name Subscriber Birth Date Member ID       DIYA BUTLER 1960 [id number]                 Emergency Contacts      (Rel.) Home Phone Work Phone Mobile Phone    ASHLEY BUTLER (Spouse) [phone] -- [phone]               History & Physical      Jerrell Gorman IV, MD at 02/28/23 0731        History of Present Illness: Diya Butler is a 62 y.o. female  status post TLIF for adjacent segment disease with " pseudoarthrosis and kyphotic deformity.  Patient continues to have severe back buttock and thigh pain throughout the day.  This has progressively worsened over the course of time.  She is now            Previous Treatment: Physical therapy     The following portions of the patient's history were reviewed and updated as appropriate: allergies, current medications, past family history, past medical history, past social history, past surgical history and problem list.     Review of Systems   Constitutional: Positive for activity change.   HENT: Negative.    Eyes: Negative.    Respiratory: Negative.    Cardiovascular: Negative.    Gastrointestinal: Negative.    Endocrine: Negative.    Genitourinary: Negative.    Musculoskeletal: Negative.    Skin: Negative.    Allergic/Immunologic: Negative.    Neurological: Positive for weakness (mild in left leg).   Hematological: Negative.    Psychiatric/Behavioral: Positive for sleep disturbance.               Objective         Neurologic Exam  Awake and alert  Moving all extremities      Assessment & Plan     Independent Review of Radiographic Studies:      I personally reviewed and interpreted the images from the following studies.     CT lumbar spine: Pseudoarthrosis of the L4-5 level with loosening of L5 screws.     Scoliosis x-ray: Patient is 12 cm positive on SVA and balanced in the coronal plane.  There is a very large PI to LL mismatch     Medical Decision Making:       Diya Arreola is a 62 y.o. 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  Imaging demonstrates kyphotic deformity.  In order to address the patient's pseudoarthrosis and deformity, she will require surgical intervention.  We will proceed with L5-S1 TLIF with sheldon osteotomies T11-L2 and L4-5 at the level of the pseudoarthrosis with correction of deformity.  Patient understands the risks of surgery as well as the  increased risk of perioperative complications due to medical comorbidities including COPD, osteopenia, autoimmune dysfunction including Sjorgen syndrome among other medical comorbidities and she has agreed to undergo the procedure       Electronically signed by Jerrell Gorman IV, MD at 23 0706          Physician Progress Notes (last 24 hours)      Jonathan Costa MD at 23 1215              [facility] Medicine Services   Daily Progress Note    Patient Name: Diya Arreola  : 1960  MRN: [id number]  Primary Care Physician:  Mitzy Rea MD  Date of admission: 2023    Subjective       Admitted to neurosurgery for spinal surgery.  We are consulted for postoperative medical management.    Postoperative day 4, doing great today.  Off her PCA, pain in her leg is all but gone.  She has been up and around quite a bit today.  She actually wants to discharge home instead of to rehab even though her pre-CERT went through yesterday afternoon.  Primary surgical service is okay with that.  They are working to get things arranged for her with home health and to ensure she has good follow-up.  Hoping for her to discharge tomorrow.  Discussed the case with the bedside nursing staff. No other acute concerns.    Objective       Vitals:   Temp:  [98.2 °F (36.8 °C)-98.3 °F (36.8 °C)] 98.3 °F (36.8 °C)  Heart Rate:  [86-95] 95  Resp:  [14-16] 16  BP: (129-133)/(72-74) 129/74    Physical Exam   GEN: WDWN, no acute distress  HEENT: NCAT, PERRLA, moist mucous membranes  NECK: Supple, midline trachea  CARD: RRR, no peripheral edema  PULM: CTAB, non-distressed  ABD: soft, NTND, normoactive bowel sounds throughout  SKIN: Warm, dry--surgical dressings look good  NEURO: Grossly intact, non-focal exam  PSYCH: Pleasant    Result Review    I have personally reviewed the results from the time of this admission to 3/4/2023 12:15 EST and agree with these findings:  [x]  Laboratory  [x]  Microbiology  [x]   Radiology  [x]  EKG/Telemetry   []  Cardiology/Vascular   []  Pathology  [x]  Old records  []  Other:    Wounds (last 24 hours)     LDA Wound     Row Name 03/04/23 0805 03/04/23 0400 03/04/23 0047       Wound 02/28/23 0824 lumbar spine Incision    Wound - Properties Group Placement Date: 02/28/23 -LT Placement Time: 0824 -LT Location: lumbar spine  -LT Primary Wound Type: Incision  -LT    Dressing Appearance open to air  -XC open to air  -SM open to air  -SM    Closure Liquid skin adhesive  -XC Liquid skin adhesive  -SM Liquid skin adhesive  -SM    Base dry;clean  -XC dry;clean  -SM dry;clean  -SM    Retired Wound - Properties Group Placement Date: 02/28/23  -LT Placement Time: 0824 -LT Location: lumbar spine  -LT Primary Wound Type: Incision  -LT    Retired Wound - Properties Group Date first assessed: 02/28/23  -LT Time first assessed: 0824 -LT Location: lumbar spine  -LT Primary Wound Type: Incision  -LT       Wound 02/28/23 0824 thoracic spine Incision    Wound - Properties Group Placement Date: 02/28/23  -LT Placement Time: 0824 -LT Location: thoracic spine  -LT Primary Wound Type: Incision  -LT    Dressing Appearance open to air  -XC open to air  -SM open to air  -SM    Closure Open to air  -XC Open to air  -SM Open to air  -SM    Base dry;clean  -XC dry;clean  -SM dry;clean  -SM    Retired Wound - Properties Group Placement Date: 02/28/23 -LT Placement Time: 0824 -LT Location: thoracic spine  -LT Primary Wound Type: Incision  -LT    Retired Wound - Properties Group Date first assessed: 02/28/23  -LT Time first assessed: 0824 -LT Location: thoracic spine  -LT Primary Wound Type: Incision  -LT    Row Name 03/03/23 2010 03/03/23 1600          Wound 02/28/23 0824 lumbar spine Incision    Wound - Properties Group Placement Date: 02/28/23  -LT Placement Time: 0824 -LT Location: lumbar spine  -LT Primary Wound Type: Incision  -LT    Dressing Appearance open to air  -SM open to air  -EH     Closure Liquid  skin adhesive  -SM Liquid skin adhesive  -EH     Base dry;clean  -SM dry;clean  -EH     Dressing Care open to air  -SM --     Retired Wound - Properties Group Placement Date: 02/28/23 -LT Placement Time: 0824 -LT Location: lumbar spine  -LT Primary Wound Type: Incision  -LT    Retired Wound - Properties Group Date first assessed: 02/28/23 -LT Time first assessed: 0824 -LT Location: lumbar spine  -LT Primary Wound Type: Incision  -LT       Wound 02/28/23 0824 thoracic spine Incision    Wound - Properties Group Placement Date: 02/28/23 -LT Placement Time: 0824 -LT Location: thoracic spine  -LT Primary Wound Type: Incision  -LT    Dressing Appearance open to air  -SM open to air  -EH     Closure Open to air  -SM Open to air  -EH     Base dry;clean  -SM dry;clean  -EH     Drainage Amount none  -SM --     Retired Wound - Properties Group Placement Date: 02/28/23 -LT Placement Time: 0824 -LT Location: thoracic spine  -LT Primary Wound Type: Incision  -LT    Retired Wound - Properties Group Date first assessed: 02/28/23 -LT Time first assessed: 0824 -LT Location: thoracic spine  -LT Primary Wound Type: Incision  -LT          User Key  (r) = Recorded By, (t) = Taken By, (c) = Cosigned By    Initials Name Provider Type    LT Kyung Aguilar, RN Registered Nurse    Ronel Ramirez RN Registered Nurse    Adele English LPN Licensed Nurse    Tony Brown RN Registered Nurse                  Assessment & Plan      Brexpiprazole, 0.5 mg, Oral, Daily  budesonide-formoterol, 2 puff, Inhalation, BID - RT   And  tiotropium bromide monohydrate, 2 puff, Inhalation, Daily - RT  bumetanide, 2 mg, Oral, BID  buPROPion XL, 150 mg, Oral, Daily  busPIRone, 15 mg, Oral, TID  cholecalciferol, 2,000 Units, Oral, Daily  cyclobenzaprine, 10 mg, Oral, TID  docusate sodium, 100 mg, Oral, BID  enoxaparin, 40 mg, Subcutaneous, Daily  folic acid, 1 mg, Oral, Q PM  gabapentin, 400 mg, Oral, 4x Daily  hydroxychloroquine, 200 mg, Oral,  Daily  multivitamin with minerals, 1 tablet, Oral, Daily  roflumilast, 500 mcg, Oral, Daily  sodium chloride, 10 mL, Intravenous, Q12H  verapamil SR, 240 mg, Oral, Daily             Active Hospital Problems:  Active Hospital Problems    Diagnosis    • **Other secondary kyphosis, thoracolumbar region    • Right lower lobe pulmonary nodule      Plan:   S/P lumbar fusion/suspected post decompression neuritis of the right leg  - Continue routine postoperative care. Drains discontinued, pain is well managed, off of her Dilaudid PCA  -  Doing great with PT/OT     COPD  - Currently on room air, continue monitor O2 saturation  - Continue Daliresp, Symbicort and Spiriva     Hypertension  - Continue verapamil and Bumex     Rheumatoid arthritis  H/O lupus and connective tissue disease  - Continue Plaquenil     Fibromyalgia  - Continue Neurontin and Flexeril     Anxiety/depression   - Continue Wellbutrin and BuSpar     DVT prophylaxis:  Medical and mechanical DVT prophylaxis orders are present.    CODE STATUS:    Level Of Support Discussed With: Patient  Code Status (Patient has no pulse and is not breathing): CPR (Attempt to Resuscitate)  Medical Interventions (Patient has pulse or is breathing): Full Support  Release to patient: Routine Release    Disposition: Doing great, wants to discharge home instead.  Primary service is working on her discharge arrangements and supportive care.  Hopefully she will discharge tomorrow.  We appreciate the consultation and will follow until she goes home.    Electronically signed by Jonathan Costa MD, 03/04/23, 12:15 EST.  [facility] Hospitalist Team             Electronically signed by Jonathan Costa MD at 03/04/23 5479          Physical Therapy Notes (last 48 hours)      Nae Delcid, PTA at 03/04/23 5220  Version 1 of 1       Subjective: Pt agreeable to therapeutic plan of care. Pt wanting to go to OPT in [place] vs rehab    Objective:     Bed mobility -  "Supervision  Transfers - CGA and with rolling walker once TLSO donned  Ambulation - 100 feet CGA and with rolling walker with TLSO    Vitals: WNL    Pain: still c/o some spasms in RLE  Intervention for pain: Repositioned, RN notified, Increased Activity and Therapeutic Presence    Education: Provided education on the importance of mobility in the acute care setting, Verbal/Tactile Cues, Transfer Training, Gait Training and Post-Op Precautions    Assessment: Diya Arreola presents with functional mobility impairments which indicate the need for skilled intervention. Tolerating session today without incident. Pt req assist to don TLSO, demonstrated proper fit. Pt doing much better today and wants to go to OPT vs rehab.Will continue to follow and progress as tolerated.     Plan/Recommendations:   Low Intensity Therapy recommended post-acute care - This is recommended as therapy feels this patient would require 2-3 visits per week. OP or HH would be the best option depending on patient's home bound status. Consider, if the patient has other  \"skilled\" needs such as wounds, IV antibiotics, etc. Combined with \"low intensity\" could also equate to a SNF. If patient is medically complex, consider LTAC.. Pt requires no DME at discharge.     Pt desires Home with family assist and Outpatient therapy at discharge. Pt cooperative; agreeable to therapeutic recommendations and plan of care.         Basic Mobility 6-click:  Rollin = Total, A lot = 2, A little = 3; 4 = None  Supine>Sit:   1 = Total, A lot = 2, A little = 3; 4 = None   Sit>Stand with arms:  1 = Total, A lot = 2, A little = 3; 4 = None  Bed>Chair:   1 = Total, A lot = 2, A little = 3; 4 = None  Ambulate in room:  1 = Total, A lot = 2, A little = 3; 4 = None  3-5 Steps with railin = Total, A lot = 2, A little = 3; 4 = None  Score: 19    Post-Tx Position: Up in Chair, Alarms activated and Call light and personal items within reach  PPE: gloves and " surgical mask    Electronically signed by Nae Delcid PTA at 03/04/23 1720     Nae Delcid PTA at 03/04/23 0855  Version 1 of 1       Assessment: Diya Arreola presents with functional mobility impairments which indicate the need for skilled intervention. Tolerating session today without incident. Pt req assist to don TLSO, demonstrated proper fit. Pt doing much better today and wants to go to OPT vs rehab.Will continue to follow and progress as tolerated.       Electronically signed by Nae Delcid PTA at 03/04/23 1720       Occupational Therapy Notes (last 48 hours)  Notes from 03/03/23 1138 through 03/05/23 1138   No notes exist for this encounter.

## 2023-03-05 NOTE — DISCHARGE INSTRUCTIONS
[facility]   [address]  [address]  [place]  [address]   P: [phone]  F: [phone]      Jerrell Gorman MD      INSTRUCTION & CARE AFTER YOUR POSTERIOR APPROACH LUMBAR FUSION    No lifting anything heavier than a gallon of milk    No driving for one week. We recommend that you limit riding in a car because of the risk of an accident. If you are a passenger, wear your seatbelt.    You may bend over or reach over your head as long as you don't lift anything heavy. Avoid harsh movements around your lower back to include twisting and bending.     Walk as much as possible. Laying around or sitting around day puts extra stress on the hardware. Change your position every two hours.     Remove the bandage on the second day after surgery and leave the incision open to air. If you notice any redness, swelling or drainage, call the office. You may have some mild irritation from the brace or rubbing from your clothes, this is normal. You may cover with a padded dressing to prevent this irritation but change this twice a day and when it gets wet.    You may shower 48 hours after surgery. Don't let the water beat directly on the incision. Gently pat the incision with mild soap and water, pat dry. Do not submerge in water, to include hot pools, tubs, pools, lakes, ocean x 4 weeks.    Call as soon as possible after leaving the hospital to schedule an appointment in two weeks.    Your prescription for pain medication may be refilled on your follow up visit. Due to changes in Federal Law in order to have this medication refilled you must contact the office four days prior to the due date and make arrangements to pick the prescription up in the office.    Don't be alarmed if you experience some of your pre-operative symptoms after going home. This is not uncommon and normally goes away in a few days but may last longer. Pain, aching and stiffness in your back and down your legs is common. If you  have any questions or concerns don't hesitate to call the office.    Wear your back brace when you are out of bed at greater than 45 degrees for more than 15 minutes    Do NOT take any anti-inflammatory medications, to include Aleve, Ibuprofen, Mobic, Celebrex, Naproxen, Aspirin.     You may take Tylenol (acetaminophen) but use this sparingly since your pain medication also contains acetaminophen.     Constipation is common after surgery. Your bowels are slow due to anesthesia, opioid pain medications and lack of movement. We do not want you to strain to use the restroom. Use a mild stool softener or laxative as needed. Drink plenty of liquids to include water and juice.     Nausea is common with pain medications. To reduce this chance, do not take your medication on an empty stomach.    Thank you

## 2023-03-05 NOTE — CASE MANAGEMENT/SOCIAL WORK
Continued Stay Note  [facility]     Patient Name: Diya Arreola  MRN: [id number]  Today's Date: 3/5/2023    Admit Date: 2/28/2023    Plan: home with op [facility] in [place].   Discharge Plan     Row Name 03/05/23 1142       Plan    Plan home with op [facility] in [place].    Plan Comments op pt order obtained.  referral sent via adhoc to [facility] in [facility] fax [phone]                Expected Discharge Date and Time     Expected Discharge Date Expected Discharge Time    Mar 5, 2023       Phone communication or documentation only - no physical contact with patient or family.        Amy Baig RN

## 2023-03-05 NOTE — DISCHARGE SUMMARY
Discharge summary:    Patient presented to the hospital with thoracic and lumbar deformity and underwent deformity correction surgery.  She was managed postoperatively.  Her pain was adequately controlled.  She was deemed to be appropriate for discharge home in a safe fashion.  She expresses desire to be discharged.  Medications were properly ordered.  She will follow-up in approximately 2 to 3 weeks.

## 2023-03-06 ENCOUNTER — TRANSITIONAL CARE MANAGEMENT TELEPHONE ENCOUNTER (OUTPATIENT)
Dept: CALL CENTER | Facility: HOSPITAL | Age: 63
End: 2023-03-06
Payer: MEDICARE

## 2023-03-06 ENCOUNTER — TELEPHONE (OUTPATIENT)
Dept: PAIN MEDICINE | Facility: CLINIC | Age: 63
End: 2023-03-06

## 2023-03-06 NOTE — TELEPHONE ENCOUNTER
Neurosurgery as prescribed hydrocodone for her for postop.  She just needs to pick it up from the pharmacy.  She needs to call [facility] pharmacy and get it filled

## 2023-03-06 NOTE — OUTREACH NOTE
Call Center TCM Note    Flowsheet Row Responses   [facility] patient discharged from? [facility]   Does the patient have one of the following disease processes/diagnoses(primary or secondary)? General Surgery   TCM attempt successful? Yes   Call start time 1214   Call end time 1226   Discharge diagnosis L5-S1 posterior lumbar interbody fusion, Pelvic fixation with SI bone screw system   Person spoke with today (if not patient) and relationship spouse   Meds reviewed with patient/caregiver? Yes   Is the patient having any side effects they believe may be caused by any medication additions or changes? No   Does the patient have all medications related to this admission filled (includes all antibiotics, pain medications, etc.) Yes   Is the patient taking all medications as directed (includes completed medication regime)? Yes   Comments Post-Op 3/15/2023 10:00 AM,  Pain mgmt  3/30/2023 10:50 AM    Does the patient have an appointment with their PCP within 7 days of discharge? No   Nursing Interventions Patient declined scheduling/rescheduling appointment at this time, Routed TCM call to PCP office   Psychosocial issues? No   Did the patient receive a copy of their discharge instructions? Yes   Nursing interventions Reviewed instructions with patient   What is the patient's perception of their health status since discharge? Improving   Nursing interventions Nurse provided patient education   Is the patient /caregiver able to teach back basic post-op care? Take showers only when approved by MD-sponge bathe until then, No tub bath, swimming, or hot tub until instructed by MD, Keep incision areas clean,dry and protected, Lifting as instructed by MD in discharge instructions, Continue use of incentive spirometry at least 1 week post discharge   Is the patient/caregiver able to teach back signs and symptoms of incisional infection? Fever, Pus or odor from incision, Incisional warmth, Increased drainage or bleeding, Increased  redness, swelling or pain at the incisonal site   Is the patient/caregiver able to teach back steps to recovery at home? Rest and rebuild strength, gradually increase activity, Eat a well-balance diet   If the patient is a current smoker, are they able to teach back resources for cessation? Not a smoker   Is the patient/caregiver able to teach back the hierarchy of who to call/visit for symptoms/problems? PCP, Specialist, Home health nurse, Urgent Care, ED, 911 Yes   TCM call completed? Yes   Wrap up additional comments Spouse states pt is doing better, and denies fever, or increased redness, swelling, drainage at incisional site. Reviewed AVS/medications/instructions with spouse. Spouse verified post-op appt on 3/15/23, and pain mgmt on 3/30/23. Spouse is requesting a televisit [facility] appt for pt,  RN will send message to office for televisit appt.   Call end time 1226   Would this patient benefit from a Referral to [facility] Social Work? No   Is the patient interested in additional calls from an ambulatory ?  NOTE:  applies to high risk patients requiring additional follow-up. No          Nandini Ashley RN    3/6/2023, 12:29 EST

## 2023-03-06 NOTE — TELEPHONE ENCOUNTER
3/6/23-- Dr GUADALUPE  Spoke to  pts -- pt was d/c yesterday 3/5 from hospital  On her back surgery with Dr Gorman---  He gave orders to take her Hydrocodone acet 7.5-325  2 tabs every 4 hrs on her discharge summary-- she last got them filled on 3/1-- which she will run out now on 3/15--  Due to the increase after surgery---   He also sent out for Valium to be mailed to her home to take  One every eigth  Hours-- ( is it ok that she takes that, since Dr Gorman  rxed it-- has not started at home on it  Yet, did have it when she was in the hospital)-- she has ov with you on 3/30 keep that appt??-- will you refill her meds sooner or do you  Need to see her sooner due to running out of meds on 3/15 ? Do you want her to take the Valium too-- Pt call back Number is [phone]

## 2023-03-06 NOTE — TELEPHONE ENCOUNTER
PATIENTS  CALLED AND STATES WHERE THE VALIUM WAS SENT THEY WILL NOT DELIVER IT TO THE PATIENT AND STATES THE RX NEEDS TO BE SENT TO [facility] IN [place], [place]    PLEASE CALL WITH AN UPDATE  THANK  YOU

## 2023-03-06 NOTE — CASE MANAGEMENT/SOCIAL WORK
Case Management Discharge Note      Final Note: HOME    Provided Post Acute Provider List?: Yes  Post Acute Provider List: Nursing Home, Inpatient Rehab  Delivered To: Patient  Method of Delivery: In person    Selected Continued Care - Discharged on 3/5/2023 Admission date: 2/28/2023 - Discharge disposition: Home or Self Care                Transportation Services  Private: Car    Final Discharge Disposition Code: 01 - home or self-care

## 2023-03-06 NOTE — TELEPHONE ENCOUNTER
Provider: BRUNO MURRAY    Caller: ABDOULAYE BUTLER    Relationship to Patient: SELF      Phone Number: [phone]    Reason for Call: PATIENT NEEDS TO SPEAK WITH CLINICAL STAFF REGARDING HER PAIN MEDICATION SINCE SHE HAS BEEN DISCHARGED FROM THE HOSPITAL.  ALSO REQ TO SPEAK TO CLINICAL STAFF REGARDING PATIENT TAKING OTHER PRESCRIBED MEDICATION FROM THE HOSPITAL.  PLEASE CONTACT PATIENT ON HER SPOUSES PHONE- PATIENT STATES HER PHONE HAS BEEN STOLEN.  SPOUSES NUMBER IS LISTED ABOVE.

## 2023-03-07 ENCOUNTER — TELEPHONE (OUTPATIENT)
Dept: NEUROSURGERY | Facility: CLINIC | Age: 63
End: 2023-03-07
Payer: MEDICARE

## 2023-03-07 DIAGNOSIS — M40.15 OTHER SECONDARY KYPHOSIS, THORACOLUMBAR REGION: ICD-10-CM

## 2023-03-07 RX ORDER — DIAZEPAM 5 MG/1
5 TABLET ORAL EVERY 8 HOURS PRN
Qty: 45 TABLET | Refills: 0 | Status: SHIPPED | OUTPATIENT
Start: 2023-03-07 | End: 2023-03-17

## 2023-03-07 NOTE — TELEPHONE ENCOUNTER
Caller: CARRIE,ASHLEY    Relationship: Emergency Contact    Best call back number: [phone]    Who are you requesting to speak with (clinical staff, provider,  specific staff member): CLINICAL    Do you know the name of the person who called: ASHLEY    What was the call regarding: MED SENT TO WRONG PHARMACY     Do you require a callback: YES     PATIENT'S  CALLED AND STATES VALIUM WAS SENT TO THE WRONG PHARMACY.  HE STATES THE RX NEEDS TO BE SENT TO [facility] IN [place], [place]    THEY CALLED YESTERDAY BUT THE ENCOUNTER WAS SENT TO THE WRONG DEPARTMENT.  ATTEMPTED WT BUT SUJATHA SAID RAMU WAS IN ZOOM MEETING.     PLEASE CALL WITH AN UPDATE  THANK  YOU

## 2023-03-14 NOTE — PROGRESS NOTES
Subjective     Chief Complaint   Patient presents with   • Post-op         Previous Treatment: Lumbar 5 to sacral 1 transforaminal lumbar interbody fusion; thoracic 10 to lumbar 2 and lumbar 4-5 sheldon osteotomies; T9 to pelvic fusion with neuro robot, hardware removal    HPI: Diya Arreola is a 62 y.o. female being seen for 2-week postoperative follow-up after undergoing the above procedure with Dr. Gorman on 2/28/2023.  Patient reports continued right leg pain without much improvement since surgery.  Her postoperative low back pain has improved.  She has been wearing her TLSO brace as instructed.  She denies chest pain, SOA, fever, chills, bowel/bladder dysfunction, saddle anesthesia, and lower extremity weakness.  She states her surgical incision has not had any issues.        PMH:  Past Medical History:   Diagnosis Date   • Allergic    • Anxiety    • Arthritis    • Asthma    • Constipation    • COPD (chronic obstructive pulmonary disease) (Formerly [facility])    • Depression    • Fall    • Fibromyalgia, primary    • Gastritis    • History of pancreatitis    • Insomnia    • Low back pain 11/2021   • Lupus (Formerly [facility])    • Neuropathy    • Osteopenia    • Palpitations    • Peripheral edema    • PONV (postoperative nausea and vomiting)    • Rheumatoid arthritis (Formerly [facility])    • Right lower lobe pulmonary nodule 3/1/2023   • Scoliosis    • Sjogren's syndrome (Formerly [facility])    • Tachycardia    • TIA (transient ischemic attack) 09/2014         Current Outpatient Medications:   •  albuterol (PROVENTIL) (2.5 MG/3ML) 0.083% nebulizer solution, Take 2.5 mg by nebulization Every 4 (Four) Hours As Needed for Wheezing., Disp: 360 each, Rfl: 3  •  Brexpiprazole (Rexulti) 0.5 MG tablet, Take 0.5 mg by mouth Daily., Disp: 90 tablet, Rfl: 3  •  bumetanide (BUMEX) 2 MG tablet, Take 1 tablet by mouth 2 (Two) Times a Day., Disp: 180 tablet, Rfl: 3  •  buPROPion XL (WELLBUTRIN XL) 150 MG 24 hr tablet, Take 1 tablet by mouth Daily., Disp: 90 tablet, Rfl: 3  •   busPIRone (BUSPAR) 15 MG tablet, TAKE 1 TABLET BY MOUTH THREE TIMES A DAY AS DIRECTED FOR 30 DAYS (Patient taking differently: Take 1 tablet by mouth 3 (Three) Times a Day. Take preop), Disp: 90 tablet, Rfl: 3  •  Cholecalciferol (VITAMIN D) 2000 units capsule, Take 1 capsule by mouth Daily. Not good at taking   ld 2/21, Disp: , Rfl:   •  cyclobenzaprine (FLEXERIL) 10 MG tablet, Take 1 tablet by mouth 3 (Three) Times a Day As Needed for Muscle Spasms., Disp: 270 tablet, Rfl: 1  •  cyclobenzaprine (FLEXERIL) 10 MG tablet, Take 1 tablet by mouth 3 (Three) Times a Day., Disp: 45 tablet, Rfl: 0  •  diphenhydrAMINE HCl, Sleep, (SLEEP-AID PO), Take 50 mg by mouth Every Night., Disp: , Rfl:   •  docusate sodium 100 MG capsule, Take 1 capsule by mouth 2 (Two) Times a Day., Disp: 45 capsule, Rfl: 0  •  EPINEPHrine (EPIPEN) 0.3 MG/0.3ML solution auto-injector injection, INJECT 0.3 ML UNDER THE SKIN INTO THE APPROPRIATE AREA AS DIRECTED 1 (ONE) TIME FOR 1 DOSE., Disp: 2 each, Rfl: 1  •  Fluticasone-Umeclidin-Vilant (Trelegy Ellipta) 200-62.5-25 MCG/INH inhaler, Inhale 1 puff Daily., Disp: 90 each, Rfl: 3  •  folic acid (FOLVITE) 1 MG tablet, Take 1 tablet by mouth Every Evening. Ld 2/21, Disp: , Rfl:   •  gabapentin (NEURONTIN) 400 MG capsule, Take 1 capsule by mouth 4 (Four) Times a Day., Disp: 120 capsule, Rfl: 0  •  HYDROcodone-acetaminophen (NORCO) 7.5-325 MG per tablet, Take 2 tablets by mouth Every 6 (Six) Hours As Needed for Moderate Pain., Disp: 80 tablet, Rfl: 0  •  HYDROcodone-acetaminophen (NORCO) 7.5-325 MG per tablet, Take 1 tablet by mouth Every 4 (Four) Hours As Needed for Moderate Pain. Take 1-2 tablets every 4 hours as needed for moderate pain., Disp: 60 tablet, Rfl: 0  •  hydroxychloroquine (PLAQUENIL) 200 MG tablet, Take 1 tablet by mouth Daily for 180 days. Indications: Rheumatoid Arthritis, Disp: 90 tablet, Rfl: 1  •  multivitamin with minerals tablet tablet, Take 1 tablet by mouth Daily. Ld 2/21, Disp: ,  Rfl:   •  nitroglycerin (NITROSTAT) 0.4 MG SL tablet, Place 1 tablet under the tongue Every 5 (Five) Minutes As Needed for Chest Pain. Take no more than 3 doses in 15 minutes., Disp: 25 tablet, Rfl: 1  •  polyethylene glycol (MIRALAX) 17 g packet, Take 17 g by mouth As Needed. None preop, Disp: , Rfl:   •  roflumilast (Daliresp) 500 MCG tablet tablet, Take 1 tablet by mouth Daily., Disp: 90 tablet, Rfl: 3  •  traZODone (DESYREL) 100 MG tablet, TAKE 1 TABLET BY MOUTH EVERYDAY AT BEDTIME (Patient taking differently: Take 1 tablet by mouth Every Night.), Disp: 90 tablet, Rfl: 3  •  varenicline (CHANTIX) 1 MG tablet, TAKE 1 TABLET BY MOUTH TWICE A DAY, Disp: 180 tablet, Rfl: 1  •  verapamil SR (CALAN-SR) 240 MG CR tablet, Take 1 tablet by mouth Daily., Disp: 90 tablet, Rfl: 3  •  Wheat Dextrin (Benefiber) powder, Take  by mouth At Night As Needed., Disp: , Rfl:      Allergies   Allergen Reactions   • Adhesive Tape Rash     Paper tape ok   • Cephalexin Rash   • Ciprofloxacin Rash   • Corticosteroids Unknown (See Comments)     Nerve burning    • Latex Rash   • Other Unknown (See Comments)     Oline Abx and [place] soap   • Penciclovir Anaphylaxis   • Penicillin G Anaphylaxis   • Sulfa Antibiotics Anaphylaxis   • Oxycodone Delirium and Irritability   • Azithromycin Rash        Past Surgical History:   Procedure Laterality Date   • BACK SURGERY      lumbar fusion 21- robotic surgery   • CARDIAC CATHETERIZATION     • CARPAL TUNNEL RELEASE     •  SECTION     • CHOLECYSTECTOMY     • COLONOSCOPY  10/08/2013   • CYSTOCELE REPAIR      a&P   • EPIDURAL BLOCK     • HYSTERECTOMY     • LUMBAR FUSION N/A 2021    Procedure: L4-5 LUMBAR LAMINECTOMY TRANSFORAMINAL LUMBAR INTERBODY FUSION; L2-3 laminectomy and facetectomy; removal of hardware L3-4; L2-5 fusion, ROBOTIC;  Surgeon: Jerrell Gorman IV, MD;  Location: [facility] MAIN OR;  Service: Robotics - Neuro;  Laterality: N/A;   • LUMBAR FUSION N/A 2023     "Procedure: Lumbar 5 to sacral 1 transforaminal lumbar interbody fusion; thoracic 10 to lumbar 2 and lumbar 4-5 sheldon osteotomies; T9 to pelvic fusion with neuro robot, hardware removal;  Surgeon: Jerrell Gorman IV, MD;  Location: [facility] MAIN OR;  Service: Robotics - Neuro;  Laterality: N/A;   • SPINE SURGERY      neck  , ; lumbar 2012        Family History   Problem Relation Age of Onset   • Diabetes Father    • Stroke Sister    • Hypertension Sister    • Hypertension Brother    • Heart disease Brother    • Osteoporosis Maternal Grandmother          Social Hx:  Social History     Tobacco Use   Smoking Status Former   • Packs/day: 1.00   • Years: 39.00   • Pack years: 39.00   • Types: Cigarettes   • Quit date: 2021   • Years since quittin.3   Smokeless Tobacco Never   Tobacco Comments    on Nicotiene  patch 22      Alcohol Use: Not At Risk   • Frequency of Alcohol Consumption: Never   • Average Number of Drinks: Patient does not drink   • Frequency of Binge Drinking: Never      Social History     Substance and Sexual Activity   Drug Use Never          Review of Systems   Constitutional: Positive for activity change. Negative for fever.   Respiratory: Negative for chest tightness and shortness of breath.    Cardiovascular: Negative for chest pain.   Musculoskeletal: Positive for back pain and myalgias.   Neurological: Positive for numbness.         Objective     /72   Pulse 54   Temp 98.2 °F (36.8 °C)   Resp 16   Ht 157.5 cm (62\")   Wt 59.9 kg (132 lb)   SpO2 95%   BMI 24.14 kg/m²    Body mass index is 24.14 kg/m².      Physical Exam  Vitals reviewed.   Constitutional:       General: She is not in acute distress.     Appearance: Normal appearance.   Cardiovascular:      Rate and Rhythm: Normal rate and regular rhythm.      Pulses: Normal pulses.   Pulmonary:      Effort: Pulmonary effort is normal. No respiratory distress.   Musculoskeletal:         General: No swelling or tenderness. " Normal range of motion.      Right lower leg: No edema.      Left lower leg: No edema.   Skin:     General: Skin is warm and dry.      Findings: No bruising, erythema or rash.      Comments: Surgical incision CDI w/ no swelling redness or drainage   Neurological:      General: No focal deficit present.      Mental Status: She is alert and oriented to person, place, and time.      Sensory: Sensation is intact.      Motor: Motor function is intact. No weakness.      Deep Tendon Reflexes:      Reflex Scores:       Patellar reflexes are 2+ on the right side and 2+ on the left side.       Achilles reflexes are 2+ on the right side and 2+ on the left side.       Being seen for 2-week postoperative follow-up after undergoing      Results Review  I personally reviewed and interpreted the images from the following studies:       CT Lumbar Spine Without Contrast    Result Date: 2/27/2023  CT LUMBAR SPINE WO CONTRAST Date of Exam: 2/27/2023 11:08 AM EST Indication: Globus robot preop protocol. Comparison: 2/21/2022. Technique: Axial CT images were obtained of the lumbar spine without contrast administration.  Sagittal and coronal reconstructions were performed.  Automated exposure control and iterative reconstruction methods were used. Findings: There is normal height and alignment of the lumbar vertebral bodies. Patient is again noted be status post fusion from the L2-L5 levels with posterior rohit and pedicle screw fixation. There arthrodesis devices in place the L3/4 and L4/5 levels. There is now bony sclerosis and subsidence of the arthrodesis device at the L4/5 level. There is lucency around the bilateral L5 pedicle screws consistent with hardware loosening. No hardware fracture identified. No significant spinal canal stenosis identified. There appear to been bilateral L2 4/5 facet tectum is. There is diffuse bony fragments posteriorly at the L4/5 and L5/S1 levels. There is no significant neural foraminal narrowing  identified. Again seen is a low-density 3.8 cm left adrenal nodule consistent with an adrenal adenoma. Other paraspinal soft tissues are within normal limits. There is a calcified granuloma within the left lower lobe. Within the right lower lobe there is a 3 mm noncalcified pulmonary nodule. Follow-up noncontrast CT scan of the chest be performed in 12 months to document stability.     Impression: Impression: 1. Status post fusion from the L2-L5 levels with posterior rohit and pedicle screw fixation. There is loosening around the bilateral L5 pedicle screws as well as subsidence of the arthrodesis device the L4/5 level. 2. 3 mm noncalcified nodule within the right lower lobe. Follow-up noncontrast CT scan of the chest would be recommended in 12 months. Electronically Signed: Misael Ly  2/27/2023 2:35 PM EST  Workstation ID: [id number]    XR Spine Scoliosis 2 or 3 Views    Result Date: 3/4/2023  XR SPINE SCOLIOSIS 2 -3 VIEWS Date of Exam: 3/4/2023 9:12 AM EST Indication: post-op T9-pelvic deformity correction surgery. Comparison: None available. Findings: Patient is noted be status post fusion from the T8-9 through the pelvis with posterior rohit and screw fixation. There are no pedicle screws in place at the L5 level. There arthrodesis devices in place at the L3/4 L4/5, and L5/S1 levels. No hardware complication is seen.     Impression: Impression: 1. Status post fusion from T9 level through the pelvis. No hardware complication. Electronically Signed: Misael Ly  3/4/2023 9:46 AM EST  Workstation ID: [id number]        Assessment & Plan     MDM: Diya Arreola is a 62 y.o. female extensive thoracolumbar decompression and fusion from T9-pelvis with Dr. Gorman.  Patient doing relatively well postoperatively though she does still have severe right leg pain that has not improved since surgery.  She has full strength and sensation in bilateral lower extremities and has no red flags for cauda equina syndrome.  I  anticipate this right leg pain will improve with time and physical therapy.  Her surgical incision is healing nicely with no signs of wound infection or breakdown.  At this point patient should begin outpatient PT for postoperative recovery.  She should lift no more than 30 pounds.  Continue to wear TLSO brace when ambulating.  Do not soak or submerge incision underwater for another 4 weeks.  I would like her to follow-up 3 months postoperatively with Dr. Gorman and new imaging as below.  Patient is agreeable to this plan.       Diagnosis Plan   1. S/P lumbar fusion  XR Spine Scoliosis 2 or 3 Views    CT Lumbar Spine Without Contrast    CT Thoracic Spine Without Contrast    Ambulatory Referral to Physical Therapy POST OP      2. Other secondary kyphosis, thoracolumbar region  XR Spine Scoliosis 2 or 3 Views    CT Lumbar Spine Without Contrast    CT Thoracic Spine Without Contrast    Ambulatory Referral to Physical Therapy POST OP      3. Pseudoarthrosis of lumbar spine  XR Spine Scoliosis 2 or 3 Views    CT Lumbar Spine Without Contrast    CT Thoracic Spine Without Contrast    Ambulatory Referral to Physical Therapy POST OP          Return in about 11 weeks (around 5/31/2023) for w/ Dr. Gorman and new thoracolumbar imaging.      Diya Arreola  reports that she quit smoking about 16 months ago. Her smoking use included cigarettes. She has a 39.00 pack-year smoking history. She has never used smokeless tobacco.. I have educated her on the risk of diseases from using tobacco products such as cancer, COPD and heart disease.        BMI is within normal parameters. No other follow-up for BMI required.         This patient was examined wearing appropriate personal protective equipment.            Jose Manuel Tenorio PA-C    03/15/23  16:19 EDT      Part of this note may be an electronic transcription/translation of spoken language to printed text using the Dragon Dictation System.

## 2023-03-15 ENCOUNTER — OFFICE VISIT (OUTPATIENT)
Dept: NEUROSURGERY | Facility: CLINIC | Age: 63
End: 2023-03-15
Payer: MEDICARE

## 2023-03-15 ENCOUNTER — READMISSION MANAGEMENT (OUTPATIENT)
Dept: CALL CENTER | Facility: HOSPITAL | Age: 63
End: 2023-03-15
Payer: MEDICARE

## 2023-03-15 VITALS
BODY MASS INDEX: 24.29 KG/M2 | HEART RATE: 54 BPM | OXYGEN SATURATION: 95 % | WEIGHT: 132 LBS | SYSTOLIC BLOOD PRESSURE: 112 MMHG | TEMPERATURE: 98.2 F | RESPIRATION RATE: 16 BRPM | DIASTOLIC BLOOD PRESSURE: 72 MMHG | HEIGHT: 62 IN

## 2023-03-15 DIAGNOSIS — M40.15 OTHER SECONDARY KYPHOSIS, THORACOLUMBAR REGION: ICD-10-CM

## 2023-03-15 DIAGNOSIS — S32.009K PSEUDOARTHROSIS OF LUMBAR SPINE: ICD-10-CM

## 2023-03-15 DIAGNOSIS — Z98.1 S/P LUMBAR FUSION: Primary | ICD-10-CM

## 2023-03-15 PROCEDURE — 1160F RVW MEDS BY RX/DR IN RCRD: CPT

## 2023-03-15 PROCEDURE — 99024 POSTOP FOLLOW-UP VISIT: CPT

## 2023-03-15 PROCEDURE — 3074F SYST BP LT 130 MM HG: CPT

## 2023-03-15 PROCEDURE — 3078F DIAST BP <80 MM HG: CPT

## 2023-03-15 PROCEDURE — 1159F MED LIST DOCD IN RCRD: CPT

## 2023-03-15 NOTE — OUTREACH NOTE
General Surgery Week 2 Survey    Flowsheet Row Responses   [facility] facility patient discharged from? [facility]   Does the patient have one of the following disease processes/diagnoses(primary or secondary)? General Surgery   Week 2 attempt successful? No   Unsuccessful attempts Attempt 1  [had an appt today]          Tanya PALMA - Registered Nurse

## 2023-03-17 DIAGNOSIS — M40.15 OTHER SECONDARY KYPHOSIS, THORACOLUMBAR REGION: ICD-10-CM

## 2023-03-17 RX ORDER — DIAZEPAM 5 MG/1
5 TABLET ORAL EVERY 8 HOURS PRN
Qty: 45 TABLET | Refills: 0 | Status: SHIPPED | OUTPATIENT
Start: 2023-03-17 | End: 2023-03-22

## 2023-03-20 ENCOUNTER — READMISSION MANAGEMENT (OUTPATIENT)
Dept: CALL CENTER | Facility: HOSPITAL | Age: 63
End: 2023-03-20
Payer: MEDICARE

## 2023-03-20 NOTE — OUTREACH NOTE
General Surgery Week 2 Survey    Flowsheet Row Responses   [facility] facility patient discharged from? [facility]   Does the patient have one of the following disease processes/diagnoses(primary or secondary)? General Surgery   Week 2 attempt successful? No   Unsuccessful attempts Attempt 1          Fifi Gamez Licensed Nurse

## 2023-03-21 NOTE — PROGRESS NOTES
"Enter Query Response Below      Query Response: Other kyphosis             If applicable, please update the problem list.         Patient: Diya Arreola        : 1960  Account: [id number]           Admit Date: 2023        How to Respond to this query:       a. Click New Note     b. Answer query within the yellow box.                c. Update the Problem List, if applicable.      If you have any questions about this query contact me at: Sincerely,    Jeff Lilly RN, BSN  Clinical Documentation Integrity  [facility]  [email or web address]      Dr. Gorman,     62-year-old female \"status post L2-5 fusion most recently L4-5 TLIF for adjacent segment disease  he developed pseudoarthrosis at this level.\" per the H&P.  Admitted 23 for L5-S1 posterior  lumbar interbody fusion for treatment of Other secondary kyphosis, thoracolumbar region.    Please clarify the etiology of the patients kyphosis. ________      By submitting this query, we are merely seeking further clarification of documentation to accurately reflect all conditions that you are monitoring, evaluating, treating or that extend the hospitalization or utilize additional resources of care. Please utilize your independent clinical judgment when addressing the question(s) above.     This query and your response, once completed, will be entered into the legal medical record.    "

## 2023-03-23 ENCOUNTER — READMISSION MANAGEMENT (OUTPATIENT)
Dept: CALL CENTER | Facility: HOSPITAL | Age: 63
End: 2023-03-23
Payer: MEDICARE

## 2023-03-23 RX ORDER — GABAPENTIN 400 MG/1
CAPSULE ORAL
Qty: 120 CAPSULE | Refills: 0 | Status: SHIPPED | OUTPATIENT
Start: 2023-03-23 | End: 2023-04-05

## 2023-03-23 NOTE — OUTREACH NOTE
General Surgery Week 2 Survey    Flowsheet Row Responses   [facility] facility patient discharged from? [facility]   Does the patient have one of the following disease processes/diagnoses(primary or secondary)? General Surgery   Week 2 attempt successful? No   Unsuccessful attempts Attempt 2  [All numbers attempted-no answer]          Regi H - Registered Nurse

## 2023-03-28 ENCOUNTER — TELEPHONE (OUTPATIENT)
Dept: NEUROSURGERY | Facility: CLINIC | Age: 63
End: 2023-03-28
Payer: MEDICARE

## 2023-03-28 NOTE — TELEPHONE ENCOUNTER
Guero from [facility] called and wanted to speak with Jose Manuel regarding the followin. Patient confused on tlso brace - how long should she wear it & does it need to be worn all day.     2. Are there any rehab protocols or limitations for her type of surgery    Guero can be reached at [phone] and if we wanted to fax a written copy of above, it can be faxed to [phone].  Please advise.

## 2023-03-30 ENCOUNTER — OFFICE VISIT (OUTPATIENT)
Dept: PAIN MEDICINE | Facility: CLINIC | Age: 63
End: 2023-03-30
Payer: MEDICARE

## 2023-03-30 VITALS
SYSTOLIC BLOOD PRESSURE: 120 MMHG | OXYGEN SATURATION: 100 % | RESPIRATION RATE: 16 BRPM | DIASTOLIC BLOOD PRESSURE: 76 MMHG | HEART RATE: 95 BPM

## 2023-03-30 DIAGNOSIS — Z79.899 HIGH RISK MEDICATION USE: ICD-10-CM

## 2023-03-30 DIAGNOSIS — M47.817 LUMBOSACRAL SPONDYLOSIS WITHOUT MYELOPATHY: ICD-10-CM

## 2023-03-30 DIAGNOSIS — G89.4 CHRONIC PAIN SYNDROME: Primary | ICD-10-CM

## 2023-03-30 DIAGNOSIS — M96.1 POSTLAMINECTOMY SYNDROME OF CERVICAL REGION: ICD-10-CM

## 2023-03-30 DIAGNOSIS — M96.1 POSTLAMINECTOMY SYNDROME OF LUMBAR REGION: ICD-10-CM

## 2023-03-30 PROCEDURE — 99214 OFFICE O/P EST MOD 30 MIN: CPT | Performed by: ANESTHESIOLOGY

## 2023-03-30 PROCEDURE — 3074F SYST BP LT 130 MM HG: CPT | Performed by: ANESTHESIOLOGY

## 2023-03-30 PROCEDURE — 1125F AMNT PAIN NOTED PAIN PRSNT: CPT | Performed by: ANESTHESIOLOGY

## 2023-03-30 PROCEDURE — 3078F DIAST BP <80 MM HG: CPT | Performed by: ANESTHESIOLOGY

## 2023-03-30 RX ORDER — HYDROCODONE BITARTRATE AND ACETAMINOPHEN 7.5; 325 MG/1; MG/1
1 TABLET ORAL
Qty: 150 TABLET | Refills: 0 | Status: SHIPPED | OUTPATIENT
Start: 2023-04-29

## 2023-03-30 RX ORDER — HYDROCODONE BITARTRATE AND ACETAMINOPHEN 7.5; 325 MG/1; MG/1
1 TABLET ORAL
Qty: 150 TABLET | Refills: 0 | Status: SHIPPED | OUTPATIENT
Start: 2023-03-30

## 2023-03-30 RX ORDER — DIAZEPAM 5 MG/1
TABLET ORAL
COMMUNITY
Start: 2023-03-24 | End: 2023-04-04

## 2023-03-30 RX ORDER — MELOXICAM 15 MG/1
1 TABLET ORAL DAILY
COMMUNITY
Start: 2023-03-16

## 2023-03-30 NOTE — PROGRESS NOTES
Subjective    CC back, leg pain, neck pain  Diya Arreola is a 62 y.o. female with chronic neck pain status post ACDF, chronic back pain S/P  L4-L5 LAMI/TILF nov 2021/[facility],  here for follow-up.    S/P thoracolumbar decompression and fusion from T9-pelvis/Dr. Gorman/[facility] 2023.  Recovering well however continues to have severe right lower extremity radicular pain and back pain.  Has not started physical therapy yet pending insurance approval.  Prescribed hydrocodone postop.  Chronic back pain upper lumbar and lower lumbar radiating to bilateral hips bilateral lower extremity worse with standing walking twisting or any activity.  Denies new injury, saddle anesthesia bladder bowel continence.  Chronic neck pain, radiating to bilateral shoulder limited range of motion in the neck and significant paraspinal muscle spasm bilateral UE radicular pain.  Denies balance issues.    Chronic tremors, history of polyarthralgia/lupus sees rheumatology.  Pain interfering with ADL.  Tried physical therapy without relief.  Seen neurosurgery, referred to try injection.        C-spine MRI 2020 probably a plate on the anterior aspect of the spine at the C4-5 level. mild subluxation with degenerative disc disease at C3-4, causing a mild central canal stenosis. Left-sided disc extrusion at C6-7 with left foraminal impingement.   L-spine MRI 2019 postsurgical changes L3-L4 with pedicle screw L3.  Retrolisthesis L2 on L3 moderate central lateral canal stenosis.  Grade 1 anterolisthesis L4-L5.  Degenerative changes throughout.  L-spine x-ray 2020: 4 mm anterolisthesis on extension, 6 mm anterolisthesis on flexion, at the L4-5 level. Stable spinal fusion changes at L3-4.. Severe degenerative disc and endplate changes at L2-3, similar to  03/16/2018 examination.    Pain Assessment   Location of Pain: Lower Back, R Hip, L Hip, L Leg, neck pain, joint  Description of Pain: Dull/Aching, Throbbing, Stabbing  Previous Pain Rating :8  Current Pain  Ratin  Aggravating Factors: Activity  Alleviating Factors: Rest, Medication    PEG Assessment   What number best describes your pain on average in the past week?8  What number best describes how, during the past week, pain has interfered with your enjoyment of life?8  What number best describes how, during the past week, pain has interfered with your general activity?10      The following portions of the patient's history were reviewed and updated as appropriate: allergies, current medications, past family history, past medical history, past social history, past surgical history and problem list.     has a past medical history of Allergic, Anxiety, Arthritis, Asthma, Constipation, COPD (chronic obstructive pulmonary disease) ([facility]), Depression, Fall, Fibromyalgia, primary, Gastritis, History of pancreatitis, Insomnia, Low back pain (2021), Lupus ([facility]), Neuropathy, Osteopenia, Palpitations, Peripheral edema, PONV (postoperative nausea and vomiting), Rheumatoid arthritis ([facility]), Right lower lobe pulmonary nodule (3/1/2023), Scoliosis, Sjogren's syndrome ([facility]), Tachycardia, and TIA (transient ischemic attack) (2014).   has a past surgical history that includes Cholecystectomy; Hysterectomy;  section; Cystocele repair; Carpal tunnel release; Cardiac catheterization; Spine surgery; Colonoscopy (10/08/2013); Epidural block injection; Lumbar fusion (N/A, 2021); Back surgery; and Lumbar fusion (N/A, 2023).  family history includes Diabetes in her father; Heart disease in her brother; Hypertension in her brother and sister; Osteoporosis in her maternal grandmother; Stroke in her sister.  Social History     Tobacco Use   • Smoking status: Former     Packs/day: 1.00     Years: 39.00     Pack years: 39.00     Types: Cigarettes     Quit date: 2021     Years since quittin.4   • Smokeless tobacco: Never   • Tobacco comments:     on Nicotiene  patch 22   Substance Use Topics   • Alcohol use:  No     Review of Systems   Musculoskeletal: Positive for arthralgias, back pain and neck pain.   Neurological: Positive for tremors, numbness and headache.   All other systems reviewed and are negative.    Objective   Physical Exam   Constitutional: No distress.   walker   Neck: Kernig's sign noted.   Pulmonary/Chest: Effort normal.   Musculoskeletal:      Cervical back: She exhibits decreased range of motion and tenderness.      Lumbar back: She exhibits decreased range of motion and tenderness.   Vitals reviewed.    /76   Pulse 95   Resp 16   SpO2 100%      PHQ 9 on chart  Opioid risk tool low risk    Assessment & Plan   Diagnoses and all orders for this visit:    1. Chronic pain syndrome (Primary)  -     HYDROcodone-acetaminophen (NORCO) 7.5-325 MG per tablet; Take 1 tablet by mouth 5 (Five) Times a Day As Needed for Severe Pain. DNF before 4/29/2023  Dispense: 150 tablet; Refill: 0  -     HYDROcodone-acetaminophen (NORCO) 7.5-325 MG per tablet; Take 1 tablet by mouth 5 (Five) Times a Day As Needed for Severe Pain. Dx M47.817  Dispense: 150 tablet; Refill: 0    2. Postlaminectomy syndrome of lumbar region    3. Postlaminectomy syndrome of cervical region    4. Lumbosacral spondylosis without myelopathy    5. High risk medication use    Summary  Diya Arreola is a 62 y.o. female with chronic neck pain status post ACDF, chronic back pain status post Fusion L3-5, here for follow-up.    Chronic back pain from DDD spondylosis postlaminectomy syndrome with radicular pain.   Chronic neck pain from postlaminectomy syndrome.  Worsening chronic tremors.    S/P thoracolumbar decompression and fusion from T9-pelvis/Dr. Gorman/[facility] 2023.  Recovering well however continues to have severe right lower extremity radicular pain and back pain.  Has not started physical therapy yet pending insurance approval.  Prescribed hydrocodone postop.    We will continue hydrocodone and increase to 5 times daily.  Consider  oxycodone.    Cont hydrocodone 7.5/325 4 times daily as needed for severe pain.  UDS and inspect reviewed  Discussed risk of tolerance, dependence, respiratory depression, coma and death associated with use of oral opioids for treatment of chronic nonmalignant pain.     RTC 2 month

## 2023-03-31 ENCOUNTER — READMISSION MANAGEMENT (OUTPATIENT)
Dept: CALL CENTER | Facility: HOSPITAL | Age: 63
End: 2023-03-31
Payer: MEDICARE

## 2023-03-31 NOTE — OUTREACH NOTE
General Surgery Week 3 Survey    Flowsheet Row Responses   [facility] patient discharged from? [facility]   Does the patient have one of the following disease processes/diagnoses(primary or secondary)? General Surgery   Week 3 attempt successful? No   Unsuccessful attempts Attempt 1          REGINALD ELLIOTT - Registered Nurse

## 2023-04-03 DIAGNOSIS — M40.15 OTHER SECONDARY KYPHOSIS, THORACOLUMBAR REGION: ICD-10-CM

## 2023-04-04 RX ORDER — DIAZEPAM 5 MG/1
TABLET ORAL
Qty: 45 TABLET | Refills: 0 | Status: SHIPPED | OUTPATIENT
Start: 2023-04-04 | End: 2023-04-17

## 2023-04-05 ENCOUNTER — READMISSION MANAGEMENT (OUTPATIENT)
Dept: CALL CENTER | Facility: HOSPITAL | Age: 63
End: 2023-04-05
Payer: MEDICARE

## 2023-04-05 DIAGNOSIS — J44.9 CHRONIC OBSTRUCTIVE PULMONARY DISEASE, UNSPECIFIED COPD TYPE: ICD-10-CM

## 2023-04-05 RX ORDER — ALBUTEROL SULFATE 90 UG/1
AEROSOL, METERED RESPIRATORY (INHALATION)
Qty: 18 G | Refills: 3 | Status: SHIPPED | OUTPATIENT
Start: 2023-04-05

## 2023-04-05 RX ORDER — FLUTICASONE FUROATE, UMECLIDINIUM BROMIDE AND VILANTEROL TRIFENATATE 200; 62.5; 25 UG/1; UG/1; UG/1
POWDER RESPIRATORY (INHALATION)
Qty: 180 EACH | Refills: 3 | Status: SHIPPED | OUTPATIENT
Start: 2023-04-05

## 2023-04-05 RX ORDER — BUSPIRONE HYDROCHLORIDE 15 MG/1
15 TABLET ORAL 3 TIMES DAILY
Qty: 270 TABLET | Refills: 1 | Status: SHIPPED | OUTPATIENT
Start: 2023-04-05

## 2023-04-05 RX ORDER — GABAPENTIN 400 MG/1
CAPSULE ORAL
Qty: 120 CAPSULE | Refills: 0 | Status: SHIPPED | OUTPATIENT
Start: 2023-04-05 | End: 2023-04-14 | Stop reason: SDUPTHER

## 2023-04-05 NOTE — OUTREACH NOTE
General Surgery Week 3 Survey    Flowsheet Row Responses   [facility] patient discharged from? [facility]   Does the patient have one of the following disease processes/diagnoses(primary or secondary)? General Surgery   Week 3 attempt successful? Yes   Call start time 1036   Call end time 1047   Discharge diagnosis L5-S1 posterior lumbar interbody fusion, Pelvic fixation with SI bone screw system   Meds reviewed with patient/caregiver? Yes   Is the patient having any side effects they believe may be caused by any medication additions or changes? No   Does the patient have all medications related to this admission filled (includes all antibiotics, pain medications, etc.) Yes   Is the patient taking all medications as directed (includes completed medication regime)? Yes   Medication comments There have been some adjustments to pain medications--pt concerned that Valium may be discontinued by pain mgmt  and she feels it is necessary for her P.T.    Does the patient have a follow up appointment scheduled with their surgeon? Yes   Has the patient kept scheduled appointments due by today? Yes   Comments Post-Op 3/15/2023 10:00 AM,  Pain mgmt  3/30/2023 10:50 AM--compliant with appts   Has home health visited the patient within 72 hours of discharge? N/A   Has all DME been delivered? No   Psychosocial issues? No   Did the patient receive a copy of their discharge instructions? Yes   Nursing interventions Reviewed instructions with patient   What is the patient's perception of their health status since discharge? Same  [Pt reports surgical incision is healing--she is ambulating with a walker but reports since surgery she has had issues with L5 pain (she will soon start P.T.).  She is tearful as she discusses pain associated with her condition, P.T. -support provided]   Nursing interventions Nurse provided patient education   Is the patient /caregiver able to teach back basic post-op care? Lifting as instructed by MD in  discharge instructions   Is the patient/caregiver able to teach back signs and symptoms of incisional infection? Increased redness, swelling or pain at the incisonal site, Increased drainage or bleeding, Incisional warmth, Pus or odor from incision, Fever  [Incisions healing without issue]   Is the patient/caregiver able to teach back steps to recovery at home? Rest and rebuild strength, gradually increase activity   If the patient is a current smoker, are they able to teach back resources for cessation? Not a smoker   Week 3 call completed? Yes          TALHA LAINEZ - Registered Nurse

## 2023-04-14 ENCOUNTER — OFFICE VISIT (OUTPATIENT)
Dept: FAMILY MEDICINE CLINIC | Facility: CLINIC | Age: 63
End: 2023-04-14
Payer: MEDICARE

## 2023-04-14 VITALS
TEMPERATURE: 96.8 F | HEIGHT: 62 IN | OXYGEN SATURATION: 92 % | WEIGHT: 129.4 LBS | DIASTOLIC BLOOD PRESSURE: 68 MMHG | HEART RATE: 108 BPM | SYSTOLIC BLOOD PRESSURE: 128 MMHG | BODY MASS INDEX: 23.81 KG/M2

## 2023-04-14 DIAGNOSIS — Z98.1 S/P LUMBAR FUSION: Primary | ICD-10-CM

## 2023-04-14 DIAGNOSIS — J44.9 CHRONIC OBSTRUCTIVE PULMONARY DISEASE, UNSPECIFIED COPD TYPE: ICD-10-CM

## 2023-04-14 DIAGNOSIS — F32.A DEPRESSION, UNSPECIFIED DEPRESSION TYPE: ICD-10-CM

## 2023-04-14 RX ORDER — HYDROXYCHLOROQUINE SULFATE 200 MG/1
200 TABLET, FILM COATED ORAL 2 TIMES DAILY
Qty: 180 TABLET | Refills: 1 | Status: SHIPPED | OUTPATIENT
Start: 2023-04-14 | End: 2023-10-11

## 2023-04-14 RX ORDER — GABAPENTIN 400 MG/1
400 CAPSULE ORAL 4 TIMES DAILY
Qty: 120 CAPSULE | Refills: 0 | Status: SHIPPED | OUTPATIENT
Start: 2023-04-14

## 2023-04-14 RX ORDER — BUPROPION HYDROCHLORIDE 150 MG/1
150 TABLET ORAL DAILY
Qty: 90 TABLET | Refills: 3 | Status: SHIPPED | OUTPATIENT
Start: 2023-04-14 | End: 2023-04-14

## 2023-04-14 RX ORDER — BUPROPION HYDROCHLORIDE 300 MG/1
300 TABLET ORAL EVERY MORNING
Qty: 90 TABLET | Refills: 3 | Status: SHIPPED | OUTPATIENT
Start: 2023-04-14

## 2023-04-14 RX ORDER — MELOXICAM 15 MG/1
15 TABLET ORAL DAILY
Qty: 90 TABLET | Refills: 3 | Status: SHIPPED | OUTPATIENT
Start: 2023-04-14

## 2023-04-14 RX ORDER — NITROGLYCERIN 0.4 MG/1
0.4 TABLET SUBLINGUAL
Qty: 25 TABLET | Refills: 1 | Status: SHIPPED | OUTPATIENT
Start: 2023-04-14

## 2023-04-14 RX ORDER — ROFLUMILAST 500 UG/1
500 TABLET ORAL DAILY
Qty: 90 TABLET | Refills: 3 | Status: SHIPPED | OUTPATIENT
Start: 2023-04-14

## 2023-04-14 RX ORDER — VERAPAMIL HYDROCHLORIDE 240 MG/1
240 TABLET, FILM COATED, EXTENDED RELEASE ORAL DAILY
Qty: 90 TABLET | Refills: 3 | Status: SHIPPED | OUTPATIENT
Start: 2023-04-14

## 2023-04-14 RX ORDER — BUMETANIDE 2 MG/1
2 TABLET ORAL 2 TIMES DAILY
Qty: 180 TABLET | Refills: 3 | Status: SHIPPED | OUTPATIENT
Start: 2023-04-14

## 2023-04-14 NOTE — PROGRESS NOTES
"Chief Complaint  Follow-up (Post op S/P lumbar infusion )    Subjective        Diya Arreola presents to [facility]  History of Present Illness  Surgery on 2/28/23 lumbar fusion.  Back Pain  This is a chronic problem. The current episode started more than 1 year ago. The problem occurs constantly. The problem has been waxing and waning since onset. The pain is present in the lumbar spine. The quality of the pain is described as aching. The pain is moderate. The symptoms are aggravated by twisting and bending. Associated symptoms include weight loss. Pertinent negatives include no chest pain, dysuria or fever.   Depression  Visit Type: follow-up  Patient presents with the following symptoms: anhedonia, depressed mood, excessive worry, fatigue, malaise, muscle tension, shortness of breath and weight loss.  Patient is not experiencing: dizziness.  Frequency of symptoms: constantly   Severity: moderate   Sleep quality: poor      COPD  She complains of cough and shortness of breath. This is a chronic problem. The current episode started more than 1 year ago. The problem occurs daily. The problem has been waxing and waning. Associated symptoms include weight loss. Pertinent negatives include no chest pain or fever.       Objective   Vital Signs:  /68 (BP Location: Left arm, Patient Position: Sitting, Cuff Size: Adult)   Pulse 108   Temp 96.8 °F (36 °C) (Infrared)   Ht 157.5 cm (62\")   Wt 58.7 kg (129 lb 6.4 oz)   SpO2 92%   BMI 23.67 kg/m²   Estimated body mass index is 23.67 kg/m² as calculated from the following:    Height as of this encounter: 157.5 cm (62\").    Weight as of this encounter: 58.7 kg (129 lb 6.4 oz).       BMI is within normal parameters. No other follow-up for BMI required.      Physical Exam  Vitals and nursing note reviewed.   Constitutional:       General: She is not in acute distress.     Appearance: She is well-developed.   HENT:      Head: " Normocephalic.   Eyes:      General: Lids are normal.      Conjunctiva/sclera: Conjunctivae normal.   Neck:      Thyroid: No thyroid mass or thyromegaly.      Trachea: Trachea normal.   Cardiovascular:      Rate and Rhythm: Normal rate and regular rhythm.      Heart sounds: Normal heart sounds.   Pulmonary:      Effort: Pulmonary effort is normal.      Breath sounds: Normal breath sounds.   Musculoskeletal:         General: Tenderness present.      Cervical back: Normal range of motion.      Lumbar back: Tenderness present. Decreased range of motion.   Lymphadenopathy:      Cervical: No cervical adenopathy.   Skin:     General: Skin is warm and dry.   Neurological:      Mental Status: She is alert and oriented to person, place, and time.      Gait: Gait abnormal.   Psychiatric:         Attention and Perception: She is attentive.         Mood and Affect: Mood normal.         Speech: Speech normal.         Behavior: Behavior normal.        Result Review :                   Assessment and Plan   Diagnoses and all orders for this visit:    1. S/P lumbar fusion (Primary)    2. Depression, unspecified depression type  Comments:  .  Orders:  -     Discontinue: buPROPion XL (WELLBUTRIN XL) 150 MG 24 hr tablet; Take 1 tablet by mouth Daily.  Dispense: 90 tablet; Refill: 3    3. Chronic obstructive pulmonary disease, unspecified COPD type    Other orders  -     gabapentin (NEURONTIN) 400 MG capsule; Take 1 capsule by mouth 4 (Four) Times a Day.  Dispense: 120 capsule; Refill: 0  -     Daliresp 500 MCG tablet tablet; Take 1 tablet by mouth Daily.  Dispense: 90 tablet; Refill: 3  -     buPROPion XL (Wellbutrin XL) 300 MG 24 hr tablet; Take 1 tablet by mouth Every Morning.  Dispense: 90 tablet; Refill: 3  -     meloxicam (MOBIC) 15 MG tablet; Take 1 tablet by mouth Daily.  Dispense: 90 tablet; Refill: 3  -     verapamil SR (CALAN-SR) 240 MG CR tablet; Take 1 tablet by mouth Daily.  Dispense: 90 tablet; Refill: 3  -      hydroxychloroquine (PLAQUENIL) 200 MG tablet; Take 1 tablet by mouth 2 (Two) Times a Day for 180 days. Indications: Rheumatoid Arthritis  Dispense: 180 tablet; Refill: 1  -     bumetanide (BUMEX) 2 MG tablet; Take 1 tablet by mouth 2 (Two) Times a Day.  Dispense: 180 tablet; Refill: 3  -     nitroglycerin (NITROSTAT) 0.4 MG SL tablet; Place 1 tablet under the tongue Every 5 (Five) Minutes As Needed for Chest Pain. Take no more than 3 doses in 15 minutes.  Dispense: 25 tablet; Refill: 1             Follow Up   No follow-ups on file.  Patient was given instructions and counseling regarding her condition or for health maintenance advice. Please see specific information pulled into the AVS if appropriate.

## 2023-04-17 DIAGNOSIS — M40.15 OTHER SECONDARY KYPHOSIS, THORACOLUMBAR REGION: ICD-10-CM

## 2023-04-17 RX ORDER — DIAZEPAM 5 MG/1
TABLET ORAL
Qty: 45 TABLET | Refills: 0 | Status: SHIPPED | OUTPATIENT
Start: 2023-04-17

## 2023-04-28 ENCOUNTER — TELEPHONE (OUTPATIENT)
Dept: PAIN MEDICINE | Facility: CLINIC | Age: 63
End: 2023-04-28

## 2023-04-28 ENCOUNTER — TELEPHONE (OUTPATIENT)
Dept: FAMILY MEDICINE CLINIC | Facility: CLINIC | Age: 63
End: 2023-04-28

## 2023-04-28 DIAGNOSIS — G89.4 CHRONIC PAIN SYNDROME: ICD-10-CM

## 2023-04-28 RX ORDER — DOXYCYCLINE 100 MG/1
100 CAPSULE ORAL 2 TIMES DAILY
Qty: 20 CAPSULE | Refills: 0 | Status: SHIPPED | OUTPATIENT
Start: 2023-04-28 | End: 2023-08-11 | Stop reason: SDUPTHER

## 2023-04-28 RX ORDER — HYDROCODONE BITARTRATE AND ACETAMINOPHEN 7.5; 325 MG/1; MG/1
1 TABLET ORAL
Qty: 150 TABLET | Refills: 0 | Status: SHIPPED | OUTPATIENT
Start: 2023-04-29

## 2023-04-28 NOTE — TELEPHONE ENCOUNTER
4/23/23-- Dr GUADALUPE can you please send her  Hydrocodn acet  7.1-325-- to [facility]-- only place that has it and they are starting to run short--- can you put DNF date for today instead of tomorrow so she can get her pills-- previous surgery pt-- they said tks so much-- call back #[phone]

## 2023-04-28 NOTE — TELEPHONE ENCOUNTER
4/28/23-- spoke to pt -- told them to call [facility] and see if they have it in [place] --- then  they are to call me back

## 2023-04-28 NOTE — TELEPHONE ENCOUNTER
Caller: ASHLEY BUTLER    Relationship: Emergency Contact    Best call back number: [phone]    What medication are you requesting: ANTIBIOTICS    What are your current symptoms: RASPY/COUGH/LOW GRADE FEVER/GREEN PHLEGM    How long have you been experiencing symptoms: 3 DAYS    Have you had these symptoms before:    [x] Yes  [] No    Have you been treated for these symptoms before:   [x] Yes  [] No    If a prescription is needed, what is your preferred pharmacy and phone number:  [facility]/pharmacy #[id number] - [address] AT [place] [address] - [phone]  - [phone]   [phone]    Additional notes:PLEASE CALL AND ADVISE.

## 2023-04-28 NOTE — TELEPHONE ENCOUNTER
Relationship to Patient:    Phone Number: [phone]  Reason for Call: PATIENTS  CALLING STATING THEY CANT FIND ANYWHERE THAT HAS THE PAIN MEDICATION IN STOCK ASKING IF THEY CAN CHANGE THE MEDS FOR WORK

## 2023-04-28 NOTE — TELEPHONE ENCOUNTER
Caller: ASHLEY BUTLER    Relationship to patient: Emergency Contact    Best call back number: [phone]    Patient is needing: PATIENT'S SPOUSE IS CHECKING STATUS ON THE PRIOR AUTH ON THE [facility]. PATIENT NEEDS THE BRAND NAME NOT GENERIC. PLEASE CALL AND ADVISE.

## 2023-04-28 NOTE — TELEPHONE ENCOUNTER
The PA was approved the  was verbally informed and he asked about this request for the antibiotic he stated she is coughing up mucus and does not feel well at all

## 2023-05-01 ENCOUNTER — TELEPHONE (OUTPATIENT)
Dept: NEUROSURGERY | Facility: CLINIC | Age: 63
End: 2023-05-01
Payer: MEDICARE

## 2023-05-01 NOTE — TELEPHONE ENCOUNTER
Dr Mcarthur with [facility] called and stated that the patient has Optic Disc Swelling and would like to get an MRI. He needs to know if this is OK to do?  His Cell Phone # is [phone]

## 2023-05-02 NOTE — TELEPHONE ENCOUNTER
Called the physician back with [facility]. I told him that we will take care of ordering the test/ LP that he is requesting.    Verbally spoke with Dr. Gorman and he would like to order a lumbar puncture to be done by radiology at [facility].

## 2023-05-04 DIAGNOSIS — M40.15 OTHER SECONDARY KYPHOSIS, THORACOLUMBAR REGION: ICD-10-CM

## 2023-05-04 RX ORDER — DIAZEPAM 5 MG/1
TABLET ORAL
Qty: 45 TABLET | Refills: 0 | Status: SHIPPED | OUTPATIENT
Start: 2023-05-04

## 2023-05-04 NOTE — TELEPHONE ENCOUNTER
Patient called the office today and is asking if anything else can be prescribed because she is not liking the Valium and that it feels like it takes about 2 hours for it to work. She said that she is 9 weeks post-op and is still having pain in her lumbar spine and it is radiating down her right leg. Stated [facility] [place].

## 2023-05-04 NOTE — TELEPHONE ENCOUNTER
Rx Refill Note  Requested Prescriptions     Pending Prescriptions Disp Refills   • diazePAM (VALIUM) 5 MG tablet [Pharmacy Med Name: DIAZEPAM 5 MG TABLET] 45 tablet 0     Sig: TAKE 1 TABLET BY MOUTH EVERY 8 (EIGHT) HOURS AS NEEDED FOR MUSCLE SPASMS FOR UP TO 5 DAYS      Last office visit with prescribing clinician: 10/17/2022   Last telemedicine visit with prescribing clinician: 5/24/2023   Next office visit with prescribing clinician: 5/24/2023                         Would you like a call back once the refill request has been completed: [] Yes [] No    If the office needs to give you a call back, can they leave a voicemail: [] Yes [] No    Chandlyer Behr, MA  05/04/23, 13:52 EDT

## 2023-05-05 ENCOUNTER — TELEPHONE (OUTPATIENT)
Dept: NEUROSURGERY | Facility: CLINIC | Age: 63
End: 2023-05-05
Payer: MEDICARE

## 2023-05-05 RX ORDER — CYCLOBENZAPRINE HCL 10 MG
10 TABLET ORAL 3 TIMES DAILY PRN
Qty: 40 TABLET | Refills: 3 | Status: SHIPPED | OUTPATIENT
Start: 2023-05-05

## 2023-05-22 ENCOUNTER — HOSPITAL ENCOUNTER (OUTPATIENT)
Dept: CT IMAGING | Facility: HOSPITAL | Age: 63
Discharge: HOME | End: 2023-05-22
Payer: MEDICARE

## 2023-05-22 DIAGNOSIS — M40.15 OTHER SECONDARY KYPHOSIS, THORACOLUMBAR REGION: ICD-10-CM

## 2023-05-22 DIAGNOSIS — S32.009K PSEUDOARTHROSIS OF LUMBAR SPINE: ICD-10-CM

## 2023-05-22 DIAGNOSIS — Z98.1 S/P LUMBAR FUSION: ICD-10-CM

## 2023-05-22 PROCEDURE — 72131 CT LUMBAR SPINE W/O DYE: CPT

## 2023-05-22 PROCEDURE — 72128 CT CHEST SPINE W/O DYE: CPT

## 2023-05-23 ENCOUNTER — TELEPHONE (OUTPATIENT)
Dept: NEUROSURGERY | Facility: CLINIC | Age: 63
End: 2023-05-23
Payer: MEDICARE

## 2023-05-23 NOTE — PROGRESS NOTES
"Subjective   History of Present Illness: Diya Arreola is a 63 y.o. female is here today for a post op follow-up from surgery on 2/28/23 with new CT scans and an X-Ray. Today patient reports her back pain has moderately improved.  Overall she is doing well at 3 months postop and continues with physical therapy    Chief Complaint   Patient presents with   • Post-op     Follow up           Previous Treatment: Transforaminal Lumbar Interbody fusion.  Physical Therapy    The following portions of the patient's history were reviewed and updated as appropriate: allergies, current medications, past family history, past medical history, past social history, past surgical history and problem list.    Review of Systems    Objective     /85   Pulse 102   Ht 157.5 cm (62\")   Wt 59 kg (130 lb)   BMI 23.78 kg/m²    Body mass index is 23.78 kg/m².  Vitals:    05/24/23 1111   PainSc:   6           Neurologic Exam    Assessment & Plan   Independent Review of Radiographic Studies:      I personally reviewed and interpreted the images from the following studies.    CT thoracic lumbar spine: Hardware is in good positioning with evidence of developing fusion    Scoliosis x-ray: SVA now less than 5    Medical Decision Making:      Diya Arreola is a 63 y.o. female status post thoracic to pelvic fusion for kyphotic deformity doing well at 3 months postop.  Imaging demonstrates correction of deformity as well as developing fusion.  Patient can continue with physical therapy.  She should continue to strictly limit bending lifting and twisting with lifting no more than about 20 pounds.  I will see her back in 3 months to evaluate her progress.      Diagnoses and all orders for this visit:    1. S/P lumbar fusion (Primary)      No follow-ups on file.    This patient was examined wearing appropriate personal protective equipment.                      Dr. Jerrell Gorman IV    05/24/23  11:35 EDT  "

## 2023-05-23 NOTE — TELEPHONE ENCOUNTER
Called patient to remind her to get her X-rays done before her appointment tomorrow.  She stated that she would do that.

## 2023-05-24 ENCOUNTER — HOSPITAL ENCOUNTER (OUTPATIENT)
Dept: GENERAL RADIOLOGY | Facility: HOSPITAL | Age: 63
Discharge: HOME | End: 2023-05-24
Payer: MEDICARE

## 2023-05-24 ENCOUNTER — OFFICE VISIT (OUTPATIENT)
Dept: NEUROSURGERY | Facility: CLINIC | Age: 63
End: 2023-05-24
Payer: MEDICARE

## 2023-05-24 VITALS
HEART RATE: 102 BPM | WEIGHT: 130 LBS | BODY MASS INDEX: 23.92 KG/M2 | HEIGHT: 62 IN | SYSTOLIC BLOOD PRESSURE: 134 MMHG | DIASTOLIC BLOOD PRESSURE: 85 MMHG

## 2023-05-24 DIAGNOSIS — M40.15 OTHER SECONDARY KYPHOSIS, THORACOLUMBAR REGION: ICD-10-CM

## 2023-05-24 DIAGNOSIS — S32.009K PSEUDOARTHROSIS OF LUMBAR SPINE: ICD-10-CM

## 2023-05-24 DIAGNOSIS — Z98.1 S/P LUMBAR FUSION: Primary | ICD-10-CM

## 2023-05-24 DIAGNOSIS — Z98.1 S/P LUMBAR FUSION: ICD-10-CM

## 2023-05-24 PROCEDURE — 99024 POSTOP FOLLOW-UP VISIT: CPT | Performed by: NEUROLOGICAL SURGERY

## 2023-05-24 PROCEDURE — 72082 X-RAY EXAM ENTIRE SPI 2/3 VW: CPT

## 2023-05-26 ENCOUNTER — TELEPHONE (OUTPATIENT)
Dept: PAIN MEDICINE | Facility: CLINIC | Age: 63
End: 2023-05-26
Payer: MEDICARE

## 2023-05-26 DIAGNOSIS — G89.4 CHRONIC PAIN SYNDROME: ICD-10-CM

## 2023-05-26 RX ORDER — HYDROCODONE BITARTRATE AND ACETAMINOPHEN 7.5; 325 MG/1; MG/1
1 TABLET ORAL
Qty: 150 TABLET | Refills: 0 | Status: SHIPPED | OUTPATIENT
Start: 2023-05-29

## 2023-05-26 NOTE — TELEPHONE ENCOUNTER
5/26/23-- Dr GUADALUPE-- pt left voice message-- runs out of her hydroc acet 7.5-325 on Monday the holiday-- [facility]-- NEXT APPT 5/31-- TRIED TO CALL PT BACK TO TELL HER TO TAKE ONE LESS A DAY STARTING TODAY TO MAKE MEDS LAST TILL HER APPOINTMENT-- LEFT VM-- ALSO I TOLD THEM ON VM THAT [facility] [place] HAS THEM TODAY, BUT I DO NOT KNOW IF THEY WILL HAVE THEM ON 5/29??-- LEFT THEM MY NUMBER TO CALL ME BACK-- THEY WHERE REQUESTING A REFILL ON HYDROCODONE  ON  THEIR  VOICE MESSAGE DUE TO MEDS NEED TO BE ESCRIBED TO PHARMACY ON HOLIDAY 5/29 AND CURRENTLY THEIR PHARMACY [facility] IS OUT-- LEFT THEM VM TO CALL ME BACK-- FYI--  (I  CHANGED LISTED PHARMACY TO [facility], DUE TO THEM HAVING THE MEDICATION TODAY 5/26)-- INSPECT IN CHART

## 2023-05-31 ENCOUNTER — OFFICE VISIT (OUTPATIENT)
Dept: PAIN MEDICINE | Facility: CLINIC | Age: 63
End: 2023-05-31

## 2023-05-31 VITALS — OXYGEN SATURATION: 96 % | RESPIRATION RATE: 16 BRPM | HEART RATE: 92 BPM

## 2023-05-31 DIAGNOSIS — G89.4 CHRONIC PAIN SYNDROME: ICD-10-CM

## 2023-05-31 DIAGNOSIS — M96.1 POSTLAMINECTOMY SYNDROME OF LUMBAR REGION: ICD-10-CM

## 2023-05-31 DIAGNOSIS — M54.16 LUMBAR RADICULITIS: ICD-10-CM

## 2023-05-31 DIAGNOSIS — M96.1 POSTLAMINECTOMY SYNDROME OF CERVICAL REGION: ICD-10-CM

## 2023-05-31 DIAGNOSIS — Z79.899 HIGH RISK MEDICATION USE: Primary | ICD-10-CM

## 2023-05-31 RX ORDER — HYDROCODONE BITARTRATE AND ACETAMINOPHEN 7.5; 325 MG/1; MG/1
1 TABLET ORAL
Qty: 150 TABLET | Refills: 0 | Status: SHIPPED | OUTPATIENT
Start: 2023-06-29

## 2023-05-31 RX ORDER — BUPROPION HYDROCHLORIDE 150 MG/1
1 TABLET ORAL DAILY
COMMUNITY
Start: 2023-05-22 | End: 2023-05-31

## 2023-05-31 NOTE — PROGRESS NOTES
Subjective    CC back, leg pain, neck pain  Diya Arreola is a 63 y.o. female with chronic neck pain status post ACDF, chronic back pain S/P  L4-L5 LAMI/TILF nov 2021/[facility],S/P thoracolumbar decompression and fusion from T9-pelvis/Dr. Gorman/[facility] 2023,  here for follow-up.    Continues to recover from recent spine surgery.  Participating in physical therapy now ambulating with walker.  Continues to have good relief of functional benefit with hydrocodone denies side effects.  Chronic back pain upper lumbar and lower lumbar radiating to bilateral hips bilateral lower extremity worse with standing walking twisting or any activity.  Denies new injury, saddle anesthesia bladder bowel continence.  Chronic neck pain, radiating to bilateral shoulder limited range of motion in the neck and significant paraspinal muscle spasm bilateral UE radicular pain.  Denies balance issues.    Chronic tremors, history of polyarthralgia/lupus sees rheumatology.  Pain interfering with ADL.  Tried physical therapy without relief.  Seen neurosurgery, referred to try injection.        C-spine MRI 2020 probably a plate on the anterior aspect of the spine at the C4-5 level. mild subluxation with degenerative disc disease at C3-4, causing a mild central canal stenosis. Left-sided disc extrusion at C6-7 with left foraminal impingement.   L-spine MRI 2019 postsurgical changes L3-L4 with pedicle screw L3.  Retrolisthesis L2 on L3 moderate central lateral canal stenosis.  Grade 1 anterolisthesis L4-L5.  Degenerative changes throughout.  L-spine x-ray 2020: 4 mm anterolisthesis on extension, 6 mm anterolisthesis on flexion, at the L4-5 level. Stable spinal fusion changes at L3-4.. Severe degenerative disc and endplate changes at L2-3, similar to  03/16/2018 examination.    Pain Assessment   Location of Pain: Lower Back, R Hip, L Hip, L Leg, neck pain, joint  Description of Pain: Dull/Aching, Throbbing, Stabbing  Previous Pain Rating :8  Current Pain  Ratin  Aggravating Factors: Activity  Alleviating Factors: Rest, Medication    PEG Assessment   What number best describes your pain on average in the past week?8  What number best describes how, during the past week, pain has interfered with your enjoyment of life?8  What number best describes how, during the past week, pain has interfered with your general activity?10      The following portions of the patient's history were reviewed and updated as appropriate: allergies, current medications, past family history, past medical history, past social history, past surgical history and problem list.     has a past medical history of Allergic, Anxiety, Arthritis, Asthma, Constipation, COPD (chronic obstructive pulmonary disease), Depression, Fall, Fibromyalgia, primary, Gastritis, History of pancreatitis, Insomnia, Low back pain (2021), Lupus, Neuropathy, Osteopenia, Palpitations, Peripheral edema, PONV (postoperative nausea and vomiting), Rheumatoid arthritis, Right lower lobe pulmonary nodule (3/1/2023), Scoliosis, Sjogren's syndrome, Tachycardia, and TIA (transient ischemic attack) (2014).   has a past surgical history that includes Cholecystectomy; Hysterectomy;  section; Cystocele repair; Carpal tunnel release; Cardiac catheterization; Spine surgery; Colonoscopy (10/08/2013); Epidural block injection; Lumbar fusion (N/A, 2021); Back surgery; and Lumbar fusion (N/A, 2023).  family history includes Diabetes in her father; Heart disease in her brother; Hypertension in her brother and sister; Osteoporosis in her maternal grandmother; Stroke in her sister.  Social History     Tobacco Use   • Smoking status: Former     Packs/day: 1.00     Years: 39.00     Pack years: 39.00     Types: Cigarettes     Quit date: 2021     Years since quittin.5   • Smokeless tobacco: Never   • Tobacco comments:     on Nicotiene  patch 22   Substance Use Topics   • Alcohol use: No     Review of Systems    Musculoskeletal: Positive for arthralgias, back pain and neck pain.   Neurological: Positive for tremors, numbness and headache.   All other systems reviewed and are negative.    Objective   Physical Exam   Constitutional: No distress.   walker   Neck: Kernig's sign noted.   Pulmonary/Chest: Effort normal.   Musculoskeletal:      Cervical back: She exhibits decreased range of motion and tenderness.      Lumbar back: She exhibits decreased range of motion and tenderness.   Vitals reviewed.    Pulse 92   Resp 16   SpO2 96%      PHQ 9 on chart  Opioid risk tool low risk    Assessment & Plan   Diagnoses and all orders for this visit:    1. Chronic pain syndrome (Primary)  -     HYDROcodone-acetaminophen (NORCO) 7.5-325 MG per tablet; Take 1 tablet by mouth 5 (Five) Times a Day As Needed for Severe Pain. DNF before 6/29/2023  Dispense: 150 tablet; Refill: 0    2. Postlaminectomy syndrome of lumbar region    3. Postlaminectomy syndrome of cervical region    4. Lumbar radiculitis    5. High risk medication use    Summary  Diya Arreola is a 62 y.o. female with chronic neck pain status post ACDF, chronic back pain status post Fusion L3-5, S/P thoracolumbar decompression and fusion from T9-pelvis/Dr. Gorman/[facility] 2023.  here for follow-up.    Chronic back pain from DDD spondylosis postlaminectomy syndrome with radicular pain.   Chronic neck pain from postlaminectomy syndrome.  Worsening chronic tremors.    Continues to recover from recent spine surgery.  Participating in physical therapy now ambulating with walker.  Continues to have good relief of functional benefit with hydrocodone denies side effects.    Cont hydrocodone 7.5/325 4 times daily as needed for severe pain.  UDS and inspect reviewed  Discussed risk of tolerance, dependence, respiratory depression, coma and death associated with use of oral opioids for treatment of chronic nonmalignant pain.     RTC 2 month

## 2023-07-17 ENCOUNTER — OFFICE VISIT (OUTPATIENT)
Dept: FAMILY MEDICINE CLINIC | Facility: CLINIC | Age: 63
End: 2023-07-17
Payer: MEDICARE

## 2023-07-17 VITALS
BODY MASS INDEX: 23.52 KG/M2 | HEART RATE: 112 BPM | TEMPERATURE: 97.8 F | SYSTOLIC BLOOD PRESSURE: 131 MMHG | WEIGHT: 127.8 LBS | OXYGEN SATURATION: 95 % | DIASTOLIC BLOOD PRESSURE: 86 MMHG | HEIGHT: 62 IN

## 2023-07-17 DIAGNOSIS — R73.9 HYPERGLYCEMIA: ICD-10-CM

## 2023-07-17 DIAGNOSIS — R76.8 ANA POSITIVE: ICD-10-CM

## 2023-07-17 DIAGNOSIS — E78.5 HYPERLIPIDEMIA, UNSPECIFIED HYPERLIPIDEMIA TYPE: ICD-10-CM

## 2023-07-17 DIAGNOSIS — I10 ESSENTIAL (PRIMARY) HYPERTENSION: ICD-10-CM

## 2023-07-17 DIAGNOSIS — M79.7 FIBROMYALGIA: ICD-10-CM

## 2023-07-17 DIAGNOSIS — Z00.00 MEDICARE ANNUAL WELLNESS VISIT, SUBSEQUENT: Primary | ICD-10-CM

## 2023-07-17 PROCEDURE — 3079F DIAST BP 80-89 MM HG: CPT | Performed by: FAMILY MEDICINE

## 2023-07-17 PROCEDURE — 3075F SYST BP GE 130 - 139MM HG: CPT | Performed by: FAMILY MEDICINE

## 2023-07-17 PROCEDURE — G0439 PPPS, SUBSEQ VISIT: HCPCS | Performed by: FAMILY MEDICINE

## 2023-07-17 RX ORDER — BACLOFEN 10 MG/1
10 TABLET ORAL 3 TIMES DAILY
Qty: 90 TABLET | Refills: 0 | Status: SHIPPED | OUTPATIENT
Start: 2023-07-17 | End: 2023-08-02

## 2023-07-20 NOTE — PROGRESS NOTES
"Subjective   History of Present Illness: Diya Arreola is a 63 y.o. female is here today for follow-up for back pain. Patient reports falling back in June of this year as she was trying to get out of her husbands car, but she reports the pain she was having at that time is gone.  Overall she is doing well with significant improvement of preoperative symptoms and the symptoms she developed following her recent fall.  No other new issues    Chief Complaint   Patient presents with    Back Pain     Follow up           Previous Treatment:Lumbar 5 to sacral 1 transforaminal lumbar interbody fusion; thoracic 10 to lumbar 2 and lumbar 4-5 sheldon osteotomies; T9 to pelvic fusion with neuro robot, hardware removal.  Mobic,hydrocodone,Gabapentin,Baclofen    Previous injections:    The following portions of the patient's history were reviewed and updated as appropriate: allergies, current medications, past family history, past medical history, past social history, past surgical history, and problem list.    Review of Systems   Constitutional:  Positive for activity change.   HENT: Negative.     Eyes: Negative.    Respiratory: Negative.     Cardiovascular: Negative.    Gastrointestinal: Negative.    Endocrine: Negative.    Genitourinary: Negative.    Musculoskeletal:  Positive for arthralgias, back pain and myalgias.   Skin: Negative.    Allergic/Immunologic: Negative.    Neurological: Negative.    Hematological: Negative.    Psychiatric/Behavioral: Negative.       Objective     /85   Pulse 98   Ht 157.5 cm (62\")   Wt 61.7 kg (136 lb)   BMI 24.87 kg/m²    Body mass index is 24.87 kg/m².  Vitals:    07/26/23 1304   PainSc:   4           Neurologic Exam    Assessment & Plan   Independent Review of Radiographic Studies:      I personally reviewed and interpreted the images from the following studies.    X-ray thoracic lumbar spine: Hardware intact in good positioning with no evidence of hardware failure or " displacement    Medical Decision Making:      Diya Arreola is a 63 y.o. female status post thoracic to pelvic fusion for deformity who had a recent fall with significant pain that has since improved.  X-rays are reassuring.  Patient can continue ramping up activity to normal.  I will see her back in 6 months.      Diagnoses and all orders for this visit:    1. S/P lumbar fusion (Primary)      No follow-ups on file.    This patient was examined wearing appropriate personal protective equipment.                      Dr. Jerrell Gorman IV    07/26/23  13:26 EDT

## 2023-07-25 DIAGNOSIS — R76.8 ANA POSITIVE: ICD-10-CM

## 2023-07-25 DIAGNOSIS — M35.00 SJOGREN'S SYNDROME, WITH UNSPECIFIED ORGAN INVOLVEMENT: Primary | ICD-10-CM

## 2023-07-26 ENCOUNTER — OFFICE VISIT (OUTPATIENT)
Dept: NEUROSURGERY | Facility: CLINIC | Age: 63
End: 2023-07-26
Payer: MEDICARE

## 2023-07-26 VITALS
HEART RATE: 98 BPM | WEIGHT: 136 LBS | HEIGHT: 62 IN | SYSTOLIC BLOOD PRESSURE: 142 MMHG | DIASTOLIC BLOOD PRESSURE: 85 MMHG | BODY MASS INDEX: 25.03 KG/M2

## 2023-07-26 DIAGNOSIS — Z98.1 S/P LUMBAR FUSION: Primary | ICD-10-CM

## 2023-08-02 RX ORDER — BREXPIPRAZOLE 0.5 MG/1
1 TABLET ORAL DAILY
Qty: 30 TABLET | Refills: 11 | Status: SHIPPED | OUTPATIENT
Start: 2023-08-02

## 2023-08-02 RX ORDER — ALBUTEROL SULFATE 2.5 MG/3ML
SOLUTION RESPIRATORY (INHALATION)
Qty: 375 ML | Refills: 3 | Status: SHIPPED | OUTPATIENT
Start: 2023-08-02

## 2023-08-02 RX ORDER — BACLOFEN 10 MG/1
TABLET ORAL
Qty: 90 TABLET | Refills: 0 | Status: SHIPPED | OUTPATIENT
Start: 2023-08-02

## 2023-08-02 RX ORDER — GABAPENTIN 400 MG/1
CAPSULE ORAL
Qty: 120 CAPSULE | Refills: 0 | Status: SHIPPED | OUTPATIENT
Start: 2023-08-02

## 2023-08-08 ENCOUNTER — TELEPHONE (OUTPATIENT)
Dept: FAMILY MEDICINE CLINIC | Facility: CLINIC | Age: 63
End: 2023-08-08

## 2023-08-08 DIAGNOSIS — J44.9 CHRONIC OBSTRUCTIVE PULMONARY DISEASE, UNSPECIFIED COPD TYPE: Primary | ICD-10-CM

## 2023-08-08 RX ORDER — NEBULIZER ACCESSORIES
KIT MISCELLANEOUS
Qty: 1 EACH | Refills: 5 | Status: SHIPPED | OUTPATIENT
Start: 2023-08-08

## 2023-08-08 NOTE — TELEPHONE ENCOUNTER
Caller: Diya Arreola    Relationship: Self    Best call back number: [phone]    What is the best time to reach you: ANYTIME    Who are you requesting to speak with (clinical staff, provider,  specific staff member): CLINICAL STAFF    Do you know the name of the person who called: PATIENT     What was the call regarding: PATIENT CALLED AND SAID THAT SHE HAS A NEBULIZER THAT SHE GOT THROUGH DR. BERRY     SHE SAID THE TUBING SHE HAD PREVIOUSLY DOES NOT WORK WITH THIS [employer] NEBULIZER    SHE IS WANTING TO SEE IF DR. BERRY NEEDS TO ORDER TUBING TO GO WITH THIS OR IF SHE CAN CONTACT THE MEDICAL SUPPLY STORE FOR IT    SHE SAID ITS CALLED [facility]    SHE CANNOT RECALL THE NAME OF THE STORE THE ORDER WAS SENT TO ORIGINALLY     SHE SAID SHE'S BEEN USING HER OLD NEBULIZER AND DUE TO THAT NOT TAKING THE AMOUNT SHE SHOULD HAVE BEEN TAKING    Is it okay if the provider responds through MyChart: NO

## 2023-08-09 ENCOUNTER — TELEPHONE (OUTPATIENT)
Dept: FAMILY MEDICINE CLINIC | Facility: CLINIC | Age: 63
End: 2023-08-09
Payer: MEDICARE

## 2023-08-09 RX ORDER — GABAPENTIN 400 MG/1
CAPSULE ORAL
Qty: 120 CAPSULE | Refills: 2 | Status: SHIPPED | OUTPATIENT
Start: 2023-08-09

## 2023-08-09 NOTE — TELEPHONE ENCOUNTER
BISHOP FROM [facility] CALLED WITH THIS PATIENTS INFORMATION.    DETAILED WRITTEN ORDER THAT WAS FAXED HAS NO PATIENT NAME AND NO START DATE. IT STATES THAT ALL THAT WAS ORDERED WERE NEB KITS, NOTHING ELSE. HE IS JUST WANTING CLARIFICATION.    [phone]

## 2023-08-09 NOTE — TELEPHONE ENCOUNTER
They are going to call me back they cannot find the form but I did re fax this with all her information

## 2023-08-09 NOTE — TELEPHONE ENCOUNTER
Pt was verbally informed that you placed a order and she would like it sent to [facility] so I completed the form and sent office notes- I plan on calling them in a few days to check on the status is this

## 2023-08-11 ENCOUNTER — TELEPHONE (OUTPATIENT)
Dept: FAMILY MEDICINE CLINIC | Facility: CLINIC | Age: 63
End: 2023-08-11

## 2023-08-11 RX ORDER — DOXYCYCLINE 100 MG/1
100 CAPSULE ORAL 2 TIMES DAILY
Qty: 20 CAPSULE | Refills: 0 | Status: SHIPPED | OUTPATIENT
Start: 2023-08-11 | End: 2023-08-14 | Stop reason: SDUPTHER

## 2023-08-11 NOTE — TELEPHONE ENCOUNTER
Caller: Diya Arreola    Relationship: Self    Best call back number: [phone]     What medication are you requesting: ANTIBIOTICS     What are your current symptoms: CHEST CONGESTION, GREEN/YELLOW PHLEGM, COUGH    How long have you been experiencing symptoms: WEEK AND A HALF       Have you had these symptoms before:    [x] Yes  [] No    Have you been treated for these symptoms before:   [x] Yes  [] No    If a prescription is needed, what is your preferred pharmacy and phone number:        [facility]/pharmacy #[id number] - [address] AT [place] [address] - [phone]  - [phone] FX       Additional notes:    PLEASE ADVISE

## 2023-08-14 ENCOUNTER — TELEPHONE (OUTPATIENT)
Dept: FAMILY MEDICINE CLINIC | Facility: CLINIC | Age: 63
End: 2023-08-14

## 2023-08-14 RX ORDER — DOXYCYCLINE 100 MG/1
100 CAPSULE ORAL 2 TIMES DAILY
Qty: 20 CAPSULE | Refills: 0 | Status: SHIPPED | OUTPATIENT
Start: 2023-08-14 | End: 2023-10-25

## 2023-08-14 NOTE — TELEPHONE ENCOUNTER
Caller: ASHLEY BUTLER    Relationship to patient: Emergency Contact    Best call back number: [phone]    Patient is needing: PATIENT'S  CALLED TO ADVISE THAT PATIENT'S PRESCRIPTION FOR THE ANTIBIOTIC WENT TO THE WRONG PHARMACY.  ONLY NORCO GOES TO [facility].   CORRECT PHARMACY IS:  [facility]/pharmacy #[id number] [facility] [place] [address] AT [place] [address] - [phone] [facility] [phone] FX           THANKS

## 2023-08-14 NOTE — TELEPHONE ENCOUNTER
Pt asked if this could be switched to the [facility] in [place] they do not have gas money to get to [facility] pt stated she is sorry

## 2023-09-06 RX ORDER — BACLOFEN 10 MG/1
TABLET ORAL
Qty: 90 TABLET | Refills: 0 | Status: SHIPPED | OUTPATIENT
Start: 2023-09-06

## 2023-09-18 RX ORDER — NITROGLYCERIN 0.4 MG/1
TABLET SUBLINGUAL
Qty: 25 TABLET | Refills: 1 | Status: SHIPPED | OUTPATIENT
Start: 2023-09-18

## 2023-09-26 ENCOUNTER — OFFICE VISIT (OUTPATIENT)
Dept: PAIN MEDICINE | Facility: CLINIC | Age: 63
End: 2023-09-26
Payer: MEDICARE

## 2023-09-26 VITALS
HEART RATE: 94 BPM | RESPIRATION RATE: 16 BRPM | SYSTOLIC BLOOD PRESSURE: 133 MMHG | DIASTOLIC BLOOD PRESSURE: 72 MMHG | OXYGEN SATURATION: 96 %

## 2023-09-26 DIAGNOSIS — M96.1 POSTLAMINECTOMY SYNDROME OF CERVICAL REGION: ICD-10-CM

## 2023-09-26 DIAGNOSIS — M96.1 POSTLAMINECTOMY SYNDROME OF LUMBAR REGION: ICD-10-CM

## 2023-09-26 DIAGNOSIS — G89.4 CHRONIC PAIN SYNDROME: Primary | ICD-10-CM

## 2023-09-26 DIAGNOSIS — M79.18 MYOFASCIAL PAIN SYNDROME: ICD-10-CM

## 2023-09-26 DIAGNOSIS — Z79.899 HIGH RISK MEDICATION USE: Primary | ICD-10-CM

## 2023-09-26 DIAGNOSIS — Z79.899 HIGH RISK MEDICATION USE: ICD-10-CM

## 2023-09-26 PROCEDURE — 99214 OFFICE O/P EST MOD 30 MIN: CPT | Performed by: ANESTHESIOLOGY

## 2023-09-26 PROCEDURE — 1159F MED LIST DOCD IN RCRD: CPT | Performed by: ANESTHESIOLOGY

## 2023-09-26 PROCEDURE — 3075F SYST BP GE 130 - 139MM HG: CPT | Performed by: ANESTHESIOLOGY

## 2023-09-26 PROCEDURE — 1160F RVW MEDS BY RX/DR IN RCRD: CPT | Performed by: ANESTHESIOLOGY

## 2023-09-26 PROCEDURE — 3078F DIAST BP <80 MM HG: CPT | Performed by: ANESTHESIOLOGY

## 2023-09-26 PROCEDURE — 1125F AMNT PAIN NOTED PAIN PRSNT: CPT | Performed by: ANESTHESIOLOGY

## 2023-09-26 RX ORDER — HYDROCODONE BITARTRATE AND ACETAMINOPHEN 7.5; 325 MG/1; MG/1
1 TABLET ORAL
Qty: 150 TABLET | Refills: 0 | Status: SHIPPED | OUTPATIENT
Start: 2023-09-27

## 2023-09-26 RX ORDER — TRAZODONE HYDROCHLORIDE 100 MG/1
TABLET ORAL
Qty: 90 TABLET | Refills: 3 | Status: SHIPPED | OUTPATIENT
Start: 2023-09-26

## 2023-09-26 RX ORDER — HYDROCODONE BITARTRATE AND ACETAMINOPHEN 7.5; 325 MG/1; MG/1
1 TABLET ORAL
Qty: 150 TABLET | Refills: 0 | Status: SHIPPED | OUTPATIENT
Start: 2023-10-26

## 2023-09-26 RX ORDER — HYDROCODONE BITARTRATE AND ACETAMINOPHEN 7.5; 325 MG/1; MG/1
1 TABLET ORAL
Qty: 150 TABLET | Refills: 0 | Status: SHIPPED | OUTPATIENT
Start: 2023-11-24

## 2023-09-26 RX ORDER — BACLOFEN 10 MG/1
TABLET ORAL
Qty: 90 TABLET | Refills: 0 | Status: SHIPPED | OUTPATIENT
Start: 2023-09-26

## 2023-09-26 RX ORDER — TIZANIDINE 4 MG/1
4 TABLET ORAL NIGHTLY PRN
Qty: 90 TABLET | Refills: 0 | Status: SHIPPED | OUTPATIENT
Start: 2023-09-26

## 2023-09-26 RX ORDER — BUPROPION HYDROCHLORIDE 150 MG/1
1 TABLET ORAL DAILY
COMMUNITY
Start: 2023-08-17

## 2023-09-26 NOTE — PROGRESS NOTES
Subjective    CC back, leg pain, neck pain  Diya Arreola is a 63 y.o. female with chronic neck pain status post ACDF, chronic back pain S/P  L4-L5 LAMI/TILF 2021/[facility],S/P thoracolumbar decompression and fusion from T9-pelvis/Dr. Gorman/[facility] ,  here for follow-up.    Continues to have significant thoracic and upper lumbar myofascial pain mostly left-sided which is interfering with sleep and all activities.  On baclofen with marginal relief.  Chronic back pain upper lumbar and lower lumbar radiating to bilateral hips bilateral lower extremity worse with standing walking twisting or any activity.  Denies new injury, saddle anesthesia bladder bowel continence.  Chronic neck pain, radiating to bilateral shoulder limited range of motion in the neck and significant paraspinal muscle spasm bilateral UE radicular pain.  Denies balance issues.    Chronic tremors, history of polyarthralgia/lupus sees rheumatology.  Pain interfering with ADL.  Tried physical therapy without relief.  Seen neurosurgery, referred to try injection.        C-spine MRI  probably a plate on the anterior aspect of the spine at the C4-5 level. mild subluxation with degenerative disc disease at C3-4, causing a mild central canal stenosis. Left-sided disc extrusion at C6-7 with left foraminal impingement.   L-spine MRI  postsurgical changes L3-L4 with pedicle screw L3.  Retrolisthesis L2 on L3 moderate central lateral canal stenosis.  Grade 1 anterolisthesis L4-L5.  Degenerative changes throughout.  L-spine x-ray : 4 mm anterolisthesis on extension, 6 mm anterolisthesis on flexion, at the L4-5 level. Stable spinal fusion changes at L3-4.. Severe degenerative disc and endplate changes at L2-3, similar to  2018 examination.    Pain Assessment   Location of Pain: Lower Back, R Hip, L Hip, Legs, neck pain,  Description of Pain: Dull/Aching, Throbbing, Stabbing  Previous Pain Rating :8  Current Pain Ratin  Aggravating Factors:  Activity  Alleviating Factors: Rest, Medication    PEG Assessment   What number best describes your pain on average in the past week?8  What number best describes how, during the past week, pain has interfered with your enjoyment of life?3  What number best describes how, during the past week, pain has interfered with your general activity?10      The following portions of the patient's history were reviewed and updated as appropriate: allergies, current medications, past family history, past medical history, past social history, past surgical history and problem list.     has a past medical history of Allergic, Anxiety, Arthritis, Asthma, Constipation, COPD (chronic obstructive pulmonary disease), Depression, Fall, Fibromyalgia, primary, Gastritis, History of pancreatitis, Insomnia, Low back pain (2021), Lupus, Neuropathy, Osteopenia, Palpitations, Peripheral edema, PONV (postoperative nausea and vomiting), Rheumatoid arthritis, Right lower lobe pulmonary nodule (3/1/2023), Scoliosis, Sjogren's syndrome, Tachycardia, and TIA (transient ischemic attack) (2014).   has a past surgical history that includes Cholecystectomy; Hysterectomy;  section; Cystocele repair; Carpal tunnel release; Cardiac catheterization; Spine surgery; Colonoscopy (10/08/2013); Epidural block injection; Lumbar fusion (N/A, 2021); Back surgery; and Lumbar fusion (N/A, 2023).  family history includes Diabetes in her father; Heart disease in her brother; Hypertension in her brother and sister; Osteoporosis in her maternal grandmother; Stroke in her sister.  Social History     Tobacco Use    Smoking status: Former     Packs/day: 1.00     Years: 39.00     Pack years: 39.00     Types: Cigarettes     Quit date: 2021     Years since quittin.9    Smokeless tobacco: Never    Tobacco comments:     on Nicotiene  patch 22   Substance Use Topics    Alcohol use: No     Review of Systems   Musculoskeletal:  Positive for  arthralgias, back pain and neck pain.   Neurological:  Positive for tremors, numbness and headache.   All other systems reviewed and are negative.  Objective   Physical Exam   Constitutional: No distress.   walker   Neck: Kernig's sign noted.   Pulmonary/Chest: Effort normal.   Musculoskeletal:      Cervical back: She exhibits tenderness.   Vitals reviewed.  /72   Pulse 94   Resp 16   SpO2 96%      PHQ 9 on chart  Opioid risk tool low risk    Assessment & Plan   Diagnoses and all orders for this visit:    1. Chronic pain syndrome (Primary)  -     tiZANidine (ZANAFLEX) 4 MG tablet; Take 1 tablet by mouth At Night As Needed for Muscle Spasms.  Dispense: 90 tablet; Refill: 0  -     HYDROcodone-acetaminophen (NORCO) 7.5-325 MG per tablet; Take 1 tablet by mouth 5 (Five) Times a Day As Needed for Severe Pain. DNF before 10/26/2023  Dispense: 150 tablet; Refill: 0  -     HYDROcodone-acetaminophen (NORCO) 7.5-325 MG per tablet; Take 1 tablet by mouth 5 (Five) Times a Day As Needed for Severe Pain. DNF before 11/24/2023  Dispense: 150 tablet; Refill: 0  -     HYDROcodone-acetaminophen (NORCO) 7.5-325 MG per tablet; Take 1 tablet by mouth 5 (Five) Times a Day As Needed for Severe Pain.  Dispense: 150 tablet; Refill: 0    2. Postlaminectomy syndrome of lumbar region    3. Postlaminectomy syndrome of cervical region    4. Myofascial pain syndrome  -     tiZANidine (ZANAFLEX) 4 MG tablet; Take 1 tablet by mouth At Night As Needed for Muscle Spasms.  Dispense: 90 tablet; Refill: 0    5. High risk medication use    Summary  Diya Arreola is a 63 y.o. female with chronic neck pain status post ACDF, chronic back pain status post Fusion L3-5, S/P thoracolumbar decompression and fusion from T9-pelvis/Dr. Gorman/[facility] 2023.  here for follow-up.    Chronic back pain from DDD spondylosis postlaminectomy syndrome with radicular pain.   Chronic neck pain from postlaminectomy syndrome.  Worsening chronic tremors.    Continues to  have significant thoracic and upper lumbar myofascial pain mostly left-sided which is interfering with sleep and all activities.  On baclofen with marginal relief.  We will start Zanaflex at bedtime as needed.  Continues to have good relief with hydrocodone and denies any side effects.    Cont hydrocodone 7.5/325 4 times daily as needed for severe pain.  UDS and inspect reviewed  Discussed risk of tolerance, dependence, respiratory depression, coma and death associated with use of oral opioids for treatment of chronic nonmalignant pain.     RTC 2-3 month

## 2023-09-30 RX ORDER — HYDROXYCHLOROQUINE SULFATE 200 MG/1
TABLET, FILM COATED ORAL
Qty: 90 TABLET | Refills: 1 | Status: CANCELLED | OUTPATIENT
Start: 2023-09-30

## 2023-10-02 RX ORDER — HYDROXYCHLOROQUINE SULFATE 200 MG/1
200 TABLET, FILM COATED ORAL DAILY
Qty: 30 TABLET | Refills: 5 | Status: SHIPPED | OUTPATIENT
Start: 2023-10-02 | End: 2024-03-30

## 2023-10-25 ENCOUNTER — OFFICE VISIT (OUTPATIENT)
Dept: FAMILY MEDICINE CLINIC | Facility: CLINIC | Age: 63
End: 2023-10-25
Payer: MEDICARE

## 2023-10-25 VITALS
BODY MASS INDEX: 25.1 KG/M2 | SYSTOLIC BLOOD PRESSURE: 120 MMHG | OXYGEN SATURATION: 95 % | HEART RATE: 107 BPM | WEIGHT: 136.4 LBS | TEMPERATURE: 98.7 F | HEIGHT: 62 IN | RESPIRATION RATE: 16 BRPM | DIASTOLIC BLOOD PRESSURE: 83 MMHG

## 2023-10-25 DIAGNOSIS — F51.01 PRIMARY INSOMNIA: Primary | ICD-10-CM

## 2023-10-25 DIAGNOSIS — I10 ESSENTIAL (PRIMARY) HYPERTENSION: ICD-10-CM

## 2023-10-25 DIAGNOSIS — E78.5 HYPERLIPIDEMIA, UNSPECIFIED HYPERLIPIDEMIA TYPE: ICD-10-CM

## 2023-10-25 PROCEDURE — 99214 OFFICE O/P EST MOD 30 MIN: CPT | Performed by: FAMILY MEDICINE

## 2023-10-25 PROCEDURE — 1160F RVW MEDS BY RX/DR IN RCRD: CPT | Performed by: FAMILY MEDICINE

## 2023-10-25 PROCEDURE — 1159F MED LIST DOCD IN RCRD: CPT | Performed by: FAMILY MEDICINE

## 2023-10-25 PROCEDURE — 3079F DIAST BP 80-89 MM HG: CPT | Performed by: FAMILY MEDICINE

## 2023-10-25 PROCEDURE — 3074F SYST BP LT 130 MM HG: CPT | Performed by: FAMILY MEDICINE

## 2023-10-25 RX ORDER — BACLOFEN 10 MG/1
10 TABLET ORAL 3 TIMES DAILY
Qty: 90 TABLET | Refills: 0 | Status: SHIPPED | OUTPATIENT
Start: 2023-10-25

## 2023-10-25 RX ORDER — GABAPENTIN 400 MG/1
400 CAPSULE ORAL 4 TIMES DAILY
Qty: 120 CAPSULE | Refills: 2 | Status: SHIPPED | OUTPATIENT
Start: 2023-10-25

## 2023-10-25 RX ORDER — ROFLUMILAST 500 UG/1
500 TABLET ORAL DAILY
Qty: 90 TABLET | Refills: 3 | Status: SHIPPED | OUTPATIENT
Start: 2023-10-25

## 2023-10-25 RX ORDER — TRAZODONE HYDROCHLORIDE 150 MG/1
150 TABLET ORAL NIGHTLY
Qty: 90 TABLET | Refills: 0 | Status: SHIPPED | OUTPATIENT
Start: 2023-10-25

## 2023-10-25 RX ORDER — BUMETANIDE 2 MG/1
2 TABLET ORAL 2 TIMES DAILY
Qty: 180 TABLET | Refills: 3 | Status: SHIPPED | OUTPATIENT
Start: 2023-10-25

## 2023-10-25 RX ORDER — HYDROXYCHLOROQUINE SULFATE 200 MG/1
200 TABLET, FILM COATED ORAL 2 TIMES DAILY
Qty: 180 TABLET | Refills: 1 | Status: SHIPPED | OUTPATIENT
Start: 2023-10-25 | End: 2024-04-22

## 2023-10-25 NOTE — PROGRESS NOTES
"Chief Complaint  Hyperlipidemia (Follow Up), Hypertension (Follow Up), Anxiety (Follow Up), and Med Refill    Subjective        Diya Arreola presents to [facility]  History of Present Illness  She had a fall in her friend's front yard on October 14.  She feels like it marisela her spine.   Hyperlipidemia  This is a chronic problem. The current episode started more than 1 year ago. The problem is controlled. There are no known factors aggravating her hyperlipidemia. Pertinent negatives include no chest pain or shortness of breath. Current antihyperlipidemic treatment includes diet change. The current treatment provides mild improvement of lipids. There are no compliance problems.    Hypertension  This is a chronic problem. The current episode started more than 1 year ago. The problem has been gradually improving since onset. The problem is controlled. Associated symptoms include anxiety, malaise/fatigue and neck pain. Pertinent negatives include no chest pain, headaches, peripheral edema or shortness of breath. The current treatment provides moderate improvement. There are no compliance problems.    Anxiety  Presents for follow-up visit. Symptoms include depressed mood, insomnia, muscle tension and nervous/anxious behavior. Patient reports no chest pain or shortness of breath. Symptoms occur most days. The severity of symptoms is moderate. The quality of sleep is fair.           Objective   Vital Signs:  /83 (BP Location: Left arm, Patient Position: Sitting, Cuff Size: Adult)   Pulse 107   Temp 98.7 °F (37.1 °C) (Infrared)   Resp 16   Ht 157.5 cm (62\")   Wt 61.9 kg (136 lb 6.4 oz)   SpO2 95%   BMI 24.95 kg/m²   Estimated body mass index is 24.95 kg/m² as calculated from the following:    Height as of this encounter: 157.5 cm (62\").    Weight as of this encounter: 61.9 kg (136 lb 6.4 oz).       BMI is within normal parameters. No other follow-up for BMI " required.      Physical Exam  Vitals and nursing note reviewed.   Constitutional:       General: She is not in acute distress.     Appearance: She is well-developed.   HENT:      Head: Normocephalic.   Eyes:      General: Lids are normal.      Conjunctiva/sclera: Conjunctivae normal.   Neck:      Thyroid: No thyroid mass or thyromegaly.      Trachea: Trachea normal.   Cardiovascular:      Rate and Rhythm: Normal rate and regular rhythm.      Heart sounds: Normal heart sounds.   Pulmonary:      Effort: Pulmonary effort is normal.      Breath sounds: Normal breath sounds.   Musculoskeletal:         General: Tenderness present.      Cervical back: Normal range of motion.   Lymphadenopathy:      Cervical: No cervical adenopathy.   Skin:     General: Skin is warm and dry.   Neurological:      Mental Status: She is alert and oriented to person, place, and time.   Psychiatric:         Attention and Perception: She is attentive.         Mood and Affect: Mood normal.         Speech: Speech normal.         Behavior: Behavior normal.        Result Review :  The following data was reviewed by: Mtizy Rea MD on 10/25/2023:  Common labs          2/20/2023    12:59 2/28/2023    23:15   Common Labs   Glucose 83  136    BUN 11  11    Creatinine 0.71  0.54    Sodium 139  139    Potassium 3.8  3.7    Chloride 102  103    Calcium 9.4  8.8    WBC 7.09  12.60    Hemoglobin 13.8  9.0    Hematocrit 41.2  28.5    Platelets 344  214    Hemoglobin A1C 5.3                    Assessment and Plan   Diagnoses and all orders for this visit:    1. Primary insomnia (Primary)    2. Essential (primary) hypertension    3. Hyperlipidemia, unspecified hyperlipidemia type    Other orders  -     traZODone (DESYREL) 150 MG tablet; Take 1 tablet by mouth Every Night.  Dispense: 90 tablet; Refill: 0  -     gabapentin (NEURONTIN) 400 MG capsule; Take 1 capsule by mouth 4 (Four) Times a Day.  Dispense: 120 capsule; Refill: 2  -     bumetanide (BUMEX) 2  MG tablet; Take 1 tablet by mouth 2 (Two) Times a Day.  Dispense: 180 tablet; Refill: 3  -     hydroxychloroquine (PLAQUENIL) 200 MG tablet; Take 1 tablet by mouth 2 (Two) Times a Day for 180 days. Indications: Rheumatoid Arthritis  Dispense: 180 tablet; Refill: 1  -     baclofen (LIORESAL) 10 MG tablet; Take 1 tablet by mouth 3 (Three) Times a Day.  Dispense: 90 tablet; Refill: 0  -     Daliresp 500 MCG tablet tablet; Take 1 tablet by mouth Daily.  Dispense: 90 tablet; Refill: 3             Follow Up   No follow-ups on file.  Patient was given instructions and counseling regarding her condition or for health maintenance advice. Please see specific information pulled into the AVS if appropriate.

## 2023-11-13 ENCOUNTER — TELEPHONE (OUTPATIENT)
Dept: NEUROSURGERY | Facility: CLINIC | Age: 63
End: 2023-11-13

## 2023-11-13 NOTE — TELEPHONE ENCOUNTER
Caller: Diya Arreola    Relationship to patient: Self    Best call back number: [phone]     Patient is needing:       PATIENT HAD A FALL ON 10/14/23 IN A FRIENDS YARD, TWISTED ANKLE AND FELL ON HER SHOULDER AND IT DEVIN HER BACK.    SHE HAS BEEN PUTTING OFF COMING BACK IN.  PATIENT SAW PCP, WHO ADVISED HER TO CONTACT DR RANGEL'S OFFICE TO SEE IF SHE COULD BE SEEN SOONER.        PLEASE ADVISE

## 2023-11-20 NOTE — TELEPHONE ENCOUNTER
PATIENT CALLED IN AND STATES SOMEONE WAS SUPPOSED TO GET BACK TO HER FOR SOONER APPOINTMENT.  STATES SHE FELL 10/14/23 AND IS IN PAIN - ATTEMPTED WT AND NOT SUCCESSFUL.  PLEASE CALL PATIENT BACK AT YOUR EARLIEST CONVENIENCE.    THANK YOU!

## 2023-11-27 ENCOUNTER — TELEPHONE (OUTPATIENT)
Dept: NEUROSURGERY | Facility: CLINIC | Age: 63
End: 2023-11-27

## 2023-11-27 ENCOUNTER — OFFICE VISIT (OUTPATIENT)
Dept: NEUROSURGERY | Facility: CLINIC | Age: 63
End: 2023-11-27
Payer: MEDICARE

## 2023-11-27 ENCOUNTER — HOSPITAL ENCOUNTER (OUTPATIENT)
Dept: GENERAL RADIOLOGY | Facility: HOSPITAL | Age: 63
Discharge: HOME | End: 2023-11-27
Payer: MEDICARE

## 2023-11-27 VITALS
BODY MASS INDEX: 25.83 KG/M2 | DIASTOLIC BLOOD PRESSURE: 94 MMHG | WEIGHT: 140.4 LBS | HEIGHT: 62 IN | HEART RATE: 90 BPM | SYSTOLIC BLOOD PRESSURE: 153 MMHG

## 2023-11-27 DIAGNOSIS — Z98.1 S/P LUMBAR FUSION: ICD-10-CM
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
## 2023-11-27 DIAGNOSIS — T84.498A INTERNAL FIXATION DEVICE (PIN, ROD, OR SCREW) MECHANICAL COMPLICATION, INITIAL ENCOUNTER: ICD-10-CM

## 2023-11-27 DIAGNOSIS — Z98.1 S/P LUMBAR FUSION: Primary | ICD-10-CM

## 2023-11-27 PROCEDURE — 1160F RVW MEDS BY RX/DR IN RCRD: CPT

## 2023-11-27 PROCEDURE — 72100 X-RAY EXAM L-S SPINE 2/3 VWS: CPT

## 2023-11-27 PROCEDURE — 1159F MED LIST DOCD IN RCRD: CPT

## 2023-11-27 PROCEDURE — 3080F DIAST BP >= 90 MM HG: CPT

## 2023-11-27 PROCEDURE — 72070 X-RAY EXAM THORAC SPINE 2VWS: CPT

## 2023-11-27 PROCEDURE — 3077F SYST BP >= 140 MM HG: CPT

## 2023-11-27 PROCEDURE — 99214 OFFICE O/P EST MOD 30 MIN: CPT

## 2023-11-27 NOTE — TELEPHONE ENCOUNTER
Attempted to call the patient however her voicemail is full and cannot accept more calls. I called her with her XR results.      (Patient has possible fractured screw, we need to obtain a CT to evaluate it further) CT will need to be done here at  as her previous ones are done here. She will need to speak to an MA for results.

## 2023-11-27 NOTE — PROGRESS NOTES
Subjective     Chief Complaint   Patient presents with    Back Pain     Follow up          Previous Treatment: Lumbar 5 to sacral 1 transforaminal lumbar interbody fusion; thoracic 10 to lumbar 2 and lumbar 4-5 sheldon osteotomies; T9 to pelvic fusion on 2/28/23.  Hydrocodone,Baclofen,Gabapentin    HPI: Diya Arreola is a 63 y.o. female who underwent T9-pelvic fusion with Dr. Gorman in February of this year.  She presents today with severe thoracic pain that started approximately 6 weeks ago after a fall at home.  This was a mechanical fall on uneven ground.  Her pain is primarily in the thoracic spine.  It does not radiate anywhere.  She does have new numbness and tingling in her feet since this fall that was not there previously.  She does feel like she have some right leg functional weakness.  Symptoms have been constant for the past 6 weeks.        PMH:  Past Medical History:   Diagnosis Date    Allergic     Anxiety     Arthritis     Asthma     Constipation     COPD (chronic obstructive pulmonary disease)     Depression     Fall     Fibromyalgia, primary     Gastritis     History of pancreatitis     Insomnia     Low back pain 11/2021    Lupus     Neuropathy     Osteopenia     Palpitations     Peripheral edema     PONV (postoperative nausea and vomiting)     Rheumatoid arthritis     Right lower lobe pulmonary nodule 3/1/2023    Scoliosis     Sjogren's syndrome     Tachycardia     TIA (transient ischemic attack) 09/2014         Current Outpatient Medications:     albuterol (PROVENTIL) (2.5 MG/3ML) 0.083% nebulizer solution, USE 1 VIAL VIA NEBULIZATION EVERY 4 HOURS AS NEEDED FOR WHEEZING, Disp: 375 mL, Rfl: 3    albuterol sulfate  (90 Base) MCG/ACT inhaler, TAKE 2 PUFFS BY MOUTH EVERY 4 HOURS AS NEEDED FOR WHEEZE, Disp: 18 g, Rfl: 3    baclofen (LIORESAL) 10 MG tablet, Take 1 tablet by mouth 3 (Three) Times a Day., Disp: 90 tablet, Rfl: 0    bumetanide (BUMEX) 2 MG tablet, Take 1 tablet by mouth 2 (Two)  Times a Day., Disp: 180 tablet, Rfl: 3    buPROPion XL (WELLBUTRIN XL) 150 MG 24 hr tablet, Take 1 tablet by mouth Daily., Disp: , Rfl:     Cholecalciferol (VITAMIN D) 2000 units capsule, Take 1 capsule by mouth Daily. Not good at taking   ld 2/21, Disp: , Rfl:     Daliresp 500 MCG tablet tablet, Take 1 tablet by mouth Daily., Disp: 90 tablet, Rfl: 3    EPINEPHrine (EPIPEN) 0.3 MG/0.3ML solution auto-injector injection, INJECT 0.3 ML UNDER THE SKIN INTO THE APPROPRIATE AREA AS DIRECTED 1 (ONE) TIME FOR 1 DOSE., Disp: 2 each, Rfl: 1    folic acid (FOLVITE) 1 MG tablet, Take 1 tablet by mouth Every Evening. Ld 2/21, Disp: , Rfl:     gabapentin (NEURONTIN) 400 MG capsule, Take 1 capsule by mouth 4 (Four) Times a Day., Disp: 120 capsule, Rfl: 2    HYDROcodone-acetaminophen (NORCO) 7.5-325 MG per tablet, Take 1 tablet by mouth 5 (Five) Times a Day As Needed for Severe Pain. DNF before 10/26/2023, Disp: 150 tablet, Rfl: 0    HYDROcodone-acetaminophen (NORCO) 7.5-325 MG per tablet, Take 1 tablet by mouth 5 (Five) Times a Day As Needed for Severe Pain. DNF before 11/24/2023, Disp: 150 tablet, Rfl: 0    HYDROcodone-acetaminophen (NORCO) 7.5-325 MG per tablet, Take 1 tablet by mouth 5 (Five) Times a Day As Needed for Severe Pain., Disp: 150 tablet, Rfl: 0    hydroxychloroquine (PLAQUENIL) 200 MG tablet, Take 1 tablet by mouth 2 (Two) Times a Day for 180 days. Indications: Rheumatoid Arthritis, Disp: 180 tablet, Rfl: 1    multivitamin with minerals tablet tablet, Take 1 tablet by mouth Daily. Ld 2/21, Disp: , Rfl:     nitroglycerin (NITROSTAT) 0.4 MG SL tablet, PLACE 1 TABLET UNDER THE TONGUE EVERY 5 MINUTES AS NEEDED FOR CHEST PAIN. MAX 3 DOSES IN 15 MINUTES., Disp: 25 tablet, Rfl: 1    polyethylene glycol (MIRALAX) 17 g packet, Take 17 g by mouth As Needed. None preop, Disp: , Rfl:     Respiratory Therapy Supplies (Nebulizer/Tubing/Mouthpiece) kit, For use with Moss home nebulizer J44.9, Disp: 1 each, Rfl: 5    Rexulti  0.5 MG tablet, TAKE 0.5 MG BY MOUTH DAILY., Disp: 30 tablet, Rfl: 11    tiZANidine (ZANAFLEX) 4 MG tablet, Take 1 tablet by mouth At Night As Needed for Muscle Spasms., Disp: 90 tablet, Rfl: 0    traZODone (DESYREL) 150 MG tablet, Take 1 tablet by mouth Every Night., Disp: 90 tablet, Rfl: 0    Trelegy Ellipta 200-62.5-25 MCG/ACT aerosol powder , INHALE 1 PUFF DAILY, Disp: 180 each, Rfl: 3    verapamil SR (CALAN-SR) 240 MG CR tablet, Take 1 tablet by mouth Daily., Disp: 90 tablet, Rfl: 3    Wheat Dextrin (Benefiber) powder, Take  by mouth At Night As Needed., Disp: , Rfl:      Allergies   Allergen Reactions    Adhesive Tape Rash     Paper tape ok    Cephalexin Rash    Ciprofloxacin Rash    Corticosteroids Unknown (See Comments)     Nerve burning     Latex Rash    Other Unknown (See Comments)     Oline Abx and Sudha Pioneer soap    Penciclovir Anaphylaxis    Penicillin G Anaphylaxis    Sulfa Antibiotics Anaphylaxis    Oxycodone Delirium and Irritability    Azithromycin Rash        Past Surgical History:   Procedure Laterality Date    BACK SURGERY      lumbar fusion 21- robotic surgery    CARDIAC CATHETERIZATION      CARPAL TUNNEL RELEASE       SECTION      CHOLECYSTECTOMY      COLONOSCOPY  10/08/2013    CYSTOCELE REPAIR      a&P    EPIDURAL BLOCK      HYSTERECTOMY      LUMBAR FUSION N/A 2021    Procedure: L4-5 LUMBAR LAMINECTOMY TRANSFORAMINAL LUMBAR INTERBODY FUSION; L2-3 laminectomy and facetectomy; removal of hardware L3-4; L2-5 fusion, ROBOTIC;  Surgeon: Jerrell Gorman IV, MD;  Location: [facility] MAIN OR;  Service: Robotics - Neuro;  Laterality: N/A;    LUMBAR FUSION N/A 2023    Procedure: Lumbar 5 to sacral 1 transforaminal lumbar interbody fusion; thoracic 10 to lumbar 2 and lumbar 4-5 sheldon osteotomies; T9 to pelvic fusion with neuro robot, hardware removal;  Surgeon: Jerrell Gorman IV, MD;  Location: [facility] MAIN OR;  Service: Robotics - Neuro;  Laterality: N/A;    SPINE SURGERY      neck   "2001, ; lumbar 2012        Family History   Problem Relation Age of Onset    Diabetes Father     Stroke Sister     Hypertension Sister     Hypertension Brother     Heart disease Brother     Osteoporosis Maternal Grandmother          Social Hx:  Social History     Tobacco Use   Smoking Status Former    Packs/day: 1.00    Years: 39.00    Additional pack years: 0.00    Total pack years: 39.00    Types: Cigarettes    Quit date: 2021    Years since quittin.0   Smokeless Tobacco Never   Tobacco Comments    on Nicotiene  patch 22      Alcohol Use: Not At Risk (2023)    AUDIT-C     Frequency of Alcohol Consumption: Never     Average Number of Drinks: Patient does not drink     Frequency of Binge Drinking: Never      Social History     Substance and Sexual Activity   Drug Use Never          Review of Systems   Constitutional:  Positive for activity change.   HENT: Negative.     Eyes: Negative.    Respiratory: Negative.     Cardiovascular: Negative.    Gastrointestinal: Negative.    Endocrine: Negative.    Genitourinary: Negative.    Musculoskeletal:  Positive for arthralgias, back pain and myalgias.   Skin: Negative.    Allergic/Immunologic: Negative.    Neurological:  Positive for weakness (right leg).        Bilateral feet   Hematological: Negative.    Psychiatric/Behavioral:  Positive for sleep disturbance.          Objective     /94   Pulse 90   Ht 157.5 cm (62\")   Wt 63.7 kg (140 lb 6.4 oz)   BMI 25.68 kg/m²    Body mass index is 25.68 kg/m².      Physical Exam  Vitals reviewed.   Constitutional:       General: She is not in acute distress.     Appearance: Normal appearance. She is well-developed and well-groomed.   HENT:      Head: Normocephalic and atraumatic.   Eyes:      Extraocular Movements: Extraocular movements intact.      Pupils: Pupils are equal, round, and reactive to light.   Cardiovascular:      Rate and Rhythm: Normal rate and regular rhythm.      Pulses: Normal pulses. "   Pulmonary:      Effort: Pulmonary effort is normal. No respiratory distress.   Musculoskeletal:         General: No swelling or tenderness. Normal range of motion.      Thoracic back: Tenderness present.        Back:    Skin:     General: Skin is warm and dry.      Findings: No bruising or rash.   Neurological:      General: No focal deficit present.      Mental Status: She is alert and oriented to person, place, and time.      Sensory: Sensation is intact.      Motor: Motor function is intact.      Comments:             Neurological Exam  Mental Status  Alert. Oriented to person, place, and time.    Cranial Nerves  CN III, IV, VI: Extraocular movements intact bilaterally. Pupils equal round and reactive to light bilaterally.    Motor  Normal muscle bulk throughout. No fasciculations present. Normal muscle tone. Strength is 5/5 in all four extremities except as noted.                                             Right                     Left   Iliopsoas                               5                          5   Quadriceps                           5                          5   Gastrocnemius                     5                           5   Anterior tibialis                      5                          5    Sensory  Normal sensation.Light touch is normal in upper and lower extremities. Pinprick is normal in upper and lower extremities.     Gait  Casual gait is normal including stance, stride, and arm swing.          Results Review  I personally reviewed and interpreted the images from the following studies:    X-ray thoracic and lumbar spine  11/27/2023  There is evidence for possible fracture of one of the T11 screws, better seen on the lumbar x-ray sagittal view.  No other evidence of hardware failure.  No acute fractures above her construct.  No evidence for proximal junctional kyphosis.          Assessment & Plan     MDM: Diya Arreola is a 63 y.o. female who underwent a thoracic to pelvic fusion  for deformity correction approximately 8 months ago with Dr. Gorman.  She presents today with acute severe mid back pain after suffering a fall 6 weeks ago.  She has no neurologic deficits on exam.  X-rays show evidence of possible fracture of one of the T11 screws.  I am ordering a CT thoracic spine to better evaluate this fracture as well as for osseous fusion of the posterior elements.  Patient should follow-up with Dr. Gorman after getting CT scan done.  She is agreeable to this plan.       Diagnosis Plan   1. S/P lumbar fusion  XR Spine Thoracic 2 View    XR Spine Lumbar 2 or 3 View    CT Thoracic Spine Without Contrast      2. Fall, initial encounter  XR Spine Thoracic 2 View    XR Spine Lumbar 2 or 3 View    CT Thoracic Spine Without Contrast      3. Internal fixation device (pin, rohit, or screw) mechanical complication, initial encounter  CT Thoracic Spine Without Contrast          Return for review of imaging with Dr. Gorman.      Diya Arreola  reports that she quit smoking about 2 years ago. Her smoking use included cigarettes. She has a 39.00 pack-year smoking history. She has never used smokeless tobacco.. I have educated her on the risk of diseases from using tobacco products such as cancer, COPD, and heart disease.         BMI is within normal parameters. No other follow-up for BMI required.         This patient was examined wearing appropriate personal protective equipment.            Jose Manuel Tenorio PA-C    11/27/23  13:05 EST      Part of this note may be an electronic transcription/translation of spoken language to printed text using the Dragon Dictation System.

## 2023-11-28 NOTE — TELEPHONE ENCOUNTER
Patient is aware of the message from Jose Manuel and will call scheduling and get the CT scan scheduled. Once she completes the CT she will call us so that it can be reviewed and decided what her next steps are.

## 2023-11-29 ENCOUNTER — TELEPHONE (OUTPATIENT)
Dept: FAMILY MEDICINE CLINIC | Facility: CLINIC | Age: 63
End: 2023-11-29
Payer: MEDICARE

## 2023-11-29 RX ORDER — BACLOFEN 10 MG/1
10 TABLET ORAL 3 TIMES DAILY
Qty: 90 TABLET | Refills: 0 | Status: SHIPPED | OUTPATIENT
Start: 2023-11-29

## 2023-11-29 RX ORDER — TRAZODONE HYDROCHLORIDE 150 MG/1
150 TABLET ORAL
Qty: 90 TABLET | Refills: 0 | Status: SHIPPED | OUTPATIENT
Start: 2023-11-29

## 2023-11-29 NOTE — TELEPHONE ENCOUNTER
PATIENT CALLED IN AND STATES CT IS NOW SCHEDULED IN FOR 12/26/23 - JUST WANTED TO LET THE OFFICE KNOW.    THANK YOU!

## 2023-11-30 RX ORDER — ALBUTEROL SULFATE 90 UG/1
2 AEROSOL, METERED RESPIRATORY (INHALATION) EVERY 4 HOURS PRN
Qty: 18 G | Refills: 3 | Status: SHIPPED | OUTPATIENT
Start: 2023-11-30

## 2023-12-06 ENCOUNTER — TELEPHONE (OUTPATIENT)
Dept: FAMILY MEDICINE CLINIC | Facility: CLINIC | Age: 63
End: 2023-12-06

## 2023-12-06 DIAGNOSIS — J44.9 CHRONIC OBSTRUCTIVE PULMONARY DISEASE, UNSPECIFIED COPD TYPE: ICD-10-CM

## 2023-12-06 RX ORDER — NEBULIZER ACCESSORIES
KIT MISCELLANEOUS
Qty: 1 EACH | Refills: 5 | Status: SHIPPED | OUTPATIENT
Start: 2023-12-06

## 2023-12-06 NOTE — TELEPHONE ENCOUNTER
Caller: Diya Arreola    Relationship: Self    Best call back number: [phone]       [facility] HAS LOST PATIENTS ORDERS FOR HER TUBES FOR HER NEBULIZER.    PATIENT IS IN NEED OF NEW TUBES AND IN ORDER FOR [facility] TO SEND THESE TO HER, THEY ARE REQUIRING A NEW ORDER FROM DR BERRY.    PLEASE ADVISE

## 2023-12-07 NOTE — TELEPHONE ENCOUNTER
I called Karen just to make sure this is correct and there is nothing else I need to send     The form was sent to [employer] and it needed to go to the [place] area     I was given their number [phone]  the fax is [phone]     The form was faxed and I will call them later today or tomorrow to check on the status of this

## 2023-12-12 ENCOUNTER — TELEPHONE (OUTPATIENT)
Dept: FAMILY MEDICINE CLINIC | Facility: CLINIC | Age: 63
End: 2023-12-12
Payer: MEDICARE

## 2023-12-19 ENCOUNTER — OFFICE VISIT (OUTPATIENT)
Dept: PAIN MEDICINE | Facility: CLINIC | Age: 63
End: 2023-12-19
Payer: MEDICARE

## 2023-12-19 VITALS
SYSTOLIC BLOOD PRESSURE: 191 MMHG | DIASTOLIC BLOOD PRESSURE: 92 MMHG | RESPIRATION RATE: 16 BRPM | HEART RATE: 92 BPM | WEIGHT: 140 LBS | OXYGEN SATURATION: 97 % | BODY MASS INDEX: 25.61 KG/M2

## 2023-12-19 DIAGNOSIS — G89.4 CHRONIC PAIN SYNDROME: ICD-10-CM

## 2023-12-19 RX ORDER — HYDROCODONE BITARTRATE AND ACETAMINOPHEN 7.5; 325 MG/1; MG/1
1 TABLET ORAL
Qty: 150 TABLET | Refills: 0 | Status: SHIPPED | OUTPATIENT
Start: 2024-01-22

## 2023-12-19 RX ORDER — MELOXICAM 15 MG/1
1 TABLET ORAL DAILY
COMMUNITY
Start: 2023-11-29

## 2023-12-19 RX ORDER — HYDROCODONE BITARTRATE AND ACETAMINOPHEN 7.5; 325 MG/1; MG/1
1 TABLET ORAL
Qty: 150 TABLET | Refills: 0 | Status: SHIPPED | OUTPATIENT
Start: 2024-03-21

## 2023-12-19 RX ORDER — HYDROCODONE BITARTRATE AND ACETAMINOPHEN 7.5; 325 MG/1; MG/1
1 TABLET ORAL
Qty: 150 TABLET | Refills: 0 | Status: SHIPPED | OUTPATIENT
Start: 2023-12-23

## 2023-12-19 NOTE — PROGRESS NOTES
Subjective    CC back, leg pain, neck pain  Diya Arreola is a 63 y.o. female with chronic neck pain status post ACDF, chronic back pain S/P  L4-L5 LAMI/TILF nov 2021/[facility],S/P thoracolumbar decompression and fusion from T9-pelvis/Dr. Gorman/[facility] 2023,  here for follow-up.    Had a fall a couple weeks ago with worsening upper thoracic back pain.  Suspected loosening screw.  Has CT pending and will follow-up with neurosurgery.  Chronic thoracic and upper lumbar myofascial pain mostly left-sided which is interfering with sleep and all activities.  On baclofen with marginal relief.  Chronic back pain upper lumbar and lower lumbar radiating to bilateral hips bilateral lower extremity worse with standing walking twisting or any activity.  Denies new injury, saddle anesthesia bladder bowel continence.  Chronic neck pain, radiating to bilateral shoulder limited range of motion in the neck and significant paraspinal muscle spasm bilateral UE radicular pain.  Denies balance issues.    Chronic tremors, history of polyarthralgia/lupus sees rheumatology.  Pain interfering with ADL.  Tried physical therapy without relief.  Seen neurosurgery, referred to try injection.        C-spine MRI 2020 probably a plate on the anterior aspect of the spine at the C4-5 level. mild subluxation with degenerative disc disease at C3-4, causing a mild central canal stenosis. Left-sided disc extrusion at C6-7 with left foraminal impingement.   L-spine MRI 2019 postsurgical changes L3-L4 with pedicle screw L3.  Retrolisthesis L2 on L3 moderate central lateral canal stenosis.  Grade 1 anterolisthesis L4-L5.  Degenerative changes throughout.  L-spine x-ray 2020: 4 mm anterolisthesis on extension, 6 mm anterolisthesis on flexion, at the L4-5 level. Stable spinal fusion changes at L3-4.. Severe degenerative disc and endplate changes at L2-3, similar to  03/16/2018 examination.    Pain Assessment   Location of Pain: Lower Back, R Hip, L Hip, Legs, neck  pain,  Description of Pain: Dull/Aching, Throbbing, Stabbing  Previous Pain Rating :7  Current Pain Ratin  Aggravating Factors: Activity  Alleviating Factors: Rest, Medication    PEG Assessment   What number best describes your pain on average in the past week?8  What number best describes how, during the past week, pain has interfered with your enjoyment of life?3  What number best describes how, during the past week, pain has interfered with your general activity?10      The following portions of the patient's history were reviewed and updated as appropriate: allergies, current medications, past family history, past medical history, past social history, past surgical history and problem list.     has a past medical history of Allergic, Anxiety, Arthritis, Asthma, Constipation, COPD (chronic obstructive pulmonary disease), Depression, Fall, Fall, Fibromyalgia, primary, Gastritis, History of pancreatitis, Insomnia, Low back pain (2021), Lupus, Neuropathy, Osteopenia, Palpitations, Peripheral edema, PONV (postoperative nausea and vomiting), Rheumatoid arthritis, Right lower lobe pulmonary nodule (2023), Scoliosis, Sjogren's syndrome, Tachycardia, and TIA (transient ischemic attack) (2014).   has a past surgical history that includes Cholecystectomy; Hysterectomy;  section; Cystocele repair; Carpal tunnel release; Cardiac catheterization; Spine surgery; Colonoscopy (10/08/2013); Epidural block injection; Lumbar fusion (N/A, 2021); Back surgery; and Lumbar fusion (N/A, 2023).  family history includes Diabetes in her father; Heart disease in her brother; Hypertension in her brother and sister; Osteoporosis in her maternal grandmother; Stroke in her sister.      Review of Systems   Musculoskeletal:  Positive for arthralgias, back pain and neck pain.   Neurological:  Positive for tremors, numbness and headache.   All other systems reviewed and are negative.    Objective   Physical Exam    Constitutional: No distress.   walker   Neck: Kernig's sign noted.   Pulmonary/Chest: Effort normal.   Musculoskeletal:      Cervical back: She exhibits tenderness.   Vitals reviewed.    BP (!) 191/92   Pulse 92   Resp 16   Wt 63.5 kg (140 lb)   SpO2 97%   BMI 25.61 kg/m²      PHQ 9 on chart  Opioid risk tool low risk    Assessment & Plan   Diagnoses and all orders for this visit:    1. Chronic pain syndrome  -     HYDROcodone-acetaminophen (NORCO) 7.5-325 MG per tablet; Take 1 tablet by mouth 5 (Five) Times a Day As Needed for Severe Pain. DNF before 3/21/2024  Dispense: 150 tablet; Refill: 0  -     HYDROcodone-acetaminophen (NORCO) 7.5-325 MG per tablet; Take 1 tablet by mouth 5 (Five) Times a Day As Needed for Severe Pain. DNF before 1/22/2024  Dispense: 150 tablet; Refill: 0  -     HYDROcodone-acetaminophen (NORCO) 7.5-325 MG per tablet; Take 1 tablet by mouth 5 (Five) Times a Day As Needed for Severe Pain.  Dispense: 150 tablet; Refill: 0    Summary  Diya Arreola is a 63 y.o. female with chronic neck pain status post ACDF, chronic back pain status post Fusion L3-5, S/P thoracolumbar decompression and fusion from T9-pelvis/Dr. Gorman/[facility] 2023.  here for follow-up.    Chronic back pain from DDD spondylosis postlaminectomy syndrome with radicular pain.   Chronic neck pain from postlaminectomy syndrome.  Worsening chronic tremors.    Had a fall a couple weeks ago with worsening upper thoracic back pain.  Suspected loosening screw.  Has CT pending and will follow-up with neurosurgery.  Continue Zanaflex at bedtime as needed.    Cont hydrocodone 7.5/325 4 times daily as needed for severe pain.  UDS and inspect reviewed  Discussed risk of tolerance, dependence, respiratory depression, coma and death associated with use of oral opioids for treatment of chronic nonmalignant pain.     RTC 2-3 month

## 2023-12-22 DIAGNOSIS — M79.18 MYOFASCIAL PAIN SYNDROME: ICD-10-CM

## 2023-12-22 DIAGNOSIS — G89.4 CHRONIC PAIN SYNDROME: ICD-10-CM

## 2023-12-22 NOTE — TELEPHONE ENCOUNTER
Rx Refill Note  Requested Prescriptions     Pending Prescriptions Disp Refills    tiZANidine (ZANAFLEX) 4 MG tablet [Pharmacy Med Name: TIZANIDINE HCL 4 MG TABLET] 90 tablet 0     Sig: TAKE 1 TABLET BY MOUTH AT NIGHT AS NEEDED FOR MUSCLE SPASMS.      Last office visit with prescribing clinician: 12/19/2023   Last telemedicine visit with prescribing clinician: Visit date not found   Next office visit with prescribing clinician: 3/19/2024                         Would you like a call back once the refill request has been completed: [] Yes [] No    If the office needs to give you a call back, can they leave a voicemail: [] Yes [] No    More Croft MA  12/22/23, 08:51 EST

## 2023-12-26 RX ORDER — TIZANIDINE 4 MG/1
4 TABLET ORAL NIGHTLY PRN
Qty: 90 TABLET | Refills: 0 | OUTPATIENT
Start: 2023-12-26

## 2023-12-26 NOTE — TELEPHONE ENCOUNTER
"PLEASE DISREGARD PREVIOUS TIZANIDINE / ZANAFLEX 4 MG REFILL REQUEST - PATIENT STATES SHE's NO LONGER TAKING THIS MEDICATION (\"IT DIDN'T WORK\")    THANKS   "

## 2023-12-27 RX ORDER — BACLOFEN 10 MG/1
10 TABLET ORAL 3 TIMES DAILY
Qty: 90 TABLET | Refills: 0 | Status: SHIPPED | OUTPATIENT
Start: 2023-12-27

## 2024-01-15 ENCOUNTER — HOSPITAL ENCOUNTER (OUTPATIENT)
Dept: CT IMAGING | Facility: HOSPITAL | Age: 64
Discharge: HOME | End: 2024-01-15
Payer: MEDICARE

## 2024-01-15 DIAGNOSIS — Z98.1 S/P LUMBAR FUSION: ICD-10-CM

## 2024-01-15 DIAGNOSIS — T84.498A INTERNAL FIXATION DEVICE (PIN, ROD, OR SCREW) MECHANICAL COMPLICATION, INITIAL ENCOUNTER: ICD-10-CM
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
## 2024-01-15 PROCEDURE — 72128 CT CHEST SPINE W/O DYE: CPT

## 2024-01-22 ENCOUNTER — OFFICE VISIT (OUTPATIENT)
Dept: FAMILY MEDICINE CLINIC | Facility: CLINIC | Age: 64
End: 2024-01-22
Payer: MEDICARE

## 2024-01-22 VITALS
HEART RATE: 117 BPM | SYSTOLIC BLOOD PRESSURE: 123 MMHG | OXYGEN SATURATION: 96 % | BODY MASS INDEX: 24.48 KG/M2 | HEIGHT: 62 IN | DIASTOLIC BLOOD PRESSURE: 83 MMHG | WEIGHT: 133 LBS | TEMPERATURE: 98.2 F

## 2024-01-22 DIAGNOSIS — I10 ESSENTIAL (PRIMARY) HYPERTENSION: ICD-10-CM

## 2024-01-22 DIAGNOSIS — Z23 NEED FOR VACCINATION: ICD-10-CM

## 2024-01-22 DIAGNOSIS — J40 BRONCHITIS: Primary | ICD-10-CM

## 2024-01-22 DIAGNOSIS — F17.200 TOBACCO DEPENDENCE: ICD-10-CM

## 2024-01-22 DIAGNOSIS — J44.9 CHRONIC OBSTRUCTIVE PULMONARY DISEASE, UNSPECIFIED COPD TYPE: ICD-10-CM

## 2024-01-22 DIAGNOSIS — E78.5 HYPERLIPIDEMIA, UNSPECIFIED HYPERLIPIDEMIA TYPE: ICD-10-CM

## 2024-01-22 PROCEDURE — 3079F DIAST BP 80-89 MM HG: CPT | Performed by: FAMILY MEDICINE

## 2024-01-22 PROCEDURE — 3074F SYST BP LT 130 MM HG: CPT | Performed by: FAMILY MEDICINE

## 2024-01-22 PROCEDURE — 1160F RVW MEDS BY RX/DR IN RCRD: CPT | Performed by: FAMILY MEDICINE

## 2024-01-22 PROCEDURE — G0009 ADMIN PNEUMOCOCCAL VACCINE: HCPCS | Performed by: FAMILY MEDICINE

## 2024-01-22 PROCEDURE — 1159F MED LIST DOCD IN RCRD: CPT | Performed by: FAMILY MEDICINE

## 2024-01-22 PROCEDURE — 90677 PCV20 VACCINE IM: CPT | Performed by: FAMILY MEDICINE

## 2024-01-22 PROCEDURE — 99214 OFFICE O/P EST MOD 30 MIN: CPT | Performed by: FAMILY MEDICINE

## 2024-01-22 RX ORDER — DOXYCYCLINE 100 MG/1
100 CAPSULE ORAL 2 TIMES DAILY
Qty: 20 CAPSULE | Refills: 0 | Status: SHIPPED | OUTPATIENT
Start: 2024-01-22

## 2024-01-22 RX ORDER — MELOXICAM 15 MG/1
15 TABLET ORAL DAILY
Qty: 90 TABLET | Refills: 3 | Status: SHIPPED | OUTPATIENT
Start: 2024-01-22

## 2024-01-22 RX ORDER — BACLOFEN 10 MG/1
10 TABLET ORAL 3 TIMES DAILY
Qty: 90 TABLET | Refills: 3 | Status: SHIPPED | OUTPATIENT
Start: 2024-01-22

## 2024-01-22 RX ORDER — GABAPENTIN 400 MG/1
400 CAPSULE ORAL 4 TIMES DAILY
Qty: 120 CAPSULE | Refills: 2 | Status: SHIPPED | OUTPATIENT
Start: 2024-01-22

## 2024-01-22 RX ORDER — TRAZODONE HYDROCHLORIDE 150 MG/1
150 TABLET ORAL
Qty: 90 TABLET | Refills: 0 | Status: SHIPPED | OUTPATIENT
Start: 2024-01-22

## 2024-01-22 RX ORDER — BUPROPION HYDROCHLORIDE 150 MG/1
150 TABLET ORAL DAILY
Qty: 90 TABLET | Refills: 3 | Status: SHIPPED | OUTPATIENT
Start: 2024-01-22

## 2024-01-22 RX ORDER — NITROGLYCERIN 0.4 MG/1
0.4 TABLET SUBLINGUAL
Qty: 25 TABLET | Refills: 1 | Status: SHIPPED | OUTPATIENT
Start: 2024-01-22

## 2024-01-22 RX ORDER — VERAPAMIL HYDROCHLORIDE 240 MG/1
240 TABLET, FILM COATED, EXTENDED RELEASE ORAL DAILY
Qty: 90 TABLET | Refills: 3 | Status: SHIPPED | OUTPATIENT
Start: 2024-01-22

## 2024-01-22 NOTE — PROGRESS NOTES
"Chief Complaint  Anxiety, Hyperlipidemia, COPD, and Follow-up    Subjective        Diya Arreola presents to [facility]  History of Present Illness  Worsening pins and needles sensation in her feet lately.   Anxiety  Presents for follow-up visit. Symptoms include nervous/anxious behavior and shortness of breath. Patient reports no chest pain, confusion or decreased concentration. Symptoms occur most days. The severity of symptoms is moderate. The quality of sleep is fair.       Hyperlipidemia  This is a chronic problem. The current episode started more than 1 year ago. The problem is controlled. Associated symptoms include shortness of breath. Pertinent negatives include no chest pain. Current antihyperlipidemic treatment includes diet change. The current treatment provides mild improvement of lipids. There are no compliance problems.    COPD  She complains of cough, difficulty breathing, shortness of breath and sputum production. This is a chronic problem. The current episode started more than 1 year ago. The problem occurs daily. The problem has been gradually worsening. The cough is productive of sputum. Associated symptoms include a sore throat. Pertinent negatives include no chest pain. Her symptoms are alleviated by beta-agonist and anxiolytics.   Shortness of Breath  This is a chronic problem. The current episode started more than 1 year ago. The problem occurs daily. The problem has been worse. Associated symptoms include a sore throat and sputum production. Pertinent negatives include no chest pain. Associated symptoms comments: Ear pressure. The maximum temperature recorded prior to her arrival was 100 - 101 F.       Objective   Vital Signs:  /83 (BP Location: Left arm, Patient Position: Sitting, Cuff Size: Adult)   Pulse 117   Temp 98.2 °F (36.8 °C) (Infrared)   Ht 157.5 cm (62\")   Wt 60.3 kg (133 lb)   SpO2 96%   BMI 24.33 kg/m²   Estimated body mass " "index is 24.33 kg/m² as calculated from the following:    Height as of this encounter: 157.5 cm (62\").    Weight as of this encounter: 60.3 kg (133 lb).       BMI is within normal parameters. No other follow-up for BMI required.      Physical Exam  Vitals and nursing note reviewed.   Constitutional:       General: She is not in acute distress.     Appearance: She is well-developed.   HENT:      Head: Normocephalic.   Eyes:      General: Lids are normal.      Conjunctiva/sclera: Conjunctivae normal.   Neck:      Thyroid: No thyroid mass or thyromegaly.      Trachea: Trachea normal.   Cardiovascular:      Rate and Rhythm: Normal rate and regular rhythm.      Heart sounds: Normal heart sounds.   Pulmonary:      Effort: Pulmonary effort is normal.      Breath sounds: Rhonchi present.   Musculoskeletal:      Cervical back: Normal range of motion.   Lymphadenopathy:      Cervical: No cervical adenopathy.   Skin:     General: Skin is warm and dry.   Neurological:      Mental Status: She is alert and oriented to person, place, and time.   Psychiatric:         Attention and Perception: She is attentive.         Mood and Affect: Mood normal.         Speech: Speech normal.         Behavior: Behavior normal.        Result Review :    The following data was reviewed by: Mitzy Rea MD on 01/22/2024:  Common labs          2/20/2023    12:59 2/28/2023    23:15   Common Labs   Glucose 83  136    BUN 11  11    Creatinine 0.71  0.54    Sodium 139  139    Potassium 3.8  3.7    Chloride 102  103    Calcium 9.4  8.8    WBC 7.09  12.60    Hemoglobin 13.8  9.0    Hematocrit 41.2  28.5    Platelets 344  214    Hemoglobin A1C 5.3                    Assessment and Plan     Diagnoses and all orders for this visit:    1. Bronchitis (Primary)  -     doxycycline (MONODOX) 100 MG capsule; Take 1 capsule by mouth 2 (Two) Times a Day.  Dispense: 20 capsule; Refill: 0    2. Tobacco dependence    3. Chronic obstructive pulmonary disease, " unspecified COPD type    4. Essential (primary) hypertension  -     verapamil SR (CALAN-SR) 240 MG CR tablet; Take 1 tablet by mouth Daily.  Dispense: 90 tablet; Refill: 3    5. Hyperlipidemia, unspecified hyperlipidemia type    6. Need for vaccination  -     Pneumococcal Conjugate Vaccine 20-Valent (PCV20)    Other orders  -     buPROPion XL (WELLBUTRIN XL) 150 MG 24 hr tablet; Take 1 tablet by mouth Daily.  Dispense: 90 tablet; Refill: 3  -     traZODone (DESYREL) 150 MG tablet; Take 1 tablet by mouth every night at bedtime.  Dispense: 90 tablet; Refill: 0  -     meloxicam (MOBIC) 15 MG tablet; Take 1 tablet by mouth Daily.  Dispense: 90 tablet; Refill: 3  -     nitroglycerin (NITROSTAT) 0.4 MG SL tablet; Place 1 tablet under the tongue Every 5 (Five) Minutes As Needed for Chest Pain. Take no more than 3 doses in 15 minutes.  Dispense: 25 tablet; Refill: 1  -     gabapentin (NEURONTIN) 400 MG capsule; Take 1 capsule by mouth 4 (Four) Times a Day.  Dispense: 120 capsule; Refill: 2  -     baclofen (LIORESAL) 10 MG tablet; Take 1 tablet by mouth 3 (Three) Times a Day.  Dispense: 90 tablet; Refill: 3             Follow Up     Return in about 6 months (around 7/22/2024).  Patient was given instructions and counseling regarding her condition or for health maintenance advice. Please see specific information pulled into the AVS if appropriate.

## 2024-01-29 NOTE — PROGRESS NOTES
"Subjective   History of Present Illness: Diya Arreola is a 63 y.o. female is here today for follow-up from surgery on 2/28/23. Today patient reports continued mid back pain.  Patient was doing well up until a couple months ago when she suffered a significant fall.  She has had pain in her mid back ever since.  She underwent x-rays after the fall which did not demonstrate any significant issues.  She is here for follow-up after CT scan.  She continues to have pain in her mid back that has improved to some extent after the fall    Chief Complaint   Patient presents with    Follow-up     Surgery on 2/28/23          Previous treatment: Tizanidine,Mobic,Hydrocodone,Gabapentin,Baclofen    Previous neurosurgery:  Lumbar 5 to sacral 1 transforaminal lumbar interbody fusion; thoracic 10 to lumbar 2 and lumbar 4-5 sheldon osteotomies; T9 to pelvic fusion with neuro robot, hardware removal.    Previous injections:     The following portions of the patient's history were reviewed and updated as appropriate: allergies, current medications, past family history, past medical history, past social history, past surgical history, and problem list.    Review of Systems   Constitutional:  Positive for activity change.   HENT: Negative.     Eyes: Negative.    Respiratory: Negative.     Cardiovascular: Negative.    Gastrointestinal: Negative.    Endocrine: Negative.    Genitourinary: Negative.    Musculoskeletal:  Positive for arthralgias, back pain and myalgias.   Skin: Negative.    Allergic/Immunologic: Negative.    Neurological: Negative.    Hematological: Negative.    Psychiatric/Behavioral:  Positive for sleep disturbance.        Objective      /97   Pulse 95   Ht 157.5 cm (62\")   Wt 61.6 kg (135 lb 11.2 oz)   BMI 24.82 kg/m²    Body mass index is 24.82 kg/m².  Vitals:    01/31/24 1323   PainSc:   8           Neurologic Exam    Assessment & Plan   Independent Review of Radiographic Studies:      I personally reviewed " and interpreted the images from the following studies.    CT thoracic spine: Hardware intact and in good positioning with evidence of developing posterior lateral fusion    Medical Decision Making:      Diya Arreola is a 63 y.o. female status post thoracic to pelvic fusion for deformity was doing well up until a fall a few months ago.  CT scan is reassuring with no evidence of fracture, adjacent segment disease or hardware failure or breakage.  To help with some of the pain she is having while sitting and walking I will provide her with a TLSO brace.  Will also start her in physical therapy.  I will see her back in 3 months to evaluate her progress.      Diagnoses and all orders for this visit:    1. S/P lumbar fusion (Primary)      No follow-ups on file.    This patient was examined wearing appropriate personal protective equipment.                      Dr. Jerrell Gorman IV    01/31/24  13:46 EST

## 2024-01-31 ENCOUNTER — OFFICE VISIT (OUTPATIENT)
Dept: NEUROSURGERY | Facility: CLINIC | Age: 64
End: 2024-01-31
Payer: MEDICARE

## 2024-01-31 VITALS
HEIGHT: 62 IN | WEIGHT: 135.7 LBS | BODY MASS INDEX: 24.97 KG/M2 | HEART RATE: 95 BPM | DIASTOLIC BLOOD PRESSURE: 97 MMHG | SYSTOLIC BLOOD PRESSURE: 144 MMHG

## 2024-01-31 DIAGNOSIS — Z98.1 S/P LUMBAR FUSION: Primary | ICD-10-CM

## 2024-02-12 ENCOUNTER — TELEPHONE (OUTPATIENT)
Dept: NEUROSURGERY | Facility: CLINIC | Age: 64
End: 2024-02-12
Payer: MEDICARE

## 2024-02-12 DIAGNOSIS — Z98.1 S/P LUMBAR FUSION: Primary | ICD-10-CM

## 2024-02-12 DIAGNOSIS — M54.16 LUMBAR RADICULOPATHY: ICD-10-CM

## 2024-02-12 DIAGNOSIS — M40.15 OTHER SECONDARY KYPHOSIS, THORACOLUMBAR REGION: ICD-10-CM

## 2024-02-17 DIAGNOSIS — J40 BRONCHITIS: ICD-10-CM

## 2024-02-19 RX ORDER — DOXYCYCLINE 100 MG/1
100 CAPSULE ORAL 2 TIMES DAILY
Qty: 20 CAPSULE | Refills: 0 | Status: SHIPPED | OUTPATIENT
Start: 2024-02-19

## 2024-02-22 ENCOUNTER — TELEPHONE (OUTPATIENT)
Dept: PAIN MEDICINE | Facility: CLINIC | Age: 64
End: 2024-02-22
Payer: MEDICARE

## 2024-02-22 DIAGNOSIS — J44.9 CHRONIC OBSTRUCTIVE PULMONARY DISEASE, UNSPECIFIED COPD TYPE: ICD-10-CM

## 2024-02-22 DIAGNOSIS — G89.4 CHRONIC PAIN SYNDROME: ICD-10-CM

## 2024-02-22 RX ORDER — HYDROCODONE BITARTRATE AND ACETAMINOPHEN 7.5; 325 MG/1; MG/1
1 TABLET ORAL
Qty: 150 TABLET | Refills: 0 | Status: SHIPPED | OUTPATIENT
Start: 2024-02-22

## 2024-02-22 NOTE — TELEPHONE ENCOUNTER
Caller: ASHLEY BUTLER    Relationship: [employer] ([facility])    Best call back number: [phone]    Requested Prescriptions: HYDROCODONE 7.5-325MG    Pharmacy where request should be sent: [facility]    Last office visit with prescribing clinician: 12/19/2023     Next office visit with prescribing clinician: 3/19/2024     Additional details provided by patient: N/A    Does the patient have less than a 3 day supply:  [x] Yes  [] No    Would you like a call back once the refill request has been completed: [x] Yes [] No    If the office needs to give you a call back, can they leave a voicemail: [x] Yes [] No    Tiffany Ayala  02/22/24 09:28 EST

## 2024-02-23 RX ORDER — FLUTICASONE FUROATE, UMECLIDINIUM BROMIDE AND VILANTEROL TRIFENATATE 200; 62.5; 25 UG/1; UG/1; UG/1
POWDER RESPIRATORY (INHALATION)
Qty: 180 EACH | Refills: 3 | Status: SHIPPED | OUTPATIENT
Start: 2024-02-23

## 2024-02-23 RX ORDER — BACLOFEN 10 MG/1
10 TABLET ORAL 3 TIMES DAILY
Qty: 270 TABLET | Refills: 1 | Status: SHIPPED | OUTPATIENT
Start: 2024-02-23

## 2024-02-23 RX ORDER — EPINEPHRINE 0.3 MG/.3ML
INJECTION SUBCUTANEOUS
Qty: 2 EACH | Refills: 1 | Status: SHIPPED | OUTPATIENT
Start: 2024-02-23

## 2024-03-19 ENCOUNTER — OFFICE VISIT (OUTPATIENT)
Dept: PAIN MEDICINE | Facility: CLINIC | Age: 64
End: 2024-03-19
Payer: MEDICARE

## 2024-03-19 VITALS
RESPIRATION RATE: 16 BRPM | OXYGEN SATURATION: 93 % | BODY MASS INDEX: 25.61 KG/M2 | WEIGHT: 140 LBS | HEART RATE: 108 BPM | SYSTOLIC BLOOD PRESSURE: 116 MMHG | DIASTOLIC BLOOD PRESSURE: 67 MMHG

## 2024-03-19 DIAGNOSIS — G89.4 CHRONIC PAIN SYNDROME: Primary | ICD-10-CM

## 2024-03-19 DIAGNOSIS — Z79.899 HIGH RISK MEDICATION USE: Primary | ICD-10-CM

## 2024-03-19 DIAGNOSIS — M79.18 MYOFASCIAL PAIN SYNDROME: ICD-10-CM

## 2024-03-19 DIAGNOSIS — M96.1 POSTLAMINECTOMY SYNDROME OF LUMBAR REGION: ICD-10-CM

## 2024-03-19 DIAGNOSIS — Z79.899 HIGH RISK MEDICATION USE: ICD-10-CM

## 2024-03-19 DIAGNOSIS — M96.1 POSTLAMINECTOMY SYNDROME OF CERVICAL REGION: ICD-10-CM

## 2024-03-19 PROCEDURE — 3078F DIAST BP <80 MM HG: CPT | Performed by: ANESTHESIOLOGY

## 2024-03-19 PROCEDURE — 1125F AMNT PAIN NOTED PAIN PRSNT: CPT | Performed by: ANESTHESIOLOGY

## 2024-03-19 PROCEDURE — 99214 OFFICE O/P EST MOD 30 MIN: CPT | Performed by: ANESTHESIOLOGY

## 2024-03-19 PROCEDURE — 3074F SYST BP LT 130 MM HG: CPT | Performed by: ANESTHESIOLOGY

## 2024-03-19 RX ORDER — HYDROCODONE BITARTRATE AND ACETAMINOPHEN 7.5; 325 MG/1; MG/1
1 TABLET ORAL
Qty: 150 TABLET | Refills: 0 | Status: SHIPPED | OUTPATIENT
Start: 2024-05-21

## 2024-03-19 RX ORDER — HYDROCODONE BITARTRATE AND ACETAMINOPHEN 7.5; 325 MG/1; MG/1
1 TABLET ORAL
Qty: 150 TABLET | Refills: 0 | Status: SHIPPED | OUTPATIENT
Start: 2024-04-21

## 2024-03-19 NOTE — PROGRESS NOTES
Subjective    CC back, leg pain, neck pain  Diya Arreola is a 63 y.o. female with chronic neck pain status post ACDF, chronic back pain S/P  L4-L5 LAMI/TILF nov 2021/[facility],S/P thoracolumbar decompression and fusion from T9-pelvis/Dr. Gorman/[facility] 2023,  here for follow-up.    Continued thoracic and lumbar back pain especially worse since a fall 2 months ago.  CT again showed intact hardware and no acute abnormalities.  Keeps close follow-up with neurosurgery.  Chronic thoracic and upper lumbar myofascial pain mostly left-sided which is interfering with sleep and all activities.  On baclofen with marginal relief.  Chronic back pain upper lumbar and lower lumbar radiating to bilateral hips bilateral lower extremity worse with standing walking twisting or any activity.  Denies new injury, saddle anesthesia bladder bowel continence.  Chronic neck pain, radiating to bilateral shoulder limited range of motion in the neck and significant paraspinal muscle spasm bilateral UE radicular pain.  Denies balance issues.    Chronic tremors, history of polyarthralgia/lupus sees rheumatology.  Pain interfering with ADL.  Tried physical therapy without relief.  Seen neurosurgery, referred to try injection.        C-spine MRI 2020 probably a plate on the anterior aspect of the spine at the C4-5 level. mild subluxation with degenerative disc disease at C3-4, causing a mild central canal stenosis. Left-sided disc extrusion at C6-7 with left foraminal impingement.   L-spine MRI 2019 postsurgical changes L3-L4 with pedicle screw L3.  Retrolisthesis L2 on L3 moderate central lateral canal stenosis.  Grade 1 anterolisthesis L4-L5.  Degenerative changes throughout.  L-spine x-ray 2020: 4 mm anterolisthesis on extension, 6 mm anterolisthesis on flexion, at the L4-5 level. Stable spinal fusion changes at L3-4.. Severe degenerative disc and endplate changes at L2-3, similar to  03/16/2018 examination.    Pain Assessment   Location of Pain:  Lower Back, R Hip, L Hip, Legs, neck pain,  Description of Pain: Dull/Aching, Throbbing, Stabbing  Previous Pain Rating :7  Current Pain Ratin  Aggravating Factors: Activity  Alleviating Factors: Rest, Medication    PEG Assessment   What number best describes your pain on average in the past week?8  What number best describes how, during the past week, pain has interfered with your enjoyment of life?3  What number best describes how, during the past week, pain has interfered with your general activity?10      The following portions of the patient's history were reviewed and updated as appropriate: allergies, current medications, past family history, past medical history, past social history, past surgical history and problem list.     has a past medical history of Allergic, Anxiety, Arthritis, Asthma, Constipation, COPD (chronic obstructive pulmonary disease), Depression, Fall, Fall, Fibromyalgia, primary, Gastritis, History of pancreatitis, Insomnia, Low back pain (2021), Lupus, Neuropathy, Osteopenia, Palpitations, Peripheral edema, PONV (postoperative nausea and vomiting), Rheumatoid arthritis, Right lower lobe pulmonary nodule (2023), Scoliosis, Sjogren's syndrome, Tachycardia, and TIA (transient ischemic attack) (2014).   has a past surgical history that includes Cholecystectomy; Hysterectomy;  section; Cystocele repair; Carpal tunnel release; Cardiac catheterization; Spine surgery; Colonoscopy (10/08/2013); Epidural block injection; Lumbar fusion (N/A, 2021); Back surgery; and Lumbar fusion (N/A, 2023).  family history includes Diabetes in her father; Heart disease in her brother; Hypertension in her brother and sister; Osteoporosis in her maternal grandmother; Stroke in her sister.      Review of Systems   Musculoskeletal:  Positive for arthralgias, back pain and neck pain.   Neurological:  Positive for tremors, numbness and headache.   All other systems reviewed and are  negative.    Objective   Physical Exam   Constitutional: No distress.   walker   Neck: Kernig's sign noted.   Pulmonary/Chest: Effort normal.   Musculoskeletal:      Cervical back: She exhibits tenderness.   Vitals reviewed.    /67   Pulse 108   Resp 16   Wt 63.5 kg (140 lb)   SpO2 93%   BMI 25.61 kg/m²      PHQ 9 on chart  Opioid risk tool low risk    Assessment & Plan   Diagnoses and all orders for this visit:    1. Chronic pain syndrome (Primary)  -     HYDROcodone-acetaminophen (NORCO) 7.5-325 MG per tablet; Take 1 tablet by mouth 5 (Five) Times a Day As Needed for Severe Pain. DNF before 4/21/2024  Dispense: 150 tablet; Refill: 0  -     HYDROcodone-acetaminophen (NORCO) 7.5-325 MG per tablet; Take 1 tablet by mouth 5 (Five) Times a Day As Needed for Severe Pain. DNF before 5/21/2024  Dispense: 150 tablet; Refill: 0    2. Postlaminectomy syndrome of lumbar region    3. Postlaminectomy syndrome of cervical region    4. Myofascial pain syndrome    5. High risk medication use    Summary  Diya Arreola is a 63 y.o. female with chronic neck pain status post ACDF, chronic back pain status post Fusion L3-5, S/P thoracolumbar decompression and fusion from T9-pelvis/Dr. Gorman/[facility] 2023.  here for follow-up.    Chronic back pain from DDD spondylosis postlaminectomy syndrome with radicular pain.   Chronic neck pain from postlaminectomy syndrome.  Worsening chronic tremors.    Continued thoracic and lumbar back pain especially worse since a fall 2 months ago.  CT again showed intact hardware and no acute abnormalities.  Keeps close follow-up with neurosurgery.  Has restarted physical therapy with mild relief.  Also using TLSO brace  Continues to have good relief of hydrocodone without side effects.    Cont hydrocodone 7.5/325 5 times daily as needed for severe pain.  UDS and inspect reviewed  Discussed risk of tolerance, dependence, respiratory depression, coma and death associated with use of oral opioids for  treatment of chronic nonmalignant pain.     RTC 2-3 month

## 2024-03-22 ENCOUNTER — TELEPHONE (OUTPATIENT)
Dept: PAIN MEDICINE | Facility: CLINIC | Age: 64
End: 2024-03-22
Payer: MEDICARE

## 2024-03-22 NOTE — TELEPHONE ENCOUNTER
Caller: MaxwellDiya    Relationship: Self    Best call back number:     Requested Prescriptions: HYDROcodone-acetaminophen (NORCO) 7.5-325 MG per tablet   Requested Prescriptions      No prescriptions requested or ordered in this encounter        Pharmacy where request should be sent: [id number] - [place], [place] - [address] [place] [address] - [phone] PH - [phone] FX     Last office visit with prescribing clinician: 3/19/2024   Last telemedicine visit with prescribing clinician: Visit date not found   Next office visit with prescribing clinician: 6/11/2024     Additional details provided by patient: PT IS OUT OF MEDICATION. SHE SPOKE WITH THE [facility] AND THEY TOLD HER THEY DID NOT HAVE THE RX.     Does the patient have less than a 3 day supply:  [x] Yes  [] No    Would you like a call back once the refill request has been completed: [x] Yes [] No    If the office needs to give you a call back, can they leave a voicemail: [x] Yes [] No    Iris Hernández Rep   03/22/24 09:14 EDT

## 2024-03-26 RX ORDER — ALBUTEROL SULFATE 90 UG/1
2 AEROSOL, METERED RESPIRATORY (INHALATION) EVERY 4 HOURS PRN
Qty: 18 G | Refills: 3 | Status: SHIPPED | OUTPATIENT
Start: 2024-03-26

## 2024-04-03 ENCOUNTER — HOSPITAL ENCOUNTER (OUTPATIENT)
Dept: CT IMAGING | Facility: HOSPITAL | Age: 64
Discharge: HOME | End: 2024-04-03
Payer: MEDICARE

## 2024-04-03 ENCOUNTER — HOSPITAL ENCOUNTER (OUTPATIENT)
Dept: MRI IMAGING | Facility: HOSPITAL | Age: 64
Discharge: HOME | End: 2024-04-03
Payer: MEDICARE

## 2024-04-03 DIAGNOSIS — M54.16 LUMBAR RADICULOPATHY: ICD-10-CM

## 2024-04-03 DIAGNOSIS — M40.15 OTHER SECONDARY KYPHOSIS, THORACOLUMBAR REGION: ICD-10-CM

## 2024-04-03 DIAGNOSIS — Z98.1 S/P LUMBAR FUSION: ICD-10-CM

## 2024-04-03 PROCEDURE — 72131 CT LUMBAR SPINE W/O DYE: CPT

## 2024-04-08 ENCOUNTER — TELEPHONE (OUTPATIENT)
Dept: NEUROSURGERY | Facility: OTHER | Age: 64
End: 2024-04-08
Payer: MEDICARE

## 2024-04-08 DIAGNOSIS — F40.9 PHOBIA, UNSPECIFIED TYPE: Primary | ICD-10-CM

## 2024-04-08 RX ORDER — DIAZEPAM 5 MG/1
5 TABLET ORAL 2 TIMES DAILY PRN
Qty: 2 TABLET | Refills: 0 | Status: SHIPPED | OUTPATIENT
Start: 2024-04-08

## 2024-04-08 NOTE — TELEPHONE ENCOUNTER
Patient called asking for valium  to be sent to [facility] her mri is tomorrow at 12. I stated I would get him the message.

## 2024-04-08 NOTE — TELEPHONE ENCOUNTER
PATIENT CALLED IN AND STATED SHE HAD HER CT AND SHE WAS FINE AND THEN SHE HAD HER MRI AND SHE HAD A PANIC ATTACK AND WAS UNABLE TO COMPLETE THE SCAN-     SHE IS INQUIRING IF THEIR IS SOMETHING DR RANGEL CAN PRESCRIBE HER TO HELP HER RELAX WHEN SHE HAS HER NEXT MRI?    [phone] ([place])     PHARMACY- [facility] #[id number] - [place], [place] - [address] [place] [address] AT [place] [address] - [phone]  - [phone]  [phone]     W/T TO CENTRAL SCHEDULING-     PLEASE ADVISE- THANK YOU!

## 2024-04-09 ENCOUNTER — HOSPITAL ENCOUNTER (OUTPATIENT)
Dept: MRI IMAGING | Facility: HOSPITAL | Age: 64
Discharge: HOME | End: 2024-04-09
Admitting: NEUROLOGICAL SURGERY
Payer: MEDICARE

## 2024-04-09 PROCEDURE — 72146 MRI CHEST SPINE W/O DYE: CPT

## 2024-04-10 RX ORDER — NITROGLYCERIN 0.4 MG/1
TABLET SUBLINGUAL
Qty: 25 TABLET | Refills: 1 | Status: SHIPPED | OUTPATIENT
Start: 2024-04-10

## 2024-04-10 RX ORDER — ALBUTEROL SULFATE 2.5 MG/3ML
SOLUTION RESPIRATORY (INHALATION)
Qty: 375 ML | Refills: 3 | Status: SHIPPED | OUTPATIENT
Start: 2024-04-10

## 2024-04-19 ENCOUNTER — TELEPHONE (OUTPATIENT)
Dept: PAIN MEDICINE | Facility: CLINIC | Age: 64
End: 2024-04-19

## 2024-04-19 NOTE — TELEPHONE ENCOUNTER
Caller: Diya Arreola    Relationship: Self    Best call back number: [phone]    Requested Prescriptions: HYDROCODONE      Pharmacy where request should be sent: [facility] [id number] - [place], [place] - [address] - [phone]  - [phone]      Last office visit with prescribing clinician: 3/19/2024   Next office visit with prescribing clinician: 6/11/2024     Does the patient have less than a 3 day supply:  [x] Yes  [] No    Would you like a call back once the refill request has been completed: [x] Yes [] No    If the office needs to give you a call back, can they leave a voicemail: [x] Yes [] No    Nasim Ventura   04/19/24 14:23 EDT

## 2024-04-23 ENCOUNTER — OFFICE VISIT (OUTPATIENT)
Dept: FAMILY MEDICINE CLINIC | Facility: CLINIC | Age: 64
End: 2024-04-23
Payer: MEDICARE

## 2024-04-23 VITALS
WEIGHT: 141.45 LBS | OXYGEN SATURATION: 93 % | HEART RATE: 103 BPM | DIASTOLIC BLOOD PRESSURE: 76 MMHG | SYSTOLIC BLOOD PRESSURE: 120 MMHG | TEMPERATURE: 98.4 F | BODY MASS INDEX: 26.03 KG/M2 | HEIGHT: 62 IN

## 2024-04-23 DIAGNOSIS — B35.1 ONYCHOMYCOSIS: ICD-10-CM

## 2024-04-23 DIAGNOSIS — J44.9 CHRONIC OBSTRUCTIVE PULMONARY DISEASE, UNSPECIFIED COPD TYPE: Primary | ICD-10-CM

## 2024-04-23 DIAGNOSIS — I34.0 NONRHEUMATIC MITRAL VALVE INSUFFICIENCY: ICD-10-CM

## 2024-04-23 DIAGNOSIS — I47.9 PAROXYSMAL TACHYCARDIA: ICD-10-CM

## 2024-04-23 DIAGNOSIS — J40 BRONCHITIS: ICD-10-CM

## 2024-04-23 PROCEDURE — 1159F MED LIST DOCD IN RCRD: CPT | Performed by: FAMILY MEDICINE

## 2024-04-23 PROCEDURE — 1160F RVW MEDS BY RX/DR IN RCRD: CPT | Performed by: FAMILY MEDICINE

## 2024-04-23 PROCEDURE — G2211 COMPLEX E/M VISIT ADD ON: HCPCS | Performed by: FAMILY MEDICINE

## 2024-04-23 PROCEDURE — 3074F SYST BP LT 130 MM HG: CPT | Performed by: FAMILY MEDICINE

## 2024-04-23 PROCEDURE — 3078F DIAST BP <80 MM HG: CPT | Performed by: FAMILY MEDICINE

## 2024-04-23 PROCEDURE — 99214 OFFICE O/P EST MOD 30 MIN: CPT | Performed by: FAMILY MEDICINE

## 2024-04-23 RX ORDER — FLUTICASONE FUROATE, UMECLIDINIUM BROMIDE AND VILANTEROL TRIFENATATE 200; 62.5; 25 UG/1; UG/1; UG/1
1 POWDER RESPIRATORY (INHALATION) DAILY
Qty: 90 EACH | Refills: 3 | Status: SHIPPED | OUTPATIENT
Start: 2024-04-23

## 2024-04-23 RX ORDER — GABAPENTIN 400 MG/1
400 CAPSULE ORAL 4 TIMES DAILY
Qty: 120 CAPSULE | Refills: 2 | Status: SHIPPED | OUTPATIENT
Start: 2024-04-23

## 2024-04-23 RX ORDER — ROFLUMILAST 500 UG/1
500 TABLET ORAL DAILY
Qty: 90 TABLET | Refills: 3 | Status: SHIPPED | OUTPATIENT
Start: 2024-04-23

## 2024-04-23 RX ORDER — DOXYCYCLINE 100 MG/1
100 CAPSULE ORAL 2 TIMES DAILY
Qty: 20 CAPSULE | Refills: 0 | Status: SHIPPED | OUTPATIENT
Start: 2024-04-23

## 2024-04-23 RX ORDER — BUPROPION HYDROCHLORIDE 150 MG/1
150 TABLET ORAL DAILY
Qty: 90 TABLET | Refills: 3 | Status: SHIPPED | OUTPATIENT
Start: 2024-04-23

## 2024-04-23 RX ORDER — CICLOPIROX 80 MG/ML
SOLUTION TOPICAL NIGHTLY
Qty: 6 ML | Refills: 2 | Status: SHIPPED | OUTPATIENT
Start: 2024-04-23

## 2024-04-23 RX ORDER — NITROGLYCERIN 0.4 MG/1
0.4 TABLET SUBLINGUAL
Qty: 25 TABLET | Refills: 1 | Status: SHIPPED | OUTPATIENT
Start: 2024-04-23

## 2024-04-23 RX ORDER — BACLOFEN 10 MG/1
10 TABLET ORAL 3 TIMES DAILY
Qty: 270 TABLET | Refills: 1 | Status: SHIPPED | OUTPATIENT
Start: 2024-04-23

## 2024-04-23 NOTE — PROGRESS NOTES
"Chief Complaint  Anxiety, Hyperlipidemia, and Follow-up    Subjective        Diya Arreola presents to [facility]  History of Present Illness  She fell 10/14/23.    Anxiety  Presents for follow-up visit.  Symptoms include depressed mood, excessive worry, nervous/anxious behavior and shortness of breath.  Patient reports no chest pain, compulsions, confusion, decreased concentration or feeling of choking. Symptoms occur most days. The severity of symptoms is moderate. The quality of sleep is fair. Past treatments include non-SSRI antidepressants and lifestyle changes. The treatment provided moderate relief.   Hyperlipidemia  This is a chronic problem. The current episode started more than 1 year ago. The problem is controlled. Associated symptoms include shortness of breath. Pertinent negatives include no chest pain. Current antihyperlipidemic treatment includes diet change. The current treatment provides moderate improvement of lipids.   Cough  This is a new problem. The current episode started 1 to 4 weeks ago. The problem has been unchanged. The problem occurs every few minutes. The cough is Productive of sputum. Associated symptoms include chills, nasal congestion and shortness of breath. Pertinent negatives include no chest pain or fever. The symptoms are aggravated by dust. She has tried a beta-agonist inhaler for the symptoms. The treatment provided mild relief. Her past medical history is significant for COPD.   Back Pain  This is a chronic problem. The current episode started more than 1 year ago. The problem occurs constantly. The problem is unchanged. Pertinent negatives include no chest pain or fever.       Objective   Vital Signs:  /76 (BP Location: Left arm, Patient Position: Sitting, Cuff Size: Adult)   Pulse 103   Temp 98.4 °F (36.9 °C) Comment: oral  Ht 157.5 cm (62\")   Wt 64.2 kg (141 lb 7.2 oz)   SpO2 93%   BMI 25.87 kg/m²   Estimated body mass " "index is 25.87 kg/m² as calculated from the following:    Height as of this encounter: 157.5 cm (62\").    Weight as of this encounter: 64.2 kg (141 lb 7.2 oz).               Physical Exam  Vitals and nursing note reviewed. Exam conducted with a chaperone present.   Constitutional:       General: She is not in acute distress.     Appearance: She is well-developed.   HENT:      Head: Normocephalic.   Eyes:      General: Lids are normal.      Conjunctiva/sclera: Conjunctivae normal.   Neck:      Thyroid: No thyroid mass or thyromegaly.      Trachea: Trachea normal.   Cardiovascular:      Rate and Rhythm: Normal rate and regular rhythm.      Heart sounds: Normal heart sounds.   Pulmonary:      Effort: Pulmonary effort is normal.      Breath sounds: Rhonchi present.   Musculoskeletal:      Cervical back: Normal range of motion.   Lymphadenopathy:      Cervical: No cervical adenopathy.   Skin:     General: Skin is warm and dry.   Neurological:      Mental Status: She is alert and oriented to person, place, and time.   Psychiatric:         Attention and Perception: She is attentive.         Mood and Affect: Mood normal.         Speech: Speech normal.         Behavior: Behavior normal.        Result Review :    The following data was reviewed by: Mitzy Rea MD on 04/23/2024:                 Assessment and Plan     Diagnoses and all orders for this visit:    1. Chronic obstructive pulmonary disease, unspecified COPD type (Primary)  -     Fluticasone-Umeclidin-Vilant (Trelegy Ellipta) 200-62.5-25 MCG/ACT inhaler; Inhale 1 puff Daily.  Dispense: 90 each; Refill: 3    2. Nonrheumatic mitral valve insufficiency  -     Ambulatory Referral to Cardiology    3. Paroxysmal tachycardia  -     Ambulatory Referral to Cardiology    4. Bronchitis  -     doxycycline (MONODOX) 100 MG capsule; Take 1 capsule by mouth 2 (Two) Times a Day.  Dispense: 20 capsule; Refill: 0    5. Onychomycosis    Other orders  -     gabapentin (NEURONTIN) " 400 MG capsule; Take 1 capsule by mouth 4 (Four) Times a Day.  Dispense: 120 capsule; Refill: 2  -     Daliresp 500 MCG tablet tablet; Take 1 tablet by mouth Daily.  Dispense: 90 tablet; Refill: 3  -     baclofen (LIORESAL) 10 MG tablet; Take 1 tablet by mouth 3 (Three) Times a Day.  Dispense: 270 tablet; Refill: 1  -     nitroglycerin (NITROSTAT) 0.4 MG SL tablet; Place 1 tablet under the tongue Every 5 (Five) Minutes As Needed for Chest Pain. Take no more than 3 doses in 15 minutes.  Dispense: 25 tablet; Refill: 1  -     buPROPion XL (WELLBUTRIN XL) 150 MG 24 hr tablet; Take 1 tablet by mouth Daily.  Dispense: 90 tablet; Refill: 3  -     ciclopirox (PENLAC) 8 % solution; Apply  topically to the appropriate area as directed Every Night.  Dispense: 6 mL; Refill: 2             Follow Up     No follow-ups on file.  Patient was given instructions and counseling regarding her condition or for health maintenance advice. Please see specific information pulled into the AVS if appropriate.

## 2024-05-07 ENCOUNTER — OFFICE VISIT (OUTPATIENT)
Dept: CARDIOLOGY | Facility: CLINIC | Age: 64
End: 2024-05-07
Payer: MEDICARE

## 2024-05-07 VITALS
OXYGEN SATURATION: 94 % | HEIGHT: 62 IN | DIASTOLIC BLOOD PRESSURE: 82 MMHG | WEIGHT: 135 LBS | BODY MASS INDEX: 24.84 KG/M2 | HEART RATE: 85 BPM | SYSTOLIC BLOOD PRESSURE: 127 MMHG

## 2024-05-07 DIAGNOSIS — J43.1 PANLOBULAR EMPHYSEMA: ICD-10-CM

## 2024-05-07 DIAGNOSIS — R06.09 DYSPNEA ON EXERTION: ICD-10-CM

## 2024-05-07 DIAGNOSIS — R07.2 PRECORDIAL PAIN: ICD-10-CM

## 2024-05-07 DIAGNOSIS — R00.2 PALPITATIONS: Primary | ICD-10-CM

## 2024-05-07 DIAGNOSIS — I51.7 RIGHT ATRIAL ENLARGEMENT: ICD-10-CM

## 2024-05-07 DIAGNOSIS — G47.33 OSA (OBSTRUCTIVE SLEEP APNEA): ICD-10-CM

## 2024-05-07 DIAGNOSIS — F17.210 CIGARETTE SMOKER: ICD-10-CM

## 2024-05-07 PROCEDURE — 3074F SYST BP LT 130 MM HG: CPT | Performed by: INTERNAL MEDICINE

## 2024-05-07 PROCEDURE — 1160F RVW MEDS BY RX/DR IN RCRD: CPT | Performed by: INTERNAL MEDICINE

## 2024-05-07 PROCEDURE — 3079F DIAST BP 80-89 MM HG: CPT | Performed by: INTERNAL MEDICINE

## 2024-05-07 PROCEDURE — 99204 OFFICE O/P NEW MOD 45 MIN: CPT | Performed by: INTERNAL MEDICINE

## 2024-05-07 PROCEDURE — 93000 ELECTROCARDIOGRAM COMPLETE: CPT | Performed by: INTERNAL MEDICINE

## 2024-05-07 PROCEDURE — 1159F MED LIST DOCD IN RCRD: CPT | Performed by: INTERNAL MEDICINE

## 2024-05-07 RX ORDER — VARENICLINE TARTRATE 0.5 (11)-1
KIT ORAL
Qty: 1 EACH | Refills: 0 | Status: SHIPPED | OUTPATIENT
Start: 2024-05-07 | End: 2024-06-04

## 2024-05-07 RX ORDER — VARENICLINE TARTRATE 1 MG/1
1 TABLET, FILM COATED ORAL 2 TIMES DAILY
Qty: 56 TABLET | Refills: 1 | Status: SHIPPED | OUTPATIENT
Start: 2024-06-04 | End: 2024-07-30

## 2024-05-10 NOTE — PROGRESS NOTES
"Subjective   History of Present Illness: Diya Arreola is a 64 y.o. female is here today for follow-up for mid back pain.  Patient continues to have pain in her mid thoracic region around the level of the top of the fusion.  She has done physical therapy with no improvement.  She is currently working with pain management for this.  Overall she is happy with her improved alignment and ability to walk.  She also complains of some tingling and numbness in her feet    Chief Complaint   Patient presents with    Back Pain          Previous treatment: Meloxicam,hydrocodone,TLSO brace, Physical Therapy/13 visits    Previous neurosurgery: Lumbar 5 to sacral 1 transforaminal lumbar interbody fusion; thoracic 10 to lumbar 2 and lumbar 4-5 sheldon osteotomies; T9 to pelvic fusion with neuro robot, hardware removal on 2/28/24.     Previous injections:     The following portions of the patient's history were reviewed and updated as appropriate: allergies, current medications, past family history, past medical history, past social history, past surgical history, and problem list.    Review of Systems   Constitutional:  Positive for activity change.   HENT: Negative.     Eyes: Negative.    Respiratory: Negative.     Cardiovascular: Negative.    Gastrointestinal: Negative.    Endocrine: Negative.    Genitourinary: Negative.    Musculoskeletal:  Positive for arthralgias, back pain and myalgias.   Skin: Negative.    Allergic/Immunologic: Negative.    Neurological:  Positive for numbness (+tingling legs and feet).        Like its been hit with a rock  Goes in both legs to feet   The pain is constant it does not go away    Hematological: Negative.    Psychiatric/Behavioral:  Positive for sleep disturbance.        Objective      /83   Pulse 77   Resp 18   Ht 156.2 cm (61.5\")   Wt 63.5 kg (140 lb)   SpO2 96%   BMI 26.02 kg/m²    Body mass index is 26.02 kg/m².  Vitals:    05/13/24 1329   PainSc:   7   PainLoc: Back       "     Neurologic Exam    Assessment & Plan   Independent Review of Radiographic Studies:      I personally reviewed and interpreted the images from the following studies.    MRI thoracic spine: No evidence of significant adjacent level disease or hardware failure.    CT lumbar spine: Hardware intact and in good positioning with evidence of fusion    Medical Decision Making:      Diya Arreola is a 64 y.o. female status post thoracic to pelvic decompression and fusion who has significant improvement in her deformity related symptoms but has developed some pain likely due to stress and strain at the top of the construct.  She has been doing physical therapy without improvement.  Imaging is reassuring with no evidence of adjacent segment disease.  I reassured her that instrumentation looks good and her bone is holding well.  There is no role for surgical intervention based on most recent imaging.  She can continue to work with pain management and physical therapy.  I will see her back as needed.  Hopefully she will slowly see improvement with this pain over the course of time.      Diagnoses and all orders for this visit:    1. S/P lumbar fusion (Primary)      No follow-ups on file.    This patient was examined wearing appropriate personal protective equipment.                      Dr. Jerrell Gorman IV    05/13/24  13:52 EDT

## 2024-05-13 ENCOUNTER — OFFICE VISIT (OUTPATIENT)
Dept: NEUROSURGERY | Facility: CLINIC | Age: 64
End: 2024-05-13
Payer: MEDICARE

## 2024-05-13 VITALS
BODY MASS INDEX: 25.76 KG/M2 | OXYGEN SATURATION: 96 % | HEART RATE: 77 BPM | DIASTOLIC BLOOD PRESSURE: 83 MMHG | WEIGHT: 140 LBS | HEIGHT: 62 IN | SYSTOLIC BLOOD PRESSURE: 132 MMHG | RESPIRATION RATE: 18 BRPM

## 2024-05-13 DIAGNOSIS — Z98.1 S/P LUMBAR FUSION: Primary | ICD-10-CM

## 2024-05-13 PROCEDURE — 1160F RVW MEDS BY RX/DR IN RCRD: CPT | Performed by: NEUROLOGICAL SURGERY

## 2024-05-13 PROCEDURE — 3075F SYST BP GE 130 - 139MM HG: CPT | Performed by: NEUROLOGICAL SURGERY

## 2024-05-13 PROCEDURE — 1159F MED LIST DOCD IN RCRD: CPT | Performed by: NEUROLOGICAL SURGERY

## 2024-05-13 PROCEDURE — 3079F DIAST BP 80-89 MM HG: CPT | Performed by: NEUROLOGICAL SURGERY

## 2024-05-13 PROCEDURE — 99213 OFFICE O/P EST LOW 20 MIN: CPT | Performed by: NEUROLOGICAL SURGERY

## 2024-05-20 RX ORDER — TRAZODONE HYDROCHLORIDE 150 MG/1
150 TABLET ORAL
Qty: 90 TABLET | Refills: 0 | Status: SHIPPED | OUTPATIENT
Start: 2024-05-20

## 2024-05-23 NOTE — TELEPHONE ENCOUNTER
Caller: Trish Arreolaline    Relationship: Self    Best call back number: [phone]    Requested Prescriptions:   Requested Prescriptions     Pending Prescriptions Disp Refills    Varenicline Tartrate, Starter, 0.5 MG X 11 & 1 MG X 42 tablet therapy pack 1 each 0     Sig: Take 0.5 mg by mouth Daily for 3 days, THEN 0.5 mg 2 (Two) Times a Day for 4 days, THEN 1 mg 2 (Two) Times a Day for 21 days. Take 0.5 mg po daily x 3 days, then 0.5 mg po bid x 4 days, then 1 mg po bid    varenicline (Chantix Continuing Month [place]) 1 MG tablet 56 tablet 1     Sig: Take 1 tablet by mouth 2 (Two) Times a Day for 56 days.        Pharmacy where request should be sent: [id number] - [facility] [place] [address] - [phone] [facility] [phone] [facility]     Last office visit with prescribing clinician: 4/23/2024   Last telemedicine visit with prescribing clinician: Visit date not found   Next office visit with prescribing clinician: 7/23/2024     Additional details provided by patient:     Does the patient have less than a 3 day supply:  [] Yes  [] No    Would you like a call back once the refill request has been completed: [] Yes [] No    If the office needs to give you a call back, can they leave a voicemail: [] Yes [] No    Iris Hernandez Rep   05/23/24 10:03 EDT

## 2024-05-24 ENCOUNTER — TELEPHONE (OUTPATIENT)
Dept: FAMILY MEDICINE CLINIC | Facility: CLINIC | Age: 64
End: 2024-05-24

## 2024-05-24 ENCOUNTER — TELEPHONE (OUTPATIENT)
Dept: FAMILY MEDICINE CLINIC | Facility: CLINIC | Age: 64
End: 2024-05-24
Payer: MEDICARE

## 2024-05-24 DIAGNOSIS — J44.9 CHRONIC OBSTRUCTIVE PULMONARY DISEASE, UNSPECIFIED COPD TYPE: ICD-10-CM

## 2024-05-24 RX ORDER — VARENICLINE TARTRATE 1 MG/1
1 TABLET, FILM COATED ORAL 2 TIMES DAILY
Qty: 56 TABLET | Refills: 1 | Status: SHIPPED | OUTPATIENT
Start: 2024-06-04 | End: 2024-07-30

## 2024-05-24 RX ORDER — VARENICLINE TARTRATE 0.5 (11)-1
KIT ORAL
Qty: 1 EACH | Refills: 0 | OUTPATIENT
Start: 2024-05-24 | End: 2024-06-20

## 2024-05-24 NOTE — TELEPHONE ENCOUNTER
Caller: Diya Arreola    Relationship: Self    Best call back number: [phone]     What was the call regarding: PATIENT IS NEEDING A PRIOR AUTHORIZATION ON     varenicline (Chantix Continuing Month Pak) 1 MG tablet     PLEASE ADVISE

## 2024-05-24 NOTE — TELEPHONE ENCOUNTER
Caller: Diya Arreola    Relationship: Self    Best call back number: [phone]     What was the call regarding: PATIENT STATED THAT SHE REACHED OUT TO HER INSURANCE REGARDING NEBULIZER TUBES. PATIENTS INSURANCE CURRENTLY ONLY COVERS 2 TUBES. RENARD TOLD PATIENT THAT IF DR. BERRY SENT AN APPEAL ELECTRONICALLY TO THEM WITH A NEW PRESCRIPTION FOR 4 NEBULIZER TUBES THAT THEY WOULD SEE IF 4 TUBES CAN BE COVERED    PLEASE ADVISE

## 2024-05-28 RX ORDER — NEBULIZER ACCESSORIES
KIT MISCELLANEOUS
Qty: 2 EACH | Refills: 5 | Status: SHIPPED | OUTPATIENT
Start: 2024-05-28

## 2024-05-28 NOTE — TELEPHONE ENCOUNTER
I will put in a new order for nebulizer tubing.  Please send it to there medical equipment company.

## 2024-06-03 ENCOUNTER — TELEPHONE (OUTPATIENT)
Dept: FAMILY MEDICINE CLINIC | Facility: CLINIC | Age: 64
End: 2024-06-03

## 2024-06-03 NOTE — TELEPHONE ENCOUNTER
Caller: Diya Arreola    Relationship: Self    Best call back number:     [phone] (     What was the call regarding:   PATIENT HAS APPROVED THE 4 NEBULIZER TUBES PER MONTH.  PLEASE PROVIDE A PRESCRIPTION  TO [facility].

## 2024-06-05 RX ORDER — HYDROXYCHLOROQUINE SULFATE 200 MG/1
200 TABLET, FILM COATED ORAL 2 TIMES DAILY
Qty: 180 TABLET | Refills: 1 | Status: CANCELLED | OUTPATIENT
Start: 2024-06-05

## 2024-06-05 RX ORDER — EPINEPHRINE 0.3 MG/.3ML
INJECTION SUBCUTANEOUS
Qty: 2 EACH | Refills: 1 | Status: SHIPPED | OUTPATIENT
Start: 2024-06-05

## 2024-06-05 RX ORDER — VARENICLINE TARTRATE 0.5 (11)-1
KIT ORAL
Qty: 53 EACH | Refills: 0 | OUTPATIENT
Start: 2024-06-05

## 2024-06-05 RX ORDER — HYDROXYCHLOROQUINE SULFATE 200 MG/1
200 TABLET, FILM COATED ORAL 2 TIMES DAILY
Qty: 180 TABLET | Refills: 1 | Status: SHIPPED | OUTPATIENT
Start: 2024-06-05

## 2024-06-11 ENCOUNTER — TELEPHONE (OUTPATIENT)
Dept: CARDIOLOGY | Facility: CLINIC | Age: 64
End: 2024-06-11
Payer: MEDICARE

## 2024-06-11 ENCOUNTER — OFFICE VISIT (OUTPATIENT)
Dept: PAIN MEDICINE | Facility: CLINIC | Age: 64
End: 2024-06-11
Payer: MEDICARE

## 2024-06-11 VITALS
WEIGHT: 144 LBS | RESPIRATION RATE: 16 BRPM | DIASTOLIC BLOOD PRESSURE: 80 MMHG | HEART RATE: 85 BPM | OXYGEN SATURATION: 94 % | BODY MASS INDEX: 26.77 KG/M2 | SYSTOLIC BLOOD PRESSURE: 140 MMHG

## 2024-06-11 DIAGNOSIS — M79.18 MYOFASCIAL PAIN SYNDROME: ICD-10-CM

## 2024-06-11 DIAGNOSIS — I20.9 ANGINA PECTORIS: ICD-10-CM

## 2024-06-11 DIAGNOSIS — E78.2 MIXED HYPERLIPIDEMIA: ICD-10-CM

## 2024-06-11 DIAGNOSIS — M25.551 BILATERAL HIP PAIN: ICD-10-CM

## 2024-06-11 DIAGNOSIS — I10 PRIMARY HYPERTENSION: ICD-10-CM

## 2024-06-11 DIAGNOSIS — M96.1 POSTLAMINECTOMY SYNDROME OF LUMBAR REGION: ICD-10-CM

## 2024-06-11 DIAGNOSIS — R07.2 PRECORDIAL PAIN: Primary | ICD-10-CM

## 2024-06-11 DIAGNOSIS — Z79.899 HIGH RISK MEDICATION USE: ICD-10-CM

## 2024-06-11 DIAGNOSIS — M96.1 POSTLAMINECTOMY SYNDROME OF CERVICAL REGION: ICD-10-CM

## 2024-06-11 DIAGNOSIS — G89.4 CHRONIC PAIN SYNDROME: Primary | ICD-10-CM

## 2024-06-11 DIAGNOSIS — M25.552 BILATERAL HIP PAIN: ICD-10-CM

## 2024-06-11 DIAGNOSIS — R06.09 DYSPNEA ON EXERTION: ICD-10-CM

## 2024-06-11 DIAGNOSIS — R00.2 PALPITATIONS: ICD-10-CM

## 2024-06-11 PROCEDURE — 99214 OFFICE O/P EST MOD 30 MIN: CPT | Performed by: ANESTHESIOLOGY

## 2024-06-11 PROCEDURE — 3077F SYST BP >= 140 MM HG: CPT | Performed by: ANESTHESIOLOGY

## 2024-06-11 PROCEDURE — 1125F AMNT PAIN NOTED PAIN PRSNT: CPT | Performed by: ANESTHESIOLOGY

## 2024-06-11 PROCEDURE — 3079F DIAST BP 80-89 MM HG: CPT | Performed by: ANESTHESIOLOGY

## 2024-06-11 RX ORDER — HYDROCODONE BITARTRATE AND ACETAMINOPHEN 7.5; 325 MG/1; MG/1
1 TABLET ORAL
Qty: 150 TABLET | Refills: 0 | Status: SHIPPED | OUTPATIENT
Start: 2024-07-19

## 2024-06-11 RX ORDER — HYDROCODONE BITARTRATE AND ACETAMINOPHEN 7.5; 325 MG/1; MG/1
1 TABLET ORAL
Qty: 150 TABLET | Refills: 0 | Status: SHIPPED | OUTPATIENT
Start: 2024-06-20

## 2024-06-11 NOTE — TELEPHONE ENCOUNTER
"Patient was scheduled for TMT on 7/5 but she has informed us that she cannot do the treadmill due to \"bad hips\". Can you place an order for a nuclear stress test instead?   "

## 2024-06-11 NOTE — PROGRESS NOTES
Subjective    CC back, leg pain, neck pain  Diya Arreola is a 64 y.o. female with chronic neck pain status post ACDF, chronic back pain S/P  L4-L5 LAMI/TILF nov 2021/[facility],S/P thoracolumbar decompression and fusion from T9-pelvis/Dr. Gorman/[facility] 2023,  here for follow-up.    Stable the last 2 months, denies any new complaints or issues.  She has completed physical therapy and keeps up with her home exercises.  Try to walk daily.  Chronic thoracic and upper lumbar myofascial pain mostly left-sided which is interfering with sleep and all activities.  On baclofen with marginal relief.  Chronic back pain upper lumbar and lower lumbar radiating to bilateral hips bilateral lower extremity worse with standing walking twisting or any activity.  Denies new injury, saddle anesthesia bladder bowel continence.  Chronic neck pain, radiating to bilateral shoulder limited range of motion in the neck and significant paraspinal muscle spasm bilateral UE radicular pain.  Denies balance issues.    Chronic tremors, history of polyarthralgia/lupus sees rheumatology.  Pain interfering with ADL.  Tried physical therapy without relief.  Seen neurosurgery, referred to try injection.        C-spine MRI 2020 probably a plate on the anterior aspect of the spine at the C4-5 level. mild subluxation with degenerative disc disease at C3-4, causing a mild central canal stenosis. Left-sided disc extrusion at C6-7 with left foraminal impingement.   L-spine MRI 2019 postsurgical changes L3-L4 with pedicle screw L3.  Retrolisthesis L2 on L3 moderate central lateral canal stenosis.  Grade 1 anterolisthesis L4-L5.  Degenerative changes throughout.  L-spine x-ray 2020: 4 mm anterolisthesis on extension, 6 mm anterolisthesis on flexion, at the L4-5 level. Stable spinal fusion changes at L3-4.. Severe degenerative disc and endplate changes at L2-3, similar to  03/16/2018 examination.    Pain Assessment   Location of Pain: Lower Back, R Hip, L Hip, Legs,  neck pain,  Description of Pain: Dull/Aching, Throbbing, Stabbing  Previous Pain Rating :8  Current Pain Ratin  Aggravating Factors: Activity  Alleviating Factors: Rest, Medication    PEG Assessment   What number best describes your pain on average in the past week?8  What number best describes how, during the past week, pain has interfered with your enjoyment of life?0  What number best describes how, during the past week, pain has interfered with your general activity?10      The following portions of the patient's history were reviewed and updated as appropriate: allergies, current medications, past family history, past medical history, past social history, past surgical history and problem list.     has a past medical history of Allergic, Anxiety, Arthritis, Asthma, Constipation, COPD (chronic obstructive pulmonary disease), Depression, Fall, Fall, Fibromyalgia, primary, Gastritis, History of pancreatitis, Insomnia, Low back pain (2021), Lupus, Neuropathy, Osteopenia, Palpitations, Peripheral edema, PONV (postoperative nausea and vomiting), Rheumatoid arthritis, Right lower lobe pulmonary nodule (2023), Scoliosis, Sjogren's syndrome, Tachycardia, and TIA (transient ischemic attack) (2014).   has a past surgical history that includes Cholecystectomy; Hysterectomy;  section; Cystocele repair; Carpal tunnel release; Cardiac catheterization; Spine surgery; Colonoscopy (10/08/2013); Epidural block injection; Lumbar fusion (N/A, 2021); Back surgery; and Lumbar fusion (N/A, 2023).  family history includes Diabetes in her father; Heart disease in her brother; Hypertension in her brother and sister; Osteoporosis in her maternal grandmother; Stroke in her sister.      Review of Systems   Musculoskeletal:  Positive for arthralgias, back pain and neck pain.   Neurological:  Positive for tremors, numbness and headache.   All other systems reviewed and are negative.    Objective   Physical  Exam   Constitutional: No distress.   walker   Neck: Kernig's sign noted.   Pulmonary/Chest: Effort normal.   Musculoskeletal:      Cervical back: She exhibits tenderness.   Vitals reviewed.    /80 (BP Location: Right arm, Patient Position: Sitting, Cuff Size: Adult)   Pulse 85   Resp 16   Wt 65.3 kg (144 lb)   SpO2 94%   BMI 26.77 kg/m²      PHQ 9 on chart  Opioid risk tool low risk    Assessment & Plan   Diagnoses and all orders for this visit:    1. Chronic pain syndrome (Primary)  -     HYDROcodone-acetaminophen (NORCO) 7.5-325 MG per tablet; Take 1 tablet by mouth 5 (Five) Times a Day As Needed for Severe Pain. DNF before 6/20/2024  Dispense: 150 tablet; Refill: 0  -     HYDROcodone-acetaminophen (NORCO) 7.5-325 MG per tablet; Take 1 tablet by mouth 5 (Five) Times a Day As Needed for Severe Pain. DNF before 7/20/2024  Dispense: 150 tablet; Refill: 0    2. Postlaminectomy syndrome of lumbar region    3. Postlaminectomy syndrome of cervical region    4. Myofascial pain syndrome    5. High risk medication use    Summary  Diya Arreola is a 64 y.o. female with chronic neck pain status post ACDF, chronic back pain status post Fusion L3-5, S/P thoracolumbar decompression and fusion from T9-pelvis/Dr. Gorman/[facility] 2023.  here for follow-up.    Chronic back pain from DDD spondylosis postlaminectomy syndrome with radicular pain.   Chronic neck pain from postlaminectomy syndrome.  Worsening chronic tremors.    Stable the last 2 months, denies any new complaints or issues.  She has completed physical therapy and keeps up with her home exercises.  Try to walk daily.    Cont hydrocodone 7.5/325 5 times daily as needed for severe pain.  UDS and inspect reviewed  Discussed risk of tolerance, dependence, respiratory depression, coma and death associated with use of oral opioids for treatment of chronic nonmalignant pain.     RTC 2-3 month

## 2024-06-18 ENCOUNTER — TELEPHONE (OUTPATIENT)
Dept: CARDIOLOGY | Facility: CLINIC | Age: 64
End: 2024-06-18
Payer: MEDICARE

## 2024-06-18 NOTE — TELEPHONE ENCOUNTER
CALLED PATIENT. TOLD HER DR. SAMUELS WOULD PROBABLY WANT TO LOOK AT TEST AND HOLTER RESULTS THEN HE WILL LET US KNOW WHEN SHE NEEDS TO FOLLOW UP. PATIENT UNDERSTOOD.

## 2024-06-18 NOTE — TELEPHONE ENCOUNTER
Caller: Diya Arreola    Relationship to patient: Self    Best call back number: [phone]     Chief complaint: TESTING    Type of visit: ROUTINE FU    Requested date: AS NEEDED     If rescheduling, when is the original appointment: NA     Additional notes: PT REACHING OUT TO SEE IF FU APPT IS NEEDED FOLLOWING HER UPCOMING TESTING - APPT NOTES SHOW DR SAMUELS TO CALL WITH RESULTS - ADVISED PT OF NOTE AND PT INQUIRING WHEN HER ROUTINE FU SHOULD BE AS NOTHING IS SCHEDULED -

## 2024-06-21 RX ORDER — VARENICLINE TARTRATE 0.5 (11)-1
KIT ORAL
Qty: 53 EACH | Refills: 0 | Status: SHIPPED | OUTPATIENT
Start: 2024-06-21

## 2024-06-21 NOTE — TELEPHONE ENCOUNTER
Rx Refill Note  Requested Prescriptions     Pending Prescriptions Disp Refills    Varenicline Tartrate, Starter, 0.5 MG X 11 & 1 MG X 42 tablet therapy pack [Pharmacy Med Name: VARENICLINE STARTING MONTH BOX] 53 each      Sig: TAKE 0.5 MG BY MOUTH DAILY FOR 3 DAYS, THEN 0.5 MG 2 (TWO) TIMES A DAY FOR 4 DAYS, THEN 1 MG 2 (TWO) TIMES A DAY FOR 21 DAYS. TAKE 0.5 MG PO DAILY X 3 DAYS, THEN 0.5 MG PO BID X 4 DAYS, THEN 1 MG BY MOUTH TWICE A DAY      Last office visit with prescribing clinician: 5/7/2024   Last telemedicine visit with prescribing clinician: Visit date not found   Next office visit with prescribing clinician: Visit date not found                         Would you like a call back once the refill request has been completed: [] Yes [] No    If the office needs to give you a call back, can they leave a voicemail: [] Yes [] No    Teresita Abdul MA  06/21/24, 14:12 EDT

## 2024-07-11 ENCOUNTER — HOSPITAL ENCOUNTER (OUTPATIENT)
Dept: CARDIOLOGY | Facility: HOSPITAL | Age: 64
Discharge: HOME | End: 2024-07-11
Payer: MEDICARE

## 2024-07-11 DIAGNOSIS — R07.2 PRECORDIAL PAIN: ICD-10-CM

## 2024-07-11 DIAGNOSIS — M25.552 BILATERAL HIP PAIN: ICD-10-CM

## 2024-07-11 DIAGNOSIS — M25.551 BILATERAL HIP PAIN: ICD-10-CM

## 2024-07-11 DIAGNOSIS — I10 PRIMARY HYPERTENSION: ICD-10-CM

## 2024-07-11 DIAGNOSIS — J43.1 PANLOBULAR EMPHYSEMA: ICD-10-CM

## 2024-07-11 DIAGNOSIS — I20.9 ANGINA PECTORIS: ICD-10-CM

## 2024-07-11 DIAGNOSIS — R06.09 DYSPNEA ON EXERTION: ICD-10-CM

## 2024-07-11 DIAGNOSIS — E78.2 MIXED HYPERLIPIDEMIA: ICD-10-CM

## 2024-07-11 DIAGNOSIS — F17.210 CIGARETTE SMOKER: ICD-10-CM

## 2024-07-11 DIAGNOSIS — R00.2 PALPITATIONS: ICD-10-CM

## 2024-07-11 DIAGNOSIS — G47.33 OSA (OBSTRUCTIVE SLEEP APNEA): ICD-10-CM

## 2024-07-11 DIAGNOSIS — I51.7 RIGHT ATRIAL ENLARGEMENT: ICD-10-CM

## 2024-07-15 RX ORDER — ALBUTEROL SULFATE 90 UG/1
2 AEROSOL, METERED RESPIRATORY (INHALATION) EVERY 4 HOURS PRN
Qty: 18 G | Refills: 3 | Status: SHIPPED | OUTPATIENT
Start: 2024-07-15

## 2024-07-18 RX ORDER — VARENICLINE TARTRATE 0.5 (11)-1
KIT ORAL
Qty: 53 EACH | Refills: 0 | OUTPATIENT
Start: 2024-07-18

## 2024-07-23 ENCOUNTER — OFFICE VISIT (OUTPATIENT)
Dept: FAMILY MEDICINE CLINIC | Facility: CLINIC | Age: 64
End: 2024-07-23
Payer: MEDICARE

## 2024-07-23 ENCOUNTER — LAB (OUTPATIENT)
Dept: FAMILY MEDICINE CLINIC | Facility: CLINIC | Age: 64
End: 2024-07-23
Payer: MEDICARE

## 2024-07-23 VITALS
TEMPERATURE: 98.6 F | SYSTOLIC BLOOD PRESSURE: 104 MMHG | DIASTOLIC BLOOD PRESSURE: 68 MMHG | HEART RATE: 88 BPM | HEIGHT: 62 IN | WEIGHT: 140.5 LBS | OXYGEN SATURATION: 93 % | BODY MASS INDEX: 25.86 KG/M2

## 2024-07-23 DIAGNOSIS — R76.8 ANA POSITIVE: ICD-10-CM

## 2024-07-23 DIAGNOSIS — F41.1 GENERALIZED ANXIETY DISORDER: ICD-10-CM

## 2024-07-23 DIAGNOSIS — Z12.31 ENCOUNTER FOR SCREENING MAMMOGRAM FOR BREAST CANCER: ICD-10-CM

## 2024-07-23 DIAGNOSIS — I10 ESSENTIAL (PRIMARY) HYPERTENSION: ICD-10-CM

## 2024-07-23 DIAGNOSIS — E78.5 HYPERLIPIDEMIA, UNSPECIFIED HYPERLIPIDEMIA TYPE: ICD-10-CM

## 2024-07-23 DIAGNOSIS — Z12.11 SCREEN FOR COLON CANCER: ICD-10-CM

## 2024-07-23 DIAGNOSIS — Z78.0 POSTMENOPAUSAL: ICD-10-CM

## 2024-07-23 DIAGNOSIS — Z87.891 PERSONAL HISTORY OF NICOTINE DEPENDENCE: ICD-10-CM

## 2024-07-23 DIAGNOSIS — F17.200 TOBACCO DEPENDENCE: ICD-10-CM

## 2024-07-23 DIAGNOSIS — Z00.00 MEDICARE ANNUAL WELLNESS VISIT, SUBSEQUENT: Primary | ICD-10-CM

## 2024-07-23 PROCEDURE — 86235 NUCLEAR ANTIGEN ANTIBODY: CPT | Performed by: FAMILY MEDICINE

## 2024-07-23 PROCEDURE — 85025 COMPLETE CBC W/AUTO DIFF WBC: CPT | Performed by: FAMILY MEDICINE

## 2024-07-23 PROCEDURE — 86038 ANTINUCLEAR ANTIBODIES: CPT | Performed by: FAMILY MEDICINE

## 2024-07-23 PROCEDURE — 86225 DNA ANTIBODY NATIVE: CPT | Performed by: FAMILY MEDICINE

## 2024-07-23 PROCEDURE — 83516 IMMUNOASSAY NONANTIBODY: CPT | Performed by: FAMILY MEDICINE

## 2024-07-23 PROCEDURE — 80053 COMPREHEN METABOLIC PANEL: CPT | Performed by: FAMILY MEDICINE

## 2024-07-23 PROCEDURE — 80061 LIPID PANEL: CPT | Performed by: FAMILY MEDICINE

## 2024-07-23 PROCEDURE — 36415 COLL VENOUS BLD VENIPUNCTURE: CPT | Performed by: FAMILY MEDICINE

## 2024-07-23 RX ORDER — BUMETANIDE 2 MG/1
2 TABLET ORAL 2 TIMES DAILY
Qty: 180 TABLET | Refills: 3 | Status: SHIPPED | OUTPATIENT
Start: 2024-07-23

## 2024-07-23 RX ORDER — VERAPAMIL HYDROCHLORIDE 240 MG/1
240 TABLET, FILM COATED, EXTENDED RELEASE ORAL DAILY
Qty: 90 TABLET | Refills: 3 | Status: SHIPPED | OUTPATIENT
Start: 2024-07-23

## 2024-07-23 RX ORDER — MELOXICAM 15 MG/1
15 TABLET ORAL DAILY
Qty: 90 TABLET | Refills: 3 | Status: SHIPPED | OUTPATIENT
Start: 2024-07-23

## 2024-07-23 RX ORDER — BACLOFEN 10 MG/1
10 TABLET ORAL 3 TIMES DAILY
Qty: 270 TABLET | Refills: 1 | Status: SHIPPED | OUTPATIENT
Start: 2024-07-23

## 2024-07-23 RX ORDER — BREXPIPRAZOLE 0.5 MG/1
1 TABLET ORAL DAILY
Qty: 90 TABLET | Refills: 3 | Status: SHIPPED | OUTPATIENT
Start: 2024-07-23

## 2024-07-23 RX ORDER — ALBUTEROL SULFATE 90 UG/1
2 AEROSOL, METERED RESPIRATORY (INHALATION) EVERY 4 HOURS PRN
Qty: 18 G | Refills: 3 | Status: SHIPPED | OUTPATIENT
Start: 2024-07-23

## 2024-07-23 RX ORDER — NITROGLYCERIN 0.4 MG/1
0.4 TABLET SUBLINGUAL
Qty: 25 TABLET | Refills: 1 | Status: SHIPPED | OUTPATIENT
Start: 2024-07-23

## 2024-07-23 RX ORDER — BUPROPION HYDROCHLORIDE 150 MG/1
150 TABLET ORAL DAILY
Qty: 90 TABLET | Refills: 3 | Status: SHIPPED | OUTPATIENT
Start: 2024-07-23

## 2024-07-23 RX ORDER — TRAZODONE HYDROCHLORIDE 150 MG/1
150 TABLET ORAL
Qty: 90 TABLET | Refills: 0 | Status: SHIPPED | OUTPATIENT
Start: 2024-07-23

## 2024-07-23 RX ORDER — GABAPENTIN 400 MG/1
400 CAPSULE ORAL 4 TIMES DAILY
Qty: 120 CAPSULE | Refills: 2 | Status: SHIPPED | OUTPATIENT
Start: 2024-07-23

## 2024-07-23 NOTE — ASSESSMENT & PLAN NOTE
Psychological condition is stable.  Continue current treatment regimen.  Psychological condition  will be reassessed in 6 months.  She plans to follow up with her therapist.

## 2024-07-23 NOTE — PROGRESS NOTES
Subjective   The ABCs of the Annual Wellness Visit  Medicare Wellness Visit      Diya Arreola is a 64 y.o. patient who presents for a Medicare Wellness Visit.    The following portions of the patient's history were reviewed and   updated as appropriate: allergies, current medications, past family history, past medical history, past social history, past surgical history, and problem list.    Compared to one year ago, the patient's physical   health is the same.  Compared to one year ago, the patient's mental   health is the same.    Recent Hospitalizations:  She was not admitted to the hospital during the last year.     Current Medical Providers:  Patient Care Team:  Mitzy Rea MD as PCP - General  Mitzy Rea MD as PCP - Family Medicine  Lizeth Mcclellan MD as Consulting Physician (Pain Medicine)  Lionel Peters MD as Consulting Physician (Cardiology)    Outpatient Medications Prior to Visit   Medication Sig Dispense Refill    Daliresp 500 MCG tablet tablet Take 1 tablet by mouth Daily. 90 tablet 3    EPINEPHrine (EPIPEN) 0.3 MG/0.3ML solution auto-injector injection INJECT 0.3 ML UNDER THE SKIN INTO THE APPROPRIATE AREA AS DIRECTED 1 (ONE) TIME FOR 1 DOSE. 2 each 1    Fluticasone-Umeclidin-Vilant (Trelegy Ellipta) 200-62.5-25 MCG/ACT inhaler Inhale 1 puff Daily. 90 each 3    folic acid (FOLVITE) 1 MG tablet Take 1 tablet by mouth Every Evening. Ld 2/21      HYDROcodone-acetaminophen (NORCO) 7.5-325 MG per tablet Take 1 tablet by mouth 5 (Five) Times a Day As Needed for Severe Pain. DNF before 5/21/2024 150 tablet 0    hydroxychloroquine (PLAQUENIL) 200 MG tablet Take 1 tablet by mouth 2 (Two) Times a Day. 180 tablet 1    polyethylene glycol (MIRALAX) 17 g packet Take 17 g by mouth As Needed. None preop      Respiratory Therapy Supplies (Nebulizer/Tubing/Mouthpiece) kit For use with [employer] home nebulizer J44.9 2 each 5    varenicline (Chantix Continuing Month [place]) 1 MG tablet  Take 1 tablet by mouth 2 (Two) Times a Day for 56 days. 56 tablet 1    Varenicline Tartrate, Starter, 0.5 MG X 11 & 1 MG X 42 tablet therapy pack TAKE 0.5 MG BY MOUTH DAILY FOR 3 DAYS, THEN 0.5 MG 2 (TWO) TIMES A DAY FOR 4 DAYS, THEN 1 MG 2 (TWO) TIMES A DAY FOR 21 DAYS. TAKE 0.5 MG PO DAILY X 3 DAYS, THEN 0.5 MG PO BID X 4 DAYS, THEN 1 MG BY MOUTH TWICE A DAY 53 each 0    Wheat Dextrin (Benefiber) powder Take  by mouth At Night As Needed.      albuterol (PROVENTIL) (2.5 MG/3ML) 0.083% nebulizer solution USE 1 VIAL VIA NEBULIZATION EVERY 4 HOURS AS NEEDED FOR WHEEZING 375 mL 3    albuterol sulfate  (90 Base) MCG/ACT inhaler TAKE 2 PUFFS BY MOUTH EVERY 4 HOURS AS NEEDED FOR WHEEZE 18 g 3    baclofen (LIORESAL) 10 MG tablet Take 1 tablet by mouth 3 (Three) Times a Day. 270 tablet 1    bumetanide (BUMEX) 2 MG tablet Take 1 tablet by mouth 2 (Two) Times a Day. 180 tablet 3    buPROPion XL (WELLBUTRIN XL) 150 MG 24 hr tablet Take 1 tablet by mouth Daily. 90 tablet 3    ciclopirox (PENLAC) 8 % solution Apply  topically to the appropriate area as directed Every Night. 6 mL 2    gabapentin (NEURONTIN) 400 MG capsule Take 1 capsule by mouth 4 (Four) Times a Day. 120 capsule 2    meloxicam (MOBIC) 15 MG tablet Take 1 tablet by mouth Daily. 90 tablet 3    nitroglycerin (NITROSTAT) 0.4 MG SL tablet Place 1 tablet under the tongue Every 5 (Five) Minutes As Needed for Chest Pain. Take no more than 3 doses in 15 minutes. 25 tablet 1    Rexulti 0.5 MG tablet TAKE 0.5 MG BY MOUTH DAILY. 30 tablet 11    traZODone (DESYREL) 150 MG tablet TAKE 1 TABLET BY MOUTH EVERY DAY AT NIGHT 90 tablet 0    verapamil SR (CALAN-SR) 240 MG CR tablet Take 1 tablet by mouth Daily. 90 tablet 3    HYDROcodone-acetaminophen (NORCO) 7.5-325 MG per tablet Take 1 tablet by mouth 5 (Five) Times a Day As Needed for Severe Pain. DNF before 6/20/2024 150 tablet 0    HYDROcodone-acetaminophen (NORCO) 7.5-325 MG per tablet Take 1 tablet by mouth 5 (Five)  Times a Day As Needed for Severe Pain. DNF before 7/20/2024 150 tablet 0     No facility-administered medications prior to visit.     Opioid medication/s are on active medication list.  and I have evaluated her active treatment plan and pain score trends (see table).  There were no vitals filed for this visit.  I have reviewed the chart for potential of high risk medication and harmful drug interactions in the elderly.        Aspirin is not on active medication list.  Aspirin use is not indicated based on review of current medical condition/s. Risk of harm outweighs potential benefits.  .    Patient Active Problem List   Diagnosis    Allergic rhinitis    Anxiety disorder    Asthma    Carpal tunnel syndrome, bilateral    Chronic obstructive pulmonary disease    Connective tissue disease, undifferentiated    Constipation    Cystic fibrosis carrier    DDD (degenerative disc disease), cervical    Dyspepsia    Edema    Elevated antinuclear antibody (LILI) level    Essential (primary) hypertension    Fatigue    Hyperlipidemia    Insomnia    Irritable bowel syndrome    Fibromyalgia    Headache    Lupus    Sjogren's syndrome    Nonrheumatic mitral valve insufficiency    Other long term (current) drug therapy    Other specified dorsopathies, site unspecified    Palpitations    Paroxysmal tachycardia    Sciatica    Sleep disorder    Spinal stenosis of lumbar region    Temporary cerebral vascular dysfunction    Vitamin D deficiency    Hypokalemia    Depression    LILI positive    Need for immunization against influenza    Acute cystitis without hematuria    Spondylolisthesis of lumbar region    S/P lumbar fusion    Encounter for postoperative care    Nausea    Tobacco dependence    Encounter for screening mammogram for breast cancer    Postmenopausal    Medicare annual wellness visit, subsequent    Encounter for hepatitis C virus screening test for high risk patient    Bronchitis    Other secondary kyphosis, thoracolumbar region  "   Right lower lobe pulmonary nodule    Hyperglycemia    Screen for colon cancer    Personal history of nicotine dependence     Advance Care Planning (Click this link to access ACP Navigator)  Advance Directive is not on file.  ACP discussion was held with the patient during this visit. Patient does not have an advance directive, information provided.        Objective   Vitals:    07/23/24 1251   BP: 104/68   BP Location: Left arm   Patient Position: Sitting   Cuff Size: Adult   Pulse: 88   Temp: 98.6 °F (37 °C)   TempSrc: Infrared   SpO2: 93%   Weight: 63.7 kg (140 lb 8 oz)   Height: 156.2 cm (61.5\")       Estimated body mass index is 26.12 kg/m² as calculated from the following:    Height as of this encounter: 156.2 cm (61.5\").    Weight as of this encounter: 63.7 kg (140 lb 8 oz).     Physical Exam  Vitals and nursing note reviewed.   Constitutional:       General: She is not in acute distress.     Appearance: She is well-developed.   HENT:      Head: Normocephalic.   Eyes:      General: Lids are normal.      Conjunctiva/sclera: Conjunctivae normal.   Neck:      Thyroid: No thyroid mass or thyromegaly.      Trachea: Trachea normal.   Cardiovascular:      Rate and Rhythm: Normal rate and regular rhythm.      Heart sounds: Normal heart sounds.   Pulmonary:      Effort: Pulmonary effort is normal.      Breath sounds: Normal breath sounds.   Abdominal:      Palpations: Abdomen is soft.   Musculoskeletal:      Cervical back: Normal range of motion.   Lymphadenopathy:      Cervical: No cervical adenopathy.   Skin:     General: Skin is warm and dry.   Neurological:      Mental Status: She is alert and oriented to person, place, and time.   Psychiatric:         Attention and Perception: She is attentive.         Mood and Affect: Mood normal.         Speech: Speech normal.         Behavior: Behavior normal.               Does the patient have evidence of cognitive impairment? No  Lab Results   Component Value Date    TRIG " 146 2024    HDL 53 2024    LDL 69 2024    VLDL 25 2024                                                                                               Health  Risk Assessment    Smoking Status:  Social History     Tobacco Use   Smoking Status Every Day    Current packs/day: 0.00    Average packs/day: 1 pack/day for 39.0 years (39.0 ttl pk-yrs)    Types: Cigarettes    Start date: 1982    Last attempt to quit: 2021    Years since quittin.7    Passive exposure: Current   Smokeless Tobacco Never   Tobacco Comments    Starting Chantix soon     Alcohol Consumption:  Social History     Substance and Sexual Activity   Alcohol Use No     Fall Risk Screen:  CRISTINAADI Fall Risk Assessment was completed, and patient is at LOW risk for falls.Assessment completed on:2024    Depression Screenin/11/2024     1:42 PM   PHQ-2/PHQ-9 Depression Screening   Little Interest or Pleasure in Doing Things 0-->not at all   Feeling Down, Depressed or Hopeless 1-->several days   PHQ-9: Brief Depression Severity Measure Score 1     Health Habits and Functional and Cognitive Screenin/23/2024     1:00 PM   Functional & Cognitive Status   Do you have difficulty preparing food and eating? No   Do you have difficulty bathing yourself, getting dressed or grooming yourself? No   Do you have difficulty using the toilet? No   Do you have difficulty moving around from place to place? No   Do you have trouble with steps or getting out of a bed or a chair? No   Current Diet Well Balanced Diet   Dental Exam Other   Eye Exam Up to date   Exercise (times per week) 2 times per week   Current Exercises Include Walking   Do you need help using the phone?  No   Are you deaf or do you have serious difficulty hearing?  No   Do you need help to go to places out of walking distance? No   Do you need help shopping? No   Do you need help preparing meals?  No   Do you need help with housework?  No   Do you need help  with laundry? No   Do you need help taking your medications? No   Do you ever drive or ride in a car without wearing a seat belt? No   Have you felt unusual stress, anger or loneliness in the last month? Yes   Who do you live with? Spouse   If you need help, do you have trouble finding someone available to you? No   Do you have difficulty concentrating, remembering or making decisions? No             Age-appropriate Screening Schedule:  Refer to the list below for future screening recommendations based on patient's age, sex and/or medical conditions. Orders for these recommended tests are listed in the plan section. The patient has been provided with a written plan.    Health Maintenance List  Health Maintenance   Topic Date Due    TDAP/TD VACCINES (1 - Tdap) Never done    LUNG CANCER SCREENING  Never done    ZOSTER VACCINE (2 of 3) 09/29/2014    COLORECTAL CANCER SCREENING  10/08/2023    DXA SCAN  08/05/2024    INFLUENZA VACCINE  08/01/2024    BMI FOLLOWUP  01/31/2025    MAMMOGRAM  02/06/2025    ANNUAL WELLNESS VISIT  07/23/2025    LIPID PANEL  07/23/2025    HEPATITIS C SCREENING  Completed    COVID-19 Vaccine  Completed    Pneumococcal Vaccine 0-64  Completed    PAP SMEAR  Discontinued                                                                                                                                                CMS Preventative Services Quick Reference  Risk Factors Identified During Encounter  Inactivity/Sedentary: Patient was advised to exercise at least 150 minutes a week per CDC recommendations.  Dental Screening Recommended  Vision Screening Recommended    The above risks/problems have been discussed with the patient.  Pertinent information has been shared with the patient in the After Visit Summary.  An After Visit Summary and PPPS were made available to the patient.    Follow Up:   Next Medicare Wellness visit to be scheduled in 1 year.     Assessment & Plan  Medicare annual wellness visit,  subsequent    Screen for colon cancer    Encounter for screening mammogram for breast cancer    Postmenopausal    Tobacco dependence  Tobacco use is improving with lifestyle modifications.  Smoking cessation counseling was provided.  Tobacco use will be reassessed in 6 months.                                        Personal history of nicotine dependence    Essential (primary) hypertension  Hypertension is stable and controlled  Continue current treatment regimen.  Blood pressure will be reassessed in 6 months.  Hyperlipidemia, unspecified hyperlipidemia type   Lipid abnormalities are stable    Plan:  Continue same medication/s without change.      Counseled patient on lifestyle modifications to help control hyperlipidemia.     Patient Treatment Goals:   LDL goal is under 100    Followup in 6 months.  LILI positive    Generalized anxiety disorder  Psychological condition is stable.  Continue current treatment regimen.  Psychological condition  will be reassessed in 6 months.  She plans to follow up with her therapist.    Orders Placed This Encounter   Procedures    Mammo Screening Digital Tomosynthesis Bilateral With CAD    DEXA Bone Density Axial    CT Chest Low Dose Wo    Cologuard - Stool, Per Rectum    LILI    Comprehensive Metabolic Panel    Lipid Panel    CBC Auto Differential    CBC & Differential     New Medications Ordered This Visit   Medications    traZODone (DESYREL) 150 MG tablet     Sig: Take 1 tablet by mouth every night at bedtime.     Dispense:  90 tablet     Refill:  0    albuterol sulfate  (90 Base) MCG/ACT inhaler     Sig: Inhale 2 puffs Every 4 (Four) Hours As Needed for Wheezing.     Dispense:  18 g     Refill:  3    gabapentin (NEURONTIN) 400 MG capsule     Sig: Take 1 capsule by mouth 4 (Four) Times a Day.     Dispense:  120 capsule     Refill:  2    verapamil SR (CALAN-SR) 240 MG CR tablet     Sig: Take 1 tablet by mouth Daily.     Dispense:  90 tablet     Refill:  3    baclofen  (LIORESAL) 10 MG tablet     Sig: Take 1 tablet by mouth 3 (Three) Times a Day.     Dispense:  270 tablet     Refill:  1    meloxicam (MOBIC) 15 MG tablet     Sig: Take 1 tablet by mouth Daily.     Dispense:  90 tablet     Refill:  3    Brexpiprazole (Rexulti) 0.5 MG tablet     Sig: Take 0.5 mg by mouth Daily.     Dispense:  90 tablet     Refill:  3    buPROPion XL (WELLBUTRIN XL) 150 MG 24 hr tablet     Sig: Take 1 tablet by mouth Daily.     Dispense:  90 tablet     Refill:  3    bumetanide (BUMEX) 2 MG tablet     Sig: Take 1 tablet by mouth 2 (Two) Times a Day.     Dispense:  180 tablet     Refill:  3    nitroglycerin (NITROSTAT) 0.4 MG SL tablet     Sig: Place 1 tablet under the tongue Every 5 (Five) Minutes As Needed for Chest Pain. Take no more than 3 doses in 15 minutes.     Dispense:  25 tablet     Refill:  1          Follow Up:   No follow-ups on file.

## 2024-07-23 NOTE — ASSESSMENT & PLAN NOTE
Tobacco use is improving with lifestyle modifications.  Smoking cessation counseling was provided.  Tobacco use will be reassessed in 6 months.

## 2024-07-24 LAB
ALBUMIN SERPL-MCNC: 4.3 G/DL (ref 3.5–5.2)
ALBUMIN/GLOB SERPL: 1.4 G/DL
ALP SERPL-CCNC: 107 U/L (ref 39–117)
ALT SERPL W P-5'-P-CCNC: 22 U/L (ref 1–33)
ANA SER QL: ABNORMAL
ANION GAP SERPL CALCULATED.3IONS-SCNC: 14 MMOL/L (ref 5–15)
AST SERPL-CCNC: 44 U/L (ref 1–32)
BASOPHILS # BLD AUTO: 0.05 10*3/MM3 (ref 0–0.2)
BASOPHILS NFR BLD AUTO: 0.5 % (ref 0–1.5)
BILIRUB SERPL-MCNC: 0.6 MG/DL (ref 0–1.2)
BUN SERPL-MCNC: 19 MG/DL (ref 8–23)
BUN/CREAT SERPL: 18.6 (ref 7–25)
CALCIUM SPEC-SCNC: 9.7 MG/DL (ref 8.6–10.5)
CHLORIDE SERPL-SCNC: 98 MMOL/L (ref 98–107)
CHOLEST SERPL-MCNC: 147 MG/DL (ref 0–200)
CO2 SERPL-SCNC: 26 MMOL/L (ref 22–29)
CREAT SERPL-MCNC: 1.02 MG/DL (ref 0.57–1)
DEPRECATED RDW RBC AUTO: 48.4 FL (ref 37–54)
EGFRCR SERPLBLD CKD-EPI 2021: 62.3 ML/MIN/1.73
EOSINOPHIL # BLD AUTO: 0.07 10*3/MM3 (ref 0–0.4)
EOSINOPHIL NFR BLD AUTO: 0.6 % (ref 0.3–6.2)
ERYTHROCYTE [DISTWIDTH] IN BLOOD BY AUTOMATED COUNT: 13.8 % (ref 12.3–15.4)
GLOBULIN UR ELPH-MCNC: 3.1 GM/DL
GLUCOSE SERPL-MCNC: 84 MG/DL (ref 65–99)
HCT VFR BLD AUTO: 38 % (ref 34–46.6)
HDLC SERPL-MCNC: 53 MG/DL (ref 40–60)
HGB BLD-MCNC: 12.7 G/DL (ref 12–15.9)
IMM GRANULOCYTES # BLD AUTO: 0.05 10*3/MM3 (ref 0–0.05)
IMM GRANULOCYTES NFR BLD AUTO: 0.5 % (ref 0–0.5)
LDLC SERPL CALC-MCNC: 69 MG/DL (ref 0–100)
LDLC/HDLC SERPL: 1.22 {RATIO}
LYMPHOCYTES # BLD AUTO: 1.91 10*3/MM3 (ref 0.7–3.1)
LYMPHOCYTES NFR BLD AUTO: 17.7 % (ref 19.6–45.3)
MCH RBC QN AUTO: 32.2 PG (ref 26.6–33)
MCHC RBC AUTO-ENTMCNC: 33.4 G/DL (ref 31.5–35.7)
MCV RBC AUTO: 96.2 FL (ref 79–97)
MONOCYTES # BLD AUTO: 0.9 10*3/MM3 (ref 0.1–0.9)
MONOCYTES NFR BLD AUTO: 8.4 % (ref 5–12)
NEUTROPHILS NFR BLD AUTO: 7.79 10*3/MM3 (ref 1.7–7)
NEUTROPHILS NFR BLD AUTO: 72.3 % (ref 42.7–76)
NRBC BLD AUTO-RTO: 0 /100 WBC (ref 0–0.2)
PLATELET # BLD AUTO: 241 10*3/MM3 (ref 140–450)
PMV BLD AUTO: 12.7 FL (ref 6–12)
POTASSIUM SERPL-SCNC: 4.4 MMOL/L (ref 3.5–5.2)
PROT SERPL-MCNC: 7.4 G/DL (ref 6–8.5)
RBC # BLD AUTO: 3.95 10*6/MM3 (ref 3.77–5.28)
SODIUM SERPL-SCNC: 138 MMOL/L (ref 136–145)
TRIGL SERPL-MCNC: 146 MG/DL (ref 0–150)
VLDLC SERPL-MCNC: 25 MG/DL (ref 5–40)
WBC NRBC COR # BLD AUTO: 10.77 10*3/MM3 (ref 3.4–10.8)

## 2024-07-24 RX ORDER — CICLOPIROX 80 MG/ML
SOLUTION TOPICAL NIGHTLY
Qty: 6.6 ML | Refills: 2 | Status: SHIPPED | OUTPATIENT
Start: 2024-07-24

## 2024-07-25 LAB
CENTROMERE B AB SER-ACNC: <0.2 AI (ref 0–0.9)
CHROMATIN AB SERPL-ACNC: 0.2 AI (ref 0–0.9)
DSDNA AB SER-ACNC: <1 IU/ML (ref 0–9)
ENA JO1 AB SER-ACNC: <0.2 AI (ref 0–0.9)
ENA RNP AB SER-ACNC: <0.2 AI (ref 0–0.9)
ENA SCL70 AB SER-ACNC: <0.2 AI (ref 0–0.9)
ENA SM AB SER-ACNC: <0.2 AI (ref 0–0.9)
ENA SM+RNP AB SER-ACNC: <0.2 AI (ref 0–0.9)
ENA SS-A AB SER-ACNC: >8 AI (ref 0–0.9)
ENA SS-B AB SER-ACNC: <0.2 AI (ref 0–0.9)
Lab: ABNORMAL
RIBOSOMAL P AB SER-ACNC: <0.2 AI (ref 0–0.9)

## 2024-07-29 RX ORDER — ALBUTEROL SULFATE 2.5 MG/3ML
SOLUTION RESPIRATORY (INHALATION)
Qty: 375 ML | Refills: 3 | Status: SHIPPED | OUTPATIENT
Start: 2024-07-29

## 2024-08-13 ENCOUNTER — OFFICE VISIT (OUTPATIENT)
Dept: PAIN MEDICINE | Facility: CLINIC | Age: 64
End: 2024-08-13
Payer: MEDICARE

## 2024-08-13 VITALS
DIASTOLIC BLOOD PRESSURE: 76 MMHG | OXYGEN SATURATION: 95 % | SYSTOLIC BLOOD PRESSURE: 135 MMHG | WEIGHT: 147 LBS | RESPIRATION RATE: 16 BRPM | BODY MASS INDEX: 27.33 KG/M2 | HEART RATE: 97 BPM

## 2024-08-13 DIAGNOSIS — Z79.899 HIGH RISK MEDICATION USE: ICD-10-CM

## 2024-08-13 DIAGNOSIS — M79.18 MYOFASCIAL PAIN SYNDROME: ICD-10-CM

## 2024-08-13 DIAGNOSIS — G89.4 CHRONIC PAIN SYNDROME: Primary | ICD-10-CM

## 2024-08-13 DIAGNOSIS — M96.1 POSTLAMINECTOMY SYNDROME OF LUMBAR REGION: ICD-10-CM

## 2024-08-13 DIAGNOSIS — M96.1 POSTLAMINECTOMY SYNDROME OF CERVICAL REGION: ICD-10-CM

## 2024-08-13 PROCEDURE — 1160F RVW MEDS BY RX/DR IN RCRD: CPT | Performed by: ANESTHESIOLOGY

## 2024-08-13 PROCEDURE — 99214 OFFICE O/P EST MOD 30 MIN: CPT | Performed by: ANESTHESIOLOGY

## 2024-08-13 PROCEDURE — 3075F SYST BP GE 130 - 139MM HG: CPT | Performed by: ANESTHESIOLOGY

## 2024-08-13 PROCEDURE — 3078F DIAST BP <80 MM HG: CPT | Performed by: ANESTHESIOLOGY

## 2024-08-13 PROCEDURE — 1159F MED LIST DOCD IN RCRD: CPT | Performed by: ANESTHESIOLOGY

## 2024-08-13 PROCEDURE — 1125F AMNT PAIN NOTED PAIN PRSNT: CPT | Performed by: ANESTHESIOLOGY

## 2024-08-13 RX ORDER — HYDROCODONE BITARTRATE AND ACETAMINOPHEN 7.5; 325 MG/1; MG/1
1 TABLET ORAL
Qty: 150 TABLET | Refills: 0 | Status: SHIPPED | OUTPATIENT
Start: 2024-08-19

## 2024-08-13 RX ORDER — HYDROCODONE BITARTRATE AND ACETAMINOPHEN 7.5; 325 MG/1; MG/1
1 TABLET ORAL
Qty: 150 TABLET | Refills: 0 | Status: SHIPPED | OUTPATIENT
Start: 2024-10-17

## 2024-08-13 RX ORDER — HYDROCODONE BITARTRATE AND ACETAMINOPHEN 7.5; 325 MG/1; MG/1
1 TABLET ORAL
Qty: 150 TABLET | Refills: 0 | Status: SHIPPED | OUTPATIENT
Start: 2024-09-18

## 2024-08-13 NOTE — PROGRESS NOTES
Subjective    CC back, leg pain, neck pain  Diya Arreola is a 64 y.o. female with chronic neck pain status post ACDF, chronic back pain S/P  L4-L5 LAMI/TILF nov 2021/[facility],S/P thoracolumbar decompression and fusion from T9-pelvis/Dr. Gorman/[facility] 2023,  here for follow-up.    Denies any new issues or complaints today.  Chronic thoracic and upper lumbar myofascial pain mostly left-sided which is interfering with sleep and all activities.  On baclofen with marginal relief.  Chronic back pain upper lumbar and lower lumbar radiating to bilateral hips bilateral lower extremity worse with standing walking twisting or any activity.  Denies new injury, saddle anesthesia bladder bowel continence.  Chronic neck pain, radiating to bilateral shoulder limited range of motion in the neck and significant paraspinal muscle spasm bilateral UE radicular pain.  Denies balance issues.    Chronic tremors, history of polyarthralgia/lupus sees rheumatology.  Pain interfering with ADL.  Tried physical therapy without relief.  Seen neurosurgery, referred to try injection.        C-spine MRI 2020 probably a plate on the anterior aspect of the spine at the C4-5 level. mild subluxation with degenerative disc disease at C3-4, causing a mild central canal stenosis. Left-sided disc extrusion at C6-7 with left foraminal impingement.   L-spine MRI 2019 postsurgical changes L3-L4 with pedicle screw L3.  Retrolisthesis L2 on L3 moderate central lateral canal stenosis.  Grade 1 anterolisthesis L4-L5.  Degenerative changes throughout.  L-spine x-ray 2020: 4 mm anterolisthesis on extension, 6 mm anterolisthesis on flexion, at the L4-5 level. Stable spinal fusion changes at L3-4.. Severe degenerative disc and endplate changes at L2-3, similar to  03/16/2018 examination.    Pain Assessment   Location of Pain: Lower Back, R Hip, L Hip, Legs, neck pain,  Description of Pain: Dull/Aching, Throbbing, Stabbing  Previous Pain Rating :8  Current Pain Rating:  4  Aggravating Factors: Activity  Alleviating Factors: Rest, Medication    PEG Assessment   What number best describes your pain on average in the past week?5  What number best describes how, during the past week, pain has interfered with your enjoyment of life?2  What number best describes how, during the past week, pain has interfered with your general activity?9      The following portions of the patient's history were reviewed and updated as appropriate: allergies, current medications, past family history, past medical history, past social history, past surgical history and problem list.    Review of Systems   Musculoskeletal:  Positive for arthralgias, back pain and neck pain.   Neurological:  Positive for tremors, numbness and headache.   All other systems reviewed and are negative.    Objective   Physical Exam   Constitutional: No distress.   walker   Neck:   Positive Spurling's   Pulmonary/Chest: Effort normal.   Musculoskeletal:      Cervical back: She exhibits tenderness.   Vitals reviewed.    /76 (BP Location: Left arm, Patient Position: Sitting, Cuff Size: Adult)   Pulse 97   Resp 16   Wt 66.7 kg (147 lb)   SpO2 95%   BMI 27.33 kg/m²      PHQ 9 on chart  Opioid risk tool low risk    Assessment & Plan   Diagnoses and all orders for this visit:    1. Chronic pain syndrome (Primary)  -     HYDROcodone-acetaminophen (NORCO) 7.5-325 MG per tablet; Take 1 tablet by mouth 5 (Five) Times a Day As Needed for Severe Pain. DNF before 9/18/2024  Dispense: 150 tablet; Refill: 0  -     HYDROcodone-acetaminophen (NORCO) 7.5-325 MG per tablet; Take 1 tablet by mouth 5 (Five) Times a Day As Needed for Severe Pain. DNF before 8/19/2024  Dispense: 150 tablet; Refill: 0  -     HYDROcodone-acetaminophen (NORCO) 7.5-325 MG per tablet; Take 1 tablet by mouth 5 (Five) Times a Day As Needed for Severe Pain. DNF before 10/17/2024  Dispense: 150 tablet; Refill: 0    2. Postlaminectomy syndrome of lumbar region    3.  Postlaminectomy syndrome of cervical region    4. Myofascial pain syndrome    5. High risk medication use  -     Urine Drug Screen - Urine, Clean Catch; Future    Summary  Diya Arreola is a 64 y.o. female with chronic neck pain status post ACDF, chronic back pain status post Fusion L3-5, S/P thoracolumbar decompression and fusion from T9-pelvis/Dr. Gorman/[facility] 2023.  here for follow-up.    Chronic back pain from DDD spondylosis postlaminectomy syndrome with radicular pain.   Chronic neck pain from postlaminectomy syndrome.  Worsening chronic tremors.    Stable the last 2 months, denies any new complaints or issues.  She has completed physical therapy and keeps up with her home exercises.  Try to walk daily.    Cont hydrocodone 7.5/325 5 times daily as needed for severe pain.  UDS and inspect reviewed  Discussed risk of tolerance, dependence, respiratory depression, coma and death associated with use of oral opioids for treatment of chronic nonmalignant pain.     RTC 2-3 month

## 2024-08-27 PROBLEM — M81.0 AGE-RELATED OSTEOPOROSIS WITHOUT CURRENT PATHOLOGICAL FRACTURE: Status: ACTIVE | Noted: 2024-08-27

## 2024-09-06 RX ORDER — HYDROXYCHLOROQUINE SULFATE 200 MG/1
200 TABLET, FILM COATED ORAL 2 TIMES DAILY
Qty: 180 TABLET | Refills: 1 | Status: CANCELLED | OUTPATIENT
Start: 2024-09-06

## 2024-09-06 RX ORDER — HYDROXYCHLOROQUINE SULFATE 200 MG/1
200 TABLET, FILM COATED ORAL 2 TIMES DAILY
Qty: 180 TABLET | Refills: 1 | Status: SHIPPED | OUTPATIENT
Start: 2024-09-06

## 2024-09-27 RX ORDER — VARENICLINE TARTRATE 1 MG/1
1 TABLET, FILM COATED ORAL 2 TIMES DAILY
Qty: 56 TABLET | Refills: 1 | Status: SHIPPED | OUTPATIENT
Start: 2024-09-27 | End: 2024-11-22

## 2024-10-13 RX ORDER — ALBUTEROL SULFATE 0.83 MG/ML
SOLUTION RESPIRATORY (INHALATION)
Qty: 375 ML | Refills: 3 | Status: SHIPPED | OUTPATIENT
Start: 2024-10-13

## 2024-10-17 ENCOUNTER — TELEPHONE (OUTPATIENT)
Dept: FAMILY MEDICINE CLINIC | Facility: CLINIC | Age: 64
End: 2024-10-17

## 2024-10-17 RX ORDER — VARENICLINE TARTRATE 1 MG/1
1 TABLET, FILM COATED ORAL 2 TIMES DAILY
Qty: 56 TABLET | Refills: 1 | Status: SHIPPED | OUTPATIENT
Start: 2024-11-14 | End: 2025-01-09

## 2024-10-17 RX ORDER — VARENICLINE TARTRATE 0.5 (11)-1
KIT ORAL
Qty: 1 EACH | Refills: 0 | Status: SHIPPED | OUTPATIENT
Start: 2024-10-17 | End: 2024-11-14

## 2024-10-17 RX ORDER — VARENICLINE TARTRATE 0.5 (11)-1
KIT ORAL
OUTPATIENT
Start: 2024-10-17

## 2024-10-17 NOTE — TELEPHONE ENCOUNTER
"  Caller: Diya Arreola \"Elda\"    Relationship: Self    Best call back number: [phone]     What is the best time to reach you: ANY    Who are you requesting to speak with (clinical staff, provider,  specific staff member): CLINICAL STAFF    Do you know the name of the person who called:      What was the call regarding: PATIENT CALLED STATED HER DAUGHTER FOUND HER PASSED OUT AND SHE HIT HER HEAD/BACK, WAS NOT ABLE TO SPEAK AND DID NOT KNOW WHERE SHE WAS.  WENT TO [facility] WHICH THEY ADMITTED HER AND DISCHARGED ON 10/16/24.  THEY RAN A LOT OF TEST AND DID NOT FIND OUT WHY SHE HAD PASSED OUT.  WANTED TO LET DR BERRY KNOW AND THAT SHE HAS APPT ON 10/25/24 AND JUST GOING TO KEEP THAT APPT TO SEE HER.  CAN DR BERRY GET HER MEDICAL RECORDS FROM THE HOSPITAL DUE TO [facility] HAS TAKEN OVER IT.      Is it okay if the provider responds through MyChart:      "

## 2024-10-18 ENCOUNTER — TELEPHONE (OUTPATIENT)
Dept: PAIN MEDICINE | Facility: CLINIC | Age: 64
End: 2024-10-18
Payer: MEDICARE

## 2024-10-18 RX ORDER — CICLOPIROX 80 MG/ML
SOLUTION TOPICAL NIGHTLY
Qty: 6.6 ML | Refills: 2 | Status: SHIPPED | OUTPATIENT
Start: 2024-10-18

## 2024-10-18 RX ORDER — TRAZODONE HYDROCHLORIDE 150 MG/1
150 TABLET ORAL
Qty: 90 TABLET | Refills: 0 | Status: SHIPPED | OUTPATIENT
Start: 2024-10-18

## 2024-10-18 NOTE — TELEPHONE ENCOUNTER
Provider: DR MURRAY     Caller: PATIENT    Phone Number: [phone]    Reason for Call: PATIENT WANTED TO LET THE DR MURRAY KNOW SHE WAS IN [facility] 10-12 THROUGH 10-14 DUE TO PASSING OUT BACKWARD AND HITTING HER HEAD AND BACK. IF RECORDS ARE NEEDED DR BERRY (PCP) HAS A COPY OF THESE. PLEASE CALL WITH ANY QUESTIONS.

## 2024-10-25 ENCOUNTER — LAB (OUTPATIENT)
Dept: FAMILY MEDICINE CLINIC | Facility: CLINIC | Age: 64
End: 2024-10-25
Payer: MEDICARE

## 2024-10-25 ENCOUNTER — OFFICE VISIT (OUTPATIENT)
Dept: FAMILY MEDICINE CLINIC | Facility: CLINIC | Age: 64
End: 2024-10-25
Payer: MEDICARE

## 2024-10-25 VITALS
BODY MASS INDEX: 24.88 KG/M2 | HEART RATE: 96 BPM | OXYGEN SATURATION: 93 % | WEIGHT: 135.2 LBS | DIASTOLIC BLOOD PRESSURE: 75 MMHG | HEIGHT: 62 IN | SYSTOLIC BLOOD PRESSURE: 114 MMHG | TEMPERATURE: 97.8 F
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
## 2024-10-25 DIAGNOSIS — S06.0X1D CONCUSSION WITH LOSS OF CONSCIOUSNESS OF 30 MINUTES OR LESS, SUBSEQUENT ENCOUNTER: Primary | ICD-10-CM

## 2024-10-25 DIAGNOSIS — E87.6 HYPOKALEMIA: ICD-10-CM

## 2024-10-25 DIAGNOSIS — R40.20 LOC (LOSS OF CONSCIOUSNESS): ICD-10-CM

## 2024-10-25 DIAGNOSIS — J40 BRONCHITIS: ICD-10-CM
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
## 2024-10-25 PROBLEM — S06.0X1A CONCUSSION WTH LOSS OF CONSCIOUSNESS OF 30 MINUTES OR LESS: Status: ACTIVE | Noted: 2024-10-25

## 2024-10-25 PROCEDURE — G2211 COMPLEX E/M VISIT ADD ON: HCPCS | Performed by: FAMILY MEDICINE

## 2024-10-25 PROCEDURE — 3074F SYST BP LT 130 MM HG: CPT | Performed by: FAMILY MEDICINE

## 2024-10-25 PROCEDURE — 36415 COLL VENOUS BLD VENIPUNCTURE: CPT

## 2024-10-25 PROCEDURE — 86140 C-REACTIVE PROTEIN: CPT | Performed by: FAMILY MEDICINE

## 2024-10-25 PROCEDURE — 3078F DIAST BP <80 MM HG: CPT | Performed by: FAMILY MEDICINE

## 2024-10-25 PROCEDURE — 85025 COMPLETE CBC W/AUTO DIFF WBC: CPT | Performed by: FAMILY MEDICINE

## 2024-10-25 PROCEDURE — 1125F AMNT PAIN NOTED PAIN PRSNT: CPT | Performed by: FAMILY MEDICINE

## 2024-10-25 PROCEDURE — 80053 COMPREHEN METABOLIC PANEL: CPT | Performed by: FAMILY MEDICINE

## 2024-10-25 PROCEDURE — 99214 OFFICE O/P EST MOD 30 MIN: CPT | Performed by: FAMILY MEDICINE

## 2024-10-25 RX ORDER — DOXYCYCLINE 100 MG/1
100 CAPSULE ORAL 2 TIMES DAILY
Qty: 20 CAPSULE | Refills: 0 | Status: SHIPPED | OUTPATIENT
Start: 2024-10-25

## 2024-10-25 RX ORDER — DOXYCYCLINE 100 MG/1
100 CAPSULE ORAL 2 TIMES DAILY
Qty: 20 CAPSULE | Refills: 0 | Status: SHIPPED | OUTPATIENT
Start: 2024-10-25 | End: 2024-10-25

## 2024-10-25 NOTE — PROGRESS NOTES
"Chief Complaint  Fall (Happened last week  )    Subjective        Diya Arreola presents to [facility]  History of Present Illness  She passed out at home about 9 days ago.  She was walking through her house and fell backwards and hit the back of her head and back.  She does not remember exactly what happened.  She had LOC for an unknown time.  Family called 911 and ambulance came and took her to [facility] for evaluation.  CT and CTA of head, carotid ultrasound and chest x-ray were unremarkable.  She was observed and labs were monitored.  Since she has been home she has been feeling better but still has some episodes of feeling \"foggy\".  She has bruises under both eyes.   Fall  The accident occurred More than 1 week ago. The fall occurred in unknown circumstances. There was no blood loss. The point of impact was the head.       Objective   Vital Signs:  /75 (BP Location: Left arm, Patient Position: Sitting, Cuff Size: Adult)   Pulse 96   Temp 97.8 °F (36.6 °C) (Infrared)   Ht 156.2 cm (61.5\")   Wt 61.3 kg (135 lb 3.2 oz)   SpO2 93%   BMI 25.13 kg/m²   Estimated body mass index is 25.13 kg/m² as calculated from the following:    Height as of this encounter: 156.2 cm (61.5\").    Weight as of this encounter: 61.3 kg (135 lb 3.2 oz).            Physical Exam  Vitals and nursing note reviewed.   Constitutional:       General: She is not in acute distress.     Appearance: She is well-developed.   HENT:      Head: Normocephalic.      Right Ear: Tympanic membrane normal.      Left Ear: Tympanic membrane normal.      Mouth/Throat:      Mouth: Mucous membranes are moist.      Pharynx: Oropharynx is clear.   Eyes:      General: Lids are normal.      Conjunctiva/sclera: Conjunctivae normal.      Pupils: Pupils are equal, round, and reactive to light.   Neck:      Thyroid: No thyroid mass or thyromegaly.      Trachea: Trachea normal.   Cardiovascular:      Rate and " Rhythm: Normal rate and regular rhythm.      Heart sounds: Normal heart sounds.   Pulmonary:      Effort: Pulmonary effort is normal.      Breath sounds: Rhonchi present.   Musculoskeletal:      Cervical back: Normal range of motion.   Lymphadenopathy:      Cervical: No cervical adenopathy.   Skin:     General: Skin is warm and dry.      Findings: Bruising present.   Neurological:      Mental Status: She is alert and oriented to person, place, and time. Mental status is at baseline.   Psychiatric:         Attention and Perception: She is attentive.         Mood and Affect: Mood normal.         Speech: Speech normal.         Behavior: Behavior normal.        Result Review :  The following data was reviewed by: Mitzy Rea MD on 10/25/2024:  Common labs          7/23/2024    13:46   Common Labs   Glucose 84    BUN 19    Creatinine 1.02    Sodium 138    Potassium 4.4    Chloride 98    Calcium 9.7    Albumin 4.3    Total Bilirubin 0.6    Alkaline Phosphatase 107    AST (SGOT) 44    ALT (SGPT) 22    WBC 10.77    Hemoglobin 12.7    Hematocrit 38.0    Platelets 241    Total Cholesterol 147    Triglycerides 146    HDL Cholesterol 53    LDL Cholesterol  69                Assessment and Plan   Diagnoses and all orders for this visit:    1. Concussion with loss of consciousness of 30 minutes or less, subsequent encounter (Primary)  -     Comprehensive Metabolic Panel  -     C-reactive protein; Future  -     CBC & Differential    2. Fall, subsequent encounter  -     C-reactive protein; Future  -     CBC & Differential    3. LOC (loss of consciousness)  -     C-reactive protein; Future  -     CBC & Differential    4. Hypokalemia  -     Comprehensive Metabolic Panel  -     C-reactive protein; Future  -     CBC & Differential    5. Bronchitis  -     doxycycline (MONODOX) 100 MG capsule; Take 1 capsule by mouth 2 (Two) Times a Day.  Dispense: 20 capsule; Refill: 0    Other orders  -     Discontinue: doxycycline (MONODOX) 100  MG capsule; Take 1 capsule by mouth 2 (Two) Times a Day.  Dispense: 20 capsule; Refill: 0             Follow Up   No follow-ups on file.  Patient was given instructions and counseling regarding her condition or for health maintenance advice. Please see specific information pulled into the AVS if appropriate.

## 2024-10-28 LAB
ALBUMIN SERPL-MCNC: 3.6 G/DL (ref 3.5–5.2)
ALBUMIN/GLOB SERPL: 1.1 G/DL
ALP SERPL-CCNC: 94 U/L (ref 39–117)
ALT SERPL W P-5'-P-CCNC: 18 U/L (ref 1–33)
ANION GAP SERPL CALCULATED.3IONS-SCNC: 9.8 MMOL/L (ref 5–15)
AST SERPL-CCNC: 26 U/L (ref 1–32)
BASOPHILS # BLD AUTO: 0.04 10*3/MM3 (ref 0–0.2)
BASOPHILS NFR BLD AUTO: 0.5 % (ref 0–1.5)
BILIRUB SERPL-MCNC: 0.3 MG/DL (ref 0–1.2)
BUN SERPL-MCNC: 9 MG/DL (ref 8–23)
BUN/CREAT SERPL: 10.3 (ref 7–25)
CALCIUM SPEC-SCNC: 9.1 MG/DL (ref 8.6–10.5)
CHLORIDE SERPL-SCNC: 100 MMOL/L (ref 98–107)
CO2 SERPL-SCNC: 30.2 MMOL/L (ref 22–29)
CREAT SERPL-MCNC: 0.87 MG/DL (ref 0.57–1)
CRP SERPL-MCNC: 4.1 MG/DL (ref 0–0.5)
DEPRECATED RDW RBC AUTO: 54.9 FL (ref 37–54)
EGFRCR SERPLBLD CKD-EPI 2021: 75.4 ML/MIN/1.73
EOSINOPHIL # BLD AUTO: 0.04 10*3/MM3 (ref 0–0.4)
EOSINOPHIL NFR BLD AUTO: 0.5 % (ref 0.3–6.2)
ERYTHROCYTE [DISTWIDTH] IN BLOOD BY AUTOMATED COUNT: 15.5 % (ref 12.3–15.4)
GLOBULIN UR ELPH-MCNC: 3.2 GM/DL
GLUCOSE SERPL-MCNC: 96 MG/DL (ref 65–99)
HCT VFR BLD AUTO: 36.7 % (ref 34–46.6)
HGB BLD-MCNC: 12.3 G/DL (ref 12–15.9)
IMM GRANULOCYTES # BLD AUTO: 0.04 10*3/MM3 (ref 0–0.05)
IMM GRANULOCYTES NFR BLD AUTO: 0.5 % (ref 0–0.5)
LYMPHOCYTES # BLD AUTO: 1.2 10*3/MM3 (ref 0.7–3.1)
LYMPHOCYTES NFR BLD AUTO: 14.8 % (ref 19.6–45.3)
MCH RBC QN AUTO: 33.1 PG (ref 26.6–33)
MCHC RBC AUTO-ENTMCNC: 33.5 G/DL (ref 31.5–35.7)
MCV RBC AUTO: 98.7 FL (ref 79–97)
MONOCYTES # BLD AUTO: 0.37 10*3/MM3 (ref 0.1–0.9)
MONOCYTES NFR BLD AUTO: 4.6 % (ref 5–12)
NEUTROPHILS NFR BLD AUTO: 6.44 10*3/MM3 (ref 1.7–7)
NEUTROPHILS NFR BLD AUTO: 79.1 % (ref 42.7–76)
NRBC BLD AUTO-RTO: 0 /100 WBC (ref 0–0.2)
PLATELET # BLD AUTO: 243 10*3/MM3 (ref 140–450)
PMV BLD AUTO: 13 FL (ref 6–12)
POTASSIUM SERPL-SCNC: 3.3 MMOL/L (ref 3.5–5.2)
PROT SERPL-MCNC: 6.8 G/DL (ref 6–8.5)
RBC # BLD AUTO: 3.72 10*6/MM3 (ref 3.77–5.28)
SODIUM SERPL-SCNC: 140 MMOL/L (ref 136–145)
WBC NRBC COR # BLD AUTO: 8.13 10*3/MM3 (ref 3.4–10.8)

## 2024-10-29 RX ORDER — POTASSIUM CHLORIDE 750 MG/1
10 CAPSULE, EXTENDED RELEASE ORAL DAILY
Qty: 30 CAPSULE | Refills: 0 | Status: SHIPPED | OUTPATIENT
Start: 2024-10-29

## 2024-10-30 RX ORDER — BUPROPION HYDROCHLORIDE 150 MG/1
150 TABLET ORAL DAILY
Qty: 90 TABLET | Refills: 3 | Status: SHIPPED | OUTPATIENT
Start: 2024-10-30

## 2024-10-30 RX ORDER — HYDROXYCHLOROQUINE SULFATE 200 MG/1
TABLET, FILM COATED ORAL DAILY
Status: CANCELLED | OUTPATIENT
Start: 2024-10-30

## 2024-10-30 RX ORDER — TRAZODONE HYDROCHLORIDE 150 MG/1
150 TABLET ORAL
Qty: 90 TABLET | Refills: 0 | Status: SHIPPED | OUTPATIENT
Start: 2024-10-30

## 2024-10-30 RX ORDER — EPINEPHRINE 0.3 MG/.3ML
0.3 INJECTION SUBCUTANEOUS ONCE
Qty: 1 EACH | Refills: 0 | Status: SHIPPED | OUTPATIENT
Start: 2024-10-30 | End: 2024-10-30

## 2024-10-30 RX ORDER — NITROGLYCERIN 0.4 MG/1
0.4 TABLET SUBLINGUAL
Qty: 25 TABLET | Refills: 1 | Status: SHIPPED | OUTPATIENT
Start: 2024-10-30

## 2024-10-30 RX ORDER — BACLOFEN 10 MG/1
10 TABLET ORAL 3 TIMES DAILY
Qty: 270 TABLET | Refills: 1 | Status: SHIPPED | OUTPATIENT
Start: 2024-10-30

## 2024-10-30 RX ORDER — GABAPENTIN 400 MG/1
400 CAPSULE ORAL 4 TIMES DAILY
Qty: 120 CAPSULE | Refills: 2 | Status: SHIPPED | OUTPATIENT
Start: 2024-10-30

## 2024-10-30 RX ORDER — ROFLUMILAST 500 UG/1
500 TABLET ORAL DAILY
Qty: 90 TABLET | Refills: 3 | Status: SHIPPED | OUTPATIENT
Start: 2024-10-30

## 2024-10-30 RX ORDER — HYDROXYCHLOROQUINE SULFATE 200 MG/1
200 TABLET, FILM COATED ORAL DAILY
Qty: 90 TABLET | Refills: 1 | Status: SHIPPED | OUTPATIENT
Start: 2024-10-30

## 2024-10-30 RX ORDER — VARENICLINE TARTRATE 1 MG/1
1 TABLET, FILM COATED ORAL 2 TIMES DAILY
Qty: 56 TABLET | Refills: 1 | Status: SHIPPED | OUTPATIENT
Start: 2024-11-14 | End: 2025-01-09

## 2024-10-30 NOTE — TELEPHONE ENCOUNTER
"Caller: Diya Arreola \"Elda\"    Relationship: Self    Best call back number: [phone]     Requested Prescriptions:   Requested Prescriptions     Pending Prescriptions Disp Refills    EPINEPHrine (EPIPEN) 0.3 MG/0.3ML solution auto-injector injection 2 each 1    traZODone (DESYREL) 150 MG tablet 90 tablet 0     Sig: Take 1 tablet by mouth every night at bedtime.    hydroxychloroquine (PLAQUENIL) 200 MG tablet       Sig: Take  by mouth Daily.    gabapentin (NEURONTIN) 400 MG capsule 120 capsule 2     Sig: Take 1 capsule by mouth 4 (Four) Times a Day.    baclofen (LIORESAL) 10 MG tablet 270 tablet 1     Sig: Take 1 tablet by mouth 3 (Three) Times a Day.    varenicline (Chantix Continuing Month [place]) 1 MG tablet 56 tablet 1     Sig: Take 1 tablet by mouth 2 (Two) Times a Day for 56 days.    nitroglycerin (NITROSTAT) 0.4 MG SL tablet 25 tablet 1     Sig: Place 1 tablet under the tongue Every 5 (Five) Minutes As Needed for Chest Pain. Take no more than 3 doses in 15 minutes.    Daliresp 500 MCG tablet tablet 90 tablet 3     Sig: Take 1 tablet by mouth Daily.    buPROPion XL (WELLBUTRIN XL) 150 MG 24 hr tablet 90 tablet 3     Sig: Take 1 tablet by mouth Daily.        Pharmacy where request should be sent: [facility]/PHARMACY #[id number] [facility] [place] [address] AT [place] [address] - [phone] [place] [phone]      Last office visit with prescribing clinician: 10/25/2024   Last telemedicine visit with prescribing clinician: Visit date not found   Next office visit with prescribing clinician: 1/24/2025     Additional details provided by patient: PATIENT FOUND LIST WITH REFILLS THAT WERE NEEDED FOR HER MEDICATIONS    Does the patient have less than a 3 day supply:  [] Yes  [x] No    Iris Tim Rep   10/30/24 13:34 EDT   "

## 2024-11-12 ENCOUNTER — OFFICE VISIT (OUTPATIENT)
Dept: PAIN MEDICINE | Facility: CLINIC | Age: 64
End: 2024-11-12
Payer: MEDICARE

## 2024-11-12 VITALS
SYSTOLIC BLOOD PRESSURE: 102 MMHG | HEART RATE: 116 BPM | DIASTOLIC BLOOD PRESSURE: 56 MMHG | BODY MASS INDEX: 24.17 KG/M2 | WEIGHT: 130 LBS | RESPIRATION RATE: 16 BRPM | OXYGEN SATURATION: 95 %

## 2024-11-12 DIAGNOSIS — Z79.899 HIGH RISK MEDICATION USE: ICD-10-CM

## 2024-11-12 DIAGNOSIS — M96.1 POSTLAMINECTOMY SYNDROME OF LUMBAR REGION: ICD-10-CM

## 2024-11-12 DIAGNOSIS — G89.4 CHRONIC PAIN SYNDROME: Primary | ICD-10-CM

## 2024-11-12 DIAGNOSIS — M96.1 POSTLAMINECTOMY SYNDROME OF CERVICAL REGION: ICD-10-CM

## 2024-11-12 DIAGNOSIS — M79.18 MYOFASCIAL PAIN SYNDROME: ICD-10-CM

## 2024-11-12 RX ORDER — HYDROCODONE BITARTRATE AND ACETAMINOPHEN 7.5; 325 MG/1; MG/1
1 TABLET ORAL
Qty: 150 TABLET | Refills: 0 | Status: SHIPPED | OUTPATIENT
Start: 2024-12-16

## 2024-11-12 RX ORDER — HYDROCODONE BITARTRATE AND ACETAMINOPHEN 7.5; 325 MG/1; MG/1
1 TABLET ORAL
Qty: 150 TABLET | Refills: 0 | Status: SHIPPED | OUTPATIENT
Start: 2024-11-17

## 2024-11-12 NOTE — PROGRESS NOTES
Subjective    CC back, leg pain, neck pain  Diya Arreola is a 64 y.o. female with chronic neck pain status post ACDF, chronic back pain S/P  L4-L5 LAMI/TILF 2021/[facility],S/P thoracolumbar decompression and fusion from T9-pelvis/Dr. Gorman/[facility] ,  here for follow-up.    Chronic thoracic and lumbar back pain mostly left-sided which is interfering with sleep and all activities.    Chronic neck pain, radiating to bilateral shoulder limited range of motion in the neck and significant paraspinal muscle spasm bilateral UE radicular pain.  Denies balance issues.    Chronic tremors, history of polyarthralgia/lupus sees rheumatology.  Pain interfering with ADL.  Tried physical therapy without relief.  Seen neurosurgery, referred to try injection.        C-spine MRI  probably a plate on the anterior aspect of the spine at the C4-5 level. mild subluxation with degenerative disc disease at C3-4, causing a mild central canal stenosis. Left-sided disc extrusion at C6-7 with left foraminal impingement.   L-spine MRI  postsurgical changes L3-L4 with pedicle screw L3.  Retrolisthesis L2 on L3 moderate central lateral canal stenosis.  Grade 1 anterolisthesis L4-L5.  Degenerative changes throughout.  L-spine x-ray : 4 mm anterolisthesis on extension, 6 mm anterolisthesis on flexion, at the L4-5 level. Stable spinal fusion changes at L3-4.. Severe degenerative disc and endplate changes at L2-3, similar to  2018 examination.    Pain Assessment   Location of Pain: Lower Back, R Hip, L Hip, Legs, neck pain,  Description of Pain: Dull/Aching, Throbbing, Stabbing  Previous Pain Rating :4  Current Pain Ratin  Aggravating Factors: Activity  Alleviating Factors: Rest, Medication    PEG Assessment   What number best describes your pain on average in the past week?3  What number best describes how, during the past week, pain has interfered with your enjoyment of life?2  What number best describes how, during the  past week, pain has interfered with your general activity?10      The following portions of the patient's history were reviewed and updated as appropriate: allergies, current medications, past family history, past medical history, past social history, past surgical history and problem list.    Review of Systems   Musculoskeletal:  Positive for arthralgias, back pain and neck pain.   Neurological:  Positive for tremors, numbness and headache.   All other systems reviewed and are negative.    Objective   Physical Exam   Constitutional: No distress.   walker   Neck:   Positive Spurling's   Pulmonary/Chest: Effort normal.   Musculoskeletal:      Cervical back: She exhibits tenderness.   Vitals reviewed.    /56 (BP Location: Left arm, Patient Position: Sitting, Cuff Size: Adult)   Pulse 116   Resp 16   Wt 59 kg (130 lb)   SpO2 95%   BMI 24.17 kg/m²      PHQ 9 on chart  Opioid risk tool low risk    Assessment & Plan   Diagnoses and all orders for this visit:    1. Chronic pain syndrome (Primary)  -     HYDROcodone-acetaminophen (NORCO) 7.5-325 MG per tablet; Take 1 tablet by mouth 5 (Five) Times a Day As Needed for Severe Pain. DNF before 11/17/2024  Dispense: 150 tablet; Refill: 0  -     HYDROcodone-acetaminophen (NORCO) 7.5-325 MG per tablet; Take 1 tablet by mouth 5 (Five) Times a Day As Needed for Severe Pain. DNF before 12/16/2024  Dispense: 150 tablet; Refill: 0    2. Postlaminectomy syndrome of lumbar region    3. Postlaminectomy syndrome of cervical region    4. Myofascial pain syndrome    5. High risk medication use    Summary  Diya Arreola is a 64 y.o. female with chronic neck pain status post ACDF, chronic back pain status post Fusion L3-5, S/P thoracolumbar decompression and fusion from T9-pelvis/Dr. Gorman/[facility] 2023.  here for follow-up.    Chronic back pain from DDD spondylosis postlaminectomy syndrome with radicular pain.   Chronic neck pain from postlaminectomy syndrome.  Worsening chronic  tremors.    Denies any new issues today.  Had a syncopal episode last month was hospitalized and had negative workup.  Negative imaging.    Cont hydrocodone 7.5/325 5 times daily as needed for severe pain.  UDS and inspect reviewed  Discussed risk of tolerance, dependence, respiratory depression, coma and death associated with use of oral opioids for treatment of chronic nonmalignant pain.     RTC 2-3 month

## 2024-12-02 RX ORDER — ROFLUMILAST 500 UG/1
500 TABLET ORAL DAILY
Qty: 90 TABLET | Refills: 3 | Status: SHIPPED | OUTPATIENT
Start: 2024-12-02

## 2024-12-02 NOTE — TELEPHONE ENCOUNTER
"Caller: Diya Arreola \"Elda\"    Relationship: Self    Best call back number: [phone]     Requested Prescriptions:   Requested Prescriptions     Pending Prescriptions Disp Refills    Daliresp 500 MCG tablet tablet 90 tablet 3     Sig: Take 1 tablet by mouth Daily.        Pharmacy where request should be sent: [facility]/PHARMACY #[id number] [address] AT [place] [address] - [phone] [facility] [phone]      Last office visit with prescribing clinician: 10/25/2024   Last telemedicine visit with prescribing clinician: Visit date not found   Next office visit with prescribing clinician: 1/24/2025     Additional details provided by patient: PATIENT CALLING STATING THAT THE PHARMACY LET HER KNOW THAT FOR SOME REASON THE PRESCRIPTION WAS REMOVED THEY ARE NEEDING A NEW PRESCRIPTION    Does the patient have less than a 3 day supply:  [x] Yes  [] No      Iris Mejia Rep   12/02/24 09:41 EST         "

## 2024-12-13 ENCOUNTER — TELEPHONE (OUTPATIENT)
Dept: FAMILY MEDICINE CLINIC | Facility: CLINIC | Age: 64
End: 2024-12-13

## 2024-12-13 DIAGNOSIS — J40 BRONCHITIS: ICD-10-CM

## 2024-12-13 RX ORDER — DOXYCYCLINE 100 MG/1
100 CAPSULE ORAL 2 TIMES DAILY
Qty: 20 CAPSULE | Refills: 0 | Status: SHIPPED | OUTPATIENT
Start: 2024-12-13

## 2024-12-13 RX ORDER — GABAPENTIN 400 MG/1
400 CAPSULE ORAL 4 TIMES DAILY
Qty: 120 CAPSULE | Refills: 2 | Status: SHIPPED | OUTPATIENT
Start: 2024-12-13

## 2024-12-13 RX ORDER — VARENICLINE TARTRATE 0.5 (11)-1
KIT ORAL
Qty: 53 EACH | Refills: 0 | Status: SHIPPED | OUTPATIENT
Start: 2024-12-13

## 2024-12-13 NOTE — TELEPHONE ENCOUNTER
"  Caller: Diya Arreola \"Elda\"    Relationship: Self    Best call back number: [phone]*    What is the best time to reach you: ANYTIME    Who are you requesting to speak with (clinical staff, provider,  specific staff member): CLINICAL    What was the call regarding: PATIENT CALLING STATING THAT THE PHARMACY TOLD HER THAT HER INSURANCE DOES NOT WANT TO COVER THE Daliresp 500 MCG tablet tablet AND HER INSURANCE WILL NEED TO BE CONTACTED AND GIVEN A REASON FOR THIS MEDICATION. THE PATIENT STATES THAT SHE HAS BEEN OUT OF THIS MEDICATION FOR A WEEK TRYING TO GET A REFILL.    PHARMACY CONFIRMED:  [facility]/pharmacy #[id number] - [place], [place] - [address] [place] [address] AT [address] [phone] [place] [phone]  [phone]       " Conjuntivae and eyelids appear normal,  Sclerae : White without injection 8

## 2024-12-13 NOTE — TELEPHONE ENCOUNTER
"  Caller: Diya Arreola \"Elda\"    Relationship: Self    Best call back number: [phone]*    What medication are you requesting: DOXYCYCLINE    What are your current symptoms: BILATERAL EAR PAIN AND PRESSURE, NASAL CONGESTION WITH GREEN PHLEGM     How long have you been experiencing symptoms: 2 DAYS    Have you had these symptoms before:    [x] Yes  [] No    Have you been treated for these symptoms before:   [x] Yes  [] No    If a prescription is needed, what is your preferred pharmacy and phone number: [facility]/PHARMACY #[id number] - [facility] [place] - [address] [place] [address] AT [place] [address] - [phone]  - [phone]      Additional notes: PATIENT STATES THAT SHE HAS TRIED BENADRYL, BUT DID NOT HELP. PATIENT REQUEST ANTIBIOTIC TO BE SENT TO HER PHARMACY.        "

## 2024-12-13 NOTE — TELEPHONE ENCOUNTER
"  Caller: MaxwellDiya \"Elda\"    Relationship: Self    Best call back number: [phone]*    Requested Prescriptions:   Requested Prescriptions     Pending Prescriptions Disp Refills    gabapentin (NEURONTIN) 400 MG capsule 120 capsule 2     Sig: Take 1 capsule by mouth 4 (Four) Times a Day.        Pharmacy where request should be sent: [facility]/PHARMACY #[id number] [address] AT [address] [phone] [place] [phone]      Last office visit with prescribing clinician: 10/25/2024   Last telemedicine visit with prescribing clinician: Visit date not found   Next office visit with prescribing clinician: 1/24/2025     Additional details provided by patient: OUT OF MEDICATION    Does the patient have less than a 3 day supply:  [x] Yes  [] No    Would you like a call back once the refill request has been completed: [] Yes [x] No    If the office needs to give you a call back, can they leave a voicemail: [] Yes [x] No    Mars Hernandez   12/13/24 10:39 EST         "

## 2024-12-13 NOTE — TELEPHONE ENCOUNTER
Pt states that she is needing to restart Chantix. I made her aware that 6 months is typically the longest time frame to take the medications.

## 2024-12-13 NOTE — TELEPHONE ENCOUNTER
Rx Refill Note  Requested Prescriptions     Pending Prescriptions Disp Refills    Varenicline Tartrate, Starter, 0.5 MG X 11 & 1 MG X 42 tablet therapy pack [Pharmacy Med Name: VARENICLINE STARTING MONTH BOX] 53 each 0     Sig: TAKE 0.5 MG BY MOUTH DAILY FOR 3 DAYS, THEN 0.5 MG 2 (TWO) TIMES A DAY FOR 4 DAYS, THEN 1 MG 2 (TWO) TIMES A DAY FOR 21 DAYS. TAKE 0.5 MG PO DAILY X 3 DAYS, THEN 0.5 MG PO BID X 4 DAYS, THEN 1 MG BY MOUTH TWICE A DAY      Last office visit with prescribing clinician: 5/7/2024   Last telemedicine visit with prescribing clinician: Visit date not found   Next office visit with prescribing clinician: Visit date not found                         Would you like a call back once the refill request has been completed: [] Yes [] No    If the office needs to give you a call back, can they leave a voicemail: [] Yes [] No    Teresita Abdul MA  12/13/24, 10:09 EST

## 2024-12-16 NOTE — PROGRESS NOTES
"Subjective   History of Present Illness: Diya Arreola is a 64 y.o. female is here today for follow-up on back pain. Today, patient reports of a fall in October 2024 and was hospitalized at [facility]. She reports when she fell went backwards and hit her head and neck and was unconscious. She states of 8/10 Thoracic back pain.  Pain is similar to what she was seen for about 9 months ago with pain banding across her mid thoracic region.  Previous imaging did not demonstrate any significant issues but she has had a significant fall since then         Previous treatment: Meloxicam,hydrocodone,TLSO brace, Physical Therapy/13 visits     Previous neurosurgery: Lumbar 5 to sacral 1 transforaminal lumbar interbody fusion; thoracic 10 to lumbar 2 and lumbar 4-5 sheldon osteotomies; T9 to pelvic fusion with neuro robot, hardware removal on 2/28/24.     Previous injections:     The following portions of the patient's history were reviewed and updated as appropriate: allergies, current medications, past family history, past medical history, past social history, past surgical history, and problem list.    Review of Systems   Constitutional:  Positive for activity change.   Musculoskeletal:  Positive for back pain.   Neurological:  Positive for weakness and numbness.       Objective      /75 (BP Location: Right arm, Patient Position: Sitting, Cuff Size: Adult)   Pulse 110   Resp 18   Ht 156.2 cm (61.5\")   Wt 61.9 kg (136 lb 8 oz)   SpO2 95%   BMI 25.37 kg/m²    Body mass index is 25.37 kg/m².  Vitals:    12/18/24 1128   PainSc:   8   PainLoc: Back         Neurological Exam        Assessment & Plan   Independent Review of Radiographic Studies:      I personally reviewed and interpreted the images from the following studies.    No      Medical Decision Making:      Diya Arreola is a 64 y.o. female status post thoracic to pelvic decompression and fusion for kyphotic deformity overall has done well " following surgery except for pain in her mid thoracic region.  This has been quite severe especially since a fall that occurred a couple months ago.  We will get a CT scan of her thoracic spine to ensure no hardware failure or adjacent segment disease.  I will see her back when she completes the CT      Diagnoses and all orders for this visit:    1. S/P lumbar fusion (Primary)      No follow-ups on file.    This patient was examined wearing appropriate personal protective equipment.                      Dr. Jerrell Gorman IV    12/18/24  11:53 EST

## 2024-12-18 ENCOUNTER — OFFICE VISIT (OUTPATIENT)
Dept: NEUROSURGERY | Facility: CLINIC | Age: 64
End: 2024-12-18
Payer: MEDICARE

## 2024-12-18 VITALS
OXYGEN SATURATION: 95 % | WEIGHT: 136.5 LBS | SYSTOLIC BLOOD PRESSURE: 110 MMHG | BODY MASS INDEX: 25.12 KG/M2 | DIASTOLIC BLOOD PRESSURE: 75 MMHG | HEIGHT: 62 IN | HEART RATE: 110 BPM | RESPIRATION RATE: 18 BRPM

## 2024-12-18 DIAGNOSIS — Z98.1 S/P LUMBAR FUSION: Primary | ICD-10-CM

## 2024-12-23 ENCOUNTER — TELEPHONE (OUTPATIENT)
Dept: FAMILY MEDICINE CLINIC | Facility: CLINIC | Age: 64
End: 2024-12-23
Payer: MEDICARE

## 2024-12-23 RX ORDER — TRAZODONE HYDROCHLORIDE 150 MG/1
150 TABLET ORAL
Qty: 90 TABLET | Refills: 0 | Status: SHIPPED | OUTPATIENT
Start: 2024-12-23

## 2024-12-23 NOTE — TELEPHONE ENCOUNTER
"Caller: Diya Arreola \"Elda\"    Relationship: Self    Best call back number: [phone]     Which medication are you concerned about: traZODone (DESYREL) 150 MG tablet     Who prescribed you this medication: DR BERRY    What are your concerns: PATIENT STATES THAT SHE WAS ONLY WRITTEN FOR 15 PILLS AND SHE WILL RUN OUT BEFORE SHE CAN SEE THE PROVIDER IN JANUARY.     PLEASE CALL PATIENT TO DISCUSS.     "

## 2025-01-03 ENCOUNTER — TELEPHONE (OUTPATIENT)
Dept: PAIN MEDICINE | Facility: CLINIC | Age: 65
End: 2025-01-03
Payer: MEDICARE

## 2025-01-03 NOTE — TELEPHONE ENCOUNTER
"    Caller: ABDOULAYE BUTLER \"LASHAUN\"     Relationship to patient: PATIENT     Best call back number: [phone]    Chief complaint: CC back, leg pain, neck pain     Type of visit: FOLLOW UP     Requested date: 01/06/2025 2:10 - TELEPHONE VISIT, MEDS REFILL 01/16/2025 - FIRST AVAIABLE TO R/S 0220/20/25    If rescheduling, when is the original appointment: 01/06/2025 2:10     Additional notes:BECAUSE OF INCLEMENT WEATHER PATIENT WOULD LIKE TO KNOW IF SHE CAN MAKE HER APPOINTMENT ON MONDAY A TELEPHONE VISIT?  PATIENT STATES HER RX WILL RUN OUT BEFORE A RESCHEDULE DATE?  PATIENT REQUEST CALL BACK TODAY 01/03/2025 SO SHE WILL KNOW WHAT TO EXPECT PLEASE        "

## 2025-01-07 ENCOUNTER — TELEPHONE (OUTPATIENT)
Dept: PAIN MEDICINE | Facility: CLINIC | Age: 65
End: 2025-01-07
Payer: MEDICARE

## 2025-01-07 DIAGNOSIS — G89.4 CHRONIC PAIN SYNDROME: ICD-10-CM

## 2025-01-07 RX ORDER — HYDROCODONE BITARTRATE AND ACETAMINOPHEN 7.5; 325 MG/1; MG/1
1 TABLET ORAL
Qty: 150 TABLET | Refills: 0 | Status: SHIPPED | OUTPATIENT
Start: 2025-01-16

## 2025-01-07 NOTE — TELEPHONE ENCOUNTER
"    Caller: ABDOULAYE BUTLER \"LASHAUN\"     Relationship to patient: PATIENT     Best call back number: [phone]    Chief complaint: CC back, leg pain, neck pain     Type of visit: FOLLOW UP     Requested date: ASAP      If rescheduling, when is the original appointment: 01/06/2025     Additional notes:PATIENT RESCHEDULED FIRST AVAILABLE  02/24/2025.  PATIENT'S HYDROcodone-acetaminophen (NORCO) 7.5-325 MG per tablet  HAS REFILL DATE OF 01/16/2025.  PATIENT CONCERNED HER FOLLOW UP WILL CAUSE A PROBLEM WITH HER REFILL JANUARY AND FEBRUARY - PATIENT STATES SHE WILL COME IN ASAP IF SHE CAN BE RESCHEDULED.  PATIENT REQUEST CALL BACK TO LET HER KNOW WHAT SHE CAN DO ABOUT HER REFILLS PLEASE         "

## 2025-01-09 ENCOUNTER — TELEPHONE (OUTPATIENT)
Dept: NEUROSURGERY | Facility: CLINIC | Age: 65
End: 2025-01-09
Payer: MEDICARE

## 2025-01-09 NOTE — TELEPHONE ENCOUNTER
Called the patient about her CT that was ordered at her last OV on 12/18/2024, to see if she was still having pain or if she was feeling better? She is feeling better now and wants to hold off on getting CT scan at this time. She will call us if she has any further increased pain and she will get the CT and FUP with Dr. Gorman after that.

## 2025-01-10 RX ORDER — ALBUTEROL SULFATE 0.83 MG/ML
SOLUTION RESPIRATORY (INHALATION)
Qty: 375 ML | Refills: 3 | Status: SHIPPED | OUTPATIENT
Start: 2025-01-10

## 2025-01-13 RX ORDER — POTASSIUM CHLORIDE 750 MG/1
10 CAPSULE, EXTENDED RELEASE ORAL DAILY
Qty: 90 CAPSULE | Refills: 1 | Status: SHIPPED | OUTPATIENT
Start: 2025-01-13

## 2025-01-20 ENCOUNTER — TELEPHONE (OUTPATIENT)
Dept: NEUROSURGERY | Facility: CLINIC | Age: 65
End: 2025-01-20
Payer: MEDICARE

## 2025-01-20 DIAGNOSIS — Z98.1 S/P LUMBAR FUSION: Primary | ICD-10-CM

## 2025-01-20 NOTE — TELEPHONE ENCOUNTER
"Caller: Diya Arreola \"Elda\"    Relationship: Self    Best call back number: [phone]    What orders are you requesting (i.e. lab or imaging): CT LUMBAR    Where will you receive your lab/imaging services: [facility]    Additional notes: PATIENT WOULD LIKE TO HAVE CT OF HER SPINE THROUGH THE TAILBONE, AS SHE IS HAVING INCREASED ISSUES THERE. CT THORACIC SPINE IS ALREADY ORDERED, WHICH SHE WOULD LIKE TO HAVE AT THE SAME TIME AS LUMBAR. PLEASE CALL PATIENT TO ADVISE/UPDATE.  "

## 2025-01-24 ENCOUNTER — LAB (OUTPATIENT)
Dept: FAMILY MEDICINE CLINIC | Facility: CLINIC | Age: 65
End: 2025-01-24
Payer: MEDICARE

## 2025-01-24 ENCOUNTER — OFFICE VISIT (OUTPATIENT)
Dept: FAMILY MEDICINE CLINIC | Facility: CLINIC | Age: 65
End: 2025-01-24
Payer: MEDICARE

## 2025-01-24 VITALS
WEIGHT: 143 LBS | TEMPERATURE: 98.4 F | BODY MASS INDEX: 26.31 KG/M2 | HEIGHT: 62 IN | SYSTOLIC BLOOD PRESSURE: 121 MMHG | OXYGEN SATURATION: 93 % | DIASTOLIC BLOOD PRESSURE: 78 MMHG | HEART RATE: 87 BPM

## 2025-01-24 DIAGNOSIS — R76.8 ANA POSITIVE: ICD-10-CM

## 2025-01-24 DIAGNOSIS — J40 BRONCHITIS: Primary | ICD-10-CM

## 2025-01-24 DIAGNOSIS — F33.1 MODERATE EPISODE OF RECURRENT MAJOR DEPRESSIVE DISORDER: ICD-10-CM

## 2025-01-24 DIAGNOSIS — Z12.11 SCREEN FOR COLON CANCER: ICD-10-CM

## 2025-01-24 PROCEDURE — 1159F MED LIST DOCD IN RCRD: CPT | Performed by: FAMILY MEDICINE

## 2025-01-24 PROCEDURE — 86235 NUCLEAR ANTIGEN ANTIBODY: CPT | Performed by: FAMILY MEDICINE

## 2025-01-24 PROCEDURE — 36415 COLL VENOUS BLD VENIPUNCTURE: CPT

## 2025-01-24 PROCEDURE — 1160F RVW MEDS BY RX/DR IN RCRD: CPT | Performed by: FAMILY MEDICINE

## 2025-01-24 PROCEDURE — 1125F AMNT PAIN NOTED PAIN PRSNT: CPT | Performed by: FAMILY MEDICINE

## 2025-01-24 PROCEDURE — 99214 OFFICE O/P EST MOD 30 MIN: CPT | Performed by: FAMILY MEDICINE

## 2025-01-24 PROCEDURE — 3078F DIAST BP <80 MM HG: CPT | Performed by: FAMILY MEDICINE

## 2025-01-24 PROCEDURE — 3074F SYST BP LT 130 MM HG: CPT | Performed by: FAMILY MEDICINE

## 2025-01-24 PROCEDURE — 86225 DNA ANTIBODY NATIVE: CPT | Performed by: FAMILY MEDICINE

## 2025-01-24 PROCEDURE — G2211 COMPLEX E/M VISIT ADD ON: HCPCS | Performed by: FAMILY MEDICINE

## 2025-01-24 PROCEDURE — 83516 IMMUNOASSAY NONANTIBODY: CPT | Performed by: FAMILY MEDICINE

## 2025-01-24 PROCEDURE — 86038 ANTINUCLEAR ANTIBODIES: CPT | Performed by: FAMILY MEDICINE

## 2025-01-24 RX ORDER — DOXYCYCLINE 100 MG/1
100 CAPSULE ORAL 2 TIMES DAILY
Qty: 20 CAPSULE | Refills: 0 | Status: SHIPPED | OUTPATIENT
Start: 2025-01-24

## 2025-01-24 RX ORDER — BUPROPION HYDROCHLORIDE 300 MG/1
300 TABLET ORAL DAILY
Qty: 90 TABLET | Refills: 3 | Status: SHIPPED | OUTPATIENT
Start: 2025-01-24

## 2025-01-24 NOTE — ASSESSMENT & PLAN NOTE
Patient's depression is a recurrent episode that is moderate without psychosis. Depression is active and worsening.    Plan:   Medication  dose was adjusted;  increased dose of Wellbutrin XL from 150 mg to 300 mg    Followup at the next regular appointment.

## 2025-01-24 NOTE — PROGRESS NOTES
"Chief Complaint  Follow-up (3 month follow /Pt not doing well with the cold air, has been staying home more/) and Cough (X 7 days, yellow mucus No fever)    Subjective        Diya Arreola presents to [facility]  Cough  This is a chronic problem. The current episode started more than 1 year ago. The problem has been comes and goes. Associated symptoms include shortness of breath. Pertinent negatives include no chest pain.   Knee Pain   The incident occurred more than 1 week ago. The injury mechanism was a twisting injury. The pain is present in the right knee. The quality of the pain is described as aching. The pain is moderate. The pain has been Worsening since onset. Pertinent negatives include no inability to bear weight. She has tried acetaminophen, heat and rest for the symptoms. The treatment provided mild relief.   Depression  Presents for follow-up visit. Symptoms include depressed mood, excessive worry, irritability, muscle tension and shortness of breath. Patient is not experiencing: confusion, suicidal ideas, chest pain and feeling of choking.Symptoms occur constantly.  The severity of symptoms is moderate.  Her past medical history is significant for depression. The treatment provides moderate relief.       Objective   Vital Signs:  /78 (BP Location: Left arm, Patient Position: Sitting, Cuff Size: Adult)   Pulse 87   Temp 98.4 °F (36.9 °C) (Skin)   Ht 156.2 cm (61.5\")   Wt 64.9 kg (143 lb)   SpO2 93%   BMI 26.58 kg/m²   Estimated body mass index is 26.58 kg/m² as calculated from the following:    Height as of this encounter: 156.2 cm (61.5\").    Weight as of this encounter: 64.9 kg (143 lb).            Physical Exam  Vitals and nursing note reviewed.   Constitutional:       General: She is not in acute distress.     Appearance: She is well-developed.   HENT:      Head: Normocephalic.   Eyes:      General: Lids are normal.      Conjunctiva/sclera: " Conjunctivae normal.   Neck:      Thyroid: No thyroid mass or thyromegaly.      Trachea: Trachea normal.   Cardiovascular:      Rate and Rhythm: Normal rate and regular rhythm.      Heart sounds: Normal heart sounds.   Pulmonary:      Effort: Pulmonary effort is normal.      Breath sounds: Wheezing present.   Abdominal:      Palpations: Abdomen is soft.   Musculoskeletal:      Cervical back: Normal range of motion.   Lymphadenopathy:      Cervical: No cervical adenopathy.   Skin:     General: Skin is warm and dry.   Neurological:      Mental Status: She is alert and oriented to person, place, and time.   Psychiatric:         Attention and Perception: She is attentive.         Mood and Affect: Mood is depressed.         Speech: Speech normal.         Behavior: Behavior normal.        Result Review :  The following data was reviewed by: Mitzy Rea MD on 01/24/2025:  Common labs          7/23/2024    13:46 10/25/2024    13:11   Common Labs   Glucose 84  96    BUN 19  9    Creatinine 1.02  0.87    Sodium 138  140    Potassium 4.4  3.3    Chloride 98  100    Calcium 9.7  9.1    Albumin 4.3  3.6    Total Bilirubin 0.6  0.3    Alkaline Phosphatase 107  94    AST (SGOT) 44  26    ALT (SGPT) 22  18    WBC 10.77  8.13    Hemoglobin 12.7  12.3    Hematocrit 38.0  36.7    Platelets 241  243    Total Cholesterol 147     Triglycerides 146     HDL Cholesterol 53     LDL Cholesterol  69                 Assessment and Plan   Diagnoses and all orders for this visit:    1. Bronchitis (Primary)  -     doxycycline (MONODOX) 100 MG capsule; Take 1 capsule by mouth 2 (Two) Times a Day.  Dispense: 20 capsule; Refill: 0    2. Screen for colon cancer  -     Ambulatory Referral For Screening Colonoscopy    3. LILI positive  -     LILI; Future    4. Moderate episode of recurrent major depressive disorder  Assessment & Plan:  Patient's depression is a recurrent episode that is moderate without psychosis. Depression is active and  worsening.    Plan:   Medication  dose was adjusted;  increased dose of Wellbutrin XL from 150 mg to 300 mg    Followup at the next regular appointment.     Orders:  -     buPROPion XL (Wellbutrin XL) 300 MG 24 hr tablet; Take 1 tablet by mouth Daily.  Dispense: 90 tablet; Refill: 3             Follow Up   No follow-ups on file.  Patient was given instructions and counseling regarding her condition or for health maintenance advice. Please see specific information pulled into the AVS if appropriate.

## 2025-01-27 LAB — ANA SER QL: ABNORMAL

## 2025-01-28 LAB
CENTROMERE B AB SER-ACNC: <0.2 AI (ref 0–0.9)
CHROMATIN AB SERPL-ACNC: 0.4 AI (ref 0–0.9)
DSDNA AB SER-ACNC: <1 IU/ML (ref 0–9)
ENA JO1 AB SER-ACNC: <0.2 AI (ref 0–0.9)
ENA RNP AB SER-ACNC: <0.2 AI (ref 0–0.9)
ENA SCL70 AB SER-ACNC: <0.2 AI (ref 0–0.9)
ENA SM AB SER-ACNC: <0.2 AI (ref 0–0.9)
ENA SM+RNP AB SER-ACNC: <0.2 AI (ref 0–0.9)
ENA SS-A AB SER-ACNC: >8 AI (ref 0–0.9)
ENA SS-B AB SER-ACNC: 0.2 AI (ref 0–0.9)
Lab: ABNORMAL
RIBOSOMAL P AB SER-ACNC: <0.2 AI (ref 0–0.9)

## 2025-01-28 RX ORDER — HYDROXYCHLOROQUINE SULFATE 200 MG/1
200 TABLET, FILM COATED ORAL DAILY
Qty: 90 TABLET | Refills: 3 | Status: SHIPPED | OUTPATIENT
Start: 2025-01-28

## 2025-01-28 RX ORDER — TRAZODONE HYDROCHLORIDE 150 MG/1
150 TABLET ORAL
Qty: 90 TABLET | Refills: 1 | Status: SHIPPED | OUTPATIENT
Start: 2025-01-28

## 2025-01-28 RX ORDER — GABAPENTIN 400 MG/1
400 CAPSULE ORAL 4 TIMES DAILY
Qty: 120 CAPSULE | Refills: 5 | Status: SHIPPED | OUTPATIENT
Start: 2025-01-28

## 2025-01-28 RX ORDER — BUMETANIDE 2 MG/1
2 TABLET ORAL 2 TIMES DAILY
Qty: 180 TABLET | Refills: 3 | Status: SHIPPED | OUTPATIENT
Start: 2025-01-28

## 2025-01-28 RX ORDER — NITROGLYCERIN 0.4 MG/1
0.4 TABLET SUBLINGUAL
Qty: 25 TABLET | Refills: 5 | Status: SHIPPED | OUTPATIENT
Start: 2025-01-28

## 2025-01-28 NOTE — PROGRESS NOTES
Patient notified and verbalized understanding      Patient is in need of the refills for trazadone, hydroxychlorquine, nitroglycerine, gabapentin and bumetandine

## 2025-02-10 ENCOUNTER — OFFICE VISIT (OUTPATIENT)
Dept: PAIN MEDICINE | Facility: CLINIC | Age: 65
End: 2025-02-10
Payer: MEDICARE

## 2025-02-10 VITALS
WEIGHT: 140 LBS | SYSTOLIC BLOOD PRESSURE: 150 MMHG | DIASTOLIC BLOOD PRESSURE: 89 MMHG | HEART RATE: 102 BPM | RESPIRATION RATE: 16 BRPM | OXYGEN SATURATION: 95 % | BODY MASS INDEX: 26.02 KG/M2

## 2025-02-10 DIAGNOSIS — M79.18 MYOFASCIAL PAIN SYNDROME: ICD-10-CM

## 2025-02-10 DIAGNOSIS — M96.1 POSTLAMINECTOMY SYNDROME OF CERVICAL REGION: ICD-10-CM

## 2025-02-10 DIAGNOSIS — G89.4 CHRONIC PAIN SYNDROME: Primary | ICD-10-CM

## 2025-02-10 DIAGNOSIS — Z79.899 HIGH RISK MEDICATION USE: ICD-10-CM

## 2025-02-10 DIAGNOSIS — M96.1 POSTLAMINECTOMY SYNDROME OF LUMBAR REGION: ICD-10-CM

## 2025-02-10 RX ORDER — CICLOPIROX 80 MG/ML
SOLUTION TOPICAL NIGHTLY
Qty: 6.6 ML | Refills: 2 | Status: SHIPPED | OUTPATIENT
Start: 2025-02-10

## 2025-02-10 RX ORDER — HYDROCODONE BITARTRATE AND ACETAMINOPHEN 7.5; 325 MG/1; MG/1
1 TABLET ORAL
Qty: 150 TABLET | Refills: 0 | Status: SHIPPED | OUTPATIENT
Start: 2025-02-15

## 2025-02-10 RX ORDER — HYDROCODONE BITARTRATE AND ACETAMINOPHEN 7.5; 325 MG/1; MG/1
1 TABLET ORAL
Qty: 150 TABLET | Refills: 0 | Status: SHIPPED | OUTPATIENT
Start: 2025-03-15

## 2025-02-11 NOTE — PROGRESS NOTES
Subjective    CC back, leg pain, neck pain  Diya Arreola is a 64 y.o. female with chronic neck pain status post ACDF, chronic back pain S/P  L4-L5 LAMI/TILF 2021/[facility],S/P thoracolumbar decompression and fusion from T9-pelvis/Dr. Gorman/[facility] ,  here for follow-up.    Denies any new issues.  Will need p.o. as well as possible for him secondary 11    /Genitourinary  Chronic thoracic and lumbar back pain mostly left-sided which is interfering with sleep and all activities.    Chronic neck pain, radiating to bilateral shoulder limited range of motion in the neck and significant paraspinal muscle spasm bilateral UE radicular pain.  Denies balance issues.    Chronic tremors, history of polyarthralgia/lupus sees rheumatology.  Pain interfering with ADL.  Tried physical therapy without relief.  Seen neurosurgery, referred to try injection.        C-spine MRI  probably a plate on the anterior aspect of the spine at the C4-5 level. mild subluxation with degenerative disc disease at C3-4, causing a mild central canal stenosis. Left-sided disc extrusion at C6-7 with left foraminal impingement.   L-spine MRI  postsurgical changes L3-L4 with pedicle screw L3.  Retrolisthesis L2 on L3 moderate central lateral canal stenosis.  Grade 1 anterolisthesis L4-L5.  Degenerative changes throughout.  L-spine x-ray : 4 mm anterolisthesis on extension, 6 mm anterolisthesis on flexion, at the L4-5 level. Stable spinal fusion changes at L3-4.. Severe degenerative disc and endplate changes at L2-3, similar to  2018 examination.    Pain Assessment   Location of Pain: Lower Back, R Hip, L Hip, Legs, neck pain,  Description of Pain: Dull/Aching, Throbbing, Stabbing  Previous Pain Rating :4  Current Pain Ratin.  Anaphylactic to somebody at the end of the epidural.  Additional complicated forms must continue stimulant send [facility] family clinic me pending CT patient also concerned by  Aggravating Factors:  Activity  Alleviating Factors: Rest, Medication    PEG Assessment   What number best describes your pain on average in the past week?3  What number best describes how, during the past week, pain has interfered with your enjoyment of life?2  What number best describes how, during the past week, pain has interfered with your general activity?10      The following portions of the patient's history were reviewed and updated as appropriate: allergies, current medications, past family history, past medical history, past social history, past surgical history and problem list.    Review of Systems   Musculoskeletal:  Positive for arthralgias, back pain and neck pain.   Neurological:  Positive for tremors, numbness and headache.   All other systems reviewed and are negative.    Objective   Physical Exam   Constitutional: No distress.   walker   Neck:   Positive Spurling's   Pulmonary/Chest: Effort normal.   Musculoskeletal:      Cervical back: She exhibits tenderness.   Vitals reviewed.    /89 (BP Location: Left arm, Patient Position: Sitting, Cuff Size: Adult)   Pulse 102   Resp 16   Wt 63.5 kg (140 lb)   SpO2 95%   BMI 26.02 kg/m²      PHQ 9 on chart  Opioid risk tool low risk    Assessment & Plan   Diagnoses and all orders for this visit:    1. Chronic pain syndrome (Primary)  -     HYDROcodone-acetaminophen (NORCO) 7.5-325 MG per tablet; Take 1 tablet by mouth 5 (Five) Times a Day As Needed for Severe Pain.  Dispense: 150 tablet; Refill: 0  -     HYDROcodone-acetaminophen (NORCO) 7.5-325 MG per tablet; Take 1 tablet by mouth 5 (Five) Times a Day As Needed for Severe Pain. DNF before 3/15/2025  Dispense: 150 tablet; Refill: 0    2. Postlaminectomy syndrome of lumbar region    3. Postlaminectomy syndrome of cervical region    4. Myofascial pain syndrome    5. High risk medication use  -     Urine Drug Screen - Urine, Clean Catch; Future    Summary  Diya Arreola is a 64 y.o. female with chronic neck pain  status post ACDF, chronic back pain status post Fusion L3-5, S/P thoracolumbar decompression and fusion from T9-pelvis/Dr. Gorman/[facility] 2023.  here for follow-up.    Chronic back pain from DDD spondylosis postlaminectomy syndrome with radicular pain.   Chronic neck pain from postlaminectomy syndrome.  Worsening chronic tremors.    Cont hydrocodone 7.5/325 5 times daily as needed for severe pain.  UDS and inspect reviewed  Discussed risk of tolerance, dependence, respiratory depression, coma and death associated with use of oral opioids for treatment of chronic nonmalignant pain.     RTC 2-3 month

## 2025-02-28 ENCOUNTER — HOSPITAL ENCOUNTER (OUTPATIENT)
Dept: CT IMAGING | Facility: HOSPITAL | Age: 65
Discharge: HOME | End: 2025-02-28
Payer: MEDICARE

## 2025-02-28 DIAGNOSIS — R91.1 LUNG NODULE SEEN ON IMAGING STUDY: ICD-10-CM

## 2025-02-28 DIAGNOSIS — Z98.1 S/P LUMBAR FUSION: ICD-10-CM

## 2025-02-28 DIAGNOSIS — Z87.891 PERSONAL HISTORY OF NICOTINE DEPENDENCE: Primary | ICD-10-CM

## 2025-02-28 PROCEDURE — 72131 CT LUMBAR SPINE W/O DYE: CPT

## 2025-02-28 PROCEDURE — 72128 CT CHEST SPINE W/O DYE: CPT

## 2025-03-27 NOTE — TELEPHONE ENCOUNTER
Rx Refill Note  Requested Prescriptions     Pending Prescriptions Disp Refills    EPINEPHrine (EPIPEN) 0.3 MG/0.3ML solution auto-injector injection [Pharmacy Med Name: EPINEPHRINE 0.3 MG AUTO-INJECT] 2 each      Sig: INJECT 0.3 ML UNDER THE SKIN INTO THE APPROPRIATE AREA AS DIRECTED 1 (ONE) TIME FOR 1 DOSE.      Last office visit with prescribing clinician: 1/24/2025   Last telemedicine visit with prescribing clinician: Visit date not found   Next office visit with prescribing clinician: 4/25/2025                         Would you like a call back once the refill request has been completed: [] Yes [] No    If the office needs to give you a call back, can they leave a voicemail: [] Yes [] No    Grace Wolf MA  03/27/25, 09:51 EDT

## 2025-03-28 RX ORDER — EPINEPHRINE 0.3 MG/.3ML
0.3 INJECTION SUBCUTANEOUS ONCE
Qty: 2 EACH | Refills: 1 | Status: SHIPPED | OUTPATIENT
Start: 2025-03-28 | End: 2025-03-28

## 2025-04-14 ENCOUNTER — OFFICE VISIT (OUTPATIENT)
Dept: PAIN MEDICINE | Facility: CLINIC | Age: 65
End: 2025-04-14
Payer: MEDICARE

## 2025-04-14 VITALS
SYSTOLIC BLOOD PRESSURE: 132 MMHG | RESPIRATION RATE: 16 BRPM | WEIGHT: 143 LBS | DIASTOLIC BLOOD PRESSURE: 76 MMHG | BODY MASS INDEX: 26.58 KG/M2 | HEART RATE: 79 BPM | OXYGEN SATURATION: 96 %

## 2025-04-14 DIAGNOSIS — M96.1 POSTLAMINECTOMY SYNDROME OF CERVICAL REGION: ICD-10-CM

## 2025-04-14 DIAGNOSIS — M96.1 POSTLAMINECTOMY SYNDROME OF LUMBAR REGION: Primary | ICD-10-CM

## 2025-04-14 DIAGNOSIS — Z79.899 HIGH RISK MEDICATION USE: ICD-10-CM

## 2025-04-14 DIAGNOSIS — G89.4 CHRONIC PAIN SYNDROME: ICD-10-CM

## 2025-04-14 PROCEDURE — 3078F DIAST BP <80 MM HG: CPT | Performed by: ANESTHESIOLOGY

## 2025-04-14 PROCEDURE — 1125F AMNT PAIN NOTED PAIN PRSNT: CPT | Performed by: ANESTHESIOLOGY

## 2025-04-14 PROCEDURE — 99214 OFFICE O/P EST MOD 30 MIN: CPT | Performed by: ANESTHESIOLOGY

## 2025-04-14 PROCEDURE — G2211 COMPLEX E/M VISIT ADD ON: HCPCS | Performed by: ANESTHESIOLOGY

## 2025-04-14 PROCEDURE — 3075F SYST BP GE 130 - 139MM HG: CPT | Performed by: ANESTHESIOLOGY

## 2025-04-14 RX ORDER — HYDROCODONE BITARTRATE AND ACETAMINOPHEN 7.5; 325 MG/1; MG/1
1 TABLET ORAL
Qty: 150 TABLET | Refills: 0 | Status: SHIPPED | OUTPATIENT
Start: 2025-05-15

## 2025-04-14 RX ORDER — HYDROCODONE BITARTRATE AND ACETAMINOPHEN 7.5; 325 MG/1; MG/1
1 TABLET ORAL
Qty: 150 TABLET | Refills: 0 | Status: SHIPPED | OUTPATIENT
Start: 2025-04-15

## 2025-04-14 RX ORDER — ALBUTEROL SULFATE 0.83 MG/ML
SOLUTION RESPIRATORY (INHALATION)
Qty: 375 ML | Refills: 3 | Status: SHIPPED | OUTPATIENT
Start: 2025-04-14

## 2025-04-14 NOTE — PROGRESS NOTES
Subjective    CC back, leg pain, neck pain  Diya Arreola is a 64 y.o. female with chronic neck pain status post ACDF, chronic back pain S/P  L4-L5 LAMI/TILF 2021/[facility],S/P thoracolumbar decompression and fusion from T9-pelvis/Dr. Gorman/[facility] ,  here for follow-up.    Continued chronic thoracic and lumbar back pain mostly left-sided which is interfering with sleep and all activities.    Chronic neck pain, radiating to bilateral shoulder limited range of motion in the neck and significant paraspinal muscle spasm bilateral UE radicular pain.  Denies balance issues.    Chronic tremors, history of polyarthralgia/lupus sees rheumatology.  Pain interfering with ADL.  Tried physical therapy without relief.  Seen neurosurgery, referred to try injection.        C-spine MRI  probably a plate on the anterior aspect of the spine at the C4-5 level. mild subluxation with degenerative disc disease at C3-4, causing a mild central canal stenosis. Left-sided disc extrusion at C6-7 with left foraminal impingement.   L-spine MRI  postsurgical changes L3-L4 with pedicle screw L3.  Retrolisthesis L2 on L3 moderate central lateral canal stenosis.  Grade 1 anterolisthesis L4-L5.  Degenerative changes throughout.  L-spine x-ray : 4 mm anterolisthesis on extension, 6 mm anterolisthesis on flexion, at the L4-5 level. Stable spinal fusion changes at L3-4.. Severe degenerative disc and endplate changes at L2-3, similar to  2018 examination.    Pain Assessment   Location of Pain: Lower Back, R Hip, L Hip, Legs, neck pain,  Description of Pain: Dull/Aching, Throbbing, Stabbing  Previous Pain Rating :4  Current Pain Ratin.  Anaphylactic to somebody at the end of the epidural.  Additional complicated forms must continue stimulant send [facility] family clinic me pending CT patient also concerned by  Aggravating Factors: Activity  Alleviating Factors: Rest, Medication    PEG Assessment   What number best describes your pain  on average in the past week?3  What number best describes how, during the past week, pain has interfered with your enjoyment of life?2  What number best describes how, during the past week, pain has interfered with your general activity?10      The following portions of the patient's history were reviewed and updated as appropriate: allergies, current medications, past family history, past medical history, past social history, past surgical history and problem list.    Review of Systems   Musculoskeletal:  Positive for arthralgias, back pain and neck pain.   Neurological:  Positive for tremors, numbness and headache.   All other systems reviewed and are negative.    Objective   Physical Exam   Constitutional: No distress.   walker   Neck:   Positive Spurling's   Pulmonary/Chest: Effort normal.   Musculoskeletal:      Cervical back: She exhibits tenderness.   Vitals reviewed.    /76   Pulse 79   Resp 16   Wt 64.9 kg (143 lb)   SpO2 96%   BMI 26.58 kg/m²      PHQ 9 on chart  Opioid risk tool low risk    Assessment & Plan   Diagnoses and all orders for this visit:    1. Postlaminectomy syndrome of lumbar region (Primary)  -     HYDROcodone-acetaminophen (NORCO) 7.5-325 MG per tablet; Take 1 tablet by mouth 5 (Five) Times a Day As Needed for Severe Pain. DNF before 5/15/2025  Dispense: 150 tablet; Refill: 0  -     HYDROcodone-acetaminophen (NORCO) 7.5-325 MG per tablet; Take 1 tablet by mouth 5 (Five) Times a Day As Needed for Severe Pain.  Dispense: 150 tablet; Refill: 0    2. Chronic pain syndrome    3. Postlaminectomy syndrome of cervical region  -     HYDROcodone-acetaminophen (NORCO) 7.5-325 MG per tablet; Take 1 tablet by mouth 5 (Five) Times a Day As Needed for Severe Pain. DNF before 5/15/2025  Dispense: 150 tablet; Refill: 0  -     HYDROcodone-acetaminophen (NORCO) 7.5-325 MG per tablet; Take 1 tablet by mouth 5 (Five) Times a Day As Needed for Severe Pain.  Dispense: 150 tablet; Refill: 0    4. High  risk medication use    Summary  Diya Arreola is a 64 y.o. female with chronic neck pain status post ACDF, chronic back pain status post Fusion L3-5, S/P thoracolumbar decompression and fusion from T9-pelvis/Dr. Gorman/[facility] 2023.  here for follow-up.    Chronic back pain from DDD spondylosis postlaminectomy syndrome with radicular pain.   Chronic neck pain from postlaminectomy syndrome.  Worsening chronic tremors.    Denies any new issues or concerns today.  Continues have good relief of functional benefit with hydrocodone and denies any side effects.    Cont hydrocodone 7.5/325 5 times daily as needed for severe pain.  UDS and inspect reviewed  Discussed risk of tolerance, dependence, respiratory depression, coma and death associated with use of oral opioids for treatment of chronic nonmalignant pain.     RTC 2-3 month

## 2025-04-15 RX ORDER — BACLOFEN 10 MG/1
10 TABLET ORAL 3 TIMES DAILY
Qty: 270 TABLET | Refills: 1 | Status: SHIPPED | OUTPATIENT
Start: 2025-04-15

## 2025-04-15 RX ORDER — NITROGLYCERIN 0.4 MG/1
0.4 TABLET SUBLINGUAL
Qty: 25 TABLET | Refills: 5 | Status: SHIPPED | OUTPATIENT
Start: 2025-04-15

## 2025-04-15 RX ORDER — POTASSIUM CHLORIDE 750 MG/1
10 CAPSULE, EXTENDED RELEASE ORAL DAILY
Qty: 90 CAPSULE | Refills: 1 | Status: SHIPPED | OUTPATIENT
Start: 2025-04-15

## 2025-04-15 NOTE — TELEPHONE ENCOUNTER
"  Caller: Trish Arreolaline \"Elda\"    Relationship: Self    Best call back number: [phone]     Requested Prescriptions:   Requested Prescriptions     Pending Prescriptions Disp Refills    nitroglycerin (NITROSTAT) 0.4 MG SL tablet 25 tablet 5     Sig: Place 1 tablet under the tongue Every 5 (Five) Minutes As Needed for Chest Pain. Take no more than 3 doses in 15 minutes.        Pharmacy where request should be sent: [facility]Y #[id number] [address] [phone] [facility] [phone]      Last office visit with prescribing clinician: 1/24/2025   Last telemedicine visit with prescribing clinician: Visit date not found   Next office visit with prescribing clinician: 4/25/2025     Additional details provided by patient:     Does the patient have less than a 3 day supply:  [] Yes  [x] No    Would you like a call back once the refill request has been completed: [] Yes [x] No    If the office needs to give you a call back, can they leave a voicemail: [x] Yes [] No    Iris Wilson Rep   04/15/25 16:03 EDT         "

## 2025-04-24 NOTE — PROGRESS NOTES
"Subjective   History of Present Illness: Diya Arreola is a 64 y.o. female is here today for follow-up with a new CT. Today patient reports she has back pain.  Briefly, the patient was doing well up until about 6 months ago when she had a fall.  She continues to have pain across her upper thoracic region and spasms.  She was last seen several months ago and she has not seen improvement in the symptoms with time.    Chief Complaint   Patient presents with    Back Pain          Previous treatment: Meloxicam,hydrocodone,TLSO brace, Physical Therapy/13 visits     Previous neurosurgery:  2/28/2024-  Lumbar 5 to sacral 1 transforaminal lumbar interbody fusion; thoracic 10 to lumbar 2 and lumbar 4-5 sheldon osteotomies; T9 to pelvic fusion with neuro robot, hardware removal     Previous injections:     The following portions of the patient's history were reviewed and updated as appropriate: .    Review of Systems   Constitutional:  Positive for activity change.   Respiratory:  Negative for chest tightness and shortness of breath.    Cardiovascular:  Negative for chest pain.   Musculoskeletal:  Positive for back pain and myalgias.   Neurological:  Negative for weakness and numbness.       Objective      Pulse 75   Resp 18   Ht 156.2 cm (61.5\")   Wt 64 kg (141 lb)   SpO2 95%   BMI 26.21 kg/m²    Body mass index is 26.21 kg/m².  Vitals:    04/28/25 1259   PainSc: 7    PainLoc: Back         Neurological Exam        Assessment & Plan   Independent Review of Radiographic Studies:      I personally reviewed and interpreted the images from the following studies.    CT thoracic lumbar spine: Hardware intact and in good positioning.  There is some mild haloing around the T10 screws.  No fracture or hardware displacement.  Evidence of fusion posterior laterally.    Medical Decision Making:      Diya Arreola is a 64 y.o. female status post T10 to pelvic fusion for deformity who was doing well up until about 6 months ago " when she had a fall and developed significant pain primarily in her mid thoracic region.  CT scan is reassuring with some mild loosening of the screws at T10 but no fracture or overt hardware failure.  Will send her to physical therapy as well as to pain management for trigger point injections in the thoracic area at the location of her pain.  She can also work with pain management doing any other interventions they see fit.  If the pain continues over the course of time she can follow-up with me with scoliosis x-ray.  Otherwise I will see her back as needed in the future      Diagnoses and all orders for this visit:    1. S/P lumbar fusion (Primary)      No follow-ups on file.    This patient was examined wearing appropriate personal protective equipment.                      Dr. Jerrell Gorman IV    04/28/25  13:26 EDT

## 2025-04-25 ENCOUNTER — OFFICE VISIT (OUTPATIENT)
Dept: FAMILY MEDICINE CLINIC | Facility: CLINIC | Age: 65
End: 2025-04-25
Payer: MEDICARE

## 2025-04-25 VITALS
HEIGHT: 62 IN | HEART RATE: 90 BPM | SYSTOLIC BLOOD PRESSURE: 116 MMHG | BODY MASS INDEX: 26.46 KG/M2 | OXYGEN SATURATION: 91 % | DIASTOLIC BLOOD PRESSURE: 82 MMHG | WEIGHT: 143.8 LBS | TEMPERATURE: 99.3 F

## 2025-04-25 DIAGNOSIS — Z12.11 SCREEN FOR COLON CANCER: ICD-10-CM

## 2025-04-25 DIAGNOSIS — R91.1 LUNG NODULE SEEN ON IMAGING STUDY: Primary | ICD-10-CM

## 2025-04-25 DIAGNOSIS — J44.9 CHRONIC OBSTRUCTIVE PULMONARY DISEASE, UNSPECIFIED COPD TYPE: ICD-10-CM

## 2025-04-25 DIAGNOSIS — Z87.891 PERSONAL HISTORY OF NICOTINE DEPENDENCE: ICD-10-CM

## 2025-04-25 RX ORDER — MELOXICAM 15 MG/1
15 TABLET ORAL DAILY
Qty: 90 TABLET | Refills: 3 | Status: SHIPPED | OUTPATIENT
Start: 2025-04-25

## 2025-04-25 RX ORDER — BACLOFEN 10 MG/1
10 TABLET ORAL 3 TIMES DAILY
Qty: 270 TABLET | Refills: 1 | Status: SHIPPED | OUTPATIENT
Start: 2025-04-25

## 2025-04-25 RX ORDER — EPINEPHRINE 0.3 MG/.3ML
0.3 INJECTION SUBCUTANEOUS ONCE
Qty: 1 EACH | Refills: 0 | Status: SHIPPED | OUTPATIENT
Start: 2025-04-25 | End: 2025-04-25

## 2025-04-25 RX ORDER — FLUTICASONE FUROATE, UMECLIDINIUM BROMIDE AND VILANTEROL TRIFENATATE 200; 62.5; 25 UG/1; UG/1; UG/1
1 POWDER RESPIRATORY (INHALATION) DAILY
Qty: 90 EACH | Refills: 3 | Status: SHIPPED | OUTPATIENT
Start: 2025-04-25

## 2025-04-25 NOTE — PROGRESS NOTES
"Chief Complaint  Primary Care Follow-Up    Subjective        Diya Arreola presents to [facility]  COPD  She complains of cough and sputum production. There is no chest tightness, difficulty breathing or hemoptysis. This is a chronic problem. The current episode started more than 1 year ago. The problem occurs intermittently. The problem has been waxing and waning. The cough is productive of sputum. Associated symptoms include dyspnea on exertion. Pertinent negatives include no appetite change, chest pain, fever, nasal congestion or trouble swallowing. Her symptoms are aggravated by nothing. Her symptoms are alleviated by beta-agonist.       Objective   Vital Signs:  /82 (BP Location: Left arm, Patient Position: Sitting, Cuff Size: Adult)   Pulse 90   Temp 99.3 °F (37.4 °C) (Temporal)   Ht 156.2 cm (61.5\")   Wt 65.2 kg (143 lb 12.8 oz)   SpO2 91%   BMI 26.73 kg/m²   Estimated body mass index is 26.73 kg/m² as calculated from the following:    Height as of this encounter: 156.2 cm (61.5\").    Weight as of this encounter: 65.2 kg (143 lb 12.8 oz).    BMI is >= 25 and <30. (Overweight) The following options were offered after discussion;: exercise counseling/recommendations      Physical Exam  Vitals and nursing note reviewed.   Constitutional:       General: She is not in acute distress.     Appearance: She is well-developed.   HENT:      Head: Normocephalic.   Eyes:      General: Lids are normal.      Conjunctiva/sclera: Conjunctivae normal.   Neck:      Thyroid: No thyroid mass or thyromegaly.      Trachea: Trachea normal.   Cardiovascular:      Rate and Rhythm: Normal rate and regular rhythm.      Heart sounds: Normal heart sounds.   Pulmonary:      Effort: Pulmonary effort is normal.      Breath sounds: Normal breath sounds.   Musculoskeletal:      Cervical back: Normal range of motion.   Lymphadenopathy:      Cervical: No cervical adenopathy.   Skin:     General: " Skin is warm and dry.   Neurological:      Mental Status: She is alert and oriented to person, place, and time.   Psychiatric:         Attention and Perception: She is attentive.         Mood and Affect: Mood normal.         Speech: Speech normal.         Behavior: Behavior normal.        Result Review :  The following data was reviewed by: Mitzy Rea MD on 04/25/2025:  Common labs          7/23/2024    13:46 10/25/2024    13:11   Common Labs   Glucose 84  96    BUN 19  9    Creatinine 1.02  0.87    Sodium 138  140    Potassium 4.4  3.3    Chloride 98  100    Calcium 9.7  9.1    Albumin 4.3  3.6    Total Bilirubin 0.6  0.3    Alkaline Phosphatase 107  94    AST (SGOT) 44  26    ALT (SGPT) 22  18    WBC 10.77  8.13    Hemoglobin 12.7  12.3    Hematocrit 38.0  36.7    Platelets 241  243    Total Cholesterol 147     Triglycerides 146     HDL Cholesterol 53     LDL Cholesterol  69                 Assessment and Plan   Diagnoses and all orders for this visit:    1. Lung nodule seen on imaging study (Primary)  -      CT Chest Low Dose Cancer Screening WO; Future    2. Chronic obstructive pulmonary disease, unspecified COPD type  -     Fluticasone-Umeclidin-Vilant (Trelegy Ellipta) 200-62.5-25 MCG/ACT inhaler; Inhale 1 puff Daily.  Dispense: 90 each; Refill: 3    3. Screen for colon cancer  -     Ambulatory Referral For Screening Colonoscopy    4. Personal history of nicotine dependence  -      CT Chest Low Dose Cancer Screening WO; Future    Other orders  -     meloxicam (MOBIC) 15 MG tablet; Take 1 tablet by mouth Daily.  Dispense: 90 tablet; Refill: 3  -     baclofen (LIORESAL) 10 MG tablet; Take 1 tablet by mouth 3 (Three) Times a Day.  Dispense: 270 tablet; Refill: 1  -     EPINEPHrine (EpiPen 2-[place]) 0.3 MG/0.3ML solution auto-injector injection; Inject 0.3 mL under the skin into the appropriate area as directed 1 (One) Time for 1 dose.  Dispense: 1 each; Refill: 0             Follow Up   No follow-ups on  file.  Patient was given instructions and counseling regarding her condition or for health maintenance advice. Please see specific information pulled into the AVS if appropriate.

## 2025-04-28 ENCOUNTER — OFFICE VISIT (OUTPATIENT)
Dept: NEUROSURGERY | Facility: CLINIC | Age: 65
End: 2025-04-28
Payer: MEDICARE

## 2025-04-28 VITALS
HEIGHT: 62 IN | RESPIRATION RATE: 18 BRPM | HEART RATE: 75 BPM | WEIGHT: 141 LBS | OXYGEN SATURATION: 95 % | BODY MASS INDEX: 25.95 KG/M2

## 2025-04-28 DIAGNOSIS — Z98.1 S/P LUMBAR FUSION: Primary | ICD-10-CM

## 2025-05-02 RX ORDER — VARENICLINE TARTRATE 0.5 (11)-1
KIT ORAL
Qty: 53 EACH | Refills: 0 | OUTPATIENT
Start: 2025-05-02

## 2025-05-02 NOTE — TELEPHONE ENCOUNTER
Rx Refill Note  Requested Prescriptions     Refused Prescriptions Disp Refills    Varenicline Tartrate, Starter, 0.5 MG X 11 & 1 MG X 42 tablet therapy pack [Pharmacy Med Name: VARENICLINE STARTING MONTH BOX] 53 each 0     Sig: TAKE 0.5 MG BY MOUTH DAILY FOR 3 DAYS, THEN 0.5 MG 2 (TWO) TIMES A DAY FOR 4 DAYS, THEN 1 MG 2 (TWO) TIMES A DAY FOR 21 DAYS. TAKE 0.5 MG PO DAILY X 3 DAYS, THEN 0.5 MG PO BID X 4 DAYS, THEN 1 MG BY MOUTH TWICE A DAY      Last office visit with prescribing clinician: 5/7/2024   Last telemedicine visit with prescribing clinician: Visit date not found   Next office visit with prescribing clinician: Visit date not found                         Would you like a call back once the refill request has been completed: [] Yes [] No    If the office needs to give you a call back, can they leave a voicemail: [] Yes [] No    Molly Downs MA  05/02/25, 13:12 EDT

## 2025-06-09 ENCOUNTER — TELEPHONE (OUTPATIENT)
Dept: FAMILY MEDICINE CLINIC | Facility: CLINIC | Age: 65
End: 2025-06-09
Payer: MEDICARE

## 2025-06-09 ENCOUNTER — OFFICE VISIT (OUTPATIENT)
Dept: PAIN MEDICINE | Facility: CLINIC | Age: 65
End: 2025-06-09
Payer: MEDICARE

## 2025-06-09 VITALS
WEIGHT: 147 LBS | OXYGEN SATURATION: 96 % | BODY MASS INDEX: 27.33 KG/M2 | DIASTOLIC BLOOD PRESSURE: 84 MMHG | RESPIRATION RATE: 16 BRPM | SYSTOLIC BLOOD PRESSURE: 129 MMHG | HEART RATE: 97 BPM

## 2025-06-09 DIAGNOSIS — M96.1 POSTLAMINECTOMY SYNDROME OF LUMBAR REGION: ICD-10-CM

## 2025-06-09 DIAGNOSIS — G89.4 CHRONIC PAIN SYNDROME: ICD-10-CM

## 2025-06-09 DIAGNOSIS — M54.50 LUMBOSACRAL PAIN: ICD-10-CM

## 2025-06-09 DIAGNOSIS — M54.16 LUMBAR RADICULITIS: ICD-10-CM

## 2025-06-09 DIAGNOSIS — M79.18 MYOFASCIAL PAIN SYNDROME: ICD-10-CM

## 2025-06-09 DIAGNOSIS — M96.1 POSTLAMINECTOMY SYNDROME OF CERVICAL REGION: ICD-10-CM

## 2025-06-09 DIAGNOSIS — Z79.899 HIGH RISK MEDICATION USE: Primary | ICD-10-CM

## 2025-06-09 RX ORDER — HYDROCODONE BITARTRATE AND ACETAMINOPHEN 7.5; 325 MG/1; MG/1
1 TABLET ORAL
Qty: 150 TABLET | Refills: 0 | Status: SHIPPED | OUTPATIENT
Start: 2025-06-14

## 2025-06-09 RX ORDER — HYDROCODONE BITARTRATE AND ACETAMINOPHEN 7.5; 325 MG/1; MG/1
1 TABLET ORAL
Qty: 150 TABLET | Refills: 0 | Status: SHIPPED | OUTPATIENT
Start: 2025-07-15

## 2025-06-09 NOTE — PROGRESS NOTES
Subjective    CC back, leg pain, neck pain  Diya Arreola is a 65 y.o. female with chronic neck pain status post ACDF, chronic back pain S/P  L4-L5 LAMI/TILF nov 2021/[facility],S/P thoracolumbar decompression and fusion from T9-pelvis/Dr. Gorman/[facility] 2023,  here for follow-up.      Worsening back pain lumbosacral, constant worse with direct pressure walking standing or activity.  Symptoms worse since fall in October 2024.  Recently had CT C-spine T-spine L-spine and followed up with neurosurgery, no acute abnormalities loosed screw at T10 but no fracture.  Lower back pain interfering with ADL.  She has tried topicals, heat, ice with marginal relief    Chronic thoracic and lumbar back pain mostly left-sided which is interfering with sleep and all activities.    Chronic neck pain, radiating to bilateral shoulder limited range of motion in the neck and significant paraspinal muscle spasm bilateral UE radicular pain.  Denies balance issues.    Chronic tremors, history of polyarthralgia/lupus sees rheumatology.  Pain interfering with ADL.  Tried physical therapy without relief.  Seen neurosurgery, referred to try injection.        C-spine MRI 2020 probably a plate on the anterior aspect of the spine at the C4-5 level. mild subluxation with degenerative disc disease at C3-4, causing a mild central canal stenosis. Left-sided disc extrusion at C6-7 with left foraminal impingement.   L-spine MRI 2019 postsurgical changes L3-L4 with pedicle screw L3.  Retrolisthesis L2 on L3 moderate central lateral canal stenosis.  Grade 1 anterolisthesis L4-L5.  Degenerative changes throughout.  L-spine x-ray 2020: 4 mm anterolisthesis on extension, 6 mm anterolisthesis on flexion, at the L4-5 level. Stable spinal fusion changes at L3-4.. Severe degenerative disc and endplate changes at L2-3, similar to  03/16/2018 examination.    Pain Assessment   Location of Pain: Lower Back, R Hip, L Hip, Legs, neck pain,  Description of Pain: Dull/Aching,  Throbbing, Stabbing  Previous Pain Rating :4  Current Pain Ratin.  Anaphylactic to somebody at the end of the epidural.  Additional complicated forms must continue stimulant send [facility] me pending CT patient also concerned by  Aggravating Factors: Activity  Alleviating Factors: Rest, Medication    PEG Assessment   What number best describes your pain on average in the past week?3  What number best describes how, during the past week, pain has interfered with your enjoyment of life?2  What number best describes how, during the past week, pain has interfered with your general activity?10      The following portions of the patient's history were reviewed and updated as appropriate: allergies, current medications, past family history, past medical history, past social history, past surgical history and problem list.    Review of Systems   Musculoskeletal:  Positive for arthralgias, back pain and neck pain.   Neurological:  Positive for tremors, numbness and headache.   All other systems reviewed and are negative.    Objective   Physical Exam   Constitutional: No distress.   walker   Neck:   Positive Spurling's   Pulmonary/Chest: Effort normal.   Musculoskeletal:      Cervical back: She exhibits tenderness.   Vitals reviewed.    /84   Pulse 97   Resp 16   Wt 66.7 kg (147 lb)   SpO2 96%   BMI 27.33 kg/m²      PHQ 9 on chart  Opioid risk tool low risk    Assessment & Plan   Diagnoses and all orders for this visit:    1. Postlaminectomy syndrome of lumbar region (Primary)  -     XR Sacrum & Coccyx  -     HYDROcodone-acetaminophen (NORCO) 7.5-325 MG per tablet; Take 1 tablet by mouth 5 (Five) Times a Day As Needed for Severe Pain. DNF before 7/15/2025  Dispense: 150 tablet; Refill: 0    2. Postlaminectomy syndrome of cervical region  -     HYDROcodone-acetaminophen (NORCO) 7.5-325 MG per tablet; Take 1 tablet by mouth 5 (Five) Times a Day As Needed for Severe Pain. DNF before 7/15/2025  Dispense: 150  tablet; Refill: 0    3. Myofascial pain syndrome    4. Chronic pain syndrome  -     HYDROcodone-acetaminophen (NORCO) 7.5-325 MG per tablet; Take 1 tablet by mouth 5 (Five) Times a Day As Needed for Severe Pain.  Dispense: 150 tablet; Refill: 0    5. High risk medication use    6. Lumbosacral pain  -     XR Sacrum & Coccyx    7. Lumbar radiculitis  -     Caudal or Epidural, Single Injection    Summary  Diya Arreola is a 65 y.o. female with chronic neck pain status post ACDF, chronic back pain status post Fusion L3-5, S/P thoracolumbar decompression and fusion from T9-pelvis/Dr. Gorman/[facility] 2023.  here for follow-up.    Chronic back pain from DDD spondylosis postlaminectomy syndrome with radicular pain.   Chronic neck pain from postlaminectomy syndrome.  Worsening chronic tremors.    Worsening back pain lumbosacral, constant worse with direct pressure walking standing or activity.  Symptoms worse since fall in October 2024.  Recently had CT C-spine T-spine L-spine and followed up with neurosurgery, no acute abnormalities loosed screw at T10 but no fracture.  Lower back pain interfering with ADL.  She has tried topicals, heat, ice with marginal relief  Will schedule for caudal ALEXIS.  Side effects discussed.    Continue sacrococcygeal pain.  X-ray sacrum coccyx ordered.    Cont hydrocodone 7.5/325 5 times daily as needed for severe pain.  UDS and inspect reviewed  Discussed risk of tolerance, dependence, respiratory depression, coma and death associated with use of oral opioids for treatment of chronic nonmalignant pain.     RTC 2-3 month

## 2025-06-09 NOTE — TELEPHONE ENCOUNTER
I put in orders on 2/28/25 and 4/25/25 for follow up screening lung CT.  I don't know why she did not get it done then.

## 2025-06-09 NOTE — TELEPHONE ENCOUNTER
"    Caller: Diya Arreola \"Elda\"    Relationship: Self    Best call back number:     Maxwell Diya \"Elda\" (Self) [phone] (Mobile)       What orders are you requesting (i.e. lab or imaging): CT OF LUNGS ./ WATCHING NODULE     In what timeframe would the patient need to come in: ASAP     Where will you receive your lab/imaging services: [facility] IN [place]     Additional notes:   "

## 2025-06-11 NOTE — TELEPHONE ENCOUNTER
WE ARE WAITING ON FORM THAT [facility] IS FAXING FOR US TO FILL OUT AND SEND BACK. [facility] SAID AS SOON AS THEY GET THE FORM BACK, THEY'LL CALL THE PT AND SCHEDULE HER.

## 2025-06-16 ENCOUNTER — TELEPHONE (OUTPATIENT)
Dept: FAMILY MEDICINE CLINIC | Facility: CLINIC | Age: 65
End: 2025-06-16
Payer: MEDICARE

## 2025-06-16 NOTE — TELEPHONE ENCOUNTER
"  Caller: Diya Arreola \"Elda\"    Relationship: Self    Best call back number: [phone]    What was the call regarding: PATIENT STATED SHE IS SUPPOSED TO HAVE A CT SCAN LUNG SCREEN DONE AT [facility]. PATIENT STATED SHE IS SUPPOSED TO HAVE IT DONE EVERY 6 MONTHS BUT SHE FORGOT IT IN FEBRUARY. PATIENT IS NEEDING AN ORDER SENT TO [facility]. PATIENT WOULD LIKE A CALL BACK WHEN THE ORDER HAS BEEN PUT IN. PLEASE ADVISE.      "

## 2025-06-18 ENCOUNTER — TELEPHONE (OUTPATIENT)
Dept: FAMILY MEDICINE CLINIC | Facility: CLINIC | Age: 65
End: 2025-06-18

## 2025-06-18 ENCOUNTER — TELEPHONE (OUTPATIENT)
Dept: PAIN MEDICINE | Facility: CLINIC | Age: 65
End: 2025-06-18
Payer: MEDICARE

## 2025-06-18 DIAGNOSIS — J40 BRONCHITIS: ICD-10-CM

## 2025-06-18 RX ORDER — DOXYCYCLINE 100 MG/1
100 CAPSULE ORAL 2 TIMES DAILY
Qty: 20 CAPSULE | Refills: 0 | Status: SHIPPED | OUTPATIENT
Start: 2025-06-18

## 2025-06-18 NOTE — TELEPHONE ENCOUNTER
"Caller: Diya Arreola \"Elda\"    Relationship: Self    Best call back number: [phone]    What medication are you requesting: PCP RECOMMENDATION     What are your current symptoms: FEVER, COUGH, CONGESTION, GREEN MUCUS     How long have you been experiencing symptoms: 2 DAYS     Have you had these symptoms before:    [x] Yes  [] No    Have you been treated for these symptoms before:   [x] Yes  [] No    If a prescription is needed, what is your preferred pharmacy and phone number: [id number] - [facility] [place] - [address] [place] [address] AT [place] [address] - [phone]  - [phone] FX       "

## 2025-06-18 NOTE — TELEPHONE ENCOUNTER
I sent in a Rx for doxycycline but if she does not improve she will need to be seen in the office.

## 2025-06-18 NOTE — TELEPHONE ENCOUNTER
"  Caller: Diya Arreola \"Elda\"    Relationship to patient: Self    Best call back number:     Chief complaint: BACK     Type of visit: FUP EPIDURAL PROCEDURE     Requested date: NA     If rescheduling, when is the original appointment: 6/18/25     Additional notes:PATIENT NOT FEELING WELL HAS FEVER NEEDS TO RESCHEDULE   "

## 2025-06-24 ENCOUNTER — TELEPHONE (OUTPATIENT)
Dept: PAIN MEDICINE | Facility: CLINIC | Age: 65
End: 2025-06-24
Payer: MEDICARE

## 2025-06-24 NOTE — TELEPHONE ENCOUNTER
"    Caller: Diya Arreola \"Elda\"    Relationship to patient: Self    Best call back number:     Chief complaint: BACK     Type of visit: FUP CAUDAL EPIDURAL     Requested date: NA     If rescheduling, when is the original appointment: 6/25/25     Additional notes:PATIENT HAS BEEN ON ANTIBIOTICS FOR 4 DAYS SO NEEDS TO RESCHEDULE   "

## 2025-06-26 NOTE — TELEPHONE ENCOUNTER
Rx Refill Note  Requested Prescriptions     Pending Prescriptions Disp Refills    hydroxychloroquine (PLAQUENIL) 200 MG tablet [Pharmacy Med Name: HYDROXYCHLOROQUINE 200 MG TAB] 180 tablet 1     Sig: TAKE 1 TABLET BY MOUTH TWICE A DAY      Last office visit with prescribing clinician: 4/25/2025   Last telemedicine visit with prescribing clinician: Visit date not found   Next office visit with prescribing clinician: 8/1/2025                         Would you like a call back once the refill request has been completed: [] Yes [] No    If the office needs to give you a call back, can they leave a voicemail: [] Yes [] No    Grace Wolf MA  06/26/25, 11:18 EDT

## 2025-06-27 RX ORDER — HYDROXYCHLOROQUINE SULFATE 200 MG/1
200 TABLET, FILM COATED ORAL 2 TIMES DAILY
Qty: 180 TABLET | Refills: 1 | Status: SHIPPED | OUTPATIENT
Start: 2025-06-27

## 2025-06-29 RX ORDER — ALBUTEROL SULFATE 0.83 MG/ML
SOLUTION RESPIRATORY (INHALATION)
Qty: 375 ML | Refills: 3 | Status: SHIPPED | OUTPATIENT
Start: 2025-06-29

## 2025-07-09 ENCOUNTER — HOSPITAL ENCOUNTER (OUTPATIENT)
Dept: PAIN MEDICINE | Facility: HOSPITAL | Age: 65
Discharge: HOME | End: 2025-07-09
Payer: MEDICARE

## 2025-07-09 VITALS
DIASTOLIC BLOOD PRESSURE: 78 MMHG | TEMPERATURE: 97.3 F | RESPIRATION RATE: 16 BRPM | OXYGEN SATURATION: 96 % | SYSTOLIC BLOOD PRESSURE: 105 MMHG | HEART RATE: 99 BPM

## 2025-07-09 DIAGNOSIS — R52 PAIN: ICD-10-CM

## 2025-07-09 DIAGNOSIS — M54.16 LUMBAR RADICULITIS: Primary | ICD-10-CM

## 2025-07-09 PROCEDURE — 25510000001 IOPAMIDOL 41 % SOLUTION: Performed by: ANESTHESIOLOGY

## 2025-07-09 PROCEDURE — 77003 FLUOROGUIDE FOR SPINE INJECT: CPT

## 2025-07-09 PROCEDURE — 25010000002 METHYLPREDNISOLONE PER 40 MG: Performed by: ANESTHESIOLOGY

## 2025-07-09 PROCEDURE — 25010000002 BUPIVACAINE (PF) 0.25 % SOLUTION: Performed by: ANESTHESIOLOGY

## 2025-07-09 RX ORDER — BUPIVACAINE HYDROCHLORIDE 2.5 MG/ML
10 INJECTION, SOLUTION EPIDURAL; INFILTRATION; INTRACAUDAL; PERINEURAL ONCE
Status: COMPLETED | OUTPATIENT
Start: 2025-07-09 | End: 2025-07-09

## 2025-07-09 RX ORDER — METHYLPREDNISOLONE ACETATE 40 MG/ML
40 INJECTION, SUSPENSION INTRA-ARTICULAR; INTRALESIONAL; INTRAMUSCULAR; SOFT TISSUE ONCE
Status: COMPLETED | OUTPATIENT
Start: 2025-07-09 | End: 2025-07-09

## 2025-07-09 RX ORDER — IOPAMIDOL 408 MG/ML
3 INJECTION, SOLUTION INTRATHECAL
Status: COMPLETED | OUTPATIENT
Start: 2025-07-09 | End: 2025-07-09

## 2025-07-09 RX ADMIN — BUPIVACAINE HYDROCHLORIDE 10 ML: 2.5 INJECTION, SOLUTION EPIDURAL; INFILTRATION; INTRACAUDAL; PERINEURAL at 14:27

## 2025-07-09 RX ADMIN — METHYLPREDNISOLONE ACETATE 40 MG: 40 INJECTION, SUSPENSION INTRA-ARTICULAR; INTRALESIONAL; INTRAMUSCULAR; INTRASYNOVIAL; SOFT TISSUE at 14:27

## 2025-07-09 RX ADMIN — IOPAMIDOL 3 ML: 408 INJECTION, SOLUTION INTRATHECAL at 14:27

## 2025-07-09 NOTE — DISCHARGE INSTRUCTIONS

## 2025-07-10 ENCOUNTER — TELEPHONE (OUTPATIENT)
Dept: PAIN MEDICINE | Facility: HOSPITAL | Age: 65
End: 2025-07-10
Payer: MEDICARE

## 2025-07-15 DIAGNOSIS — I10 ESSENTIAL (PRIMARY) HYPERTENSION: ICD-10-CM

## 2025-07-16 RX ORDER — VERAPAMIL HYDROCHLORIDE 240 MG/1
240 TABLET, FILM COATED, EXTENDED RELEASE ORAL DAILY
Qty: 90 TABLET | Refills: 3 | Status: SHIPPED | OUTPATIENT
Start: 2025-07-16

## 2025-07-16 NOTE — PROCEDURES
Subjective   CC back pain, leg pain  Diya Arreola is a 65 y.o. female with lumbar radiculitis postlaminectomy syndrome here for caudal ALEXIS..   No anticoagulation    Pain Assessment   Location of Pain: Lower Back, R Hip, L Hip,  Leg,   Description of Pain: Dull/Aching, Throbbing, Stabbing  Previous Pain Rating :7  Current Pain Ratin  Aggravating Factors: Activity  Alleviating Factors: Rest, Medication  Pain onset over 12 weeks ago  Pain interferes with ADL's  [place] back pain disability scale scanned in chart     The following portions of the patient's history were reviewed and updated as appropriate: allergies, current medications, past family history, past medical history, past social history, past surgical history and problem list.      Review of Systems  As in HPI  Objective   Physical Exam  Vitals reviewed.   Constitutional:       General: She is not in acute distress.  Pulmonary:      Effort: Pulmonary effort is normal.   Musculoskeletal:      Thoracic back: Decreased range of motion.       /78   Pulse 99   Temp 97.3 °F (36.3 °C)   Resp 16   SpO2 96%     Assessment & Plan    underwent caudal ALEXIS.    RTC 4-6 weeks or as needed for repeat    DATE OF PROCEDURE: 2025    PREOPERATIVE DIAGNOSIS:  lumbosacral DDD and radiculitis    POSTOPERATIVE DIAGNOSIS: Same    PROCEDURE PERFORMED: Caudal Epidural Steroid Injection    The patient presents with a history of  lumbosacral degenerative disc disease with lumbosacral neuritis. The patient presents today for a caudal epidural steroid injection. The patient understands the risks and benefits of the procedure and wishes to proceed. The patient was seen in the preoperative area.  Patient's consent was obtained and updated.  Vitals were taken.  Patient was then brought to the procedure suite and placed in a prone position. The appropriate anatomic area was widely prepped with  Chloraprep and draped in a sterile fashion. Noninvasive monitoring per  routine anesthesia protocol was placed.  Under fluoroscopic guidance using a lateral view a 22 guage curved spinal needle was passed through skin anesthesized with 1% Lidocaine without epinephrine.  The needle was advanced through the sacral hiatus and into the sacral epidural space using fluoroscopic guidance. Needle tip placement in the epidural space was confirmed by loss of resistance and injection of  1.5  mL of  preservative free contrast. Following this 10 mL of a solution containing  1 mL of 40 mg Depo-Medrol,  1 mL of  0.25% bupivacaine and 8 mL of preservative-free saline was carefully administered in the epidural space.   A sterile dressing was placed over the puncture site.    The patient tolerated the procedure with  no complications. They were then brought to the post procedure area where they recovered nicely.     Discharge:  The patient will be discharged home in stable condition.   Patient understands to contact the Center with any post procedure questions or concerns.  Discharge instructions given by nursing staff.

## 2025-07-16 NOTE — TELEPHONE ENCOUNTER
Rx Refill Note  Requested Prescriptions     Pending Prescriptions Disp Refills    verapamil SR (CALAN-SR) 240 MG CR tablet [Pharmacy Med Name: VERAPAMIL  MG TABLET] 90 tablet 3     Sig: TAKE 1 TABLET BY MOUTH EVERY DAY      Last office visit with prescribing clinician: 4/25/2025   Last telemedicine visit with prescribing clinician: Visit date not found   Next office visit with prescribing clinician: 8/1/2025                         Would you like a call back once the refill request has been completed: [] Yes [] No    If the office needs to give you a call back, can they leave a voicemail: [] Yes [] No    Grace Wolf MA  07/16/25, 14:26 EDT

## 2025-08-01 ENCOUNTER — LAB (OUTPATIENT)
Dept: FAMILY MEDICINE CLINIC | Facility: CLINIC | Age: 65
End: 2025-08-01
Payer: MEDICARE

## 2025-08-01 ENCOUNTER — OFFICE VISIT (OUTPATIENT)
Dept: FAMILY MEDICINE CLINIC | Facility: CLINIC | Age: 65
End: 2025-08-01
Payer: MEDICARE

## 2025-08-01 VITALS
SYSTOLIC BLOOD PRESSURE: 123 MMHG | OXYGEN SATURATION: 96 % | HEIGHT: 62 IN | TEMPERATURE: 97.9 F | HEART RATE: 76 BPM | WEIGHT: 145.4 LBS | DIASTOLIC BLOOD PRESSURE: 84 MMHG | BODY MASS INDEX: 26.76 KG/M2

## 2025-08-01 DIAGNOSIS — S93.401A SPRAIN OF RIGHT ANKLE, UNSPECIFIED LIGAMENT, INITIAL ENCOUNTER: ICD-10-CM

## 2025-08-01 DIAGNOSIS — E78.5 HYPERLIPIDEMIA, UNSPECIFIED HYPERLIPIDEMIA TYPE: ICD-10-CM

## 2025-08-01 DIAGNOSIS — I10 ESSENTIAL (PRIMARY) HYPERTENSION: ICD-10-CM

## 2025-08-01 DIAGNOSIS — F17.200 TOBACCO DEPENDENCE: ICD-10-CM

## 2025-08-01 DIAGNOSIS — Z00.00 MEDICARE ANNUAL WELLNESS VISIT, SUBSEQUENT: Primary | ICD-10-CM

## 2025-08-01 PROCEDURE — 80061 LIPID PANEL: CPT | Performed by: FAMILY MEDICINE

## 2025-08-01 PROCEDURE — 36415 COLL VENOUS BLD VENIPUNCTURE: CPT | Performed by: FAMILY MEDICINE

## 2025-08-01 PROCEDURE — 80053 COMPREHEN METABOLIC PANEL: CPT | Performed by: FAMILY MEDICINE

## 2025-08-01 RX ORDER — TERBINAFINE HYDROCHLORIDE 250 MG/1
250 TABLET ORAL DAILY
Qty: 84 TABLET | Refills: 0 | Status: SHIPPED | OUTPATIENT
Start: 2025-08-01

## 2025-08-01 RX ORDER — NITROGLYCERIN 0.4 MG/1
0.4 TABLET SUBLINGUAL
Qty: 25 TABLET | Refills: 5 | Status: SHIPPED | OUTPATIENT
Start: 2025-08-01

## 2025-08-01 RX ORDER — EPINEPHRINE 0.3 MG/.3ML
0.3 INJECTION SUBCUTANEOUS DAILY PRN
Qty: 1 EACH | Refills: 3 | Status: SHIPPED | OUTPATIENT
Start: 2025-08-01

## 2025-08-01 RX ORDER — TRAZODONE HYDROCHLORIDE 150 MG/1
150 TABLET ORAL
Qty: 90 TABLET | Refills: 1 | Status: SHIPPED | OUTPATIENT
Start: 2025-08-01

## 2025-08-01 NOTE — PROGRESS NOTES
Subjective   The ABCs of the Annual Wellness Visit  Medicare Wellness Visit      Diya Arreola is a 65 y.o. patient who presents for a Medicare Wellness Visit.    The following portions of the patient's history were reviewed and   updated as appropriate: allergies, current medications, past family history, past medical history, past social history, past surgical history, and problem list.    Compared to one year ago, the patient's physical   health is the same.  Compared to one year ago, the patient's mental   health is the same.    Recent Hospitalizations:  She was not admitted to the hospital during the last year.     Current Medical Providers:  Patient Care Team:  Mitzy Rea MD as PCP - General  Mitzy Rea MD as PCP - Family Medicine  Lizeth Mcclellan MD as Consulting Physician (Pain Medicine)  Lionel Peters MD as Consulting Physician (Cardiology)  Jerrell Gorman IV, MD as Surgeon (Neurosurgery)    Outpatient Medications Prior to Visit   Medication Sig Dispense Refill    albuterol (PROVENTIL) (2.5 MG/3ML) 0.083% nebulizer solution USE 1 VIAL VIA NEBULIZATION EVERY 4 HOURS AS NEEDED FOR WHEEZING 375 mL 3    albuterol sulfate  (90 Base) MCG/ACT inhaler Inhale 2 puffs Every 4 (Four) Hours As Needed for Wheezing. 18 g 3    baclofen (LIORESAL) 10 MG tablet Take 1 tablet by mouth 3 (Three) Times a Day. 270 tablet 1    Brexpiprazole (Rexulti) 0.5 MG tablet Take 0.5 mg by mouth Daily. 90 tablet 3    bumetanide (BUMEX) 2 MG tablet Take 1 tablet by mouth 2 (Two) Times a Day. 180 tablet 3    buPROPion XL (Wellbutrin XL) 300 MG 24 hr tablet Take 1 tablet by mouth Daily. 90 tablet 3    ciclopirox (PENLAC) 8 % solution APPLY TOPICALLY TO THE APPROPRIATE AREA AS DIRECTED EVERY NIGHT. 6.6 mL 2    Daliresp 500 MCG tablet tablet Take 1 tablet by mouth Daily. 90 tablet 3    Fluticasone-Umeclidin-Vilant (Trelegy Ellipta) 200-62.5-25 MCG/ACT inhaler Inhale 1 puff Daily. 90 each 3    folic acid  (FOLVITE) 1 MG tablet Take 1 tablet by mouth Every Evening. Ld 2/21      gabapentin (NEURONTIN) 400 MG capsule Take 1 capsule by mouth 4 (Four) Times a Day. 120 capsule 5    HYDROcodone-acetaminophen (NORCO) 7.5-325 MG per tablet Take 1 tablet by mouth 5 (Five) Times a Day As Needed for Severe Pain. 150 tablet 0    HYDROcodone-acetaminophen (NORCO) 7.5-325 MG per tablet Take 1 tablet by mouth 5 (Five) Times a Day As Needed for Severe Pain. 150 tablet 0    HYDROcodone-acetaminophen (NORCO) 7.5-325 MG per tablet Take 1 tablet by mouth 5 (Five) Times a Day As Needed for Severe Pain. DNF before 7/15/2025 150 tablet 0    hydroxychloroquine (PLAQUENIL) 200 MG tablet TAKE 1 TABLET BY MOUTH TWICE A  tablet 1    meloxicam (MOBIC) 15 MG tablet Take 1 tablet by mouth Daily. 90 tablet 3    potassium chloride (MICRO-K) 10 MEQ CR capsule TAKE 1 CAPSULE BY MOUTH EVERY DAY 90 capsule 1    Respiratory Therapy Supplies (Nebulizer/Tubing/Mouthpiece) kit For use with Moss home nebulizer J44.9 2 each 5    verapamil SR (CALAN-SR) 240 MG CR tablet TAKE 1 TABLET BY MOUTH EVERY DAY 90 tablet 3    Wheat Dextrin (Benefiber) powder Take  by mouth At Night As Needed.      doxycycline (MONODOX) 100 MG capsule Take 1 capsule by mouth 2 (Two) Times a Day. 20 capsule 0    nitroglycerin (NITROSTAT) 0.4 MG SL tablet Place 1 tablet under the tongue Every 5 (Five) Minutes As Needed for Chest Pain. Take no more than 3 doses in 15 minutes. 25 tablet 5    traZODone (DESYREL) 150 MG tablet Take 1 tablet by mouth every night at bedtime. 90 tablet 1     No facility-administered medications prior to visit.     Opioid medication/s are on active medication list.  and I have evaluated her active treatment plan and pain score trends (see table).  Vitals:    08/01/25 1306   PainSc: 9    PainLoc: Ankle     I have reviewed the chart for potential of high risk medication and harmful drug interactions in the elderly.        Aspirin is not on active  medication list.  Aspirin use is not indicated based on review of current medical condition/s. Risk of harm outweighs potential benefits.  .    Patient Active Problem List   Diagnosis    Allergic rhinitis    Anxiety disorder    Asthma    Carpal tunnel syndrome, bilateral    Chronic obstructive pulmonary disease    Connective tissue disease, undifferentiated    Constipation    Cystic fibrosis carrier    DDD (degenerative disc disease), cervical    Dyspepsia    Edema    Elevated antinuclear antibody (LILI) level    Essential (primary) hypertension    Fatigue    Hyperlipidemia    Insomnia    Irritable bowel syndrome    Fibromyalgia    Headache    Lupus    Sjogren's syndrome    Nonrheumatic mitral valve insufficiency    Other long term (current) drug therapy    Other specified dorsopathies, site unspecified    Palpitations    Paroxysmal tachycardia    Sciatica    Sleep disorder    Spinal stenosis of lumbar region    Temporary cerebral vascular dysfunction    Vitamin D deficiency    Hypokalemia    Depression    LILI positive    Need for immunization against influenza    Acute cystitis without hematuria    Spondylolisthesis of lumbar region    S/P lumbar fusion    Encounter for postoperative care    Nausea    Tobacco dependence    Encounter for screening mammogram for breast cancer    Postmenopausal    Medicare annual wellness visit, subsequent    Encounter for hepatitis C virus screening test for high risk patient    Bronchitis    Other secondary kyphosis, thoracolumbar region    Right lower lobe pulmonary nodule    Hyperglycemia    Screen for colon cancer    Personal history of nicotine dependence    Age-related osteoporosis without current pathological fracture    LOC (loss of consciousness)    Fall    Concussion wth loss of consciousness of 30 minutes or less    Sprain of right ankle     Advance Care Planning Advance Directive is not on file.  ACP discussion was held with the patient during this visit. Patient does not  "have an advance directive, information provided.            Objective   Vitals:    25 1306   BP: 123/84   BP Location: Left arm   Patient Position: Sitting   Cuff Size: Adult   Pulse: 76   Temp: 97.9 °F (36.6 °C)   TempSrc: Infrared   SpO2: 96%   Weight: 66 kg (145 lb 6.4 oz)   Height: 156.2 cm (61.5\")   PainSc: 9    PainLoc: Ankle       Estimated body mass index is 27.03 kg/m² as calculated from the following:    Height as of this encounter: 156.2 cm (61.5\").    Weight as of this encounter: 66 kg (145 lb 6.4 oz).                Does the patient have evidence of cognitive impairment? No                                                                                                Health  Risk Assessment    Smoking Status:  Social History     Tobacco Use   Smoking Status Every Day    Current packs/day: 1.00    Average packs/day: 1 pack/day for 42.7 years (42.7 ttl pk-yrs)    Types: Cigarettes    Start date: 1982    Passive exposure: Current   Smokeless Tobacco Never   Tobacco Comments    Starting Chantix      Alcohol Consumption:  Social History     Substance and Sexual Activity   Alcohol Use No       Fall Risk Screen  STEADI Fall Risk Assessment was completed, and patient is at MODERATE risk for falls. Assessment completed on:2025    Depression Screening   Little interest or pleasure in doing things? Not at all   Feeling down, depressed, or hopeless? Not at all   PHQ-2 Total Score 0      Health Habits and Functional and Cognitive Screenin/1/2025     1:00 PM   Functional & Cognitive Status   Do you have difficulty preparing food and eating? No   Do you have difficulty bathing yourself, getting dressed or grooming yourself? No   Do you have difficulty using the toilet? No   Do you have difficulty moving around from place to place? No   Do you have trouble with steps or getting out of a bed or a chair? Yes   Current Diet Well Balanced Diet   Dental Exam Not up to date   Eye Exam Up to date "   Exercise (times per week) 5 times per week   Current Exercises Include House Cleaning;Yard Work;Walking   Do you need help using the phone?  No   Are you deaf or do you have serious difficulty hearing?  No   Do you need help to go to places out of walking distance? Yes   Do you need help shopping? No   Do you need help preparing meals?  No   Do you need help with housework?  No   Do you need help with laundry? No   Do you need help taking your medications? No   Do you need help managing money? No   Do you ever drive or ride in a car without wearing a seat belt? No   Have you felt unusual fatigue (could be tiredness), stress, anger or loneliness in the last month? Yes   Who do you live with? Spouse   If you need help, do you have trouble finding someone available to you? No   Have you been bothered in the last four weeks by sexual problems? No   Do you have difficulty concentrating, remembering or making decisions? No           Age-appropriate Screening Schedule:  Refer to the list below for future screening recommendations based on patient's age, sex and/or medical conditions. Orders for these recommended tests are listed in the plan section. The patient has been provided with a written plan.    Health Maintenance List  Health Maintenance   Topic Date Due    LIPID PANEL  07/23/2025    LUNG CANCER SCREENING  08/26/2025    COVID-19 Vaccine (7 - 2024-25 season) 12/31/2025 (Originally 1/26/2025)    TDAP/TD VACCINES (1 - Tdap) 12/31/2025 (Originally 5/9/1981)    ZOSTER VACCINE (2 of 3) 12/31/2025 (Originally 9/29/2014)    COLORECTAL CANCER SCREENING  08/01/2026 (Originally 5/9/2007)    INFLUENZA VACCINE  10/01/2025    ANNUAL WELLNESS VISIT  08/01/2026    MAMMOGRAM  08/26/2026    HEPATITIS C SCREENING  Completed    Pneumococcal Vaccine 50+  Completed                                                                                                                                                CMS Preventative Services  "Quick Reference  Risk Factors Identified During Encounter  Inactivity/Sedentary: Patient was advised to exercise at least 150 minutes a week per CDC recommendations.  Tobacco Use/Dependance Risk (use dotphrase .tobaccocessation for documentation)  Dental Screening Recommended  Vision Screening Recommended    The above risks/problems have been discussed with the patient.  Pertinent information has been shared with the patient in the After Visit Summary.  An After Visit Summary and PPPS were made available to the patient.    Follow Up:   Next Medicare Wellness visit to be scheduled in 1 year.         Additional E&M Note during same encounter follows:  Patient has additional, significant, and separately identifiable condition(s)/problem(s) that require work above and beyond the Medicare Wellness Visit     Chief Complaint  Medicare Wellness-subsequent (Pt is doing well today) and Ankle Pain (Walking from table to counter in kitchen, felt a pop. X 1 week. Was swollen and bruised, has had ongoing pain 9/10 on pain scale)    Subjective   Ankle Pain   The incident occurred more than 1 week ago. The incident occurred at home. There was no injury mechanism (She was in her kitchen walking to the sink and a sudden \"pop\" and had pain in right ankle.). The pain is present in the right ankle. The quality of the pain is described as aching. The pain is moderate. The pain has been Constant since onset. She reports no foreign bodies present. The symptoms are aggravated by movement and weight bearing. She has tried elevation for the symptoms. The treatment provided mild relief.   Hypertension  Chronicity:  Chronic  Onset:  More than 1 year ago  Progression since onset:  Unchanged  Condition status:  Controlled  Associated symptoms: anxiety, chest pain, headaches, malaise/fatigue, shortness of breath and dyspnea with exertion    Associated symptoms: no palpitations    Agents associated with hypertension:  No associated " "agents  Hyperlipidemia  This is a chronic problem. The current episode started more than 1 year ago. The problem is controlled. Recent lipid tests were reviewed and are normal. Associated symptoms include chest pain and shortness of breath.     Elda is also being seen today for additional medical problem/s.    Review of Systems   Constitutional:  Positive for malaise/fatigue.   Respiratory:  Positive for shortness of breath.    Cardiovascular:  Positive for chest pain. Negative for palpitations.              Objective   Vital Signs:  /84 (BP Location: Left arm, Patient Position: Sitting, Cuff Size: Adult)   Pulse 76   Temp 97.9 °F (36.6 °C) (Infrared)   Ht 156.2 cm (61.5\")   Wt 66 kg (145 lb 6.4 oz)   SpO2 96%   BMI 27.03 kg/m²   Physical Exam  Vitals and nursing note reviewed.   Constitutional:       General: She is not in acute distress.     Appearance: She is well-developed.   HENT:      Head: Normocephalic.   Eyes:      General: Lids are normal.      Conjunctiva/sclera: Conjunctivae normal.   Neck:      Thyroid: No thyroid mass or thyromegaly.      Trachea: Trachea normal.   Cardiovascular:      Rate and Rhythm: Normal rate and regular rhythm.      Heart sounds: Normal heart sounds.   Pulmonary:      Effort: Pulmonary effort is normal.      Breath sounds: Wheezing present.   Musculoskeletal:      Cervical back: Normal range of motion.      Right lower leg: No edema.      Left lower leg: No edema.   Lymphadenopathy:      Cervical: No cervical adenopathy.   Skin:     General: Skin is warm and dry.   Neurological:      Mental Status: She is alert and oriented to person, place, and time. Mental status is at baseline.      Motor: Tremor present.   Psychiatric:         Attention and Perception: She is attentive.         Mood and Affect: Mood normal.         Speech: Speech normal.         Behavior: Behavior normal.         The following data was reviewed by: Mitzy Rea MD on 08/01/2025:    Common " labs          10/25/2024    13:11   Common Labs   Glucose 96    BUN 9    Creatinine 0.87    Sodium 140    Potassium 3.3    Chloride 100    Calcium 9.1    Albumin 3.6    Total Bilirubin 0.3    Alkaline Phosphatase 94    AST (SGOT) 26    ALT (SGPT) 18    WBC 8.13    Hemoglobin 12.3    Hematocrit 36.7    Platelets 243           Assessment and Plan Additional age appropriate preventative wellness advice topics were discussed during today's preventative wellness exam(some topics already addressed during AWV portion of the note above):    Physical Activity: Advised cardiovascular activity 150 minutes per week as tolerated. (example brisk walk for 30 minutes, 5 days a week).     Medicare annual wellness visit, subsequent         Essential (primary) hypertension  Hypertension is stable and controlled  Continue current treatment regimen.  Blood pressure will be reassessed in 3 months.    Orders:    Lipid Panel    Comprehensive Metabolic Panel    Hyperlipidemia, unspecified hyperlipidemia type   Lipid abnormalities are stable    Plan:  Continue same medication/s without change.      Counseled patient on lifestyle modifications to help control hyperlipidemia.     Patient Treatment Goals:   LDL goal is under 100    Followup at the next regular appointment.    Orders:    Lipid Panel    Comprehensive Metabolic Panel    Tobacco dependence  Tobacco use is unchanged.  Smoking cessation counseling was provided.  Tobacco use will be reassessed at the next regular appointment.                                               Sprain of right ankle, unspecified ligament, initial encounter    Orders:    XR Ankle 3+ View Right; Future            Follow Up   No follow-ups on file.  Patient was given instructions and counseling regarding her condition or for health maintenance advice. Please see specific information pulled into the AVS if appropriate.

## 2025-08-01 NOTE — ASSESSMENT & PLAN NOTE
Tobacco use is unchanged.  Smoking cessation counseling was provided.  Tobacco use will be reassessed at the next regular appointment.

## 2025-08-01 NOTE — ASSESSMENT & PLAN NOTE
Hypertension is stable and controlled  Continue current treatment regimen.  Blood pressure will be reassessed in 3 months.    Orders:    Lipid Panel    Comprehensive Metabolic Panel

## 2025-08-02 LAB
ALBUMIN SERPL-MCNC: 4.2 G/DL (ref 3.5–5.2)
ALBUMIN/GLOB SERPL: 1.4 G/DL
ALP SERPL-CCNC: 99 U/L (ref 39–117)
ALT SERPL W P-5'-P-CCNC: 13 U/L (ref 1–33)
ANION GAP SERPL CALCULATED.3IONS-SCNC: 12.2 MMOL/L (ref 5–15)
AST SERPL-CCNC: 17 U/L (ref 1–32)
BILIRUB SERPL-MCNC: 0.3 MG/DL (ref 0–1.2)
BUN SERPL-MCNC: 9 MG/DL (ref 8–23)
BUN/CREAT SERPL: 16.1 (ref 7–25)
CALCIUM SPEC-SCNC: 9.5 MG/DL (ref 8.6–10.5)
CHLORIDE SERPL-SCNC: 107 MMOL/L (ref 98–107)
CHOLEST SERPL-MCNC: 169 MG/DL (ref 0–200)
CO2 SERPL-SCNC: 24.8 MMOL/L (ref 22–29)
CREAT SERPL-MCNC: 0.56 MG/DL (ref 0.57–1)
EGFRCR SERPLBLD CKD-EPI 2021: 102.7 ML/MIN/1.73
GLOBULIN UR ELPH-MCNC: 3.1 GM/DL
GLUCOSE SERPL-MCNC: 99 MG/DL (ref 65–99)
HDLC SERPL-MCNC: 39 MG/DL (ref 40–60)
LDLC SERPL CALC-MCNC: 87 MG/DL (ref 0–100)
LDLC/HDLC SERPL: 2.02 {RATIO}
POTASSIUM SERPL-SCNC: 4.1 MMOL/L (ref 3.5–5.2)
PROT SERPL-MCNC: 7.3 G/DL (ref 6–8.5)
SODIUM SERPL-SCNC: 144 MMOL/L (ref 136–145)
TRIGL SERPL-MCNC: 257 MG/DL (ref 0–150)
VLDLC SERPL-MCNC: 43 MG/DL (ref 5–40)

## 2025-08-04 ENCOUNTER — OFFICE VISIT (OUTPATIENT)
Dept: PAIN MEDICINE | Facility: CLINIC | Age: 65
End: 2025-08-04
Payer: MEDICARE

## 2025-08-04 VITALS
WEIGHT: 144 LBS | BODY MASS INDEX: 26.77 KG/M2 | HEART RATE: 100 BPM | DIASTOLIC BLOOD PRESSURE: 79 MMHG | SYSTOLIC BLOOD PRESSURE: 143 MMHG | RESPIRATION RATE: 16 BRPM | OXYGEN SATURATION: 95 %

## 2025-08-04 DIAGNOSIS — M96.1 POSTLAMINECTOMY SYNDROME OF LUMBAR REGION: Primary | ICD-10-CM

## 2025-08-04 DIAGNOSIS — M96.1 POSTLAMINECTOMY SYNDROME OF CERVICAL REGION: ICD-10-CM

## 2025-08-04 DIAGNOSIS — M79.18 MYOFASCIAL PAIN SYNDROME: ICD-10-CM

## 2025-08-04 DIAGNOSIS — Z79.899 HIGH RISK MEDICATION USE: ICD-10-CM

## 2025-08-04 PROCEDURE — 3078F DIAST BP <80 MM HG: CPT | Performed by: ANESTHESIOLOGY

## 2025-08-04 PROCEDURE — 1159F MED LIST DOCD IN RCRD: CPT | Performed by: ANESTHESIOLOGY

## 2025-08-04 PROCEDURE — 1125F AMNT PAIN NOTED PAIN PRSNT: CPT | Performed by: ANESTHESIOLOGY

## 2025-08-04 PROCEDURE — G2211 COMPLEX E/M VISIT ADD ON: HCPCS | Performed by: ANESTHESIOLOGY

## 2025-08-04 PROCEDURE — 3077F SYST BP >= 140 MM HG: CPT | Performed by: ANESTHESIOLOGY

## 2025-08-04 PROCEDURE — 99214 OFFICE O/P EST MOD 30 MIN: CPT | Performed by: ANESTHESIOLOGY

## 2025-08-04 PROCEDURE — 1160F RVW MEDS BY RX/DR IN RCRD: CPT | Performed by: ANESTHESIOLOGY

## 2025-08-04 RX ORDER — HYDROCODONE BITARTRATE AND ACETAMINOPHEN 7.5; 325 MG/1; MG/1
1 TABLET ORAL
Qty: 150 TABLET | Refills: 0 | Status: SHIPPED | OUTPATIENT
Start: 2025-09-13

## 2025-08-04 RX ORDER — HYDROCODONE BITARTRATE AND ACETAMINOPHEN 7.5; 325 MG/1; MG/1
1 TABLET ORAL
Qty: 150 TABLET | Refills: 0 | Status: SHIPPED | OUTPATIENT
Start: 2025-08-14

## 2025-08-14 ENCOUNTER — TELEPHONE (OUTPATIENT)
Dept: ORTHOPEDIC SURGERY | Facility: CLINIC | Age: 65
End: 2025-08-14
Payer: MEDICARE

## 2025-08-30 DIAGNOSIS — J44.9 CHRONIC OBSTRUCTIVE PULMONARY DISEASE, UNSPECIFIED COPD TYPE: ICD-10-CM

## 2025-08-30 RX ORDER — FLUTICASONE FUROATE, UMECLIDINIUM BROMIDE AND VILANTEROL TRIFENATATE 200; 62.5; 25 UG/1; UG/1; UG/1
1 POWDER RESPIRATORY (INHALATION) DAILY
Qty: 60 EACH | Refills: 3 | Status: SHIPPED | OUTPATIENT
Start: 2025-08-30

## (undated) DEVICE — CVR HNDL LT SURG ACCSSRY BLU STRL

## (undated) DEVICE — C-ARM: Brand: UNBRANDED

## (undated) DEVICE — PK BASIC SPINE 50

## (undated) DEVICE — SUT MNCRYL 4/0 PS2 18 IN

## (undated) DEVICE — SOLUTION,WATER,IRRIGATION,1000ML,STERILE: Brand: MEDLINE

## (undated) DEVICE — Device

## (undated) DEVICE — PENCL HND ROCKRSWTCH HOLSTR EZ CLEAN TP CRD 10FT

## (undated) DEVICE — DRP C/ARMOR

## (undated) DEVICE — SOL NACL 0.9PCT 1000ML

## (undated) DEVICE — GLV SURG BIOGEL LTX PF 8

## (undated) DEVICE — DRSNG WND GZ PAD BORDERED 4X8IN STRL

## (undated) DEVICE — 1010 S-DRAPE TOWEL DRAPE 10/BX: Brand: STERI-DRAPE™

## (undated) DEVICE — ELECTRD BLD EZ CLN MOD XLNG 2.75IN

## (undated) DEVICE — 3.0MM PRECISION NEURO (MATCH HEAD)

## (undated) DEVICE — PROB SCRW STIM PEDCL DISP

## (undated) DEVICE — PRECISION MATCH HEAD

## (undated) DEVICE — KT SURG TURNOVER 050

## (undated) DEVICE — SUT VIC 0 CT1 CR8 18IN J840D

## (undated) DEVICE — 3M™ STERI-DRAPE™ INSTRUMENT POUCH 9097: Brand: 3M™ STERI-DRAPE™

## (undated) DEVICE — COVADERM: Brand: DEROYAL

## (undated) DEVICE — SPONGE,LAP,12"X12",XR,ST,5/PK,40PK/CS: Brand: MEDLINE

## (undated) DEVICE — STERILE PACKAGE WITH CANNULA: Brand: LITE BIO DELIVERY SYSTEM

## (undated) DEVICE — SPONGE,NEURO,1"X1",XR,STRL,LF,10/PK: Brand: MEDLINE

## (undated) DEVICE — CODMAN® SURGICAL PATTIES 1" X 1" (2.54CM X 2.54CM): Brand: CODMAN®

## (undated) DEVICE — PK ATS CUST W CARDIOTOMY RESEVOIR

## (undated) DEVICE — DRP OPMI 326071

## (undated) DEVICE — SMOKE EVACUATION TUBING WITH 7/8 IN TO 1/4 IN REDUCER: Brand: BUFFALO FILTER

## (undated) DEVICE — DRSNG WND BORDR/ADHS NONADHR/GZ LF 4X4IN STRL

## (undated) DEVICE — CONTAINER,SPECIMEN,OR STERILE,4OZ: Brand: MEDLINE

## (undated) DEVICE — CREO® THREADED 8.5 X 45MM POLYAXIAL SCREW
Type: IMPLANTABLE DEVICE | Site: SPINE LUMBAR | Status: NON-FUNCTIONAL
Brand: CREO
Removed: 2023-02-28

## (undated) DEVICE — 6.0MM PRECISION ROUND

## (undated) DEVICE — DECANTER: Brand: UNBRANDED

## (undated) DEVICE — ANTIBACTERIAL UNDYED BRAIDED (POLYGLACTIN 910), SYNTHETIC ABSORBABLE SUTURE: Brand: COATED VICRYL

## (undated) DEVICE — INTENDED FOR TISSUE SEPARATION, AND OTHER PROCEDURES THAT REQUIRE A SHARP SURGICAL BLADE TO PUNCTURE OR CUT.: Brand: BARD-PARKER ® CARBON RIB-BACK BLADES

## (undated) DEVICE — SHEET, DRAPE, SPLIT, STERILE: Brand: MEDLINE

## (undated) DEVICE — BLOOD TRANSFUSION FILTER: Brand: HAEMONETICS

## (undated) DEVICE — CELLERATERX SURGICAL HYDROLYZED COLLAGEN 1G TYPE I BOVINE COLLAGEN FOR CHRONIC AND ACUTE WOUNDS, PARTIAL AND FULL THICKNESS WOUNDS, PRESSURE INJURIES I-IV, VENOUS STASIS ULCERS, ARTERIAL ULCERS, DIABETIC ULCERS, TRAUMATIC WOUNDS, FIRST AND SECOND DEGREE BURNS, SUPERFICIAL WOUNDS AND SURGICAL WOUNDS. STERILE UNTIL OPENED.  STORE AT ROOM TEMPERATURE. FOR USE ON MODERATELY TO HEAVILY EXUDATIVE WOUNDS. CLEANSE THE WOUND PER FACILITY PROTOCOL.  APPLY CELLERATERX POWDER OVER THE ENTIRE WOUND BED AND THE EDGES OF THE WOUND.  COVER WITH AN APPROPRIATE DRESSING.  REAPPLY 2-3 TIMES A WEEK, OR WITH EACH DRESSING CHANGE, TAKING CARE NOT TO DISRUPT WOUND SITE. HTTPS://SANARAMEDTECH.COM/SURGICAL/CELLERATERX-SURGICAL/: Brand: CELLERATERX SURGICAL

## (undated) DEVICE — TUBING, SUCTION, 1/4" X 12', STRAIGHT: Brand: MEDLINE

## (undated) DEVICE — BG BLD SYS

## (undated) DEVICE — BIPOLAR SEALER 23-112-1 AQM 6.0: Brand: AQUAMANTYS™

## (undated) DEVICE — BIT DRL EXCELSIUS/GPS HI/SPD 3.5MM DISP

## (undated) DEVICE — SOL ANTISTICK CAUTRY ELECTROLUBE LF

## (undated) DEVICE — UNDERGLV SURG BIOGEL INDICAT PI SZ8 BLU

## (undated) DEVICE — SPHR MARKR STEALTH STATION

## (undated) DEVICE — KNIF BAYO ANNULOTOMY

## (undated) DEVICE — TBG SXN PERFUS W TP

## (undated) DEVICE — KT DRN EVAC WND PVC PCH WTROC RND 10F400

## (undated) DEVICE — UNDERGLV SURG BIOGEL/PI PF SYNTH SURG SZ8.5 BLU 50/BX